# Patient Record
Sex: MALE | Race: WHITE | Employment: FULL TIME | URBAN - METROPOLITAN AREA
[De-identification: names, ages, dates, MRNs, and addresses within clinical notes are randomized per-mention and may not be internally consistent; named-entity substitution may affect disease eponyms.]

---

## 2017-01-17 ENCOUNTER — TELEPHONE (OUTPATIENT)
Dept: FAMILY MEDICINE | Facility: CLINIC | Age: 57
End: 2017-01-17

## 2017-01-17 DIAGNOSIS — R10.13 ABDOMINAL PAIN, EPIGASTRIC: Primary | ICD-10-CM

## 2017-01-17 NOTE — TELEPHONE ENCOUNTER
[facility] phone call message-patient reporting a symptom:     Symptom: Stomach suggestive to stress.    Same Day Visit Offered: Yes, accepted and schedule for 01/20/2017 at 9:00 AM.    Additional comments: Patient states experiencing symptom mentioned above.  Would like a call from nurse to discuss whether it would be best to come in to see Dr. Singleton about medication, or to see a specialist.  Please call patient.  Thank you.    OK to leave message on voice mail? Yes on [phone] or [phone].    Primary language: English      needed? No    Call taken on January 17, 2017 at 10:48 AM by Lisa Alberto

## 2017-01-17 NOTE — TELEPHONE ENCOUNTER
Attempted to reach patient to further discuss. Unable to reach. LVM with name and call back number.    Maggie Villafana RN

## 2017-01-17 NOTE — TELEPHONE ENCOUNTER
Patient returned call. States he was seen by PCP 5-6 weeks ago regarding stomach issues (see 12/8/16 encounter). Reports PCP advised patient start Zantac and other OTC medications. States he still is having the same stomach symptoms with only slight improvement for a short period with Maalox. Patient states plan was originally to try Zantac for 6 week trial  and return.    Patient currently scheduled for appointment with PCP on 1/20/17. Patient wants to know what the plan is moving forward. Patient is wondering if he should follow up with his PCP or if patient should be seen by a specialist.     Advised patient to keep appointment as scheduled. Informed patient I will reach out to PCP to discuss plan moving forward and return call to patient with PCP's recommendation. Patient in agreement.    Maggie Villafana RN

## 2017-01-18 NOTE — TELEPHONE ENCOUNTER
Spoke with PCP. Willing to place referral to [facility]. Called patient to inform. Patient would still like to keep appointment to discuss treatment in meantime with PCP.    Maggie Villafana RN

## 2017-01-20 ENCOUNTER — PRE VISIT (OUTPATIENT)
Dept: GASTROENTEROLOGY | Facility: CLINIC | Age: 57
End: 2017-01-20

## 2017-01-20 ENCOUNTER — OFFICE VISIT (OUTPATIENT)
Dept: FAMILY MEDICINE | Facility: CLINIC | Age: 57
End: 2017-01-20

## 2017-01-20 VITALS
WEIGHT: 229.4 LBS | DIASTOLIC BLOOD PRESSURE: 78 MMHG | TEMPERATURE: 98.5 F | RESPIRATION RATE: 16 BRPM | OXYGEN SATURATION: 96 % | HEIGHT: 72 IN | HEART RATE: 67 BPM | SYSTOLIC BLOOD PRESSURE: 117 MMHG | BODY MASS INDEX: 31.07 KG/M2

## 2017-01-20 DIAGNOSIS — E78.5 HYPERLIPIDEMIA LDL GOAL <70: ICD-10-CM

## 2017-01-20 DIAGNOSIS — K92.9 DIGESTIVE PROBLEMS: Primary | ICD-10-CM

## 2017-01-20 DIAGNOSIS — S89.91XA RIGHT KNEE INJURY, INITIAL ENCOUNTER: ICD-10-CM

## 2017-01-20 NOTE — MR AVS SNAPSHOT
After Visit Summary   1/20/2017    Hong Pool    MRN: [id number]           Patient Information     Date Of Birth          1960        Visit Information        Provider Department      1/20/2017 9:00 AM Jorge Singleton MD [facility]        Today's Diagnoses     Digestive problems    -  1       Care Instructions    Here is the plan from today's visit    1. Digestive problems  - Add metamucil to diet (start daily then change to every other day when stools are at right consistency)   - H Pylori antigen stool; Future - Drop off sample to lab when completed  - omeprazole (PRILOSEC) 20 MG CR capsule; Take 1 capsule (20 mg) by mouth daily  Dispense: 30 capsule; Refill: 1  - GI appointment in Feb 2017    Please call or return to clinic if your symptoms don't go away.    Follow up plan    Thank you for coming to [facility] today.  Lab Testing:  **If you had lab testing today and your results are reassuring or normal they will be mailed to you or sent through [facility] within 7 days.   **If the lab tests need quick action we will call you with the results.  The phone number we will call with results is # [phone] (home) . If this is not the best number please call our clinic and change the number.  Medication Refills:  If you need any refills please call your pharmacy and they will contact us.   If you need to  your refill at a new pharmacy, please contact the new pharmacy directly. The new pharmacy will help you get your medications transferred faster.   Scheduling:  If you have any concerns about today's visit or wish to schedule another appointment please call our office during normal business hours [phone] (8-5:00 M-F)  If a referral was made to a [facility] and you don't get a call from central scheduling please call [phone].  If a Mammogram was ordered for you at [facility] call [phone] to schedule or change your  appointment.  If you had an XRay/CT/Ultrasound/MRI ordered the number is [phone] to schedule or change your radiology appointment.   Medical Concerns:  If you have urgent medical concerns please call [phone] at any time of the day.  If you have a medical emergency please call 911.          Follow-ups after your visit        Your next 10 appointments already scheduled     Feb 08, 2017 11:40 AM   (Arrive by 11:25 AM)   New Patient Visit with Corine Ly MD   [facility] ([facility])    [address] [place] [address]  4th Floor  [facility] [id number] [phone]            Jun 28, 2017  1:00 PM   Lab with  LAB   [facility] ([facility])    [address] [place] [address]  1st [facility] [id number] [phone]            Jun 28, 2017  1:30 PM   (Arrive by 1:15 PM)   RETURN LIPID VISIT with Jaylan Evangelista MD   [facility] ([facility])    [address] [place] [address]  3rd [facility] [id number] [phone]              Future tests that were ordered for you today     Open Future Orders        Priority Expected Expires Ordered    H Pylori antigen stool Routine  2/19/2017 1/20/2017            Who to contact     Please call your clinic at [phone] to:    Ask questions about your health    Make or cancel appointments    Discuss your medicines    Learn about your test results    Speak to your doctor   If you have compliments or concerns about an experience at your clinic, or if you wish to file a complaint, please contact [facility] Patient Relations at [phone] or email us at [email or web address]         Additional Information About Your Visit        Care EveryWhere ID     This is your Care EveryWhere ID. This could be used by other organizations to access your [place] medical records  [id number]        Your Vitals Were     Pulse  Temperature Respirations Height BMI (Body Mass Index) Pulse Oximetry    67 98.5  F (36.9  C) (Oral) 16 6' (182.9 cm) 31.11 kg/m2 96%       Blood Pressure from Last 3 Encounters:   01/20/17 117/78   12/14/16 128/81   12/08/16 134/80    Weight from Last 3 Encounters:   01/20/17 229 lb 6.4 oz (104.055 kg)   12/14/16 235 lb 14.4 oz (107.004 kg)   12/08/16 232 lb 3.2 oz (105.325 kg)                 Today's Medication Changes          These changes are accurate as of: 1/20/17  9:35 AM.  If you have any questions, ask your nurse or doctor.               Start taking these medicines.        Dose/Directions    omeprazole 20 MG CR capsule   Commonly known as:  priLOSEC   Used for:  Digestive problems   Started by:  Jorge Singleton MD        Dose:  20 mg   Take 1 capsule (20 mg) by mouth daily   Quantity:  30 capsule   Refills:  1            Where to get your medicines      These medications were sent to [address] [address]     Phone:  [phone]    - omeprazole 20 MG CR capsule             Primary Care Provider Office Phone # Fax #    Jorge Singleton [phone]       [facility] [address] [address]        Thank you!     Thank you for choosing [facility]  for your care. Our goal is always to provide you with excellent care. Hearing back from our patients is one way we can continue to improve our services. Please take a few minutes to complete the written survey that you may receive in the mail after your visit with us. Thank you!             Your Updated Medication List - Protect others around you: Learn how to safely use, store and throw away your medicines at www.disposemymeds.org.          This list is accurate as of: 1/20/17  9:35 AM.  Always use your most recent med list.                   Brand Name Dispense Instructions for use    alpha-d-galactosidase tablet     540  tablet    Take 2 tablets by mouth 3 times daily (before meals)       aspirin 81 MG EC tablet     90 tablet    Take 1 tablet (81 mg) by mouth daily       Blood Pressure Monitor Kit     1 kit    1 each daily.       [employer] COENZYME Q10 100 MG Caps capsule   Generic drug:  co-enzyme Q-10      Take 1 capsule (100 mg) by mouth daily       ezetimibe 10 MG tablet    ZETIA    90 tablet    Take 1 tablet (10 mg) by mouth daily       ketotifen 0.025 % Soln ophthalmic solution    ZADITOR    1 Bottle    Place 1 drop into both eyes every 12 hours       Lactase 9000 UNITS Chew    LACTAID FAST ACT    270 tablet    Take 1 tablet by mouth daily Chew and swallow 1 tablet with first bite of dairy food prn       MENS MULTIVITAMIN PLUS Tabs     90 tablet    Take 1 tablet by mouth daily       omeprazole 20 MG CR capsule    priLOSEC    30 capsule    Take 1 capsule (20 mg) by mouth daily       potassium citrate 10 MEQ (1080 MG) CR tablet    UROCIT-K    270 tablet    Take 1 tablet (10 mEq) by mouth 3 times daily       pyridOXINE 100 MG Tabs    VITAMIN B6    90 tablet    Take 1 tablet (100 mg) by mouth daily       ranitidine 150 MG tablet    ZANTAC    60 tablet    Take 1 tablet (150 mg) by mouth 2 times daily       STATIN NOT PRESCRIBED (INTENTIONAL)     0 each    1 each continuous Statin not prescribed intentionally due to Intolerance (with supporting documentation of trying a statin at least once within the last 5 years)       vitamin D3 2000 UNITS Caps     90 capsule    Take 1 capsule by mouth daily

## 2017-01-20 NOTE — PATIENT INSTRUCTIONS
Here is the plan from today's visit    1. Digestive problems  - Add metamucil to diet (start daily then change to every other day when stools are at right consistency)   - H Pylori antigen stool; Future - Drop off sample to lab when completed  - omeprazole (PRILOSEC) 20 MG CR capsule; Take 1 capsule (20 mg) by mouth daily  Dispense: 30 capsule; Refill: 1  - GI appointment in Feb 2017    Please call or return to clinic if your symptoms don't go away.    Follow up plan    Thank you for coming to [facility] today.  Lab Testing:  **If you had lab testing today and your results are reassuring or normal they will be mailed to you or sent through [facility] within 7 days.   **If the lab tests need quick action we will call you with the results.  The phone number we will call with results is # [phone] (home) . If this is not the best number please call our clinic and change the number.  Medication Refills:  If you need any refills please call your pharmacy and they will contact us.   If you need to  your refill at a new pharmacy, please contact the new pharmacy directly. The new pharmacy will help you get your medications transferred faster.   Scheduling:  If you have any concerns about today's visit or wish to schedule another appointment please call our office during normal business hours [phone] (8-5:00 M-F)  If a referral was made to a [facility] and you don't get a call from central scheduling please call [phone].  If a Mammogram was ordered for you at [facility] call [phone] to schedule or change your appointment.  If you had an XRay/CT/Ultrasound/MRI ordered the number is [phone] to schedule or change your radiology appointment.   Medical Concerns:  If you have urgent medical concerns please call [phone] at any time of the day.  If you have a medical emergency please call [phone].

## 2017-01-20 NOTE — PROGRESS NOTES
HPI:       Hong Pool is a 56 year old who presents for the following  Patient presents with:  RECHECK: Digestion Issues  Pain: R knee pain X1week since falling on ice.      1. Fall - R knee     Onset: 1 week ago    Injury  Yes Details: slipped on ice and fell forward on knee     Description:   Location(s): Right Knee  Character: Sharp    Intensity: moderate    Progression of Symptoms: better    Accompanying Signs & Symptoms:  Other symptoms: none   History:   Previous similar pain: no      Worsened by    Overuse?: no  Morning Stiffness?:no    Alleviating factors:  Improved by: ice       Therapies Tried and outcome: Nothing         2. Digestion Issues    Description of the problem :Having stomach issues. Complains if discomfort down center of chest. Eating helps.      When did it start?: 6 Weeks     Intensity: moderate, severe    Progression of Symptoms:  same    Therapies Tried Zantac for 6 weeks    What has worked?  Nothing    - Taking Zantac but with no relief, but has some relief when taking with food/snack  - Digestion comes up at random times, not related to any specific activity   - GI consult/appointment scheduled for February 2017  - No longer taking calcium  - H pylori ordered     - Does not sound cardiac  No change in symptoms when going up stairs, or with activity  Actually feels better        3. Zetia  - Has been on Zetia for 4 weeks - prescribed by cardiologist  - Taking Zetia for 8 weeks total due to insurance formulary issue        Adherence and Exercise  Medication side effects: no  How often is a medication missed? Never  Are you able to follow the treatment plan? Yes  Exercise:walking  4-5 days/week for an average of 30-45 minutes     Problem, Medication and Allergy Lists were reviewed and are current.  Patient is an established patient of this clinic.         Review of Systems:   Review of Systems   Negative ROS except as noted above in HPI         Physical Exam:   Patient Vitals for  the past 24 hrs:   BP Temp Temp src Pulse Resp SpO2 Height Weight   01/20/17 0903 117/78 mmHg 98.5  F (36.9  C) Oral 67 16 96 % 6' (182.9 cm) 229 lb 6.4 oz (104.055 kg)     Body mass index is 31.11 kg/(m^2).  Vitals were reviewed and were normal     Physical Exam   Musculoskeletal:        Right knee: Normal.   Scab on lateral side of right knee         Results:     Results for orders placed or performed in visit on 12/09/16   Comprehensive metabolic panel   Result Value Ref Range    Sodium 143 133 - 144 mmol/L    Potassium 4.0 3.4 - 5.3 mmol/L    Chloride 109 94 - 109 mmol/L    Carbon Dioxide 28 20 - 32 mmol/L    Anion Gap 6 3 - 14 mmol/L    Glucose 126 (H) 70 - 99 mg/dL    Urea Nitrogen 21 7 - 30 mg/dL    Creatinine 1.08 0.66 - 1.25 mg/dL    GFR Estimate 71 >60 mL/min/1.7m2    GFR Estimate If Black 85 >60 mL/min/1.7m2    Calcium 8.9 8.5 - 10.1 mg/dL    Bilirubin Total 0.7 0.2 - 1.3 mg/dL    Albumin 3.7 3.4 - 5.0 g/dL    Protein Total 7.3 6.8 - 8.8 g/dL    Alkaline Phosphatase 64 40 - 150 U/L    ALT 63 0 - 70 U/L    AST 28 0 - 45 U/L   Lipid panel reflex to direct LDL   Result Value Ref Range    Cholesterol 250 (H) <200 mg/dL    Triglycerides 423 (H) <150 mg/dL    HDL Cholesterol 44 >39 mg/dL    LDL Cholesterol Calculated  <100 mg/dL     Cannot estimate LDL when triglyceride exceeds 400 mg/dL    Non HDL Cholesterol 206 (H) <130 mg/dL   LDL cholesterol direct   Result Value Ref Range    LDL Cholesterol Direct 146 (H) <100 mg/dL        Assessment and Plan     1. Digestive problems  Unimproved with eliminating potential irritants, using zantac  Check for H. Pylori  Trial of omeprazole  GI consult     - H Pylori antigen stool; Future  - omeprazole (PRILOSEC) 20 MG CR capsule; Take 1 capsule (20 mg) by mouth daily  Dispense: 30 capsule; Refill: 1    2. Right knee injury, initial encounter  Symptomatic care    3. Hyperlipidemia LDL goal <70  On zetia x 1 month  Will check lipids after 1 additional month.  If not  improving, PA for prulent may be stronger         There are no discontinued medications.  Options for treatment and follow-up care were reviewed with the patient. Hong Pool  engaged in the decision making process and verbalized understanding of the options discussed and agreed with the final plan.    Jorge Singleton MD

## 2017-01-20 NOTE — TELEPHONE ENCOUNTER
Received call back from patient. Stated all records are at [facility] with Dr. Singleton only. No outside records.     Closing encounter.

## 2017-01-24 DIAGNOSIS — K92.9 DIGESTIVE PROBLEMS: ICD-10-CM

## 2017-01-25 LAB
H PYLORI AG STL QL IA: NORMAL
MICRO REPORT STATUS: NORMAL
SPECIMEN SOURCE: NORMAL

## 2017-02-08 ENCOUNTER — TELEPHONE (OUTPATIENT)
Dept: GASTROENTEROLOGY | Facility: CLINIC | Age: 57
End: 2017-02-08

## 2017-02-08 ENCOUNTER — OFFICE VISIT (OUTPATIENT)
Dept: GASTROENTEROLOGY | Facility: CLINIC | Age: 57
End: 2017-02-08

## 2017-02-08 VITALS
DIASTOLIC BLOOD PRESSURE: 85 MMHG | HEART RATE: 62 BPM | OXYGEN SATURATION: 96 % | SYSTOLIC BLOOD PRESSURE: 137 MMHG | BODY MASS INDEX: 30.61 KG/M2 | HEIGHT: 72 IN | WEIGHT: 226 LBS

## 2017-02-08 DIAGNOSIS — R10.13 EPIGASTRIC PAIN: Primary | ICD-10-CM

## 2017-02-08 ASSESSMENT — PAIN SCALES - GENERAL: PAINLEVEL: MODERATE PAIN (5)

## 2017-02-08 NOTE — PATIENT INSTRUCTIONS
1. Continue omeprazole.  2. Ok to use antacid as needed for breakthrough discomfort.   3. Schedule an EGD.  4. Follow-up in GI clinic after EGD, 3-4 months.

## 2017-02-08 NOTE — MR AVS SNAPSHOT
After Visit Summary   2/8/2017    Hong Pool    MRN: [id number]           Patient Information     Date Of Birth          1960        Visit Information        Provider Department      2/8/2017 11:40 AM Corine Ly MD [facility]        Today's Diagnoses     Epigastric pain    -  1       Care Instructions    1. Continue omeprazole.  2. Ok to use antacid as needed for breakthrough discomfort.   3. Schedule an EGD.  4. Follow-up in GI clinic after EGD, 3-4 months.          Follow-ups after your visit        Your next 10 appointments already scheduled     Jun 28, 2017  1:00 PM   Lab with  LAB   [facility] ([facility])    [address] [place] [address]  1st [facility] [address] [phone]            Jun 28, 2017  1:30 PM   (Arrive by 1:15 PM)   RETURN LIPID VISIT with Jaylan Evangelista MD   [facility] ([facility])    [address] [place] [address]  3rd [facility] [address] [phone]              Future tests that were ordered for you today     Open Future Orders        Priority Expected Expires Ordered    EXAMEGD Routine 3/25/2017 3/25/2017 2/8/2017            Who to contact     Please call your clinic at [phone] to:    Ask questions about your health    Make or cancel appointments    Discuss your medicines    Learn about your test results    Speak to your doctor   If you have compliments or concerns about an experience at your clinic, or if you wish to file a complaint, please contact [facility] Patient Relations at [phone] or email us at [email or web address]         Additional Information About Your Visit        Care EveryWhere ID     This is your Care EveryWhere ID. This could be used by other organizations to access your [place] medical records  [id number]        Your Vitals Were     Pulse Height BMI (Body Mass Index) Pulse  Oximetry          62 1.829 m (6') 30.64 kg/m2 96%         Blood Pressure from Last 3 Encounters:   02/08/17 137/85   01/20/17 117/78   12/14/16 128/81    Weight from Last 3 Encounters:   02/08/17 102.513 kg (226 lb)   01/20/17 104.055 kg (229 lb 6.4 oz)   12/14/16 107.004 kg (235 lb 14.4 oz)               Primary Care Provider Office Phone # Fax #    Jorge Singleton [phone]       [facility] [address] [address]        Thank you!     Thank you for choosing [facility]  for your care. Our goal is always to provide you with excellent care. Hearing back from our patients is one way we can continue to improve our services. Please take a few minutes to complete the written survey that you may receive in the mail after your visit with us. Thank you!             Your Updated Medication List - Protect others around you: Learn how to safely use, store and throw away your medicines at www.disposemymeds.org.          This list is accurate as of: 2/8/17 12:42 PM.  Always use your most recent med list.                   Brand Name Dispense Instructions for use    alpha-d-galactosidase tablet     540 tablet    Take 2 tablets by mouth 3 times daily (before meals)       aspirin 81 MG EC tablet     90 tablet    Take 1 tablet (81 mg) by mouth daily       Blood Pressure Monitor Kit     1 kit    1 each daily.       [employer]S COENZYME Q10 100 MG Caps capsule   Generic drug:  co-enzyme Q-10      Take 1 capsule (100 mg) by mouth daily       ezetimibe 10 MG tablet    ZETIA    90 tablet    Take 1 tablet (10 mg) by mouth daily       Lactase 9000 UNITS Chew    LACTAID FAST ACT    270 tablet    Take 1 tablet by mouth daily Chew and swallow 1 tablet with first bite of dairy food prn       MENS MULTIVITAMIN PLUS Tabs     90 tablet    Take 1 tablet by mouth daily       omeprazole 20 MG CR capsule    priLOSEC    30 capsule    Take 1 capsule (20 mg) by mouth daily       pyridOXINE  100 MG Tabs    VITAMIN B6    90 tablet    Take 1 tablet (100 mg) by mouth daily       STATIN NOT PRESCRIBED (INTENTIONAL)     0 each    1 each continuous Statin not prescribed intentionally due to Intolerance (with supporting documentation of trying a statin at least once within the last 5 years)       vitamin D3 2000 UNITS Caps     90 capsule    Take 1 capsule by mouth daily

## 2017-02-08 NOTE — Clinical Note
"2/8/2017       RE: Hong Pool  [address]  [facility] [address]     Dear Colleague,    Thank you for referring your patient, Hong Pool, to the [facility] GASTROENTEROLOGY AND IBD at [facility]. Please see a copy of my visit note below.    GI CLINIC VISIT    CC/REFERRING MD:  Jorge Singleton  REASON FOR CONSULTATION:   Mr. Pool is a 56 year old male who I was asked to see in consultation at the request of Dr. Jorge Singleton for   Chief Complaint   Patient presents with     Consult     abd pain       ASSESSMENT/PLAN:  ***    Specialty Problems        Gastroenterology Diagnoses    Calculus of gallbladder without cholecystitis without obstruction                   [facility] {[facility]:[id number]::\"***\"}    Thank you for this consultation.  It was a pleasure to participate in the care of this patient; please contact us with any further questions.  A total of *** minutes was spent with this patient, >50% of which was counseling regarding the above delineated issues.    Corine Ly MD   of Medicine  Division of Gastroenterology, Hepatology and Nutrition  [facility]    ---------------------------------------------------------------------------------------------------  HPI:    Mr. Pool is a 56 year old male with *** and reported lactose intolerance       Started cholest-OFF plant stanol (as couldn't tolerated statins) - bad taste, loss of appetite, bloating overall felt \"odd\" and began to have a vague abdominal discomfort. So stopped using it as well as the potassium citrate he'd been on. Bad taste went away after stopping cholest-OFF, but other symptoms remained.      Seemed to progress - in Dec. Discomfort in upper abdomen to low sternal area, a feeling bloated/distended in upper abdomen similar to his lower abd sx with lactose ingestion. Feels like an \"empty stomach\" dull ache --> progresses to more focal burning pain.      Felt " much better with eating. Not aggravated by palpation. No associated symptoms     Saw [facility] in Dec - put on ranitidine and prn alum hydroxide, mg hydroxide, simethicone combo as needed for discomfort. Helped but not as much as with food.      Cut out EtOH, spicy foods.    Only other new med is Zetia (but this was started after abdominal symptoms).     Now on omeprazole - after 4 days or so feeling a little better.     Stool s  ROS:    GI ROS:  Dysphagia: none   Odynophagia: none  GERD symptoms: rare heartburn, no correlation with current discomfort  Nausea/vomiting: one episode of vomiting in Jan - felt it was a GI illness, did not correlate with discomfort, never recurred  Hematemesis/melena/hematochezia: none  Baseline stools: 1-2 stools daily, formed, soft   Stools were looser while on ranitidine  Eructation: no change from baseline  Flatus: no change from baseline  Abdominal pain: see HPI  Weight loss: none, has some weight gain  NSAIDs: 81 mg ASA daily, denies other NSAIDs  Oral steroids: none    Fevers/chills: none  Headache: none  Vision changes: none  Chest pain/pressure: none  Dyspnea: none  Skin changes/rashes: none  Lymphadenopathy: none  Myalgias/Arthralgias: none  Anxiety/depression: none      PROBLEM LIST  Patient Active Problem List    Diagnosis Date Noted     ASCVD (arteriosclerotic cardiovascular disease) 06/24/2012     Priority: High     CAD (coronary artery disease) 06/22/2012     Priority: High     Kidney stone 12/08/2016     Priority: Medium     Lithotripsy to remove 1 stone, cystoscopy to remove 2nd       Statin intolerance 06/23/2016     Priority: Medium     Calculus of gallbladder without cholecystitis without obstruction 06/21/2016     Priority: Medium     Borderline diabetes mellitus 11/26/2015     Priority: Medium     Coronary artery disease involving coronary bypass graft of native heart without angina pectoris 11/18/2015     Priority: Medium     Hyperlipidemia LDL goal <70 09/07/2012      Priority: Medium     Statin intolerance       Knee pain 01/10/2013     Fatigue 01/08/2013     Adjustment disorder with depressed mood 10/25/2012     Testicular hypofunction 10/25/2012     Problem list name updated by automated process. Provider to review       Simple goiter 10/25/2012     Problem list name updated by automated process. Provider to review       Pain in joint, shoulder region 12/07/2010       PERTINENT PAST MEDICAL HISTORY:  Past Medical History   Diagnosis Date     Depression      Hyperlipidemia      Started on statin 7/2007.  Discontinued 10/07 secondary to muscle aches and fatigue     Shoulder pain      Goiter      With normal TSH     Nephrolithiasis      Coronary artery disease 2012     CABG x4     Coronary artery disease involving coronary bypass graft of native heart without angina pectoris 11/18/2015     Statin intolerance 6/23/2016     Borderline diabetes mellitus 11/26/2015       PREVIOUS SURGERIES:  Past Surgical History   Procedure Laterality Date     Bypass graft artery coronary  6/24/2012     Procedure: BYPASS GRAFT ARTERY CORONARY;  Median Sternotomy, Coronary Artery Bypass Grafting x 4 using Left Internal Mammary and Left Saphenous Vein  with Endovein Harvesting. On Pump Oxygenation;  Surgeon: Genesis Larsen MD;  Location: [facility] OR       PREVIOUS ENDOSCOPY:  ***    ALLERGIES:     Allergies   Allergen Reactions     Lidocaine Rash     Break out     Band-Aid Anti-Itch      Cholestoff Complete [Plant Sterol Stanol-Pantethine] Other (See Comments)     Severe stomach pain     Lactose Diarrhea     Zocor [Simvastatin - High Dose] Other (See Comments)     Muscle aches/soreness, confusion, disorganized thinking       PERTINENT MEDICATIONS:  Current Outpatient Prescriptions   Medication     omeprazole (PRILOSEC) 20 MG CR capsule     ezetimibe (ZETIA) 10 MG tablet     alpha-d-galactosidase (BEANO) tablet     Lactase (LACTAID FAST ACT) 9000 UNITS CHEW     Cholecalciferol (VITAMIN D3) 2000  UNITS CAPS     pyridOXINE (VITAMIN B6) 100 MG TABS     co-enzyme Q-10 ([employer] COENZYME Q10) 100 MG CAPS     Multiple Vitamins-Minerals (MENS MULTIVITAMIN PLUS) TABS     aspirin 81 MG EC tablet     Blood Pressure Monitor KIT     STATIN NOT PRESCRIBED, INTENTIONAL,     No current facility-administered medications for this visit.       SOCIAL HISTORY:  Social History     Social History     Marital Status:      Spouse Name: N/A     Number of Children: N/A     Years of Education: N/A     Occupational History     Not on file.     Social History Main Topics     Smoking status: Never Smoker      Smokeless tobacco: Never Used     Alcohol Use: Yes      Comment: Occassional     Drug Use: No     Sexual Activity: Yes     Other Topics Concern     Not on file     Social History Narrative    .        FAMILY HISTORY:  Family History   Problem Relation Age of Onset     C.A.D. Other      Uncle     DIABETES Brother      CEREBROVASCULAR DISEASE Brother      Cardiovascular Sister      tachyarrhythmia       Past/family/social history reviewed and no changes    PHYSICAL EXAMINATION:  Vitals /85 mmHg  Pulse 62  Ht 1.829 m (6')  Wt 102.513 kg (226 lb)  BMI 30.64 kg/m2  SpO2 96%   Wt   Wt Readings from Last 2 Encounters:   02/08/17 102.513 kg (226 lb)   01/20/17 104.055 kg (229 lb 6.4 oz)      Gen: Pt sitting up on exam table in NAD, interactive and cooperative on exam***  Eyes: sclerae anicteric, no injection***  ENT:  OP clear, MMM***  Cardiac: RRR, nl S1, S2, radial pulses +2 b/l, no peripheral edema***  Resp: Clear on anterior exam***  GI: Normoactive BS, abd soft, flat, nontender***  Skin: Warm, dry, no jaundice, nails appear healthy***  Lymph: no submandibular, no cervical, and no supraclavicular LAD***  Neuro: alert, oriented, answers questions appropriately***      PERTINENT STUDIES:    Orders Only on 01/24/2017   Component Date Value Ref Range Status     Specimen Description 01/24/2017 Feces   Final     H Pylori  Antigen 01/24/2017    Final                    Value:Negative for Helicobacter pylori antigen by enzyme immunoassay. A negative   result indicates the absence of H. pylori antigen or that the level of antigen   is below the level of detection.       Micro Report Status 01/24/2017 FINAL 01/25/2017   Final               Again, thank you for allowing me to participate in the care of your patient.      Sincerely,    Corine Ly MD

## 2017-02-08 NOTE — LETTER
2/8/2017      RE: Hong Pool  [address]  [facility] [address]       GI CLINIC VISIT    CC/REFERRING MD:  Jorge Singleton  REASON FOR CONSULTATION:   Mr. Pool is a 56 year old male who I was asked to see in consultation at the request of Dr. Jorge Singleton for   Chief Complaint   Patient presents with     Consult     abd pain       ASSESSMENT/PLAN:  (R10.13) Epigastric pain  (primary encounter diagnosis)  Comment:    Epigastric/substernal pain resolves with eating and, more recently, improved (but not resolved) with PPI. Denies odynophagia, dysphagia, overt signs of GI bleeding. Denies any weight loss. Chronic ASA use does place patient at risk of PUD. H pylori a possibility as well. Would be an unusual presentation for celiac disease; most recent hgb 13.9.    Clinical history is not suggestive of GB disease, though noted history of cholelithiasis.     Will start with endoscopic evaluation with planned biopsies. Procedure discussed with patient and his wife. Questions addressed.   Plan:   1. Continue omeprazole.  2. Ok to use antacid as needed for breakthrough discomfort.   3. Schedule an EGD at earliest convenience.       RTC after EGD, in ~3 months    Thank you for this consultation.  It was a pleasure to participate in the care of this patient; please contact us with any further questions.  A total of 35 minutes was spent with this patient, >50% of which was counseling regarding the above delineated issues.    Corine Ly MD   of Medicine  Division of Gastroenterology, Hepatology and Nutrition  [facility]    ---------------------------------------------------------------------------------------------------  HPI:    Mr. Pool is a 56 year old male with CAD s/p 2v CABG, HL, MDD, cholelithiasis, nephrolithiasis, borderline diabetes and reported lactose intolerance who was referred to GI clinic for evaluation of substernal pain.      Mr. Pool believes the  "symptoms began in Nov 2016. He ties it to the time he began Cholest-OFF plant stanol. With these pills he reported a \"bad\" taste, loss of appetite, and a vague abdominal discomfort associated with bloating. He discontinued the Cholest-OFF as well as potassium chloride which he been on for some time prior. The \"bad\" taste resolved, though his other symptoms remained and seemed to progress throughout Dec 2016.      In December, Mr. Pool felt that his vague abdominal pain was more focused in the upper abdomen and substernal area (primarily at the level of his xiphoid process). This pain is described as \"like an empty stomach\" and a dull ache which progressed to a more focal burning pain. The discomfort is improved with eating. It does not worsen with direct palpation/pressure and Mr. Pool denies any associated symptoms.     He was seen at his primary care clinic, [facility], in December and placed on ranitidine and prn aluminum hydroxide/Mg hydroxide/simethicone combo. These both helped to some degree, but did not relieve the pain as eating does. He cut our alcohol and spicy foods with no marked change. He returned to his primary care clinic and omeprazole was started. He has only been on this for 3-4 days and feels that it is helping more than other interventions though his symptoms have not fully resolved.        Mr. Pool is on a daily 81 mg ASA, but denies use of any other NSAIDs. He has not been on any oral steroids as of late. His only other new medication is Zetia and he states this was started weeks after his abdominal symptoms began.     Please see GI ROS and systemic ROS below for futher details.     ROS:    GI ROS:  Dysphagia: none   Odynophagia: none  GERD symptoms: rare heartburn, no correlation with current discomfort  Nausea/vomiting: one episode of vomiting in Jan - felt it was a GI illness, did not correlate with discomfort, never recurred  Hematemesis/melena/hematochezia: none  Baseline stools: " 1-2 stools daily, formed, soft   Stools were looser while on ranitidine, now back to baseline  Eructation: no change from baseline  Flatus: no change from baseline  Abdominal pain: see HPI  Weight loss: none, has some weight gain  NSAIDs: 81 mg ASA daily, denies other NSAIDs  Oral steroids: none    Fevers/chills: none  Headache: none  Vision changes: none  Chest pain/pressure: none  Dyspnea: none  Skin changes/rashes: none  Lymphadenopathy: none  Myalgias/Arthralgias: none  Anxiety/depression: none      PROBLEM LIST  Patient Active Problem List    Diagnosis Date Noted     ASCVD (arteriosclerotic cardiovascular disease) 06/24/2012     Priority: High     CAD (coronary artery disease) 06/22/2012     Priority: High     Kidney stone 12/08/2016     Priority: Medium     Lithotripsy to remove 1 stone, cystoscopy to remove 2nd       Statin intolerance 06/23/2016     Priority: Medium     Calculus of gallbladder without cholecystitis without obstruction 06/21/2016     Priority: Medium     Borderline diabetes mellitus 11/26/2015     Priority: Medium     Coronary artery disease involving coronary bypass graft of native heart without angina pectoris 11/18/2015     Priority: Medium     Hyperlipidemia LDL goal <70 09/07/2012     Priority: Medium     Statin intolerance       Knee pain 01/10/2013     Fatigue 01/08/2013     Adjustment disorder with depressed mood 10/25/2012     Testicular hypofunction 10/25/2012     Problem list name updated by automated process. Provider to review       Simple goiter 10/25/2012     Problem list name updated by automated process. Provider to review       Pain in joint, shoulder region 12/07/2010       PERTINENT PAST MEDICAL HISTORY:  Past Medical History   Diagnosis Date     Depression      Hyperlipidemia      Started on statin 7/2007.  Discontinued 10/07 secondary to muscle aches and fatigue     Shoulder pain      Goiter      With normal TSH     Nephrolithiasis      Coronary artery disease 2012      CABG x4     Coronary artery disease involving coronary bypass graft of native heart without angina pectoris 11/18/2015     Statin intolerance 6/23/2016     Borderline diabetes mellitus 11/26/2015       PREVIOUS SURGERIES:  Past Surgical History   Procedure Laterality Date     Bypass graft artery coronary  6/24/2012     Procedure: BYPASS GRAFT ARTERY CORONARY;  Median Sternotomy, Coronary Artery Bypass Grafting x 4 using Left Internal Mammary and Left Saphenous Vein  with Endovein Harvesting. On Pump Oxygenation;  Surgeon: Genesis Larsen MD;  Location:  OR       ALLERGIES:     Allergies   Allergen Reactions     Lidocaine Rash     Break out     Band-Aid Anti-Itch      Cholestoff Complete [Plant Sterol Stanol-Pantethine] Other (See Comments)     Severe stomach pain     Lactose Diarrhea     Zocor [Simvastatin - High Dose] Other (See Comments)     Muscle aches/soreness, confusion, disorganized thinking       PERTINENT MEDICATIONS:  Current Outpatient Prescriptions   Medication     omeprazole (PRILOSEC) 20 MG CR capsule     ezetimibe (ZETIA) 10 MG tablet     alpha-d-galactosidase (BEANO) tablet     Lactase (LACTAID FAST ACT) 9000 UNITS CHEW     Cholecalciferol (VITAMIN D3) 2000 UNITS CAPS     pyridOXINE (VITAMIN B6) 100 MG TABS     co-enzyme Q-10 (CVS COENZYME Q10) 100 MG CAPS     Multiple Vitamins-Minerals (MENS MULTIVITAMIN PLUS) TABS     aspirin 81 MG EC tablet     Blood Pressure Monitor KIT     STATIN NOT PRESCRIBED, INTENTIONAL,     No current facility-administered medications for this visit.       SOCIAL HISTORY:  Social History     Social History     Marital Status:      Spouse Name: N/A     Number of Children: N/A     Years of Education: N/A     Occupational History     Not on file.     Social History Main Topics     Smoking status: Never Smoker      Smokeless tobacco: Never Used     Alcohol Use: Yes      Comment: Occassional     Drug Use: No     Sexual Activity: Yes     Other Topics Concern     Not  on file     Social History Narrative    .        FAMILY HISTORY:  Family History   Problem Relation Age of Onset     C.A.D. Other      Uncle     DIABETES Brother      CEREBROVASCULAR DISEASE Brother      Cardiovascular Sister      tachyarrhythmia       Past/family/social history reviewed and no changes    PHYSICAL EXAMINATION:  Vitals /85 mmHg  Pulse 62  Ht 1.829 m (6')  Wt 102.513 kg (226 lb)  BMI 30.64 kg/m2  SpO2 96%   Wt   Wt Readings from Last 2 Encounters:   02/08/17 102.513 kg (226 lb)   01/20/17 104.055 kg (229 lb 6.4 oz)   Gen: Pt sitting up on exam table in NAD, interactive and cooperative on exam  Eyes: sclerae anicteric, no injection  ENT:  OP clear, MMM  Cardiac: RRR, nl S1, S2, radial pulses +2 b/l, no peripheral edema  Resp: Clear on anterior exam  GI: Normoactive BS, abd soft, flat, nontender  Skin: Warm, dry, no jaundice, nails appear healthy  Lymph: no submandibular, no cervical, and no supraclavicular LAD  Neuro: alert, oriented, answers questions appropriately      PERTINENT STUDIES:    Orders Only on 01/24/2017   Component Date Value Ref Range Status     Specimen Description 01/24/2017 Feces   Final     H Pylori Antigen 01/24/2017    Final                    Value:Negative for Helicobacter pylori antigen by enzyme immunoassay. A negative   result indicates the absence of H. pylori antigen or that the level of antigen   is below the level of detection.       Micro Report Status 01/24/2017 FINAL 01/25/2017   Final         Corine Ly MD

## 2017-02-08 NOTE — PROGRESS NOTES
GI CLINIC VISIT    CC/REFERRING MD:  Jorge Singleton  REASON FOR CONSULTATION:   Mr. Pool is a 56 year old male who I was asked to see in consultation at the request of Dr. Jorge Singleton for   Chief Complaint   Patient presents with     Consult     abd pain       ASSESSMENT/PLAN:  (R10.13) Epigastric pain  (primary encounter diagnosis)  Comment:    Epigastric/substernal pain resolves with eating and, more recently, improved (but not resolved) with PPI. Denies odynophagia, dysphagia, overt signs of GI bleeding. Denies any weight loss. Chronic ASA use does place patient at risk of PUD. H pylori a possibility as well. Would be an unusual presentation for celiac disease; most recent hgb 13.9.    Clinical history is not suggestive of GB disease, though noted history of cholelithiasis.     Will start with endoscopic evaluation with planned biopsies. Procedure discussed with patient and his wife. Questions addressed.   Plan:   1. Continue omeprazole.  2. Ok to use antacid as needed for breakthrough discomfort.   3. Schedule an EGD at earliest convenience.       RTC after EGD, in ~3 months    Thank you for this consultation.  It was a pleasure to participate in the care of this patient; please contact us with any further questions.  A total of 35 minutes was spent with this patient, >50% of which was counseling regarding the above delineated issues.    Corine Ly MD   of Medicine  Division of Gastroenterology, Hepatology and Nutrition  [facility]    ---------------------------------------------------------------------------------------------------  HPI:    Mr. Pool is a 56 year old male with CAD s/p 2v CABG, HL, MDD, cholelithiasis, nephrolithiasis, borderline diabetes and reported lactose intolerance who was referred to GI clinic for evaluation of substernal pain.      Mr. Pool believes the symptoms began in Nov 2016. He ties it to the time he began Cholest-OFF plant stanol.  "With these pills he reported a \"bad\" taste, loss of appetite, and a vague abdominal discomfort associated with bloating. He discontinued the Cholest-OFF as well as potassium chloride which he been on for some time prior. The \"bad\" taste resolved, though his other symptoms remained and seemed to progress throughout Dec 2016.      In December, Mr. Pool felt that his vague abdominal pain was more focused in the upper abdomen and substernal area (primarily at the level of his xiphoid process). This pain is described as \"like an empty stomach\" and a dull ache which progressed to a more focal burning pain. The discomfort is improved with eating. It does not worsen with direct palpation/pressure and Mr. Pool denies any associated symptoms.     He was seen at his primary care clinic, [facility], in December and placed on ranitidine and prn aluminum hydroxide/Mg hydroxide/simethicone combo. These both helped to some degree, but did not relieve the pain as eating does. He cut our alcohol and spicy foods with no marked change. He returned to his primary care clinic and omeprazole was started. He has only been on this for 3-4 days and feels that it is helping more than other interventions though his symptoms have not fully resolved.        Mr. Pool is on a daily 81 mg ASA, but denies use of any other NSAIDs. He has not been on any oral steroids as of late. His only other new medication is Zetia and he states this was started weeks after his abdominal symptoms began.     Please see GI ROS and systemic ROS below for futher details.     ROS:    GI ROS:  Dysphagia: none   Odynophagia: none  GERD symptoms: rare heartburn, no correlation with current discomfort  Nausea/vomiting: one episode of vomiting in Jan - felt it was a GI illness, did not correlate with discomfort, never recurred  Hematemesis/melena/hematochezia: none  Baseline stools: 1-2 stools daily, formed, soft   Stools were looser while on ranitidine, now back to " baseline  Eructation: no change from baseline  Flatus: no change from baseline  Abdominal pain: see HPI  Weight loss: none, has some weight gain  NSAIDs: 81 mg ASA daily, denies other NSAIDs  Oral steroids: none    Fevers/chills: none  Headache: none  Vision changes: none  Chest pain/pressure: none  Dyspnea: none  Skin changes/rashes: none  Lymphadenopathy: none  Myalgias/Arthralgias: none  Anxiety/depression: none      PROBLEM LIST  Patient Active Problem List    Diagnosis Date Noted     ASCVD (arteriosclerotic cardiovascular disease) 06/24/2012     Priority: High     CAD (coronary artery disease) 06/22/2012     Priority: High     Kidney stone 12/08/2016     Priority: Medium     Lithotripsy to remove 1 stone, cystoscopy to remove 2nd       Statin intolerance 06/23/2016     Priority: Medium     Calculus of gallbladder without cholecystitis without obstruction 06/21/2016     Priority: Medium     Borderline diabetes mellitus 11/26/2015     Priority: Medium     Coronary artery disease involving coronary bypass graft of native heart without angina pectoris 11/18/2015     Priority: Medium     Hyperlipidemia LDL goal <70 09/07/2012     Priority: Medium     Statin intolerance       Knee pain 01/10/2013     Fatigue 01/08/2013     Adjustment disorder with depressed mood 10/25/2012     Testicular hypofunction 10/25/2012     Problem list name updated by automated process. Provider to review       Simple goiter 10/25/2012     Problem list name updated by automated process. Provider to review       Pain in joint, shoulder region 12/07/2010       PERTINENT PAST MEDICAL HISTORY:  Past Medical History   Diagnosis Date     Depression      Hyperlipidemia      Started on statin 7/2007.  Discontinued 10/07 secondary to muscle aches and fatigue     Shoulder pain      Goiter      With normal TSH     Nephrolithiasis      Coronary artery disease 2012     CABG x4     Coronary artery disease involving coronary bypass graft of native heart  without angina pectoris 11/18/2015     Statin intolerance 6/23/2016     Borderline diabetes mellitus 11/26/2015       PREVIOUS SURGERIES:  Past Surgical History   Procedure Laterality Date     Bypass graft artery coronary  6/24/2012     Procedure: BYPASS GRAFT ARTERY CORONARY;  Median Sternotomy, Coronary Artery Bypass Grafting x 4 using Left Internal Mammary and Left Saphenous Vein  with Endovein Harvesting. On Pump Oxygenation;  Surgeon: Genesis Larsen MD;  Location: [facility] OR       ALLERGIES:     Allergies   Allergen Reactions     Lidocaine Rash     Break out     Band-Aid Anti-Itch      Cholestoff Complete [Plant Sterol Stanol-Pantethine] Other (See Comments)     Severe stomach pain     Lactose Diarrhea     Zocor [Simvastatin - High Dose] Other (See Comments)     Muscle aches/soreness, confusion, disorganized thinking       PERTINENT MEDICATIONS:  Current Outpatient Prescriptions   Medication     omeprazole (PRILOSEC) 20 MG CR capsule     ezetimibe (ZETIA) 10 MG tablet     alpha-d-galactosidase (BEANO) tablet     Lactase (LACTAID FAST ACT) 9000 UNITS CHEW     Cholecalciferol (VITAMIN D3) 2000 UNITS CAPS     pyridOXINE (VITAMIN B6) 100 MG TABS     co-enzyme Q-10 (CVS COENZYME Q10) 100 MG CAPS     Multiple Vitamins-Minerals (MENS MULTIVITAMIN PLUS) TABS     aspirin 81 MG EC tablet     Blood Pressure Monitor KIT     STATIN NOT PRESCRIBED, INTENTIONAL,     No current facility-administered medications for this visit.       SOCIAL HISTORY:  Social History     Social History     Marital Status:      Spouse Name: N/A     Number of Children: N/A     Years of Education: N/A     Occupational History     Not on file.     Social History Main Topics     Smoking status: Never Smoker      Smokeless tobacco: Never Used     Alcohol Use: Yes      Comment: Occassional     Drug Use: No     Sexual Activity: Yes     Other Topics Concern     Not on file     Social History Narrative    .        FAMILY HISTORY:  Family  History   Problem Relation Age of Onset     C.A.D. Other      Uncle     DIABETES Brother      CEREBROVASCULAR DISEASE Brother      Cardiovascular Sister      tachyarrhythmia       Past/family/social history reviewed and no changes    PHYSICAL EXAMINATION:  Vitals /85 mmHg  Pulse 62  Ht 1.829 m (6')  Wt 102.513 kg (226 lb)  BMI 30.64 kg/m2  SpO2 96%   Wt   Wt Readings from Last 2 Encounters:   02/08/17 102.513 kg (226 lb)   01/20/17 104.055 kg (229 lb 6.4 oz)   Gen: Pt sitting up on exam table in NAD, interactive and cooperative on exam  Eyes: sclerae anicteric, no injection  ENT:  OP clear, MMM  Cardiac: RRR, nl S1, S2, radial pulses +2 b/l, no peripheral edema  Resp: Clear on anterior exam  GI: Normoactive BS, abd soft, flat, nontender  Skin: Warm, dry, no jaundice, nails appear healthy  Lymph: no submandibular, no cervical, and no supraclavicular LAD  Neuro: alert, oriented, answers questions appropriately      PERTINENT STUDIES:    Orders Only on 01/24/2017   Component Date Value Ref Range Status     Specimen Description 01/24/2017 Feces   Final     H Pylori Antigen 01/24/2017    Final                    Value:Negative for Helicobacter pylori antigen by enzyme immunoassay. A negative   result indicates the absence of H. pylori antigen or that the level of antigen   is below the level of detection.       Micro Report Status 01/24/2017 FINAL 01/25/2017   Final

## 2017-02-08 NOTE — NURSING NOTE
Chief Complaint   Patient presents with     Consult     abd pain       Filed Vitals:    02/08/17 1158   BP: 137/85   Pulse: 62   Height: 1.829 m (6')   Weight: 102.513 kg (226 lb)   SpO2: 96%       Body mass index is 30.64 kg/(m^2).

## 2017-02-14 ENCOUNTER — TELEPHONE (OUTPATIENT)
Dept: GASTROENTEROLOGY | Facility: CLINIC | Age: 57
End: 2017-02-14

## 2017-02-15 DIAGNOSIS — Z78.9 STATIN INTOLERANCE: ICD-10-CM

## 2017-02-15 DIAGNOSIS — R73.03 BORDERLINE DIABETES MELLITUS: ICD-10-CM

## 2017-02-15 DIAGNOSIS — E78.5 HYPERLIPIDEMIA LDL GOAL <70: ICD-10-CM

## 2017-02-15 LAB
ALBUMIN SERPL-MCNC: 4 G/DL (ref 3.4–5)
ALP SERPL-CCNC: 61 U/L (ref 40–150)
ALT SERPL W P-5'-P-CCNC: 59 U/L (ref 0–70)
ANION GAP SERPL CALCULATED.3IONS-SCNC: 8 MMOL/L (ref 3–14)
AST SERPL W P-5'-P-CCNC: 32 U/L (ref 0–45)
BILIRUB SERPL-MCNC: 1.6 MG/DL (ref 0.2–1.3)
BUN SERPL-MCNC: 19 MG/DL (ref 7–30)
CALCIUM SERPL-MCNC: 8.8 MG/DL (ref 8.5–10.1)
CHLORIDE SERPL-SCNC: 105 MMOL/L (ref 94–109)
CHOLEST SERPL-MCNC: 199 MG/DL
CO2 SERPL-SCNC: 27 MMOL/L (ref 20–32)
CREAT SERPL-MCNC: 1.05 MG/DL (ref 0.66–1.25)
GFR SERPL CREATININE-BSD FRML MDRD: 73 ML/MIN/1.7M2
GLUCOSE SERPL-MCNC: 113 MG/DL (ref 70–99)
HBA1C MFR BLD: 5.9 % (ref 4.3–6)
HDLC SERPL-MCNC: 52 MG/DL
LDLC SERPL CALC-MCNC: 109 MG/DL
NONHDLC SERPL-MCNC: 147 MG/DL
POTASSIUM SERPL-SCNC: 4.2 MMOL/L (ref 3.4–5.3)
PROT SERPL-MCNC: 7.4 G/DL (ref 6.8–8.8)
SODIUM SERPL-SCNC: 140 MMOL/L (ref 133–144)
TRIGL SERPL-MCNC: 189 MG/DL

## 2017-02-27 ENCOUNTER — TELEPHONE (OUTPATIENT)
Dept: FAMILY MEDICINE | Facility: CLINIC | Age: 57
End: 2017-02-27

## 2017-02-27 NOTE — TELEPHONE ENCOUNTER
[facility] phone call message- medication clarification/question:    Full Medication Name: omeprazole (PRILOSEC) 20 MG CR capsule    Question: Patient believes he is experiencing some side effects of the above medication. He reports a constant feeling of being bloated, even when he has not eaten anything for several hours. He states that this does not effect his appetite however, he is still able to eat a full meal. He states that prior to taking the medication he felt stomach pains, which have lessened, and he is wondering if maybe the bloating is more noticeable now that the pain is gone. He also reports that he feels congested and has a runny nose that have not gone away. He realizes this could be unrelated but notes that it is listed as a side effect on the medication's web site. Please call patient to discuss in more detail. Thank you!     Pharmacy confirmed as   [facility] - [address] [address]  Phone: [phone] Fax: [phone]  : Yes    OK to leave a message on voice mail? Yes    Call taken on February 27, 2017 at 2:39 PM by Paul Vargas

## 2017-03-03 ENCOUNTER — HOSPITAL ENCOUNTER (OUTPATIENT)
Facility: CLINIC | Age: 57
End: 2017-03-03
Attending: INTERNAL MEDICINE | Admitting: INTERNAL MEDICINE

## 2017-03-03 ENCOUNTER — TELEPHONE (OUTPATIENT)
Dept: FAMILY MEDICINE | Facility: CLINIC | Age: 57
End: 2017-03-03

## 2017-03-03 NOTE — TELEPHONE ENCOUNTER
Spoke with pt.  Symptoms have recurred.  He affirms that the association between omeprazole and bloating is not definitively established.  He also affirms that ranitidine and antacid meds are not effective.      Plan:  He will retry the omeprazole.  If bloating recurs, call clinic again in 3-4 days and we will try an alternate PPI.

## 2017-03-03 NOTE — TELEPHONE ENCOUNTER
[facility] phone call message- medication clarification/question:    Full Medication Name:    omeprazole (PRILOSEC) 20 MG CR capsule    Question: patient states he was taking above medication more a month and started noticing a bad side effect, so he was told to discontinue using the med. Patient states he was also suppose to take the medication until his surgery in April but is not sure if it would be safe to be off this medication that long.     Pharmacy confirmed as [facility] - [address]: Yes    OK to leave a message on voice mail? Yes     Primary language: English      needed? No    Call taken on March 3, 2017 at 3:41 PM by Jennyfer Aguayo

## 2017-03-03 NOTE — TELEPHONE ENCOUNTER
Attempted to reach patient at number in encounter. Patient is not home. Spoke with wife who provided cell number to reach patient at [phone].    Patient was supposed to have upcoming appointment with Gastro this next week. Patient's appointment got pushed back another 4 weeks. Patient states some of the sharp pains Omeprazole helped with are returning. Per chart review, patient called on 2/27/17 reporting negative side effects of bloating with taking medication. Was informed they can discontinue medication and follow up with Gastro as scheduled.    Patient requesting alternative of Omeprazole be sent to pharmacy to take in meantime to help with burning symptoms since appointment with Gastro pushed back. Patient has tried Zantac in the past and reports not effective.     Message routed to PCP. Please review and send if appropriate.    Maggie Villafana RN

## 2017-03-07 ENCOUNTER — TELEPHONE (OUTPATIENT)
Dept: CARDIOLOGY | Facility: CLINIC | Age: 57
End: 2017-03-07

## 2017-03-08 NOTE — TELEPHONE ENCOUNTER
Prior Authorization [employer] Medication Request  Medication/Dose: Praluent 75 mg   Diagnosis and ICD code: Hyperlipidemia goal LDL <70 [E78.5], Statin intolerance [Z78.9], Coronary artery disease involving coronary bypass graft of native heart without angina pectoris [I25.810], Borderline diabetes mellitus [R73.03]  New/Renewal/Insurance Change PA: New, appeal   Previously Tried and Failed Therapies: Atorvastatin, Pravastatin (patient unable to tolerate 10 mg three times weekly), plant sterols; Presently taking Zetia as recommended in previous prior authorization in 11/2016 request for Praluent approval.  Goal LDL <70 not achieved.     Component      Latest Ref Rng & Units 11/30/2015 6/24/2016   Cholesterol      <200 mg/dL 244 (H) 214 (H)   Triglycerides      <150 mg/dL 342 (H) 234 (H)   HDL Cholesterol      >39 mg/dL 45 42   LDL Cholesterol Calculated      <100 mg/dL 131 (H) 125 (H)   Non HDL Cholesterol      <130 mg/dL 199 (H) 172 (H)   LDL Cholesterol Direct      <100 mg/dL       Component      Latest Ref Rng & Units 12/9/2016   Cholesterol      <200 mg/dL 250 (H)   Triglycerides      <150 mg/dL 423 (H)   HDL Cholesterol      >39 mg/dL 44   LDL Cholesterol Calculated      <100 mg/dL Cannot estimate LDL when triglyceride exceeds 400 mg/dL   Non HDL Cholesterol      <130 mg/dL 206 (H)   LDL Cholesterol Direct      <100 mg/dL 146 (H)     Component      Latest Ref Rng & Units 2/15/2017   Cholesterol      <200 mg/dL 199   Triglycerides      <150 mg/dL 189 (H)   HDL Cholesterol      >39 mg/dL 52   LDL Cholesterol Calculated      <100 mg/dL 109 (H)   Non HDL Cholesterol      <130 mg/dL 147 (H)   LDL Cholesterol Direct      <100 mg/dL

## 2017-03-09 ENCOUNTER — TELEPHONE (OUTPATIENT)
Dept: CARDIOLOGY | Facility: CLINIC | Age: 57
End: 2017-03-09

## 2017-03-09 NOTE — TELEPHONE ENCOUNTER
Pt called to receive results of lipid panel from 2/15. Reviewed results with patient. Pt would like to receive a call from [facility]' care team to discuss his Zetia and his overall plan of care based on these results. Pt said it is ok to leave a voicemail at his home number if necessary.

## 2017-03-13 NOTE — TELEPHONE ENCOUNTER
[employer] Prior Authorization Team   Phone: [phone]  Fax: [phone]    PRIOR AUTHORIZATION DENIED    Medication: Praluent - Denied    Denial Date: 12/8/2016    Denial Rational: According to Rep at [employer] prior authorization department, denial from 12/8/16 (see telephone encounter dated 11/14/16) is still in place and upheld for 180 days from 6/6/17. They will not review another initial request until 6/7/17. If provider feels patient should be on this medication, provider can appeal the denial from 12/8/16.    Appeal Information: Appeal Allowed/Information: Appeals can be faxed to [employer] Appeal Program at fax number [phone]. Please let us know if you would like to appeal this denial. If appealing, please provide a letter of medical necessity.

## 2017-03-23 DIAGNOSIS — K92.9 DIGESTIVE PROBLEMS: ICD-10-CM

## 2017-03-23 NOTE — TELEPHONE ENCOUNTER
Date of last visit at clinic: 1/20/17    Please complete refill and CLOSE ENCOUNTER.  Closing the encounter signifies the refill is complete.

## 2017-04-11 ENCOUNTER — HOSPITAL ENCOUNTER (OUTPATIENT)
Facility: CLINIC | Age: 57
Discharge: HOME | End: 2017-04-11
Attending: INTERNAL MEDICINE | Admitting: INTERNAL MEDICINE
Payer: COMMERCIAL

## 2017-04-11 ENCOUNTER — SURGERY (OUTPATIENT)
Age: 57
End: 2017-04-11

## 2017-04-11 VITALS
BODY MASS INDEX: 31.15 KG/M2 | RESPIRATION RATE: 13 BRPM | SYSTOLIC BLOOD PRESSURE: 131 MMHG | DIASTOLIC BLOOD PRESSURE: 81 MMHG | HEIGHT: 72 IN | WEIGHT: 230 LBS | OXYGEN SATURATION: 93 %

## 2017-04-11 LAB — UPPER GI ENDOSCOPY: NORMAL

## 2017-04-11 PROCEDURE — 43239 EGD BIOPSY SINGLE/MULTIPLE: CPT | Performed by: INTERNAL MEDICINE

## 2017-04-11 PROCEDURE — 25000132 ZZH RX MED GY IP 250 OP 250 PS 637: Performed by: INTERNAL MEDICINE

## 2017-04-11 PROCEDURE — 25000125 ZZHC RX 250: Performed by: INTERNAL MEDICINE

## 2017-04-11 PROCEDURE — 88305 TISSUE EXAM BY PATHOLOGIST: CPT | Performed by: INTERNAL MEDICINE

## 2017-04-11 PROCEDURE — 25000128 H RX IP 250 OP 636: Performed by: INTERNAL MEDICINE

## 2017-04-11 PROCEDURE — G0500 MOD SEDAT ENDO SERVICE >5YRS: HCPCS | Performed by: INTERNAL MEDICINE

## 2017-04-11 PROCEDURE — 88342 IMHCHEM/IMCYTCHM 1ST ANTB: CPT | Performed by: INTERNAL MEDICINE

## 2017-04-11 RX ORDER — FLUMAZENIL 0.1 MG/ML
0.2 INJECTION, SOLUTION INTRAVENOUS
Status: DISCONTINUED | OUTPATIENT
Start: 2017-04-11 | End: 2017-04-11 | Stop reason: HOSPADM

## 2017-04-11 RX ORDER — ONDANSETRON 4 MG/1
4 TABLET, ORALLY DISINTEGRATING ORAL EVERY 6 HOURS PRN
Status: DISCONTINUED | OUTPATIENT
Start: 2017-04-11 | End: 2017-04-11 | Stop reason: HOSPADM

## 2017-04-11 RX ORDER — NALOXONE HYDROCHLORIDE 0.4 MG/ML
.1-.4 INJECTION, SOLUTION INTRAMUSCULAR; INTRAVENOUS; SUBCUTANEOUS
Status: DISCONTINUED | OUTPATIENT
Start: 2017-04-11 | End: 2017-04-11 | Stop reason: HOSPADM

## 2017-04-11 RX ORDER — ONDANSETRON 2 MG/ML
4 INJECTION INTRAMUSCULAR; INTRAVENOUS EVERY 6 HOURS PRN
Status: DISCONTINUED | OUTPATIENT
Start: 2017-04-11 | End: 2017-04-11 | Stop reason: HOSPADM

## 2017-04-11 RX ORDER — FENTANYL CITRATE 50 UG/ML
INJECTION, SOLUTION INTRAMUSCULAR; INTRAVENOUS PRN
Status: DISCONTINUED | OUTPATIENT
Start: 2017-04-11 | End: 2017-04-11 | Stop reason: HOSPADM

## 2017-04-11 RX ADMIN — FENTANYL CITRATE 50 MCG: 50 INJECTION, SOLUTION INTRAMUSCULAR; INTRAVENOUS at 11:08

## 2017-04-11 RX ADMIN — MIDAZOLAM HYDROCHLORIDE 2 MG: 1 INJECTION, SOLUTION INTRAMUSCULAR; INTRAVENOUS at 11:08

## 2017-04-11 RX ADMIN — FENTANYL CITRATE 50 MCG: 50 INJECTION, SOLUTION INTRAMUSCULAR; INTRAVENOUS at 11:12

## 2017-04-11 RX ADMIN — BENZOCAINE 1 SPRAY: 220 SPRAY, METERED PERIODONTAL at 11:08

## 2017-04-11 NOTE — IP AVS SNAPSHOT
MRN:[id number]                      After Visit Summary   4/11/2017    Hong Pool    MRN: [id number]           Thank you!     Thank you for choosing [facility] for your care. Our goal is always to provide you with excellent care. Hearing back from our patients is one way we can continue to improve our services. Please take a few minutes to complete the written survey that you may receive in the mail after you visit with us. Thank you!        Patient Information     Date Of Birth          1960        About your hospital stay     You were admitted on:  April 11, 2017 You last received care in the:  [facility], Endoscopy    You were discharged on:  April 11, 2017       Who to Call     For medical emergencies, please call 911.  For non-urgent questions about your medical care, please call your primary care provider or clinic, [phone]  For questions related to your surgery, please call your surgery clinic        Attending Provider     Provider Corine Desir MD Internal Medicine       Primary Care Provider Office Phone # Fax #    Jorge Singleton [phone]       [facility] [address] [address]        Your next 10 appointments already scheduled     May 03, 2017 10:20 AM CDT   (Arrive by 10:05 AM)   Return Visit with Corine Ly MD   [facility] ([facility])    [address] [place] [address]  [address] [facility] [id number] [phone]            Jun 23, 2017  8:30 AM CDT   Lab with UC LAB   [facility] ([facility])    [address] [place] [address]  [address] [facility] [id number] [phone]            Jun 28, 2017  1:30 PM CDT   (Arrive by 1:15 PM)   RETURN LIPID VISIT with Jaylan Evangelista MD   [facility] ([facility])    [address] [place] [address]  [address] [facility] [address]  "  [phone]              Pending Results     No orders found from 2017 to 2017.            Admission Information     Date & Time Provider Department Dept. Phone    2017 Corine Ly MD [facility] [place], Endoscopy [phone]      Your Vitals Were     Blood Pressure Respirations Height Weight Pulse Oximetry BMI (Body Mass Index)    128/79 11 1.829 m (6') 104.3 kg (230 lb) 98% 31.19 kg/m2      MyChart Information     [employer] lets you send messages to your doctor, view your test results, renew your prescriptions, schedule appointments and more. To sign up, go to [email or web address] . Click on \"Log in\" on the left side of the screen, which will take you to the Welcome page. Then click on \"Sign up Now\" on the right side of the page.     You will be asked to enter the access code listed below, as well as some personal information. Please follow the directions to create your username and password.     Your access code is: [email or web address]  Expires: 7/10/2017 11:45 AM     Your access code will  in 90 days. If you need help or a new code, please call your [place] clinic or [phone].        Care EveryWhere ID     This is your Care EveryWhere ID. This could be used by other organizations to access your [place] medical records  [id number]           Review of your medicines      UNREVIEWED medicines. Ask your doctor about these medicines        Dose / Directions    alpha-d-galactosidase tablet   Used for:  Bloating        Dose:  2 tablet   Take 2 tablets by mouth 3 times daily (before meals)   Quantity:  540 tablet   Refills:  4       aspirin 81 MG EC tablet        Dose:  81 mg   Take 1 tablet (81 mg) by mouth daily   Quantity:  90 tablet   Refills:  4       CVS COENZYME Q10 100 MG Caps capsule   Generic drug:  co-enzyme Q-10        Dose:  100 mg   Take 1 capsule (100 mg) by mouth daily   Refills:  0       ezetimibe 10 MG tablet   Commonly known as:  ZETIA   Used for:  " Hyperlipidemia LDL goal <70        Dose:  10 mg   Take 1 tablet (10 mg) by mouth daily   Quantity:  90 tablet   Refills:  3       Lactase 9000 UNITS Chew   Commonly known as:  LACTAID FAST ACT   Used for:  Lactose intolerance        Dose:  1 tablet   Take 1 tablet by mouth daily Chew and swallow 1 tablet with first bite of dairy food prn   Quantity:  270 tablet   Refills:  4       MENS MULTIVITAMIN PLUS Tabs   Used for:  Preventive measure        Dose:  1 tablet   Take 1 tablet by mouth daily   Quantity:  90 tablet   Refills:  4       omeprazole 20 MG CR capsule   Commonly known as:  priLOSEC   Used for:  Digestive problems        Dose:  20 mg   Take 1 capsule (20 mg) by mouth daily   Quantity:  30 capsule   Refills:  1       pyridOXINE 100 MG Tabs   Commonly known as:  VITAMIN B6   Used for:  Coronary artery disease involving autologous artery coronary bypass graft without angina pectoris        Dose:  100 mg   Take 1 tablet (100 mg) by mouth daily   Quantity:  90 tablet   Refills:  4       STATIN NOT PRESCRIBED (INTENTIONAL)   Used for:  Hyperlipidemia LDL goal <70, Statin intolerance        Dose:  1 each   1 each continuous Statin not prescribed intentionally due to Intolerance (with supporting documentation of trying a statin at least once within the last 5 years)   Quantity:  0 each   Refills:  0       vitamin D3 2000 UNITS Caps   Used for:  Vitamin D deficiency disease        Dose:  1 capsule   Take 1 capsule by mouth daily   Quantity:  90 capsule   Refills:  4         CONTINUE these medicines which have NOT CHANGED        Dose / Directions    Blood Pressure Monitor Kit   Used for:  S/P CABG (coronary artery bypass graft), CAD (coronary artery disease)        Dose:  1 each   1 each daily.   Quantity:  1 kit   Refills:  0                Protect others around you: Learn how to safely use, store and throw away your medicines at www.disposemymeds.org.             Medication List: This is a list of all your  medications and when to take them. Check marks below indicate your daily home schedule. Keep this list as a reference.      Medications           Morning Afternoon Evening Bedtime As Needed    alpha-d-galactosidase tablet   Take 2 tablets by mouth 3 times daily (before meals)                                aspirin 81 MG EC tablet   Take 1 tablet (81 mg) by mouth daily                                Blood Pressure Monitor Kit   1 each daily.                                CVS COENZYME Q10 100 MG Caps capsule   Take 1 capsule (100 mg) by mouth daily   Generic drug:  co-enzyme Q-10                                ezetimibe 10 MG tablet   Commonly known as:  ZETIA   Take 1 tablet (10 mg) by mouth daily                                Lactase 9000 UNITS Chew   Commonly known as:  LACTAID FAST ACT   Take 1 tablet by mouth daily Chew and swallow 1 tablet with first bite of dairy food prn                                MENS MULTIVITAMIN PLUS Tabs   Take 1 tablet by mouth daily                                omeprazole 20 MG CR capsule   Commonly known as:  priLOSEC   Take 1 capsule (20 mg) by mouth daily                                pyridOXINE 100 MG Tabs   Commonly known as:  VITAMIN B6   Take 1 tablet (100 mg) by mouth daily                                STATIN NOT PRESCRIBED (INTENTIONAL)   1 each continuous Statin not prescribed intentionally due to Intolerance (with supporting documentation of trying a statin at least once within the last 5 years)                                vitamin D3 2000 UNITS Caps   Take 1 capsule by mouth daily

## 2017-04-11 NOTE — OR NURSING
EGD with biopsies done under conscious sedation (100mcg fentanyl and 2mg versed), pt tolerate well. VSS on 2L NC throughout procedure, weaned to room air.

## 2017-04-11 NOTE — LETTER
April 13, 2017       TO: Hnog Pool  [address] [facility] [address]       Dear Mr. Pool,    I am writing to inform you of your biopsy results from your recent upper endoscopy.     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 This is a benign condition that does not require any further follow-up.     Your stomach showed some chronic inflammation. The inflammation is often caused by medications (typically non-steroidal anti-inflammatory drugs such as aspirin and ibuprofen), bile, or a bacteria called Helicobacter pylori. There was no evidence of this bacteria in your biopsies. There were no cells concerning for cancer in your biopsies.     As we discussed on the day of your procedure, the polyps seen in your stomach are fundic gland polyps. This is confirmed on the biopsies. They can form in your stomach from longterm proton-pump inhibitor use (such as omeprazole/Prilosec). These are benign and don't require any further intervention.     If you have any questions, please don't hesitate to contact our GI RN Clinic Coordinators at [phone] (select option #3 for nurse line).       Sincerely,     Corine Ly MD    [facility]  Division of Gastroenterology, Hepatology and Nutrition

## 2017-04-11 NOTE — IP AVS SNAPSHOT
[facility], [place], Endoscopy    [address] [facility] [address]    Phone:  [phone]                                       After Visit Summary   4/11/2017    Hong Pool    MRN: [id number]           After Visit Summary Signature Page     I have received my discharge instructions, and my questions have been answered. I have discussed any challenges I see with this plan with the nurse or doctor.    ..........................................................................................................................................  Patient/Patient Representative Signature      ..........................................................................................................................................  Patient Representative Print Name and Relationship to Patient    ..................................................               ................................................  Date                                            Time    ..........................................................................................................................................  Reviewed by Signature/Title    ...................................................              ..............................................  Date                                                            Time

## 2017-04-12 LAB — COPATH REPORT: NORMAL

## 2017-04-27 ENCOUNTER — TELEPHONE (OUTPATIENT)
Dept: GASTROENTEROLOGY | Facility: CLINIC | Age: 57
End: 2017-04-27

## 2017-05-03 ENCOUNTER — OFFICE VISIT (OUTPATIENT)
Dept: GASTROENTEROLOGY | Facility: CLINIC | Age: 57
End: 2017-05-03

## 2017-05-03 VITALS
SYSTOLIC BLOOD PRESSURE: 146 MMHG | HEART RATE: 65 BPM | OXYGEN SATURATION: 97 % | WEIGHT: 233 LBS | HEIGHT: 72 IN | DIASTOLIC BLOOD PRESSURE: 102 MMHG | BODY MASS INDEX: 31.56 KG/M2

## 2017-05-03 DIAGNOSIS — K92.9 DIGESTIVE PROBLEMS: ICD-10-CM

## 2017-05-03 DIAGNOSIS — R10.13 ABDOMINAL PAIN, EPIGASTRIC: Primary | ICD-10-CM

## 2017-05-03 DIAGNOSIS — K30 FUNCTIONAL DYSPEPSIA: ICD-10-CM

## 2017-05-03 DIAGNOSIS — Z86.0100 HISTORY OF COLONIC POLYPS: ICD-10-CM

## 2017-05-03 DIAGNOSIS — K21.9 GASTROESOPHAGEAL REFLUX DISEASE WITHOUT ESOPHAGITIS: ICD-10-CM

## 2017-05-03 ASSESSMENT — PAIN SCALES - GENERAL: PAINLEVEL: SEVERE PAIN (6)

## 2017-05-03 NOTE — PROGRESS NOTES
GI CLINIC VISIT    CC: follow-up       ASSESSMENT/PLAN:  (R10.13) Abdominal pain, epigastric  (primary encounter diagnosis)  (K30) Functional dyspepsia  (K21.9) Gastroesophageal reflux disease without esophagitis  Comment:    Pt with GERD which has resolved with daily PPI. Epigastric pain markedly improved with PPI, though not fully resolved. Though mild chronic gastritis on stomach biopsies, endoscopic exam was without signficant inflammation. Suspect that dyspepsia is a contributor to this discomfort and has responded to PPI. We discussed a trial of BID omeprazole dosing vs adding in an H2 blocker. Mr. Pool would prefer trial of PPI given prior loose stools with use of ranitidine in the past.   Reviewed with patient recent studies regarding longterm PPI medications (with focus on studies regarding CKD, CVA, and dementia). Notably omeprazole with lower risk for CVA as compared with other PPIs and most significantly, the risk on lowest doses of PPI is felt to be none to minimal for CVA. From patient's QOL perspective, Mr. Pool is open to continued daily 20 mg omeprazole and trial of  20 mg BID dosing with re-evaluation. Pt to contact clinic via phone for an update. Pending response could consider intermittent BID dosing for periods of increased symptoms (if pt desires to avoid chronic PPI use).    Future considerations could be for cholestyramine.    Much time was spent reviewing the pathophysiology of GERD and dyspepsia. Pt with many questions regarding role of stress, lifestyle in contributing to symptoms. Diet and lifestyle changes were reviewed as outlined below. Pt is interested in pursuing acupuncture and massage which would be acceptable from GI perspective.   Plan:   1. Continue symptom and diet tracking  2. Increase your omeprazole (Prilosec) to 20 mg twice daily.  3. Contact GI clinic with an update in 4-8 weeks.  4. GERD diet and lifestyle changes   5. Avoid prolonged periods of fasting/empty  "stomach.   6. Avoid overfilling stomach/over-eating.       (Z86.010) History of colonic polyps  Pt is due for surveillance colonoscopy. He is interested in follow-up with the GI provider who previously did his procedure.  We discussed that we're happy to arrange this within the [facility] system if needed.         RTC as needed    It was a pleasure to participate in the care of this patient; please contact us with any further questions.  A total of 20 minutes was spent with this patient, >50% of which was counseling regarding the above delineated issues.    Corine Ly MD   of Medicine  Division of Gastroenterology, Hepatology and Nutrition  [facility]    ---------------------------------------------------------------------------------------------------  HPI:    Mr. Pool is a 56 year old male with CAD s/p 2v CABG, HL, MDD, cholelithiasis, nephrolithiasis, borderline diabetes and reported lactose intolerance who was initially seen in GI clinic for evaluation of substernal and epigastric pain in Feb 2017. He returns today for follow-up.     Summarized from my prior documentation:  Mr. Pool's symptoms began in Nov 2016 with a \"bad\" taste, loss of appetite, and a vague abdominal discomfort associated with bloating. The \"bad-taste\" resolved after discontinuing Cholest-OFF and potassium chloride though the other symptoms remained and progressed. In Dec 2016, Mr. Pool's abdominal pain became focused in the upper abdomen and substernal area (primarily at the level of his xiphoid process). This pain is described as \"like an empty stomach\" and a dull ache which progressed to a more focal burning pain. The discomfort is improved with eating. It does not worsen with direct palpation/pressure and Mr. Pool denies any associated symptoms. It did not respond to ranitidine and prn aluminum hydroxide/Mg hydroxide/simethicone combo. He cut our alcohol and spicy foods with no marked " change. He had just started PPI at the time of our last visit (a couple days of treatment) and thought it might be helpful, though symptoms persisted.        An EGD was undertaken which revealed a normal esophagus, normal stomach with small fundic gland polyps, and normal duodenum with a small focus of gastric heretopia. Gastric biopsies demonstrated mild chronic gastritis with no H pylori and no IM. Since the EGD, we recommended restarting PPI which he has continued every day.      Today, Mr. Pool reports he has been doing well. With restart and continuation of omeprazole his symptoms improved rapidly. He went from episodes of pain multiple times a day to having symptom-free days. He still has occasional discomfort (every few days, rarely has symptoms which occur more than once daily, more short-lived) which are less intense. He does use liquid antacids for these episodes, though they minimally improve things.      His substernal burning/GERD has resolved completely with PPI.      In regards to his GI ROS. He states he had no further episodes of emesis since his viral GI illness in Jan. His intermittent issues with gas/bloating/flatulence and loose stools attributed to lactose intolerance have improved with switching lactase enzyme brands. He states that otherwise he is feeling quite well.     ROS:    Remainder of comprehensive ROS is otherwise negative.    PROBLEM LIST  Patient Active Problem List    Diagnosis Date Noted     ASCVD (arteriosclerotic cardiovascular disease) 06/24/2012     Priority: High     CAD (coronary artery disease) 06/22/2012     Priority: High     Kidney stone 12/08/2016     Priority: Medium     Lithotripsy to remove 1 stone, cystoscopy to remove 2nd       Statin intolerance 06/23/2016     Priority: Medium     Calculus of gallbladder without cholecystitis without obstruction 06/21/2016     Priority: Medium     Borderline diabetes mellitus 11/26/2015     Priority: Medium     Coronary artery  disease involving coronary bypass graft of native heart without angina pectoris 11/18/2015     Priority: Medium     Hyperlipidemia LDL goal <70 09/07/2012     Priority: Medium     Statin intolerance       Knee pain 01/10/2013     Fatigue 01/08/2013     Adjustment disorder with depressed mood 10/25/2012     Testicular hypofunction 10/25/2012     Problem list name updated by automated process. Provider to review       Simple goiter 10/25/2012     Problem list name updated by automated process. Provider to review       Pain in joint, shoulder region 12/07/2010       PERTINENT MEDICATIONS:  Current Outpatient Prescriptions   Medication     omeprazole (PRILOSEC) 20 MG CR capsule     alpha-d-galactosidase (BEANO) tablet     Lactase (LACTAID FAST ACT) 9000 UNITS CHEW     Cholecalciferol (VITAMIN D3) 2000 UNITS CAPS     co-enzyme Q-10 ([employer] COENZYME Q10) 100 MG CAPS     Multiple Vitamins-Minerals (MENS MULTIVITAMIN PLUS) TABS     aspirin 81 MG EC tablet     Blood Pressure Monitor KIT     ezetimibe (ZETIA) 10 MG tablet     pyridOXINE (VITAMIN B6) 100 MG TABS     STATIN NOT PRESCRIBED, INTENTIONAL,     No current facility-administered medications for this visit.          PHYSICAL EXAMINATION:  Vitals BP (!) 146/102  Pulse 65  Ht 1.829 m (6')  Wt 105.7 kg (233 lb)  SpO2 97%  BMI 31.6 kg/m2   Wt   Wt Readings from Last 2 Encounters:   05/03/17 105.7 kg (233 lb)   04/11/17 104.3 kg (230 lb)   Gen: Pt sitting up on exam table in NAD, interactive and cooperative on exam  Eyes: sclerae anicteric, no injection  ENT:  MMM  Cardiac: RRR, nl S1, S2  Resp: Clear on anterior exam  GI: Normoactive BS, abd soft, flat, nontender  Skin: Warm, dry, no jaundice  Neuro: alert, oriented, answers questions appropriately      PERTINENT STUDIES:    Orders Only on 02/15/2017   Component Date Value Ref Range Status     Sodium 02/15/2017 140  133 - 144 mmol/L Final     Potassium 02/15/2017 4.2  3.4 - 5.3 mmol/L Final     Chloride 02/15/2017 105   94 - 109 mmol/L Final     Carbon Dioxide 02/15/2017 27  20 - 32 mmol/L Final     Anion Gap 02/15/2017 8  3 - 14 mmol/L Final     Glucose 02/15/2017 113* 70 - 99 mg/dL Final     Urea Nitrogen 02/15/2017 19  7 - 30 mg/dL Final     Creatinine 02/15/2017 1.05  0.66 - 1.25 mg/dL Final     GFR Estimate 02/15/2017 73  >60 mL/min/1.7m2 Final     GFR Estimate If Black 02/15/2017 88  >60 mL/min/1.7m2 Final     Calcium 02/15/2017 8.8  8.5 - 10.1 mg/dL Final     Bilirubin Total 02/15/2017 1.6* 0.2 - 1.3 mg/dL Final     Albumin 02/15/2017 4.0  3.4 - 5.0 g/dL Final     Protein Total 02/15/2017 7.4  6.8 - 8.8 g/dL Final     Alkaline Phosphatase 02/15/2017 61  40 - 150 U/L Final     ALT 02/15/2017 59  0 - 70 U/L Final     AST 02/15/2017 32  0 - 45 U/L Final     Hemoglobin A1C 02/15/2017 5.9  4.3 - 6.0 % Final     Cholesterol 02/15/2017 199  <200 mg/dL Final     Triglycerides 02/15/2017 189* <150 mg/dL Final     HDL Cholesterol 02/15/2017 52  >39 mg/dL Final     LDL Cholesterol Calculated 02/15/2017 109* <100 mg/dL Final     Non HDL Cholesterol 02/15/2017 147* <130 mg/dL Final

## 2017-05-03 NOTE — LETTER
5/3/2017       RE: Hong Pool  [address] [facility] [address]     Dear Colleague,    Thank you for referring your patient, Hong Pool, to the [facility] GASTROENTEROLOGY AND IBD at [facility]. Please see a copy of my visit note below.    GI CLINIC VISIT    CC: follow-up       ASSESSMENT/PLAN:  (R10.13) Abdominal pain, epigastric  (primary encounter diagnosis)  (K30) Functional dyspepsia  (K21.9) Gastroesophageal reflux disease without esophagitis  Comment:    Pt with GERD which has resolved with daily PPI. Epigastric pain markedly improved with PPI, though not fully resolved. Though mild chronic gastritis on stomach biopsies, endoscopic exam was without signficant inflammation. Suspect that dyspepsia is a contributor to this discomfort and has responded to PPI. We discussed a trial of BID omeprazole dosing vs adding in an H2 blocker. Mr. Pool would prefer trial of PPI given prior loose stools with use of ranitidine in the past.   Reviewed with patient recent studies regarding longterm PPI medications (with focus on studies regarding CKD, CVA, and dementia). Notably omeprazole with lower risk for CVA as compared with other PPIs and most significantly, the risk on lowest doses of PPI is felt to be none to minimal for CVA. From patient's QOL perspective, Mr. Pool is open to continued daily 20 mg omeprazole and trial of  20 mg BID dosing with re-evaluation. Pt to contact clinic via phone for an update. Pending response could consider intermittent BID dosing for periods of increased symptoms (if pt desires to avoid chronic PPI use).    Future considerations could be for cholestyramine.    Much time was spent reviewing the pathophysiology of GERD and dyspepsia. Pt with many questions regarding role of stress, lifestyle in contributing to symptoms. Diet and lifestyle changes were reviewed as outlined below. Pt is interested in pursuing acupuncture and  "massage which would be acceptable from GI perspective.   Plan:   1. Continue symptom and diet tracking  2. Increase your omeprazole (Prilosec) to 20 mg twice daily.  3. Contact GI clinic with an update in 4-8 weeks.  4. GERD diet and lifestyle changes   5. Avoid prolonged periods of fasting/empty stomach.   6. Avoid overfilling stomach/over-eating.       (Z86.010) History of colonic polyps  Pt is due for surveillance colonoscopy. He is interested in follow-up with the GI provider who previously did his procedure.  We discussed that we're happy to arrange this within the [facility] system if needed.         RTC as needed    It was a pleasure to participate in the care of this patient; please contact us with any further questions.  A total of 20 minutes was spent with this patient, >50% of which was counseling regarding the above delineated issues.    Corine Ly MD   of Medicine  Division of Gastroenterology, Hepatology and Nutrition  [facility]    ---------------------------------------------------------------------------------------------------  HPI:    Mr. Pool is a 56 year old male with CAD s/p 2v CABG, HL, MDD, cholelithiasis, nephrolithiasis, borderline diabetes and reported lactose intolerance who was initially seen in GI clinic for evaluation of substernal and epigastric pain in Feb 2017. He returns today for follow-up.     Summarized from my prior documentation:  Mr. Pool's symptoms began in Nov 2016 with a \"bad\" taste, loss of appetite, and a vague abdominal discomfort associated with bloating. The \"bad-taste\" resolved after discontinuing Cholest-OFF and potassium chloride though the other symptoms remained and progressed. In Dec 2016, Mr. Pool's abdominal pain became focused in the upper abdomen and substernal area (primarily at the level of his xiphoid process). This pain is described as \"like an empty stomach\" and a dull ache which progressed to a more focal " burning pain. The discomfort is improved with eating. It does not worsen with direct palpation/pressure and Mr. Pool denies any associated symptoms. It did not respond to ranitidine and prn aluminum hydroxide/Mg hydroxide/simethicone combo. He cut our alcohol and spicy foods with no marked change. He had just started PPI at the time of our last visit (a couple days of treatment) and thought it might be helpful, though symptoms persisted.        An EGD was undertaken which revealed a normal esophagus, normal stomach with small fundic gland polyps, and normal duodenum with a small focus of gastric heretopia. Gastric biopsies demonstrated mild chronic gastritis with no H pylori and no IM. Since the EGD, we recommended restarting PPI which he has continued every day.      Today, Mr. Pool reports he has been doing well. With restart and continuation of omeprazole his symptoms improved rapidly. He went from episodes of pain multiple times a day to having symptom-free days. He still has occasional discomfort (every few days, rarely has symptoms which occur more than once daily, more short-lived) which are less intense. He does use liquid antacids for these episodes, though they minimally improve things.      His substernal burning/GERD has resolved completely with PPI.      In regards to his GI ROS. He states he had no further episodes of emesis since his viral GI illness in Jan. His intermittent issues with gas/bloating/flatulence and loose stools attributed to lactose intolerance have improved with switching lactase enzyme brands. He states that otherwise he is feeling quite well.     ROS:    Remainder of comprehensive ROS is otherwise negative.    PROBLEM LIST  Patient Active Problem List    Diagnosis Date Noted     ASCVD (arteriosclerotic cardiovascular disease) 06/24/2012     Priority: High     CAD (coronary artery disease) 06/22/2012     Priority: High     Kidney stone 12/08/2016     Priority: Medium      Lithotripsy to remove 1 stone, cystoscopy to remove 2nd       Statin intolerance 06/23/2016     Priority: Medium     Calculus of gallbladder without cholecystitis without obstruction 06/21/2016     Priority: Medium     Borderline diabetes mellitus 11/26/2015     Priority: Medium     Coronary artery disease involving coronary bypass graft of native heart without angina pectoris 11/18/2015     Priority: Medium     Hyperlipidemia LDL goal <70 09/07/2012     Priority: Medium     Statin intolerance       Knee pain 01/10/2013     Fatigue 01/08/2013     Adjustment disorder with depressed mood 10/25/2012     Testicular hypofunction 10/25/2012     Problem list name updated by automated process. Provider to review       Simple goiter 10/25/2012     Problem list name updated by automated process. Provider to review       Pain in joint, shoulder region 12/07/2010       PERTINENT MEDICATIONS:  Current Outpatient Prescriptions   Medication     omeprazole (PRILOSEC) 20 MG CR capsule     alpha-d-galactosidase (BEANO) tablet     Lactase (LACTAID FAST ACT) 9000 UNITS CHEW     Cholecalciferol (VITAMIN D3) 2000 UNITS CAPS     co-enzyme Q-10 ([employer] COENZYME Q10) 100 MG CAPS     Multiple Vitamins-Minerals (MENS MULTIVITAMIN PLUS) TABS     aspirin 81 MG EC tablet     Blood Pressure Monitor KIT     ezetimibe (ZETIA) 10 MG tablet     pyridOXINE (VITAMIN B6) 100 MG TABS     STATIN NOT PRESCRIBED, INTENTIONAL,     No current facility-administered medications for this visit.          PHYSICAL EXAMINATION:  Vitals BP (!) 146/102  Pulse 65  Ht 1.829 m (6')  Wt 105.7 kg (233 lb)  SpO2 97%  BMI 31.6 kg/m2   Wt   Wt Readings from Last 2 Encounters:   05/03/17 105.7 kg (233 lb)   04/11/17 104.3 kg (230 lb)   Gen: Pt sitting up on exam table in NAD, interactive and cooperative on exam  Eyes: sclerae anicteric, no injection  ENT:  MMM  Cardiac: RRR, nl S1, S2  Resp: Clear on anterior exam  GI: Normoactive BS, abd soft, flat, nontender  Skin: Warm,  dry, no jaundice  Neuro: alert, oriented, answers questions appropriately      PERTINENT STUDIES:    Orders Only on 02/15/2017   Component Date Value Ref Range Status     Sodium 02/15/2017 140  133 - 144 mmol/L Final     Potassium 02/15/2017 4.2  3.4 - 5.3 mmol/L Final     Chloride 02/15/2017 105  94 - 109 mmol/L Final     Carbon Dioxide 02/15/2017 27  20 - 32 mmol/L Final     Anion Gap 02/15/2017 8  3 - 14 mmol/L Final     Glucose 02/15/2017 113* 70 - 99 mg/dL Final     Urea Nitrogen 02/15/2017 19  7 - 30 mg/dL Final     Creatinine 02/15/2017 1.05  0.66 - 1.25 mg/dL Final     GFR Estimate 02/15/2017 73  >60 mL/min/1.7m2 Final     GFR Estimate If Black 02/15/2017 88  >60 mL/min/1.7m2 Final     Calcium 02/15/2017 8.8  8.5 - 10.1 mg/dL Final     Bilirubin Total 02/15/2017 1.6* 0.2 - 1.3 mg/dL Final     Albumin 02/15/2017 4.0  3.4 - 5.0 g/dL Final     Protein Total 02/15/2017 7.4  6.8 - 8.8 g/dL Final     Alkaline Phosphatase 02/15/2017 61  40 - 150 U/L Final     ALT 02/15/2017 59  0 - 70 U/L Final     AST 02/15/2017 32  0 - 45 U/L Final     Hemoglobin A1C 02/15/2017 5.9  4.3 - 6.0 % Final     Cholesterol 02/15/2017 199  <200 mg/dL Final     Triglycerides 02/15/2017 189* <150 mg/dL Final     HDL Cholesterol 02/15/2017 52  >39 mg/dL Final     LDL Cholesterol Calculated 02/15/2017 109* <100 mg/dL Final     Non HDL Cholesterol 02/15/2017 147* <130 mg/dL Final       Again, thank you for allowing me to participate in the care of your patient.      Sincerely,    Corine Ly MD

## 2017-05-03 NOTE — MR AVS SNAPSHOT
After Visit Summary   5/3/2017    Hong Pool    MRN: [id number]           Patient Information     Date Of Birth          1960        Visit Information        Provider Department      5/3/2017 10:20 AM Corine Ly MD [facility] Gastroenterology and IBD        Today's Diagnoses     Abdominal pain, epigastric    -  1    Functional dyspepsia        Gastroesophageal reflux disease without esophagitis        History of colonic polyps        Digestive problems          Care Instructions    1. Continue symptom and diet tracking  2. Increase your omeprazole (Prilosec) to 20 mg twice daily.  3. Contact GI clinic with an update in 4-8 weeks.  4. GERD diet and lifestyle changes as outlined below.  5. Avoid prolonged periods of fasting/empty stomach.   6. Avoid overfilling stomach/over-eating.   7. Follow-up with GI clinic as needed.   8. Please schedule a colonoscopy. If you have difficulty scheduling with your prior GI provider or are interested in having it done at [facility], please let us known.   If you have any questions, please don't hesitate to contact our GI RN Clinic Coordinators at [phone] (select option #3 for nurse line).         Lifestyle Changes for Controlling GERD  When you have GERD, stomach acid feels as if it s backing up toward your mouth. Whether or not you take medication to control your GERD, your symptoms can often be improved with lifestyle changes. Talk to your doctor about the following suggestions, which may help you get relief from your symptoms.  Raise Your Head    Reflux is more likely to strike when you re lying down flat, because stomach fluid can flow backward more easily. Raising the head of your bed 4 to 6 inches can help. To do this:    Slide blocks or books under the legs at the head of your bed. Or, place a wedge under the mattress. Many [employer] can make a suitable wedge for you. The wedge should run from your waist to the top of your  head.    Don t just prop your head on several pillows. This increases pressure on your stomach. It can make GERD worse.  Watch Your Eating Habits  Certain foods may increase the acid in your stomach or relax the lower esophageal sphincter, making GERD more likely. It s best to avoid the following:    Coffee, tea, and carbonated drinks (with and without caffeine)    Fatty, fried, or spicy food    Mint, chocolate, onions, and tomatoes    Any other foods that seem to irritate your stomach or cause you pain  Relieve the Pressure    Eat smaller meals, even if you have to eat more often.    Don t lie down right after you eat. Wait a few hours for your stomach to empty.    Avoid tight belts and tight-fitting clothes.    Lose excess weight.  Tobacco and Alcohol  Avoid smoking tobacco and drinking alcohol. They can make GERD symptoms worse.    4292-5915 [employer]. [address] [place] [address]. All rights reserved. This information is not intended as a substitute for professional medical care. Always follow your healthcare professional's instructions.              Follow-ups after your visit        Your next 10 appointments already scheduled     Jun 23, 2017  8:30 AM CDT   Lab with  LAB    [facility] ([facility])    [address] [place] [id number] [phone]            Jun 28, 2017  1:30 PM CDT   (Arrive by 1:15 PM)   RETURN LIPID VISIT with Jaylan Evangelista MD   [facility] ([facility])    [address] [place] [address] Floor  [facility] [id number] [phone]              Who to contact     Please call your clinic at [phone] to:    Ask questions about your health    Make or cancel appointments    Discuss your medicines    Learn about your test results    Speak to your doctor   If you have compliments or concerns about an experience at your clinic, or if you wish to file a complaint, please contact  [facility] Patient Relations at [phone] or email us at [email or web address]         Additional Information About Your Visit        DataPophart Information     Pollfish is an electronic gateway that provides easy, online access to your medical records. With Pollfish, you can request a clinic appointment, read your test results, renew a prescription or communicate with your care team.     To sign up for Pollfish visit the website at [email or web address]   You will be asked to enter the access code listed below, as well as some personal information. Please follow the directions to create your username and password.     Your access code is: [email or web address]  Expires: 7/10/2017 11:45 AM     Your access code will  in 90 days. If you need help or a new code, please contact your [facility] or call [phone] for assistance.        Care EveryWhere ID     This is your Care EveryWhere ID. This could be used by other organizations to access your [place] medical records  [id number]        Your Vitals Were     Pulse Height Pulse Oximetry BMI (Body Mass Index)          65 1.829 m (6') 97% 31.6 kg/m2         Blood Pressure from Last 3 Encounters:   17 (!) 146/102   17 131/81   17 137/85    Weight from Last 3 Encounters:   17 105.7 kg (233 lb)   17 104.3 kg (230 lb)   17 102.5 kg (226 lb)              Today, you had the following     No orders found for display         Today's Medication Changes          These changes are accurate as of: 5/3/17 11:30 AM.  If you have any questions, ask your nurse or doctor.               These medicines have changed or have updated prescriptions.        Dose/Directions    omeprazole 20 MG CR capsule   Commonly known as:  priLOSEC   This may have changed:  when to take this   Used for:  Digestive problems   Changed by:  Corine Ly MD        Dose:  20 mg   Take 1  capsule (20 mg) by mouth 2 times daily   Quantity:  60 capsule   Refills:  3            Where to get your medicines      These medications were sent to [facility] - [place], [place] - [address] [facility]  [address] [facility] [address]     Phone:  [phone]     omeprazole 20 MG CR capsule                Primary Care Provider Office Phone # Fax #    Jorge Singleton [phone]       [facility] [address]  [facility] [address]        Thank you!     Thank you for choosing [facility] GASTROENTEROLOGY AND IBD  for your care. Our goal is always to provide you with excellent care. Hearing back from our patients is one way we can continue to improve our services. Please take a few minutes to complete the written survey that you may receive in the mail after your visit with us. Thank you!             Your Updated Medication List - Protect others around you: Learn how to safely use, store and throw away your medicines at www.disposemymeds.org.          This list is accurate as of: 5/3/17 11:30 AM.  Always use your most recent med list.                   Brand Name Dispense Instructions for use    alpha-d-galactosidase tablet     540 tablet    Take 2 tablets by mouth 3 times daily (before meals)       aspirin 81 MG EC tablet     90 tablet    Take 1 tablet (81 mg) by mouth daily       Blood Pressure Monitor Kit     1 kit    1 each daily.       CVS COENZYME Q10 100 MG Caps capsule   Generic drug:  co-enzyme Q-10      Take 1 capsule (100 mg) by mouth daily       ezetimibe 10 MG tablet    ZETIA    90 tablet    Take 1 tablet (10 mg) by mouth daily       Lactase 9000 UNITS Chew    LACTAID FAST ACT    270 tablet    Take 1 tablet by mouth daily Chew and swallow 1 tablet with first bite of dairy food prn       MENS MULTIVITAMIN PLUS Tabs     90 tablet    Take 1 tablet by mouth daily       omeprazole 20 MG CR capsule    priLOSEC    60 capsule    Take 1 capsule (20 mg) by mouth 2 times  daily       pyridOXINE 100 MG Tabs    VITAMIN B6    90 tablet    Take 1 tablet (100 mg) by mouth daily       STATIN NOT PRESCRIBED (INTENTIONAL)     0 each    1 each continuous Statin not prescribed intentionally due to Intolerance (with supporting documentation of trying a statin at least once within the last 5 years)       vitamin D3 2000 UNITS Caps     90 capsule    Take 1 capsule by mouth daily

## 2017-05-03 NOTE — NURSING NOTE
Chief Complaint   Patient presents with     RECHECK     Abd pain        Vitals:    05/03/17 1020   BP: (!) 146/102   Pulse: 65   SpO2: 97%   Weight: 105.7 kg (233 lb)   Height: 1.829 m (6')       Body mass index is 31.6 kg/(m^2).

## 2017-05-03 NOTE — PATIENT INSTRUCTIONS
1. Continue symptom and diet tracking  2. Increase your omeprazole (Prilosec) to 20 mg twice daily.  3. Contact GI clinic with an update in 4-8 weeks.  4. GERD diet and lifestyle changes as outlined below.  5. Avoid prolonged periods of fasting/empty stomach.   6. Avoid overfilling stomach/over-eating.   7. Follow-up with GI clinic as needed.   8. Please schedule a colonoscopy. If you have difficulty scheduling with your prior GI provider or are interested in having it done at [facility], please let us known.   If you have any questions, please don't hesitate to contact our GI RN Clinic Coordinators at [phone] (select option #3 for nurse line).         Lifestyle Changes for Controlling GERD  When you have GERD, stomach acid feels as if it s backing up toward your mouth. Whether or not you take medication to control your GERD, your symptoms can often be improved with lifestyle changes. Talk to your doctor about the following suggestions, which may help you get relief from your symptoms.  Raise Your Head    Reflux is more likely to strike when you re lying down flat, because stomach fluid can flow backward more easily. Raising the head of your bed 4 to 6 inches can help. To do this:    Slide blocks or books under the legs at the head of your bed. Or, place a wedge under the mattress. Many [employer] can make a suitable wedge for you. The wedge should run from your waist to the top of your head.    Don t just prop your head on several pillows. This increases pressure on your stomach. It can make GERD worse.  Watch Your Eating Habits  Certain foods may increase the acid in your stomach or relax the lower esophageal sphincter, making GERD more likely. It s best to avoid the following:    Coffee, tea, and carbonated drinks (with and without caffeine)    Fatty, fried, or spicy food    Mint, chocolate, onions, and tomatoes    Any other foods that seem to irritate your stomach or cause you pain  Relieve the  Pressure    Eat smaller meals, even if you have to eat more often.    Don t lie down right after you eat. Wait a few hours for your stomach to empty.    Avoid tight belts and tight-fitting clothes.    Lose excess weight.  Tobacco and Alcohol  Avoid smoking tobacco and drinking alcohol. They can make GERD symptoms worse.    1349-5026 [employer]. [address] [place] [address], [place] [address]. All rights reserved. This information is not intended as a substitute for professional medical care. Always follow your healthcare professional's instructions.

## 2017-05-16 ENCOUNTER — OFFICE VISIT (OUTPATIENT)
Dept: FAMILY MEDICINE | Facility: CLINIC | Age: 57
End: 2017-05-16

## 2017-05-16 VITALS
HEART RATE: 70 BPM | BODY MASS INDEX: 31.65 KG/M2 | DIASTOLIC BLOOD PRESSURE: 80 MMHG | TEMPERATURE: 98.7 F | SYSTOLIC BLOOD PRESSURE: 138 MMHG | WEIGHT: 233.4 LBS | OXYGEN SATURATION: 95 %

## 2017-05-16 DIAGNOSIS — K30 FUNCTIONAL DYSPEPSIA: ICD-10-CM

## 2017-05-16 DIAGNOSIS — K21.9 GASTROESOPHAGEAL REFLUX DISEASE WITHOUT ESOPHAGITIS: ICD-10-CM

## 2017-05-16 NOTE — MR AVS SNAPSHOT
After Visit Summary   5/16/2017    Hong Pool    MRN: [id number]           Patient Information     Date Of Birth          1960        Visit Information        Provider Department      5/16/2017 12:50 PM Jorge Singleton MD [facility]        Today's Diagnoses     Functional dyspepsia        Gastroesophageal reflux disease without esophagitis           Follow-ups after your visit        Your next 10 appointments already scheduled     Jun 23, 2017  8:30 AM CDT   Lab with  LAB   [facility] ([facility])    [address] [place] [address] [phone]            Jun 28, 2017  1:30 PM CDT   (Arrive by 1:15 PM)   RETURN LIPID VISIT with Jaylan Evangelista MD   [facility] ([facility])    [address] [place] [address] [phone]              Who to contact     Please call your clinic at [phone] to:    Ask questions about your health    Make or cancel appointments    Discuss your medicines    Learn about your test results    Speak to your doctor   If you have compliments or concerns about an experience at your clinic, or if you wish to file a complaint, please contact [facility] Patient Relations at [phone] or email us at [email or web address]         Additional Information About Your Visit        MyChart Information     Pavlokt is an electronic gateway that provides easy, online access to your medical records. With [employer], you can request a clinic appointment, read your test results, renew a prescription or communicate with your care team.     To sign up for Pavlokt visit the website at [email or web address]   You will be asked to enter the access code listed below, as well as some personal information. Please follow the directions to create your username and password.     Your access code is:  [email or web address]  Expires: 7/10/2017 11:45 AM     Your access code will  in 90 days. If you need help or a new code, please contact your [facility] or call [phone] for assistance.        Care EveryWhere ID     This is your Care EveryWhere ID. This could be used by other organizations to access your [place] medical records  [id number]        Your Vitals Were     Pulse Temperature Pulse Oximetry BMI (Body Mass Index)          70 98.7  F (37.1  C) (Oral) 95% 31.65 kg/m2         Blood Pressure from Last 3 Encounters:   17 138/80   17 (!) 146/102   17 131/81    Weight from Last 3 Encounters:   17 233 lb 6.4 oz (105.9 kg)   17 233 lb (105.7 kg)   17 230 lb (104.3 kg)              Today, you had the following     No orders found for display       Primary Care Provider Office Phone # Fax #    Jorge Singleton [phone]       [facility] [address] [address]        Thank you!     Thank you for choosing [facility]  for your care. Our goal is always to provide you with excellent care. Hearing back from our patients is one way we can continue to improve our services. Please take a few minutes to complete the written survey that you may receive in the mail after your visit with us. Thank you!             Your Updated Medication List - Protect others around you: Learn how to safely use, store and throw away your medicines at www.disposemymeds.org.          This list is accurate as of: 17 11:59 PM.  Always use your most recent med list.                   Brand Name Dispense Instructions for use    alpha-d-galactosidase tablet     540 tablet    Take 2 tablets by mouth 3 times daily (before meals)       aspirin 81 MG EC tablet     90 tablet    Take 1 tablet (81 mg) by mouth daily       Blood Pressure Monitor Kit     1 kit    1 each daily.       CVS COENZYME Q10 100 MG Caps capsule    Generic drug:  co-enzyme Q-10      Take 1 capsule (100 mg) by mouth daily       ezetimibe 10 MG tablet    ZETIA    90 tablet    Take 1 tablet (10 mg) by mouth daily       Lactase 9000 UNITS Chew    LACTAID FAST ACT    270 tablet    Take 1 tablet by mouth daily Chew and swallow 1 tablet with first bite of dairy food prn       MENS MULTIVITAMIN PLUS Tabs     90 tablet    Take 1 tablet by mouth daily       omeprazole 20 MG CR capsule    priLOSEC    60 capsule    Take 1 capsule (20 mg) by mouth 2 times daily       pyridOXINE 100 MG Tabs    VITAMIN B6    90 tablet    Take 1 tablet (100 mg) by mouth daily       STATIN NOT PRESCRIBED (INTENTIONAL)     0 each    1 each continuous Statin not prescribed intentionally due to Intolerance (with supporting documentation of trying a statin at least once within the last 5 years)       vitamin D3 2000 UNITS Caps     90 capsule    Take 1 capsule by mouth daily

## 2017-05-16 NOTE — PROGRESS NOTES
"HPI  56 year old male with wife to discuss \"digestive issues\"     Pt has completed a significant evaluation with GI for abd discomfort / digestive issues.  Essentially a negative work-up  Does not feel he has a clear diagnosis. Wonders about complicated interactions that may be causing symptoms    Symptoms - stomach distress at irregular times.  Not cardiac related  After discussion, stress a likely component.  Is using PPI QD  Off statin (intolerance - muscle, other)  Stopping as many other meds as possible    Long discussion to explore perceptions and ideas including the followin. Use PPI BID x 6  Weeks  2. Stop ASA for 1 month  3. Health psychology - dealing with unclear dx, stress management  4. MBSR  5. Complementary health providers - acupuncture, kinesology, massage, others  6. Colonoscopy for polyps f/u.  7. Consider irritable bowel syndrome - pt to read  8. Eliminate as many non-essential vitamins/supplements as possible               ROS  6 point ROS neg other than the symptoms noted above in the HPI.    MEDS  Current Outpatient Prescriptions   Medication     omeprazole (PRILOSEC) 20 MG CR capsule     alpha-d-galactosidase (BEANO) tablet     Lactase (LACTAID FAST ACT) 9000 UNITS CHEW     Cholecalciferol (VITAMIN D3) 2000 UNITS CAPS     pyridOXINE (VITAMIN B6) 100 MG TABS     co-enzyme Q-10 ([employer] COENZYME Q10) 100 MG CAPS     Multiple Vitamins-Minerals (MENS MULTIVITAMIN PLUS) TABS     aspirin 81 MG EC tablet     ezetimibe (ZETIA) 10 MG tablet     STATIN NOT PRESCRIBED, INTENTIONAL,     Blood Pressure Monitor KIT     No current facility-administered medications for this visit.          SOC      FHx  Family History   Problem Relation Age of Onset     C.A.D. Other      Uncle     DIABETES Brother      CEREBROVASCULAR DISEASE Brother      Cardiovascular Sister      tachyarrhythmia       PHYSICAL EXAMINATION:  Vital signs: /80  Pulse 70  Temp 98.7  F (37.1  C) (Oral)  Wt 233 lb 6.4 oz (105.9 kg)  " SpO2 95%  BMI 31.65 kg/m2    GENERAL:: healthy, alert and no distress  PSYCH: Alert and oriented times 3; speech- coherent , normal rate and volume; able to articulate logical thoughts, able to abstract reason, no tangential thoughts, no hallucinations or delusions, affect- normal      LABS    Results for orders placed or performed during the hospital encounter of 04/11/17   UPPER GI ENDOSCOPY   Result Value Ref Range    Upper GI Endoscopy       [facility], [place]  [address] [place] [address] [phone]     Endoscopy Department  _______________________________________________________________________________  Patient Name: Hong Pool         Procedure Date: 4/11/2017 10:45 AM  MRN: [id number]                       Account Number: [id number]  YOB: 1960              Admit Type: Outpatient  Age: 56                               Room[facility]  Gender: Male                          Note Status: Finalized  Attending MD: Corine Ly MD  Total Sedation Time:   _______________________________________________________________________________     Procedure:           Upper GI endoscopy  Indications:         Epigastric abdominal pain  Providers:           Corine Ly MD, Zhanna Godinez RN  Patient Profile:     Mr. Pool is a 56 year old male with CAD s/p 2v CABG,                        HL, MDD, cholelithiasis, nephrolithiasis, borderline                        diabetes, and re ported lactose intolerance who was                        referred to GI clinic for evaluation of epigastric pain.                        The pain has improved somewhat with PPI use.  Referring MD:        Corine Ly MD  Medicines:           Midazolam 2 mg IV, Fentanyl 100 micrograms IV,                        Benzocaine spray                       Face-to-face sedation time: 22 minutes  Complications:       No immediate  complications.  _______________________________________________________________________________  Procedure:           Pre-Anesthesia Assessment:                       - Prior to the procedure, a History and Physical was                        performed, and patient medications and allergies were                        reviewed. The patient is competent. The risks and                        benefits of the procedure and the sedation options and                        risks were discussed with the patient. All questions                        were answered and  informed consent was obtained. Patient                        identification and proposed procedure were verified by                        the physician and the nurse in the procedure room.                        Mental Status Examination: alert and oriented. Airway                        Examination: normal oropharyngeal airway and neck                        mobility and Mallampati Class I (tonsillar pillars                        visualized). Respiratory Examination: clear to                        auscultation. CV Examination: normal. Prophylactic                        Antibiotics: The patient does not require prophylactic                        antibiotics. Prior Anticoagulants: The patient has taken                        no previous anticoagulant or antiplatelet agents. ASA                        Grade Assessment: II - A patient with mild systemic                        disease. After reviewing the risks and benefits, the                        patient was deemed in satisfacto ry condition to undergo                        the procedure. The anesthesia plan was to use moderate                        sedation / analgesia (conscious sedation). Immediately                        prior to administration of medications, the patient was                        re-assessed for adequacy to receive sedatives. The heart                        rate, respiratory  rate, oxygen saturations, blood                        pressure, adequacy of pulmonary ventilation, and                        response to care were monitored throughout the                        procedure. The physical status of the patient was                        re-assessed after the procedure.                       After obtaining informed consent, the endoscope was                        passed under direct vision. Throughout the procedure,                        the patient's blood pressure, pulse, and oxygen                        saturations were monitored continuously. The Endoscope                         was introduced through the mouth, and advanced to the                        second part of duodenum. The upper GI endoscopy was                        accomplished without difficulty. The patient tolerated                        the procedure well.                                                                                   Findings:       The Z-line was regular and was found 41 cm from the incisors.       The examined esophagus was normal.       A few 2 to 6 mm sessile polyps with no bleeding and no stigmata of        recent bleeding were found in the gastric fundus and in the gastric        body; appearance was suggestive of fundic gland polyps. Two of these        polyps were removed with a cold biopsy forceps. Resection and retrieval        were complete (Gastric polyps = Jar C).       The exam of the stomach was otherwise normal.       Biopsies were taken with a cold forceps for Helicobacter pylori testing.        (Jar B)       A single 4 mm sessile polyp with no bleed ing was found in the duodenal        bulb. The polyp was removed with a piecemeal technique using a cold        biopsy forceps. Resection and retrieval were complete. (Jar A)       The examined duodenum was normal.                                                                                   Impression:          - Z-line  regular, 41 cm from the incisors.                       - Normal esophagus.                       - A few gastric polyps. Resected and retrieved.                       - A single duodenal polyp. Resected and retrieved.                       - Normal examined duodenum.                       - Biopsies were taken with a cold forceps for                        Helicobacter pylori testing.  Recommendation:      - Await pathology results.                       - Discharge patient to home.                       - Return to previous diet.                       - Restart omeprazole 20 mg daily.                                                                                      Ean Ly MD  _______________________  Ean Ly MD  4/11/2017 11:42 AM  I was physically present for the entire viewing portion of the exam.  __________________________  Signature of teaching physician  Natasha/Aquiles Ly MD  Number of Addenda: 0    Note Initiated On: 4/11/2017 10:45 AM  Scope In: 12:00:00 AM  Scope Out: 12:00:00 AM     Surgical pathology exam   Result Value Ref Range    Copath Report       Patient Name: SVEN ESCALERA  MR#: [id number]  Specimen #: [id number]  Collected: 4/11/2017  Received: 4/11/2017  Reported: 4/12/2017 18:00  Ordering Phy(s): EAN LY    For improved result formatting, select 'View Enhanced Report Format'  under Linked Documents section.    SPECIMEN(S):  A: Duodenal biopsy, polyp  B: Gastric biopsy, body  C: Gastric polyp, antrum    FINAL DIAGNOSIS:  A. SMALL INTESTINE, DUODENAL POLYP, POLYPECTOMY:  - Duodenal mucosa with polypoid gastric heterotopia  - Negative for dysplasia and malignancy    B. STOMACH, BODY, BIOPSY:  - Mild chronic gastritis  - No H. pylori like organisms identified on immunohistochemical staining  - Negative for intestinal metaplasia and dysplasia    C. STOMACH, ANTRUM POLYP, POLYPECTOMY:  - Fundic gland polyp  - Negative for dysplasia,  "intestinal metaplasia and malignancy    I have personally reviewed all specimens and or slides, including the  listed special stains, and used them with my medical judgement to   determine the final diagnosis.    Electronically signed out by:    Joseph Barney M.D    CLINICAL HISTORY:  56 year old man with multiple comorbidities including coronary artery  disease, now presenting with epigastric pain, somewhat improved with PPI  use.    EGD findings: A few gastric polyps-resected and retrieved.  A single  duodenal polyp-resected and retrieved.  Biopsies were taken with a cold  forceps for Helicobacter pylori testing.    GROSS:  A: The specimen is received in formalin with proper patient  identification, labeled \"duodenal polyp\".  The specimen consists of a  0.2 cm in greatest dimension, polypoid tan-pink soft tissue which is  entirely submitted in cassette A1.    B: The specimen is received in formalin with proper patient  identification, labeled \"gastric biopsies\".  The specimen consists of  five irregular tan soft tissues averaging 0.2 cm in greatest dimension  which are entirely submitted in cassette B1.    C: The specimen is received in formalin with pro per patient  identification, labeled \"gastric polyps\".  The specimen consists of  three irregular tan soft tissues averaging 0.2 cm in greatest dimension  which are entirely submitted in cassette C1. (Dictated by: Suman Gee  4/11/2017 02:35 PM)    MICROSCOPIC:  Microscopic examination was performed.  An immunohistochemical stain for  H. pylori was performed with the appropriate control and is negative.    CPT Codes:  A: 48394-KX5  B: 86882-IC9, 60443-ZAP  C: 85359-TG0    TESTING LAB LOCATION:  [facility], [address]   [id number] [phone]    COLLECTION SITE:  Client: [facility]  Location: [facility] (B)           ASSESSMENT/PLAN    1. " Functional dyspepsia  2. Gastroesophageal reflux disease without esophagitis    Patient with unclear diagnosis with significant life impairment (not wanting to travel, etc)  Discussed that the bad things are not there. This is not comforting to patient. He would prefer a clear dx  Discussed likely layering effect of multiple issues    Long discussion about next steps  1. Use PPI BID x 6  Weeks  2. Stop ASA for 1 month  3. Health psychology - dealing with unclear dx, stress management  4. MBSR  5. Complementary health providers - acupuncture, kinesology, massage, others  6. Colonoscopy for polyps f/u.  7. Consider irritable bowel syndrome - pt to read  8. Eliminate as many non-essential vitamins/supplements as possible    Patient and wife will consider  I would urge health psychologist (at [facility]) and complementary providers (wife believes in these, patient skeptical). Also urge them to read about IBS      CORY BOSS    40min spent with the patient, >50% in arranging care for unclear diagnosis and next steps for digestive issues  Cory

## 2017-05-17 PROBLEM — K21.9 GASTROESOPHAGEAL REFLUX DISEASE WITHOUT ESOPHAGITIS: Status: ACTIVE | Noted: 2017-05-17

## 2017-05-17 PROBLEM — K30 FUNCTIONAL DYSPEPSIA: Status: ACTIVE | Noted: 2017-05-17

## 2017-05-23 NOTE — TELEPHONE ENCOUNTER
Medication Appeal Initiation    We have initiated an appeal for the requested medication:  Medication: Praluent - Denied  Appeal Start Date:  5/23/2017  Insurance Company: [employer] - Phone [phone] Fax [phone]  Comments:       Drafted LMN and faxed to [employer] at  [phone]

## 2017-05-26 NOTE — TELEPHONE ENCOUNTER
MEDICATION APPEAL DENIED    Medication: Praluent - Appeal Denied    Denial Date: 5/25/2017    Denial Rational: Patient must try and fail high dose Crestor (over 20mg)    Second Level Appeal Information: Can appeal, please provide guidance on why medication has not been prescribed.     ** Appeal denial letter is scanned into Epic under Media tab **

## 2017-06-01 ENCOUNTER — TELEPHONE (OUTPATIENT)
Dept: CARDIOLOGY | Facility: CLINIC | Age: 57
End: 2017-06-01

## 2017-06-01 ENCOUNTER — CARE COORDINATION (OUTPATIENT)
Dept: CARDIOLOGY | Facility: CLINIC | Age: 57
End: 2017-06-01

## 2017-06-01 DIAGNOSIS — I25.10 ASCVD (ARTERIOSCLEROTIC CARDIOVASCULAR DISEASE): Primary | ICD-10-CM

## 2017-06-01 DIAGNOSIS — E78.5 HYPERLIPIDEMIA LDL GOAL <70: ICD-10-CM

## 2017-06-01 NOTE — PROGRESS NOTES
Left patient voice message informing him that insurance requires that he try and fail high dose Crestor (over 20mg).  Encouraged him to return call to determine if he has ever been tried on Crestor (Rosuvastatin).

## 2017-06-01 NOTE — TELEPHONE ENCOUNTER
Patient returned call stating he has not tried Crestvor, and yes, he will.  He is wondering if he needs a blood draw before he starts this medication.  Please call him @ [phone]

## 2017-06-23 DIAGNOSIS — E78.5 HYPERLIPIDEMIA LDL GOAL <70: ICD-10-CM

## 2017-06-23 DIAGNOSIS — I25.10 ASCVD (ARTERIOSCLEROTIC CARDIOVASCULAR DISEASE): ICD-10-CM

## 2017-06-23 LAB
ALBUMIN SERPL-MCNC: 3.8 G/DL (ref 3.4–5)
ALP SERPL-CCNC: 53 U/L (ref 40–150)
ALT SERPL W P-5'-P-CCNC: 36 U/L (ref 0–70)
ANION GAP SERPL CALCULATED.3IONS-SCNC: 7 MMOL/L (ref 3–14)
AST SERPL W P-5'-P-CCNC: 23 U/L (ref 0–45)
BILIRUB SERPL-MCNC: 1.1 MG/DL (ref 0.2–1.3)
BUN SERPL-MCNC: 16 MG/DL (ref 7–30)
CALCIUM SERPL-MCNC: 8.8 MG/DL (ref 8.5–10.1)
CHLORIDE SERPL-SCNC: 108 MMOL/L (ref 94–109)
CHOLEST SERPL-MCNC: 227 MG/DL
CO2 SERPL-SCNC: 26 MMOL/L (ref 20–32)
CREAT SERPL-MCNC: 1.09 MG/DL (ref 0.66–1.25)
GFR SERPL CREATININE-BSD FRML MDRD: 70 ML/MIN/1.7M2
GLUCOSE SERPL-MCNC: 109 MG/DL (ref 70–99)
HDLC SERPL-MCNC: 50 MG/DL
LDLC SERPL CALC-MCNC: 137 MG/DL
NONHDLC SERPL-MCNC: 177 MG/DL
POTASSIUM SERPL-SCNC: 4 MMOL/L (ref 3.4–5.3)
PROT SERPL-MCNC: 6.9 G/DL (ref 6.8–8.8)
SODIUM SERPL-SCNC: 141 MMOL/L (ref 133–144)
TRIGL SERPL-MCNC: 197 MG/DL

## 2017-06-27 ENCOUNTER — CARE COORDINATION (OUTPATIENT)
Dept: CARDIOLOGY | Facility: CLINIC | Age: 57
End: 2017-06-27

## 2017-06-27 DIAGNOSIS — Z78.9 STATIN INTOLERANCE: ICD-10-CM

## 2017-06-27 DIAGNOSIS — E78.5 HYPERLIPIDEMIA LDL GOAL <70: Primary | ICD-10-CM

## 2017-06-27 RX ORDER — ROSUVASTATIN CALCIUM 10 MG/1
10 TABLET, COATED ORAL DAILY
Qty: 30 TABLET | Refills: 11 | Status: SHIPPED | OUTPATIENT
Start: 2017-06-27 | End: 2017-08-02 | Stop reason: SINTOL

## 2017-06-27 NOTE — PROGRESS NOTES
Patient confirmed receipt of message.  Patient's appointment rescheduled to end of August.  He will have labs prior to the appointment at his convenience to assess response to Rosuvastatin.  He denies any questions or concerns regarding plan of care.

## 2017-06-27 NOTE — PROGRESS NOTES
Date: 6/27/2017    Time of Call: 9:06 AM     Diagnosis:  Hyperlipidemia     [ TORB ] Ordering provider: Dr. Evangelista  Order: Rosuvastatin 10 mg tablet, take one-half tablet (5 mg) by mouth once daily for 3 weeks, then 10 mg by mouth once daily for 3 weeks, then 20 mg once daily for 3 weeks. Stop immediately if you develop muscle pain or weakness. Repeat labs (CMP, lipids, CK) in 12 weeks.      Order received by: Mansi Christiansen RN, BSN     Follow-up/additional notes: Patient's insurance is unwilling to approve PCSK9 therapy due to Left patient detailed voice message encouraging that he return call to confirm receipt of message.

## 2017-07-13 ENCOUNTER — CARE COORDINATION (OUTPATIENT)
Dept: CARDIOLOGY | Facility: CLINIC | Age: 57
End: 2017-07-13

## 2017-07-13 NOTE — PROGRESS NOTES
Patient called to report that restarting rosuvastatin has reproduced the pain in his legs.  Patient will d/c rosuvastatin.  I will forward to Rita MARTIN RN to review with Dr. Evangelista.  Any changes to care plan will be communicated by them to the patient.  Patient states understanding and agrees to call with any questions or concerns.

## 2017-07-14 ENCOUNTER — TELEPHONE (OUTPATIENT)
Dept: CARDIOLOGY | Facility: CLINIC | Age: 57
End: 2017-07-14

## 2017-07-14 NOTE — TELEPHONE ENCOUNTER
PA Initiation    Medication: Praluent 75 mg/mL - PA Pending  Insurance Company: [employer] - Phone [phone] Fax [phone]  [facility] [address] [place] [address]  Filling Pharmacy Phone: [phone]  Filling Pharmacy Fax: [phone]  Start Date: 7/14/2017    Submitted a new PA for Praluent 75 mg/mL to [employer] (fax: [phone]). Case ID# [id number]. The request was first initiated 12/8/16, but was denied. An appeal was submitted 5/25/17, but was also denied due to the patient needing to try/fail: Atorvastatin, Rosuvastatin, and Zetia. After the appeal was denied the patient was prescribed Rosuvastatin 20 MG in combination with Zetia 10 MG. Rosuvastatin was stopped due to developing myalgia (7/13/17) and discontinued. I confirmed with a rep at [employer] that a new PA could be submitted 6 months (6/6/17) after the initial PA denial. The rep sent me a new case ID# and was able to resubmit his PA.

## 2017-07-19 NOTE — TELEPHONE ENCOUNTER
PRIOR AUTHORIZATION DENIED    Medication: Praluent 75 mg/mL - PA Denied    Denial Date: 7/17/2017    Denial Rational: See denial letter below    Appeal Information: External Appeal - Patient signature required    [employer] stated all PAs submitted after 5/25/2017 are denied due to the appeal denial (the rep. I spoke with 7/17/17 gave me incorrect information). Since a 1st level appeal was done and marked urgent the determination made was a final determination and a 2nd level appeal is not allowed. The appeal was denied 5/25/17. The only other option is to do an External Appeal through [employer]. A signature is required by the patient to submit the external appeal.

## 2017-07-31 ENCOUNTER — PRE VISIT (OUTPATIENT)
Dept: CARDIOLOGY | Facility: CLINIC | Age: 57
End: 2017-07-31

## 2017-08-02 ENCOUNTER — OFFICE VISIT (OUTPATIENT)
Dept: CARDIOLOGY | Facility: CLINIC | Age: 57
End: 2017-08-02
Attending: INTERNAL MEDICINE
Payer: COMMERCIAL

## 2017-08-02 VITALS
DIASTOLIC BLOOD PRESSURE: 83 MMHG | OXYGEN SATURATION: 100 % | WEIGHT: 231.6 LBS | SYSTOLIC BLOOD PRESSURE: 131 MMHG | HEART RATE: 61 BPM | BODY MASS INDEX: 31.37 KG/M2 | HEIGHT: 72 IN

## 2017-08-02 DIAGNOSIS — I25.10 ASCVD (ARTERIOSCLEROTIC CARDIOVASCULAR DISEASE): ICD-10-CM

## 2017-08-02 DIAGNOSIS — Z95.1 S/P CABG (CORONARY ARTERY BYPASS GRAFT): ICD-10-CM

## 2017-08-02 DIAGNOSIS — Z78.9 STATIN INTOLERANCE: ICD-10-CM

## 2017-08-02 DIAGNOSIS — E78.00 HYPERCHOLESTEREMIA: Primary | ICD-10-CM

## 2017-08-02 PROCEDURE — 99213 OFFICE O/P EST LOW 20 MIN: CPT | Mod: GC | Performed by: INTERNAL MEDICINE

## 2017-08-02 ASSESSMENT — PAIN SCALES - GENERAL: PAINLEVEL: NO PAIN (0)

## 2017-08-02 NOTE — NURSING NOTE
Chief Complaint   Patient presents with     Follow Up For     6 month follow up for CAD s/p CABG, HTN, hyperlipidemia        Vitals were taken and medications were reconciled.    Dottie Baez, ARELY     2:53 PM

## 2017-08-02 NOTE — LETTER
8/2/2017      RE: Hong Pool  [address] [facility] [id number]       Dear Colleague,    Thank you for the opportunity to participate in the care of your patient, Hong Pool, at the [facility]. Please see a copy of my visit note below.    HPI:     Anant Pool is a 56 year old male with a history of CAD with CABG in 2012. Last seen by Cardiology Dec 2016. He has had statin intolerance (atorvastatin 80 mg , simvastatin 80 mg caused both muscle pain and consequently was unable to walk ) and lower dosage pravastatin was not tolerated.  Tried to get praluent approved because of failure to zetia.    Patient was started on zetia 10mg a day, however did not have profound affect on cholesterol.  Applied to use pralulent (alirocumab) but application denied, and appeal denied in May.  Noted that patient must try and fail crestor high dose.  Started on one-half tablet (5 mg) by mouth once daily with plans to titrate up. Upon restarting he developed pain in his legs.  Repeat appeal for praluent made in July and also denied.     Patient denies chest pain, shortness of breath, palpitations, intermittent claudication.     Indigestion is getting better with diet change, eating more vegetables.    PAST MEDICAL HISTORY:  Past Medical History:   Diagnosis Date     Borderline diabetes mellitus 11/26/2015     Coronary artery disease 2012    CABG x4     Coronary artery disease involving coronary bypass graft of native heart without angina pectoris 11/18/2015     Depression      Goiter     With normal TSH     Hyperlipidemia     Started on statin 7/2007.  Discontinued 10/07 secondary to muscle aches and fatigue     Nephrolithiasis      Shoulder pain      Statin intolerance 6/23/2016       CURRENT MEDICATIONS:  Current Outpatient Prescriptions   Medication Sig Dispense Refill     alpha-d-galactosidase (BEANO) tablet Take 2 tablets by mouth 3 times daily  (before meals) 540 tablet 4     Lactase (LACTAID FAST ACT) 9000 UNITS CHEW Take 1 tablet by mouth daily Chew and swallow 1 tablet with first bite of dairy food prn 270 tablet 4     Cholecalciferol (VITAMIN D3) 2000 UNITS CAPS Take 1 capsule by mouth daily 90 capsule 4     pyridOXINE (VITAMIN B6) 100 MG TABS Take 1 tablet (100 mg) by mouth daily 90 tablet 4     Multiple Vitamins-Minerals (MENS MULTIVITAMIN PLUS) TABS Take 1 tablet by mouth daily 90 tablet 4     Blood Pressure Monitor KIT 1 each daily. 1 kit 0     rosuvastatin (CRESTOR) 10 MG tablet Take 1 tablet (10 mg) by mouth daily (Patient not taking: Reported on 8/2/2017) 30 tablet 11     omeprazole (PRILOSEC) 20 MG CR capsule Take 1 capsule (20 mg) by mouth 2 times daily (Patient taking differently: Take 20 mg by mouth daily ) 60 capsule 3     ezetimibe (ZETIA) 10 MG tablet Take 1 tablet (10 mg) by mouth daily (Patient not taking: Reported on 5/3/2017) 90 tablet 3     STATIN NOT PRESCRIBED, INTENTIONAL, 1 each continuous Statin not prescribed intentionally due to Intolerance (with supporting documentation of trying a statin at least once within the last 5 years) (Patient not taking: Reported on 5/3/2017) 0 each 0     co-enzyme Q-10 ([employer] COENZYME Q10) 100 MG CAPS Take 1 capsule (100 mg) by mouth daily  0     aspirin 81 MG EC tablet Take 1 tablet (81 mg) by mouth daily (Patient not taking: Reported on 8/2/2017) 90 tablet 4       PAST SURGICAL HISTORY:  Past Surgical History:   Procedure Laterality Date     BYPASS GRAFT ARTERY CORONARY  6/24/2012    Procedure: BYPASS GRAFT ARTERY CORONARY;  Median Sternotomy, Coronary Artery Bypass Grafting x 4 using Left Internal Mammary and Left Saphenous Vein  with Endovein Harvesting. On Pump Oxygenation;  Surgeon: Genesis Larsen MD;  Location: [facility]     ESOPHAGOSCOPY, GASTROSCOPY, DUODENOSCOPY (EGD), COMBINED N/A 4/11/2017    Procedure: COMBINED ESOPHAGOSCOPY, GASTROSCOPY, DUODENOSCOPY (EGD), BIOPSY SINGLE OR MULTIPLE;   Surgeon: Corine Ly MD;  Location:  GI       ALLERGIES     Allergies   Allergen Reactions     Lidocaine Rash     Break out     Band-Aid Anti-Itch      Cholestoff Complete [Plant Sterol Stanol-Pantethine] Other (See Comments)     Severe stomach pain     Lactose Diarrhea     Rosuvastatin Muscle Pain (Myalgia)     Zocor [Simvastatin - High Dose] Other (See Comments)     Muscle aches/soreness, confusion, disorganized thinking       FAMILY HISTORY:  Family History   Problem Relation Age of Onset     C.A.D. Other      Uncle     DIABETES Brother      CEREBROVASCULAR DISEASE Brother      Cardiovascular Sister      tachyarrhythmia       SOCIAL HISTORY:  Social History     Social History     Marital status:      Spouse name: N/A     Number of children: N/A     Years of education: N/A     Social History Main Topics     Smoking status: Never Smoker     Smokeless tobacco: Never Used     Alcohol use No     Drug use: No     Sexual activity: Yes     Other Topics Concern     None     Social History Narrative    .      ROS:   Constitutional: No fever, chills, or sweats. No weight gain/loss   ENT: No visual disturbance, ear ache, epistaxis, sore throat  Allergies/Immunologic: Negative.   Respiratory: No cough, hemoptysia  Cardiovascular: As per HPI  GI: No nausea, vomiting, hematemesis, melena, or hematochezia  : No urinary frequency, dysuria, or hematuria  Integument: Negative  Psychiatric: Negative  Neuro: Negative  Endocrinology: Negative   Musculoskeletal: Negative    EXAM:  /83 (BP Location: Left arm, Patient Position: Chair, Cuff Size: Adult Regular)  Pulse 61  Ht 1.829 m (6')  Wt 105.1 kg (231 lb 9.6 oz)  SpO2 100%  BMI 31.41 kg/m2  In general, the patient is a pleasant male in no apparent distress.    HEENT: NC/AT.  PERRLA.  EOMI.  Sclerae white, not injected.  Nares clear.    Neck: No jugular venous distension.   Heart: RRR. Normal S1, S2. No murmur, rub, click, or gallop.    Lungs: CTA.  No ronchi, wheezes, rales.  No dullness to percussion.   Abdomen: Soft, nontender, nondistended. No organomegaly.  No bruits.   Extremities: No clubbing, cyanosis, or edema.    Neurologic: Alert and oriented to person/place/time, normal speech, gait and affect  Skin: No petechiae, purpura or rash.    Labs:  LIPID RESULTS:  Lab Results   Component Value Date    CHOL 227 (H) 06/23/2017    HDL 50 06/23/2017     (H) 06/23/2017    TRIG 197 (H) 06/23/2017    CHOLHDLRATIO 5.1 (H) 11/24/2015    NHDL 177 (H) 06/23/2017       LIVER ENZYME RESULTS:  Lab Results   Component Value Date    AST 23 06/23/2017    ALT 36 06/23/2017       CBC RESULTS:  Lab Results   Component Value Date    WBC 4.9 06/24/2016    RBC 4.33 (L) 06/24/2016    HGB 13.9 06/24/2016    HCT 41.3 06/24/2016    MCV 95 06/24/2016    MCH 32.1 06/24/2016    MCHC 33.7 06/24/2016    RDW 11.9 06/24/2016     (L) 06/24/2016       BMP RESULTS:  Lab Results   Component Value Date     06/23/2017    POTASSIUM 4.0 06/23/2017    CHLORIDE 108 06/23/2017    CO2 26 06/23/2017    ANIONGAP 7 06/23/2017     (H) 06/23/2017    BUN 16 06/23/2017    CR 1.09 06/23/2017    GFRESTIMATED 70 06/23/2017    GFRESTBLACK 84 06/23/2017    TYSON 8.8 06/23/2017        A1C RESULTS:  Lab Results   Component Value Date    A1C 5.9 02/15/2017       INR RESULTS:  Lab Results   Component Value Date    INR 1.53 (H) 06/24/2012    INR 1.73 (H) 06/24/2012       Procedures:  Echo Interpretation Summary  Right ventricular function, chamber size, wall motion, and thickness are  normal.  Borderline dilated LV with normal geometry and wall motion, LVEF 58%. Normal  diastolic function    Assessment and Plan:   Anant Pool is a 56 year old male with a history of CAD with CABG in 2012. Last seen by Cardiology Dec 2016. He has had statin intolerance (atorvastatin 80 mg, simvastatin 80 mg caused both muscle pain and consequently was unable to walk, pravastatin 10mg , and  rosuvastatin 5mg).  Tried to get praluent approved because of failure to zetia.      Will try one more external appeal for praluent.  If not approved, will try for repatha.  Patient will ask his employer and HR department if the PCSK9 inhibitors are on their formulary.  If PCSK9 approved, patient will need labs after second dose.    Follow-up in 1 year.  Seen and discussed with attending dr. katerina Negron MD PGY4  Cardiology Fellow  [phone]    I interviewed and examined the patient with the CV fellow.  I agree with the assessment and plan as documented.    Jaylan Evangelista MD, PhD  Professor of Medicine  Division of Cardiology    CC  Patient Care Team:  Jorge Singleton MD as PCP - General (General Surgery)  Mansi Christiansen, RN as Nurse Coordinator (Cardiology)  Rebeca Dunn MD as MD (Ophthalmology)  Corine Ly MD as MD (Internal Medicine)

## 2017-08-02 NOTE — PROGRESS NOTES
HPI:     Anant Pool is a 56 year old male with a history of CAD with CABG in 2012. Last seen by Cardiology Dec 2016. He has had statin intolerance (atorvastatin 80 mg , simvastatin 80 mg caused both muscle pain and consequently was unable to walk ) and lower dosage pravastatin was not tolerated.  Tried to get praluent approved because of failure to zetia.    Patient was started on zetia 10mg a day, however did not have profound affect on cholesterol.  Applied to use pralulent (alirocumab) but application denied, and appeal denied in May.  Noted that patient must try and fail crestor high dose.  Started on one-half tablet (5 mg) by mouth once daily with plans to titrate up. Upon restarting he developed pain in his legs.  Repeat appeal for praluent made in July and also denied.     Patient denies chest pain, shortness of breath, palpitations, intermittent claudication.     Indigestion is getting better with diet change, eating more vegetables.    PAST MEDICAL HISTORY:  Past Medical History:   Diagnosis Date     Borderline diabetes mellitus 11/26/2015     Coronary artery disease 2012    CABG x4     Coronary artery disease involving coronary bypass graft of native heart without angina pectoris 11/18/2015     Depression      Goiter     With normal TSH     Hyperlipidemia     Started on statin 7/2007.  Discontinued 10/07 secondary to muscle aches and fatigue     Nephrolithiasis      Shoulder pain      Statin intolerance 6/23/2016       CURRENT MEDICATIONS:  Current Outpatient Prescriptions   Medication Sig Dispense Refill     alpha-d-galactosidase (BEANO) tablet Take 2 tablets by mouth 3 times daily (before meals) 540 tablet 4     Lactase (LACTAID FAST ACT) 9000 UNITS CHEW Take 1 tablet by mouth daily Chew and swallow 1 tablet with first bite of dairy food prn 270 tablet 4     Cholecalciferol (VITAMIN D3) 2000 UNITS CAPS Take 1 capsule by mouth daily 90 capsule 4     pyridOXINE (VITAMIN B6) 100 MG TABS Take 1 tablet  (100 mg) by mouth daily 90 tablet 4     Multiple Vitamins-Minerals (MENS MULTIVITAMIN PLUS) TABS Take 1 tablet by mouth daily 90 tablet 4     Blood Pressure Monitor KIT 1 each daily. 1 kit 0     rosuvastatin (CRESTOR) 10 MG tablet Take 1 tablet (10 mg) by mouth daily (Patient not taking: Reported on 8/2/2017) 30 tablet 11     omeprazole (PRILOSEC) 20 MG CR capsule Take 1 capsule (20 mg) by mouth 2 times daily (Patient taking differently: Take 20 mg by mouth daily ) 60 capsule 3     ezetimibe (ZETIA) 10 MG tablet Take 1 tablet (10 mg) by mouth daily (Patient not taking: Reported on 5/3/2017) 90 tablet 3     STATIN NOT PRESCRIBED, INTENTIONAL, 1 each continuous Statin not prescribed intentionally due to Intolerance (with supporting documentation of trying a statin at least once within the last 5 years) (Patient not taking: Reported on 5/3/2017) 0 each 0     co-enzyme Q-10 ([employer] COENZYME Q10) 100 MG CAPS Take 1 capsule (100 mg) by mouth daily  0     aspirin 81 MG EC tablet Take 1 tablet (81 mg) by mouth daily (Patient not taking: Reported on 8/2/2017) 90 tablet 4       PAST SURGICAL HISTORY:  Past Surgical History:   Procedure Laterality Date     BYPASS GRAFT ARTERY CORONARY  6/24/2012    Procedure: BYPASS GRAFT ARTERY CORONARY;  Median Sternotomy, Coronary Artery Bypass Grafting x 4 using Left Internal Mammary and Left Saphenous Vein  with Endovein Harvesting. On Pump Oxygenation;  Surgeon: Genesis Larsen MD;  Location:  OR     ESOPHAGOSCOPY, GASTROSCOPY, DUODENOSCOPY (EGD), COMBINED N/A 4/11/2017    Procedure: COMBINED ESOPHAGOSCOPY, GASTROSCOPY, DUODENOSCOPY (EGD), BIOPSY SINGLE OR MULTIPLE;  Surgeon: Corine Ly MD;  Location:  GI       ALLERGIES     Allergies   Allergen Reactions     Lidocaine Rash     Break out     Band-Aid Anti-Itch      Cholestoff Complete [Plant Sterol Stanol-Pantethine] Other (See Comments)     Severe stomach pain     Lactose Diarrhea     Rosuvastatin Muscle Pain  (Myalgia)     Zocor [Simvastatin - High Dose] Other (See Comments)     Muscle aches/soreness, confusion, disorganized thinking       FAMILY HISTORY:  Family History   Problem Relation Age of Onset     C.A.D. Other      Uncle     DIABETES Brother      CEREBROVASCULAR DISEASE Brother      Cardiovascular Sister      tachyarrhythmia       SOCIAL HISTORY:  Social History     Social History     Marital status:      Spouse name: N/A     Number of children: N/A     Years of education: N/A     Social History Main Topics     Smoking status: Never Smoker     Smokeless tobacco: Never Used     Alcohol use No     Drug use: No     Sexual activity: Yes     Other Topics Concern     None     Social History Narrative    .      ROS:   Constitutional: No fever, chills, or sweats. No weight gain/loss   ENT: No visual disturbance, ear ache, epistaxis, sore throat  Allergies/Immunologic: Negative.   Respiratory: No cough, hemoptysia  Cardiovascular: As per HPI  GI: No nausea, vomiting, hematemesis, melena, or hematochezia  : No urinary frequency, dysuria, or hematuria  Integument: Negative  Psychiatric: Negative  Neuro: Negative  Endocrinology: Negative   Musculoskeletal: Negative    EXAM:  /83 (BP Location: Left arm, Patient Position: Chair, Cuff Size: Adult Regular)  Pulse 61  Ht 1.829 m (6')  Wt 105.1 kg (231 lb 9.6 oz)  SpO2 100%  BMI 31.41 kg/m2  In general, the patient is a pleasant male in no apparent distress.    HEENT: NC/AT.  PERRLA.  EOMI.  Sclerae white, not injected.  Nares clear.    Neck: No jugular venous distension.   Heart: RRR. Normal S1, S2. No murmur, rub, click, or gallop.   Lungs: CTA.  No ronchi, wheezes, rales.  No dullness to percussion.   Abdomen: Soft, nontender, nondistended. No organomegaly.  No bruits.   Extremities: No clubbing, cyanosis, or edema.    Neurologic: Alert and oriented to person/place/time, normal speech, gait and affect  Skin: No petechiae, purpura or  rash.    Labs:  LIPID RESULTS:  Lab Results   Component Value Date    CHOL 227 (H) 06/23/2017    HDL 50 06/23/2017     (H) 06/23/2017    TRIG 197 (H) 06/23/2017    CHOLHDLRATIO 5.1 (H) 11/24/2015    NHDL 177 (H) 06/23/2017       LIVER ENZYME RESULTS:  Lab Results   Component Value Date    AST 23 06/23/2017    ALT 36 06/23/2017       CBC RESULTS:  Lab Results   Component Value Date    WBC 4.9 06/24/2016    RBC 4.33 (L) 06/24/2016    HGB 13.9 06/24/2016    HCT 41.3 06/24/2016    MCV 95 06/24/2016    MCH 32.1 06/24/2016    MCHC 33.7 06/24/2016    RDW 11.9 06/24/2016     (L) 06/24/2016       BMP RESULTS:  Lab Results   Component Value Date     06/23/2017    POTASSIUM 4.0 06/23/2017    CHLORIDE 108 06/23/2017    CO2 26 06/23/2017    ANIONGAP 7 06/23/2017     (H) 06/23/2017    BUN 16 06/23/2017    CR 1.09 06/23/2017    GFRESTIMATED 70 06/23/2017    GFRESTBLACK 84 06/23/2017    TYSON 8.8 06/23/2017        A1C RESULTS:  Lab Results   Component Value Date    A1C 5.9 02/15/2017       INR RESULTS:  Lab Results   Component Value Date    INR 1.53 (H) 06/24/2012    INR 1.73 (H) 06/24/2012       Procedures:  Echo Interpretation Summary  Right ventricular function, chamber size, wall motion, and thickness are  normal.  Borderline dilated LV with normal geometry and wall motion, LVEF 58%. Normal  diastolic function    Assessment and Plan:   Anant Pool is a 56 year old male with a history of CAD with CABG in 2012. Last seen by Cardiology Dec 2016. He has had statin intolerance (atorvastatin 80 mg, simvastatin 80 mg caused both muscle pain and consequently was unable to walk, pravastatin 10mg , and rosuvastatin 5mg).  Tried to get praluent approved because of failure to zetia.      Will try one more external appeal for praluent.  If not approved, will try for repatha.  Patient will ask his employer and HR department if the PCSK9 inhibitors are on their formulary.  If PCSK9 approved, patient will need labs  after second dose.    Follow-up in 1 year.  Seen and discussed with attending dr. katerina eNgron MD PGY4  Cardiology Fellow  [phone]    I interviewed and examined the patient with the CV fellow.  I agree with the assessment and plan as documented.    Jaylan Evangelista MD, PhD  Professor of Medicine  Division of Cardiology    CC  Patient Care Team:  Jorge Singleton MD as PCP - General (General Surgery)  Mansi Christiansen RN as Nurse Coordinator (Cardiology)  Jaylan Evangelista MD as MD (Cardiology)  Rebeca Dunn MD as MD (Ophthalmology)  Corine Ly MD as MD (Internal Medicine)

## 2017-08-02 NOTE — PATIENT INSTRUCTIONS
Return to clinic in 12 months.  Fasting labs will be needed prior to this appointment.     You will need fasting labs 7-10 days after your second injection of Praluent (or Repatha), and then once every 6 months thereafter.      Please do not hesitate to call if you have any cardiology related questions or concerns, or need to schedule an appointment, at [phone].         Cardiology Medication Refill Request Information:  * Please contact your pharmacy regarding ANY request for medication refills.  ** [facility] Prescription Fax = [phone]  * Please allow 3 business days for routine medication refills.    Cardiology Test Result notification information:  *You will be notified with in 7-10 days of your appointment day regarding the results of your test. If you are on MyChart you will be notified as soon as the provider has reviewed the results and signed off on them. Please call RN Care Coordinator with questions regarding results.      
SPouse

## 2017-08-02 NOTE — MR AVS SNAPSHOT
After Visit Summary   8/2/2017    Hong Pool    MRN: [id number]           Patient Information     Date Of Birth          1960        Visit Information        Provider Department      8/2/2017 3:00 PM Jaylan Evangelista MD [facility]        Care Instructions    Return to clinic in 12 months.  Fasting labs will be needed prior to this appointment.     You will need fasting labs 7-10 days after your second injection of Praluent (or Repatha), and then once every 6 months thereafter.      Please do not hesitate to call if you have any cardiology related questions or concerns, or need to schedule an appointment, at [phone].         Cardiology Medication Refill Request Information:  * Please contact your pharmacy regarding ANY request for medication refills.  ** [facility] Prescription Fax = [phone]  * Please allow 3 business days for routine medication refills.    Cardiology Test Result notification information:  *You will be notified with in 7-10 days of your appointment day regarding the results of your test. If you are on MyChart you will be notified as soon as the provider has reviewed the results and signed off on them. Please call RN Care Coordinator with questions regarding results.              Follow-ups after your visit        Follow-up notes from your care team     Discussed this visit Return in about 1 year (around 8/2/2018) for Jean Carlos, Alex, Routine Visit, FASTING lab work.      Who to contact     If you have questions or need follow up information about today's clinic visit or your schedule please contact [facility] directly at [phone].  Normal or non-critical lab and imaging results will be communicated to you by MyChart, letter or phone within 4 business days after the clinic has received the results. If you do not hear from us within 7 days, please contact the clinic through MyChart or phone. If you have a critical or abnormal lab result, we  "will notify you by phone as soon as possible.  Submit refill requests through [employer] or call your pharmacy and they will forward the refill request to us. Please allow 3 business days for your refill to be completed.          Additional Information About Your Visit        [employer] Information     S[employer] lets you send messages to your doctor, view your test results, renew your prescriptions, schedule appointments and more. To sign up, go to [email or web address] . Click on \"Log in\" on the left side of the screen, which will take you to the Welcome page. Then click on \"Sign up Now\" on the right side of the page.     You will be asked to enter the access code listed below, as well as some personal information. Please follow the directions to create your username and password.     Your access code is: [email or web address]  Expires: 10/26/2017  6:30 AM     Your access code will  in 90 days. If you need help or a new code, please call your [place] clinic or [phone].        Care EveryWhere ID     This is your Care EveryWhere ID. This could be used by other organizations to access your [place] medical records  [id number]        Your Vitals Were     Pulse Height Pulse Oximetry BMI (Body Mass Index)          61 1.829 m (6') 100% 31.41 kg/m2         Blood Pressure from Last 3 Encounters:   17 131/83   17 138/80   17 (!) 146/102    Weight from Last 3 Encounters:   17 105.1 kg (231 lb 9.6 oz)   17 105.9 kg (233 lb 6.4 oz)   17 105.7 kg (233 lb)              Today, you had the following     No orders found for display         Today's Medication Changes          These changes are accurate as of: 17  3:59 PM.  If you have any questions, ask your nurse or doctor.               These medicines have changed or have updated prescriptions.        Dose/Directions    omeprazole 20 MG CR capsule   Commonly known as:  priLOSEC   This may have changed:  when to take this   Used for:  " Digestive problems        Dose:  20 mg   Take 1 capsule (20 mg) by mouth 2 times daily   Quantity:  60 capsule   Refills:  3         Stop taking these medicines if you haven't already. Please contact your care team if you have questions.     ezetimibe 10 MG tablet   Commonly known as:  ZETIA   Stopped by:  Jaylan Evangelista MD                    Primary Care Provider Office Phone # Fax #    Jorge Singleton [phone]       [facility] [address] [address]        Equal Access to Services     [facility]: Hadii aad ku hadasho Soomaali, waaxda luqadaha, qaybta kaalmada adeegyada, waxizabel hoover haytoyin randhawa . So [facility] [phone].    ATENCIÓN: Si habla español, tiene a keane disposición servicios gratuitos de asistencia lingüística. [facility] [phone].    We comply with applicable federal civil rights laws and [place] laws. We do not discriminate on the basis of race, color, national origin, age, disability sex, sexual orientation or gender identity.            Thank you!     Thank you for choosing [facility]  for your care. Our goal is always to provide you with excellent care. Hearing back from our patients is one way we can continue to improve our services. Please take a few minutes to complete the written survey that you may receive in the mail after your visit with us. Thank you!             Your Updated Medication List - Protect others around you: Learn how to safely use, store and throw away your medicines at www.disposemymeds.org.          This list is accurate as of: 8/2/17  3:59 PM.  Always use your most recent med list.                   Brand Name Dispense Instructions for use Diagnosis    alpha-d-galactosidase tablet     540 tablet    Take 2 tablets by mouth 3 times daily (before meals)    Bloating       aspirin 81 MG EC tablet     90 tablet    Take 1 tablet (81 mg) by mouth daily        Blood Pressure Monitor Kit     1 kit    1  each daily.    S/P CABG (coronary artery bypass graft), CAD (coronary artery disease)       [employer] COENZYME Q10 100 MG Caps capsule   Generic drug:  co-enzyme Q-10      Take 1 capsule (100 mg) by mouth daily        Lactase 9000 UNITS Chew    LACTAID FAST ACT    270 tablet    Take 1 tablet by mouth daily Chew and swallow 1 tablet with first bite of dairy food prn    Lactose intolerance       MENS MULTIVITAMIN PLUS Tabs     90 tablet    Take 1 tablet by mouth daily    Preventive measure       omeprazole 20 MG CR capsule    priLOSEC    60 capsule    Take 1 capsule (20 mg) by mouth 2 times daily    Digestive problems       pyridOXINE 100 MG Tabs    VITAMIN B6    90 tablet    Take 1 tablet (100 mg) by mouth daily    Coronary artery disease involving autologous artery coronary bypass graft without angina pectoris       rosuvastatin 10 MG tablet    CRESTOR    30 tablet    Take 1 tablet (10 mg) by mouth daily    Hyperlipidemia LDL goal <70, Statin intolerance       STATIN NOT PRESCRIBED (INTENTIONAL)     0 each    1 each continuous Statin not prescribed intentionally due to Intolerance (with supporting documentation of trying a statin at least once within the last 5 years)    Hyperlipidemia LDL goal <70, Statin intolerance       vitamin D3 2000 UNITS Caps     90 capsule    Take 1 capsule by mouth daily    Vitamin D deficiency disease

## 2017-08-03 NOTE — TELEPHONE ENCOUNTER
Medication Appeal Initiation    We have initiated an appeal for the requested medication:  Medication: Praluent 75 mg/mL - Appeal Pending  Appeal Start Date:  8/2/17   Insurance Company:  [employer]  Comments:  Faxed appeal letter, Praluent FDA approval letter, most recent office visit and LDL labs, to [employer] Appeal Program at fax # [phone]. Scanned appeal letter into Media tab.

## 2017-08-09 NOTE — TELEPHONE ENCOUNTER
Appeal for Praluent is currently under review with [employer] (team 5). [facility] Case # [id number]    Phone: [phone]  Fax: [phone]

## 2017-08-21 NOTE — TELEPHONE ENCOUNTER
Peer to Peer Review requested for Praluent by [employer] department. Request to call back Dr. Craig at [phone]. Informed nurse of request.

## 2017-08-30 ENCOUNTER — TELEPHONE (OUTPATIENT)
Dept: CARDIOLOGY | Facility: CLINIC | Age: 57
End: 2017-08-30

## 2017-09-11 ENCOUNTER — CARE COORDINATION (OUTPATIENT)
Dept: CARDIOLOGY | Facility: CLINIC | Age: 57
End: 2017-09-11

## 2017-09-11 DIAGNOSIS — E78.5 HYPERLIPIDEMIA LDL GOAL <70: ICD-10-CM

## 2017-09-11 DIAGNOSIS — I25.10 CAD (CORONARY ARTERY DISEASE): Primary | ICD-10-CM

## 2017-09-11 NOTE — PROGRESS NOTES
Patient states that he received approval letter from his insurance company for Praluent.  Prescription sent to  Specialty pharmacy as requested by patient. He understands that fasting labs are needed 5-7 days after his 2nd injection. He will review the 's web site for administration instructions.  Denies questions or concerns at this time.

## 2017-09-12 NOTE — TELEPHONE ENCOUNTER
MEDICATION APPEAL APPROVED    Medication: Praluent 75 mg/mL - Appeal Approved  Authorization Effective Date:    Authorization Expiration Date:    Approved Dose/Quantity: 2    Reference #: Case ID# [id number]   Insurance Company: [employer] - Phone [phone] Fax [phone]  Expected CoPay:       CoPay Card Available:   Yes   Foundation Assistance Needed:    Which Pharmacy is filling the prescription (Not needed for infusion/clinic administered): [facility] - [address] approved. Pt has insurance through [employer] and is restricted to fill with [facility] (Ph: [phone] Fax: [phone])

## 2017-10-09 DIAGNOSIS — Z78.9 STATIN INTOLERANCE: ICD-10-CM

## 2017-10-09 DIAGNOSIS — I25.10 CAD (CORONARY ARTERY DISEASE): ICD-10-CM

## 2017-10-09 DIAGNOSIS — E78.5 HYPERLIPIDEMIA LDL GOAL <70: ICD-10-CM

## 2017-10-09 LAB
ALBUMIN SERPL-MCNC: 4 G/DL (ref 3.4–5)
ALP SERPL-CCNC: 55 U/L (ref 40–150)
ALT SERPL W P-5'-P-CCNC: 32 U/L (ref 0–70)
ANION GAP SERPL CALCULATED.3IONS-SCNC: 4 MMOL/L (ref 3–14)
AST SERPL W P-5'-P-CCNC: 19 U/L (ref 0–45)
BILIRUB SERPL-MCNC: 1.7 MG/DL (ref 0.2–1.3)
BUN SERPL-MCNC: 18 MG/DL (ref 7–30)
CALCIUM SERPL-MCNC: 9.1 MG/DL (ref 8.5–10.1)
CHLORIDE SERPL-SCNC: 105 MMOL/L (ref 94–109)
CHOLEST SERPL-MCNC: 144 MG/DL
CK SERPL-CCNC: 125 U/L (ref 30–300)
CO2 SERPL-SCNC: 29 MMOL/L (ref 20–32)
CREAT SERPL-MCNC: 1.07 MG/DL (ref 0.66–1.25)
GFR SERPL CREATININE-BSD FRML MDRD: 71 ML/MIN/1.7M2
GLUCOSE SERPL-MCNC: 118 MG/DL (ref 70–99)
HDLC SERPL-MCNC: 54 MG/DL
LDLC SERPL CALC-MCNC: 63 MG/DL
NONHDLC SERPL-MCNC: 90 MG/DL
POTASSIUM SERPL-SCNC: 4.4 MMOL/L (ref 3.4–5.3)
PROT SERPL-MCNC: 7.7 G/DL (ref 6.8–8.8)
SODIUM SERPL-SCNC: 138 MMOL/L (ref 133–144)
TRIGL SERPL-MCNC: 135 MG/DL

## 2017-10-09 NOTE — LETTER
October 9, 2017       TO: Hong Pool  [address] [facility] [address]       Dear Mr. Pool,    I have attached a copy of your recent laboratroy results for your personal record.  Your LDL decreased as expected with Praluent.  Please have repeat fasting lipid profile again in 3 months, then once every 6 months thereafter.     Please do not hesitate to call if you have any cardiology related questions or concerns, or need to schedule an appointment at [phone].      Sincerely,     Rita Christiansen RN, BSN  Cardiology Care Coordinator                                Resulted Orders   CK total   Result Value Ref Range    CK Total 125 30 - 300 U/L   Comprehensive metabolic panel   Result Value Ref Range    Sodium 138 133 - 144 mmol/L    Potassium 4.4 3.4 - 5.3 mmol/L    Chloride 105 94 - 109 mmol/L    Carbon Dioxide 29 20 - 32 mmol/L    Anion Gap 4 3 - 14 mmol/L    Glucose 118 (H) 70 - 99 mg/dL    Urea Nitrogen 18 7 - 30 mg/dL    Creatinine 1.07 0.66 - 1.25 mg/dL    GFR Estimate 71 >60 mL/min/1.7m2      Comment:      Non  GFR Calc    GFR Estimate If Black 86 >60 mL/min/1.7m2      Comment:       GFR Calc    Calcium 9.1 8.5 - 10.1 mg/dL    Bilirubin Total 1.7 (H) 0.2 - 1.3 mg/dL    Albumin 4.0 3.4 - 5.0 g/dL    Protein Total 7.7 6.8 - 8.8 g/dL    Alkaline Phosphatase 55 40 - 150 U/L    ALT 32 0 - 70 U/L    AST 19 0 - 45 U/L   Lipid panel reflex to direct LDL   Result Value Ref Range    Cholesterol 144 <200 mg/dL    Triglycerides 135 <150 mg/dL    HDL Cholesterol 54 >39 mg/dL    LDL Cholesterol Calculated 63 <100 mg/dL      Comment:      Desirable:       <100 mg/dl    Non HDL Cholesterol 90 <130 mg/dL

## 2017-10-10 ENCOUNTER — TELEPHONE (OUTPATIENT)
Dept: CARDIOLOGY | Facility: CLINIC | Age: 57
End: 2017-10-10

## 2017-10-10 NOTE — TELEPHONE ENCOUNTER
Patient calling for lab results and also what the next steps will be for him.  Please call to discuss.  [phone].

## 2017-10-12 ENCOUNTER — TELEPHONE (OUTPATIENT)
Dept: CARDIOLOGY | Facility: CLINIC | Age: 57
End: 2017-10-12

## 2017-10-12 NOTE — TELEPHONE ENCOUNTER
Free Drug     Medication Praluent  Sponsor MyPraluent  Phone # [phone]  Fax # [phone]  Effective Dates   Additional Information     Enrolling patient in MyPRALUENT free drug program due to patient not having insurance coverage.

## 2017-10-17 NOTE — TELEPHONE ENCOUNTER
Cancelling request for Free Drug Program through [employer]. The patient's COBRA plan has been activated through [employer]. Test claim confirms coverage is active. Also, provided [facility] with a co-pay card.

## 2017-11-15 ENCOUNTER — TRANSFERRED RECORDS (OUTPATIENT)
Dept: HEALTH INFORMATION MANAGEMENT | Facility: CLINIC | Age: 57
End: 2017-11-15

## 2017-12-30 ENCOUNTER — HEALTH MAINTENANCE LETTER (OUTPATIENT)
Age: 57
End: 2017-12-30

## 2018-01-02 ENCOUNTER — TELEPHONE (OUTPATIENT)
Dept: CARDIOLOGY | Facility: CLINIC | Age: 58
End: 2018-01-02

## 2018-01-02 DIAGNOSIS — I25.10 CAD (CORONARY ARTERY DISEASE): ICD-10-CM

## 2018-01-02 DIAGNOSIS — E78.5 HYPERLIPIDEMIA LDL GOAL <70: ICD-10-CM

## 2018-01-02 NOTE — TELEPHONE ENCOUNTER
Prior Authorization Approval    Authorization Effective Date: 1/1/2018  Authorization Expiration Date: 1/2/2019  Medication: Praluent 75MG/ML - Approved  Approved Dose/Quantity: 2mL  Reference #: [id number]   Insurance Company: [email or web address] - Phone [phone] Fax [phone]  Expected CoPay: $0 with Cop[employer] card     CoPay Card Available: Yes   Foundation Assistance Needed: MyPraluent  Which Pharmacy is filling the prescription (Not needed for infusion/clinic administered): [facility] - [place], [place] - [facility] [address]  Pharmacy Notified: Yes  Patient Notified: Yes

## 2018-01-02 NOTE — TELEPHONE ENCOUNTER
PA Initiation    Medication: Praluent 75MG/ML - Approved  Insurance Company: [facility] - Phone [phone] Fax [phone]  Pharmacy Filling the Rx: [facility] - [place], [place] - [facility] [address]  Filling Pharmacy Phone: [phone]  Filling Pharmacy Fax: [phone]  Start Date: 1/2/2018

## 2018-01-03 DIAGNOSIS — E78.5 HYPERLIPIDEMIA LDL GOAL <70: ICD-10-CM

## 2018-01-03 DIAGNOSIS — I25.10 CAD (CORONARY ARTERY DISEASE): ICD-10-CM

## 2018-01-03 LAB
CHOLEST SERPL-MCNC: 142 MG/DL
HDLC SERPL-MCNC: 52 MG/DL
LDLC SERPL CALC-MCNC: 45 MG/DL
NONHDLC SERPL-MCNC: 90 MG/DL
TRIGL SERPL-MCNC: 227 MG/DL

## 2018-01-03 NOTE — LETTER
January 4, 2018       TO: Hong Pool  [address] [facility] [address]       Dear  Corine,    I have attached your recent lab results for your personal record. Please continue your Praluent and have repeat fasting labs once every 6 months while on this medication.      Please do not hesitate to call if you have any cardiology related questions or concerns, or need to schedule an appointment at [phone].      Sincerely,     Rita Christiansen RN, BSN  Cardiology Care Coordinator                                  Resulted Orders   Lipid panel reflex to direct LDL   Result Value Ref Range    Cholesterol 142 <200 mg/dL    Triglycerides 227 (H) <150 mg/dL      Comment:      Borderline high:  150-199 mg/dl  High:             200-499 mg/dl  Very high:       >499 mg/dl      HDL Cholesterol 52 >39 mg/dL    LDL Cholesterol Calculated 45 <100 mg/dL      Comment:      Desirable:       <100 mg/dl    Non HDL Cholesterol 90 <130 mg/dL

## 2018-01-17 ENCOUNTER — ALLIED HEALTH/NURSE VISIT (OUTPATIENT)
Dept: FAMILY MEDICINE | Facility: CLINIC | Age: 58
End: 2018-01-17
Payer: COMMERCIAL

## 2018-01-17 DIAGNOSIS — Z23 IMMUNIZATION DUE: Primary | ICD-10-CM

## 2018-01-17 NOTE — MR AVS SNAPSHOT
After Visit Summary   2018    Hong Pool    MRN: [id number]           Patient Information     Date Of Birth          1960        Visit Information        Provider Department      2018 2:20 PM Nurse, Marcio [facility]        Today's Diagnoses     Immunization due    -  1       Follow-ups after your visit        Who to contact     Please call your clinic at [phone] to:    Ask questions about your health    Make or cancel appointments    Discuss your medicines    Learn about your test results    Speak to your doctor   If you have compliments or concerns about an experience at your clinic, or if you wish to file a complaint, please contact [facility] Patient Relations at [phone] or email us at [email or web address]         Additional Information About Your Visit        MyChart Information     [employer] is an electronic gateway that provides easy, online access to your medical records. With [employer], you can request a clinic appointment, read your test results, renew a prescription or communicate with your care team.     To sign up for [employer]st visit the website at [email or web address]   You will be asked to enter the access code listed below, as well as some personal information. Please follow the directions to create your username and password.     Your access code is: [email or web address]  Expires: 2018  6:31 AM     Your access code will  in 90 days. If you need help or a new code, please contact your [facility] or call [phone] for assistance.        Care EveryWhere ID     This is your Care EveryWhere ID. This could be used by other organizations to access your [place] medical records  [id number]         Blood Pressure from Last 3 Encounters:   17 131/83   17 138/80   17 (!) 146/102    Weight from Last 3 Encounters:   17 231 lb 9.6 oz (105.1  kg)   05/16/17 233 lb 6.4 oz (105.9 kg)   05/03/17 233 lb (105.7 kg)              We Performed the Following     ADMIN VACCINE, INITIAL     FLU VAC PRESRV FREE QUAD SPLIT VIR IM, 0.5 mL dosage          Today's Medication Changes          These changes are accurate as of: 1/17/18 11:59 PM.  If you have any questions, ask your nurse or doctor.               These medicines have changed or have updated prescriptions.        Dose/Directions    omeprazole 20 MG CR capsule   Commonly known as:  priLOSEC   This may have changed:  when to take this   Used for:  Digestive problems        Dose:  20 mg   Take 1 capsule (20 mg) by mouth 2 times daily   Quantity:  60 capsule   Refills:  3                Primary Care Provider Office Phone # Fax #    Jorge Singleton [phone]       [address] [address]        Equal Access to Services     [facility] : Devin Rios, wamai kelly, honorio del angel, cuba randhawa . So [facility] [phone].    ATENCIÓN: Si habla español, tiene a keane disposición servicios gratuitos de asistencia lingüística. Llame al [phone].    We comply with applicable federal civil rights laws and [place] laws. We do not discriminate on the basis of race, color, national origin, age, disability, sex, sexual orientation, or gender identity.            Thank you!     Thank you for choosing [facility]  for your care. Our goal is always to provide you with excellent care. Hearing back from our patients is one way we can continue to improve our services. Please take a few minutes to complete the written survey that you may receive in the mail after your visit with us. Thank you!             Your Updated Medication List - Protect others around you: Learn how to safely use, store and throw away your medicines at www.disposemymeds.org.          This list is accurate as of: 1/17/18 11:59 PM.   Always use your most recent med list.                   Brand Name Dispense Instructions for use Diagnosis    alirocumab 75 MG/ML injectable pen    PRALUENT    2 mL    Inject 1 mL (75 mg) Subcutaneous every 14 days    CAD (coronary artery disease), Hyperlipidemia LDL goal <70       alpha-d-galactosidase tablet     540 tablet    Take 2 tablets by mouth 3 times daily (before meals)    Bloating       aspirin 81 MG EC tablet     90 tablet    Take 1 tablet (81 mg) by mouth daily        Blood Pressure Monitor Kit     1 kit    1 each daily.    S/P CABG (coronary artery bypass graft), CAD (coronary artery disease)       CVS COENZYME Q10 100 MG Caps capsule   Generic drug:  co-enzyme Q-10      Take 1 capsule (100 mg) by mouth daily        Lactase 9000 UNITS Chew    LACTAID FAST ACT    270 tablet    Take 1 tablet by mouth daily Chew and swallow 1 tablet with first bite of dairy food prn    Lactose intolerance       MENS MULTIVITAMIN PLUS Tabs     90 tablet    Take 1 tablet by mouth daily    Preventive measure       omeprazole 20 MG CR capsule    priLOSEC    60 capsule    Take 1 capsule (20 mg) by mouth 2 times daily    Digestive problems       pyridOXINE 100 MG Tabs    VITAMIN B6    90 tablet    Take 1 tablet (100 mg) by mouth daily    Coronary artery disease involving autologous artery coronary bypass graft without angina pectoris       STATIN NOT PRESCRIBED (INTENTIONAL)     0 each    1 each continuous Statin not prescribed intentionally due to Intolerance (with supporting documentation of trying a statin at least once within the last 5 years)    Hyperlipidemia LDL goal <70, Statin intolerance       vitamin D3 2000 UNITS Caps     90 capsule    Take 1 capsule by mouth daily    Vitamin D deficiency disease

## 2018-01-17 NOTE — NURSING NOTE
"Injectable Influenza Immunization Documentation    1.  Has the patient received the information for the injectable influenza vaccine? YES     2. Is the patient 6 months of age or older? YES     3. Does the patient have any of the following contraindications?         Severe allergy to eggs? No     Severe allergic reaction to previous influenza vaccines? No   Severe allergy to latex? No       History of Guillain-[place] syndrome? No     Currently have a temperature greater than 100.4F? No        4.  Severely egg allergic patients should have flu vaccine eligibility assessed by an MD, RN, or pharmacist, and those who received flu vaccine should be observed for 15 min by an MD, RN, Pharmacist, Medical Technician, or member of clinic staff.\": YES    5. Latex-allergic patients should be given latex-free influenza vaccine Yes. Please reference the Vaccine latex table to determine if your clinic s product is latex-containing.       Vaccination given by Sultana Duarte CMA        "

## 2018-03-12 ENCOUNTER — TELEPHONE (OUTPATIENT)
Dept: CARDIOLOGY | Facility: CLINIC | Age: 58
End: 2018-03-12

## 2018-03-12 DIAGNOSIS — I25.10 CORONARY ARTERY DISEASE INVOLVING NATIVE CORONARY ARTERY OF NATIVE HEART WITHOUT ANGINA PECTORIS: Primary | ICD-10-CM

## 2018-03-12 NOTE — TELEPHONE ENCOUNTER
----- Message from Katie Gibbs sent at 3/12/2018  2:41 PM CDT -----  Regarding: FW: Dr Evangelista/[facility] APPT for Lipid Management SORAYA  Contact: [phone]  Annemarie,    This came from the call center.  Per patient's chart he should be following up in April not August.  Not sure though.      ----- Message -----     From: Laisha Jarvis     Sent: 3/12/2018  11:39 AM       To: Acute Care Discharge - Cardiology Clinic  Subject: Dr Evangelista/[facility] APPT for Lipid Management A#    Per call from PT he is to FU with Dr Evangelista in August but PT scheduled next AVAIL APPT on 10/17/18.  Per PT he is okay to come in Oct if no sooner APPT but if there's sooner APPT he can come earlier. PT on wait list.    Thank You,  Laisha    Please DO NOT send this message and/or reply back to sender. Call Center Representatives DO NOT respond to messages

## 2018-05-25 ENCOUNTER — APPOINTMENT (OUTPATIENT)
Dept: CT IMAGING | Facility: CLINIC | Age: 58
End: 2018-05-25
Attending: EMERGENCY MEDICINE
Payer: COMMERCIAL

## 2018-05-25 ENCOUNTER — OFFICE VISIT (OUTPATIENT)
Dept: FAMILY MEDICINE | Facility: CLINIC | Age: 58
End: 2018-05-25
Payer: COMMERCIAL

## 2018-05-25 ENCOUNTER — HOSPITAL ENCOUNTER (EMERGENCY)
Facility: CLINIC | Age: 58
Discharge: HOME | End: 2018-05-25
Attending: EMERGENCY MEDICINE | Admitting: EMERGENCY MEDICINE
Payer: COMMERCIAL

## 2018-05-25 VITALS
WEIGHT: 218.4 LBS | SYSTOLIC BLOOD PRESSURE: 149 MMHG | HEART RATE: 64 BPM | TEMPERATURE: 98.3 F | OXYGEN SATURATION: 94 % | DIASTOLIC BLOOD PRESSURE: 89 MMHG | BODY MASS INDEX: 29.62 KG/M2

## 2018-05-25 VITALS
BODY MASS INDEX: 29.78 KG/M2 | RESPIRATION RATE: 18 BRPM | DIASTOLIC BLOOD PRESSURE: 74 MMHG | WEIGHT: 219.58 LBS | SYSTOLIC BLOOD PRESSURE: 139 MMHG | OXYGEN SATURATION: 97 % | TEMPERATURE: 98.8 F

## 2018-05-25 DIAGNOSIS — R10.9 RIGHT FLANK PAIN: ICD-10-CM

## 2018-05-25 DIAGNOSIS — K80.20 CALCULUS OF GALLBLADDER WITHOUT CHOLECYSTITIS WITHOUT OBSTRUCTION: ICD-10-CM

## 2018-05-25 DIAGNOSIS — N20.0 NEPHROLITHIASIS: ICD-10-CM

## 2018-05-25 DIAGNOSIS — R10.31 ABDOMINAL PAIN, RIGHT LOWER QUADRANT: Primary | ICD-10-CM

## 2018-05-25 LAB
% GRANULOCYTES: 82.1 %G (ref 40–75)
ALBUMIN SERPL-MCNC: 4.6 MG/DL (ref 3.5–4.7)
ALP SERPL-CCNC: 52.9 U/L (ref 31.7–110.7)
ALT SERPL-CCNC: 33.5 U/L (ref 0–45)
AST SERPL-CCNC: 29.4 U/L (ref 0–55)
BILIRUB SERPL-MCNC: 2 MG/DL (ref 0.2–1.3)
BILIRUBIN UR: ABNORMAL
BLOOD UR: ABNORMAL
BUN SERPL-MCNC: 19.1 MG/DL (ref 7–21)
CALCIUM SERPL-MCNC: 9.4 MG/DL (ref 8.5–10.1)
CHLORIDE SERPLBLD-SCNC: 101.2 MMOL/L (ref 98–110)
CO2 SERPL-SCNC: 30.8 MMOL/L (ref 20–32)
CREAT SERPL-MCNC: 1.2 MG/DL (ref 0.7–1.3)
GFR SERPL CREATININE-BSD FRML MDRD: 66.3 ML/MIN/1.7 M2
GLUCOSE SERPL-MCNC: 155.3 MG'DL (ref 70–99)
GLUCOSE URINE: NEGATIVE
GRANULOCYTES #: 4 K/UL (ref 1.6–8.3)
HCT VFR BLD AUTO: 51.2 % (ref 40–53)
HEMOGLOBIN: 15.7 G/DL (ref 13.3–17.7)
KETONES UR QL: ABNORMAL
LEUKOCYTE ESTERASE UR: NEGATIVE
LIPASE SERPL-CCNC: 114 U/L (ref 73–393)
LYMPHOCYTES # BLD AUTO: 0.6 K/UL (ref 0.8–5.3)
LYMPHOCYTES NFR BLD AUTO: 13.1 %L (ref 20–48)
MCH RBC QN AUTO: 31.5 PG (ref 26.5–35)
MCHC RBC AUTO-ENTMCNC: 30.7 G/DL (ref 32–36)
MCV RBC AUTO: 102.7 FL (ref 78–100)
MID #: 0.2 K/UL (ref 0–2.2)
MID %: 4.8 %M (ref 0–20)
NITRITE UR QL STRIP: NEGATIVE
PH UR STRIP: 5.5 [PH] (ref 5–7)
PLATELET # BLD AUTO: 122 K/UL (ref 150–450)
POTASSIUM SERPL-SCNC: 3.7 MMOL/DL (ref 3.3–4.5)
PROT SERPL-MCNC: 7.3 G/DL (ref 6.8–8.8)
PROTEIN UR: ABNORMAL
RADIOLOGIST FLAGS: NORMAL
RBC # BLD AUTO: 4.99 M/UL (ref 4.4–5.9)
SODIUM SERPL-SCNC: 135.8 MMOL/L (ref 132.6–141.4)
SP GR UR STRIP: 1.02
UROBILINOGEN UR STRIP-ACNC: ABNORMAL
WBC # BLD AUTO: 4.9 K/UL (ref 4–11)

## 2018-05-25 PROCEDURE — 74176 CT ABD & PELVIS W/O CONTRAST: CPT

## 2018-05-25 PROCEDURE — 96374 THER/PROPH/DIAG INJ IV PUSH: CPT | Performed by: EMERGENCY MEDICINE

## 2018-05-25 PROCEDURE — 99284 EMERGENCY DEPT VISIT MOD MDM: CPT | Mod: 25 | Performed by: EMERGENCY MEDICINE

## 2018-05-25 PROCEDURE — 96361 HYDRATE IV INFUSION ADD-ON: CPT | Performed by: EMERGENCY MEDICINE

## 2018-05-25 PROCEDURE — 96375 TX/PRO/DX INJ NEW DRUG ADDON: CPT | Performed by: EMERGENCY MEDICINE

## 2018-05-25 PROCEDURE — 25000132 ZZH RX MED GY IP 250 OP 250 PS 637: Performed by: EMERGENCY MEDICINE

## 2018-05-25 PROCEDURE — 25000128 H RX IP 250 OP 636: Performed by: EMERGENCY MEDICINE

## 2018-05-25 PROCEDURE — 99284 EMERGENCY DEPT VISIT MOD MDM: CPT | Mod: Z6 | Performed by: EMERGENCY MEDICINE

## 2018-05-25 RX ORDER — TAMSULOSIN HYDROCHLORIDE 0.4 MG/1
0.4 CAPSULE ORAL ONCE
Status: COMPLETED | OUTPATIENT
Start: 2018-05-25 | End: 2018-05-25

## 2018-05-25 RX ORDER — ONDANSETRON 2 MG/ML
4 INJECTION INTRAMUSCULAR; INTRAVENOUS ONCE
Status: COMPLETED | OUTPATIENT
Start: 2018-05-25 | End: 2018-05-25

## 2018-05-25 RX ORDER — KETOROLAC TROMETHAMINE 10 MG/1
10 TABLET, FILM COATED ORAL EVERY 6 HOURS PRN
Qty: 20 TABLET | Refills: 0 | Status: SHIPPED | OUTPATIENT
Start: 2018-05-25 | End: 2018-08-14

## 2018-05-25 RX ORDER — KETOROLAC TROMETHAMINE 15 MG/ML
15 INJECTION, SOLUTION INTRAMUSCULAR; INTRAVENOUS ONCE
Status: COMPLETED | OUTPATIENT
Start: 2018-05-25 | End: 2018-05-25

## 2018-05-25 RX ORDER — ONDANSETRON 4 MG/1
4 TABLET, ORALLY DISINTEGRATING ORAL EVERY 6 HOURS PRN
Qty: 10 TABLET | Refills: 0 | Status: SHIPPED | OUTPATIENT
Start: 2018-05-25 | End: 2018-05-28

## 2018-05-25 RX ORDER — OXYCODONE HYDROCHLORIDE 5 MG/1
5 TABLET ORAL EVERY 6 HOURS PRN
Qty: 12 TABLET | Refills: 0 | Status: SHIPPED | OUTPATIENT
Start: 2018-05-25 | End: 2018-06-05

## 2018-05-25 RX ORDER — TAMSULOSIN HYDROCHLORIDE 0.4 MG/1
0.4 CAPSULE ORAL DAILY
Qty: 10 CAPSULE | Refills: 0 | Status: SHIPPED | OUTPATIENT
Start: 2018-05-25 | End: 2018-06-12

## 2018-05-25 RX ADMIN — ONDANSETRON 4 MG: 2 INJECTION INTRAMUSCULAR; INTRAVENOUS at 15:07

## 2018-05-25 RX ADMIN — SODIUM CHLORIDE 1000 ML: 9 INJECTION, SOLUTION INTRAVENOUS at 15:09

## 2018-05-25 RX ADMIN — KETOROLAC TROMETHAMINE 15 MG: 15 INJECTION, SOLUTION INTRAMUSCULAR; INTRAVENOUS at 15:07

## 2018-05-25 RX ADMIN — TAMSULOSIN HYDROCHLORIDE 0.4 MG: 0.4 CAPSULE ORAL at 16:12

## 2018-05-25 ASSESSMENT — ENCOUNTER SYMPTOMS
NAUSEA: 0
DYSURIA: 0
FEVER: 1
DIARRHEA: 0
HEMATURIA: 0
APPETITE CHANGE: 1
VOMITING: 0
ABDOMINAL PAIN: 1
CHILLS: 1

## 2018-05-25 NOTE — DISCHARGE INSTRUCTIONS
Strain urine and save any stone if found before your follow-up appointment.    Please make an appointment to follow up with Urology Clinic (phone: [phone]) in 5-7 days for recheck.    Return to the ER for any worsening, fevers, pain uncontrolled by medication.

## 2018-05-25 NOTE — ED AVS SNAPSHOT
[facility] [place], Emergency Department    [address] [place] [address]    Phone:  [phone]                                       Hong Pool   MRN: [id number]    Department:  [facility], Emergency Department   Date of Visit:  5/25/2018           After Visit Summary Signature Page     I have received my discharge instructions, and my questions have been answered. I have discussed any challenges I see with this plan with the nurse or doctor.    ..........................................................................................................................................  Patient/Patient Representative Signature      ..........................................................................................................................................  Patient Representative Print Name and Relationship to Patient    ..................................................               ................................................  Date                                            Time    ..........................................................................................................................................  Reviewed by Signature/Title    ...................................................              ..............................................  Date                                                            Time

## 2018-05-25 NOTE — ED AVS SNAPSHOT
[facility], Emergency Department    [address] [facility] [address]    Phone:  [phone]                                       Hong Pool   MRN: [id number]    Department:  [facility], Emergency Department   Date of Visit:  5/25/2018           Patient Information     Date Of Birth          1960        Your diagnoses for this visit were:     Nephrolithiasis right proximal ureter    Calculus of gallbladder without cholecystitis without obstruction incidental, known       You were seen by Gerardo Tolentino MD.        Discharge Instructions       Strain urine and save any stone if found before your follow-up appointment.    Please make an appointment to follow up with Urology Clinic (phone: [phone]) in 5-7 days for recheck.    Return to the ER for any worsening, fevers, pain uncontrolled by medication.    Discharge References/Attachments     KIDNEY STONES, UNDERSTANDING (ENGLISH)      Your next 10 appointments already scheduled     Oct 17, 2018  3:30 PM CDT   (Arrive by 3:15 PM)   RETURN LIPID VISIT with Jaylan Evangelista MD   [facility] ([facility])    [address] [place] [address]  [address] [place] [id number] [phone]              24 Hour Appointment Hotline       To make an appointment at any [facility], call [phone]). If you don't have a family doctor or clinic, we will help you find one. [facility] clinics are conveniently located to serve the needs of you and your family.             Review of your medicines      START taking        Dose / Directions Last dose taken    ketorolac 10 MG tablet   Commonly known as:  TORADOL   Dose:  10 mg   Quantity:  20 tablet        Take 1 tablet (10 mg) by mouth every 6 hours as needed for moderate pain   Refills:  0        ondansetron 4 MG ODT tab   Commonly known as:  ZOFRAN ODT   Dose:  4 mg   Quantity:  10 tablet        Take 1 tablet (4 mg) by mouth every 6 hours as  needed   Refills:  0        tamsulosin 0.4 MG capsule   Commonly known as:  FLOMAX   Dose:  0.4 mg   Quantity:  10 capsule        Take 1 capsule (0.4 mg) by mouth daily for 10 doses   Refills:  0          Our records show that you are taking the medicines listed below. If these are incorrect, please call your family doctor or clinic.        Dose / Directions Last dose taken    alirocumab 75 MG/ML injectable pen   Commonly known as:  PRALUENT   Dose:  75 mg   Quantity:  2 mL        Inject 1 mL (75 mg) Subcutaneous every 14 days   Refills:  11        alpha-d-galactosidase tablet   Dose:  2 tablet   Quantity:  540 tablet        Take 2 tablets by mouth 3 times daily (before meals)   Refills:  4        aspirin 81 MG EC tablet   Dose:  81 mg   Quantity:  90 tablet        Take 1 tablet (81 mg) by mouth daily   Refills:  4        Blood Pressure Monitor Kit   Dose:  1 each   Quantity:  1 kit        1 each daily.   Refills:  0        [employer] COENZYME Q10 100 MG Caps capsule   Dose:  100 mg   Generic drug:  co-enzyme Q-10        Take 1 capsule (100 mg) by mouth daily   Refills:  0        Lactase 9000 units Chew   Commonly known as:  LACTAID FAST ACT   Dose:  1 tablet   Quantity:  270 tablet        Take 1 tablet by mouth daily Chew and swallow 1 tablet with first bite of dairy food prn   Refills:  4        MENS MULTIVITAMIN PLUS Tabs   Dose:  1 tablet   Quantity:  90 tablet        Take 1 tablet by mouth daily   Refills:  4        omeprazole 20 MG CR capsule   Commonly known as:  priLOSEC   Dose:  20 mg   Quantity:  60 capsule        Take 1 capsule (20 mg) by mouth 2 times daily   Refills:  3        oxyCODONE IR 5 MG tablet   Commonly known as:  ROXICODONE   Dose:  5 mg   Quantity:  12 tablet        Take 1 tablet (5 mg) by mouth every 6 hours as needed for pain   Refills:  0        pyridOXINE 100 MG Tabs   Commonly known as:  VITAMIN B6   Dose:  100 mg   Quantity:  90 tablet        Take 1 tablet (100 mg) by mouth daily   Refills:  4         STATIN NOT PRESCRIBED (INTENTIONAL)   Dose:  1 each   Quantity:  0 each        1 each continuous Statin not prescribed intentionally due to Intolerance (with supporting documentation of trying a statin at least once within the last 5 years)   Refills:  0        vitamin D3 2000 units Caps   Dose:  1 capsule   Quantity:  90 capsule        Take 1 capsule by mouth daily   Refills:  4                Prescriptions were sent or printed at these locations (3 Prescriptions)                   Other Prescriptions                Printed at Department/Unit printer (3 of 3)         ketorolac (TORADOL) 10 MG tablet               ondansetron (ZOFRAN ODT) 4 MG ODT tab               tamsulosin (FLOMAX) 0.4 MG capsule                Procedures and tests performed during your visit     CT Abdomen Pelvis w/o Contrast    Peripheral IV: Standard      Orders Needing Specimen Collection     None      Pending Results     No orders found from 5/23/2018 to 5/26/2018.            Pending Culture Results     No orders found from 5/23/2018 to 5/26/2018.            Pending Results Instructions     If you had any lab results that were not finalized at the time of your Discharge, you can call the ED Lab Result RN at [phone]. You will be contacted by this team for any positive Lab results or changes in treatment. The nurses are available 7 days a week from 10A to 6:30P.  You can leave a message 24 hours per day and they will return your call.        Thank you for choosing [facility]       Thank you for choosing [facility] for your care. Our goal is always to provide you with excellent care. Hearing back from our patients is one way we can continue to improve our services. Please take a few minutes to complete the written survey that you may receive in the mail after you visit with us. Thank you!        Care EveryWhere ID     This is your Care EveryWhere ID. This could be used by other organizations to access your [facility] medical  records  [id number]        Equal Access to Services     MARCK BE : Devin Rios, lexy kelly, honorio del angel, cub[place] randhawa . So [facility] [phone].    ATENCIÓN: Si habla español, tiene a keane disposición servicios gratuitos de asistencia lingüística. Llame al [phone].    We comply with applicable federal civil rights laws and [place] laws. We do not discriminate on the basis of race, color, national origin, age, disability, sex, sexual orientation, or gender identity.            After Visit Summary       This is your record. Keep this with you and show to your community pharmacist(s) and doctor(s) at your next visit.

## 2018-05-25 NOTE — PROGRESS NOTES
I was present with the medical student who participated in the service and in the documentation of this note. I have verified the history and personally performed the physical exam and medical decision making, and have verified the content of the note, which accurately reflects my assessment of the patient and the plan of care.   Chencho Beltran MD

## 2018-05-25 NOTE — PROGRESS NOTES
HPI:       Hong Pool is a 57 year old who presents for the following  Patient presents with:  Abdominal Pain: Started with leg pain, vomiting, then R side abdominal pain x1 wk  Flank Pain: R flank pain    Mathew presents for abdominal pain following flu-like symptoms. He had leg pain on Tuesday night that was followed by diarrhea and vomiting on Wednesday. He thought this was either due to the flu or food poisoning. With the vomiting and diarrhea he also noted intense abdominal cramps. He stopped experiencing diarrhea and vomiting by Wednesday night but then noticed right lower back pain and flank pain. He has had limited PO intake prior to yesterday. At this point, the flu symptoms have disappeared and he has resumed eating, but he is having pain in his RLQ, right flank and right lower back. The pain is sharp, rates 8/10 in severity, and has been unchanged in severity over the past few days. The pain lasts for 30-60 minutes, disappears for 5-6 hours but will return. He has had subjective fevers and chills, often with the pains. Sometimes he notes issues with deep breaths but not always. Movement, eating, and stooling do not affect the pain. He has not had any pain with urination, hematuria, or pain into his scrotum. He has not found anything that makes the pain better. Of note, he has a history of kidney stones. He has been on potassium citrate for the last 6 months. He stopped the potassium citrate for 1 year due to digestive issues and during the workup for this issue he was found to have a non-obstructive gallstone. He notes that the pain sometimes feels similar to the pain he experienced with kidney stones. He is wondering if the problem could be with his intestines given that the pain is shifting.    Problem, Medication and Allergy Lists were reviewed and are current.  Patient is an established patient of this clinic.         Review of Systems:   Review of Systems   Constitutional: Positive for  appetite change, chills and fever.   Gastrointestinal: Positive for abdominal pain. Negative for diarrhea, nausea and vomiting.   Genitourinary: Negative for dysuria and hematuria.   All other systems reviewed and are negative.            Physical Exam:   Patient Vitals for the past 24 hrs:   BP Temp Temp src Pulse SpO2 Weight   05/25/18 1020 149/89 - - - - -   05/25/18 1016 (!) 164/94 98.3  F (36.8  C) Oral 64 94 % 218 lb 6.4 oz (99.1 kg)     Body mass index is 29.62 kg/(m^2).  Vitals were reviewed and were normal except for elevated blood pressure (164/94), likely 2/2 pain.     Physical Exam   Constitutional: He appears well-developed and well-nourished. No distress.   Cardiovascular: Normal rate, regular rhythm and normal heart sounds.    Pulmonary/Chest: Effort normal and breath sounds normal.   Abdominal: Soft. Bowel sounds are normal. He exhibits no distension and no mass. There is no tenderness. There is no rebound and no guarding.   Skin: He is not diaphoretic.         Results:    Results from the last 24 hours  Results for orders placed or performed in visit on 05/25/18 (from the past 24 hour(s))   CBC with Diff Plt ([facility])   Result Value Ref Range    WBC 4.9 4.0 - 11.0 K/uL    Lymphocytes # 0.6 (L) 0.8 - 5.3 K/uL    % Lymphocytes 13.1 (L) 20.0 - 48.0 %L    Mid # 0.2 0.0 - 2.2 K/uL    Mid % 4.8 0.0 - 20.0 %M    GRANULOCYTES # 4.0 1.6 - 8.3 K/uL    % Granulocytes 82.1 (H) 40.0 - 75.0 %G    RBC 4.99 4.40 - 5.90 M/uL    Hemoglobin 15.7 13.3 - 17.7 g/dL    Hematocrit 51.2 40.0 - 53.0 %    .7 (H) 78.0 - 100.0 fL    MCH 31.5 26.5 - 35.0 pg    MCHC 30.7 (L) 32.0 - 36.0 g/dL    Platelets 122.0 (L) 150.0 - 450.0 K/uL   Urinalysis (UA) ([facility])   Result Value Ref Range    Specific Gravity Urine 1.020 1.005 - 1.030    pH Urine 5.5 4.5 - 8.0    Leukocyte Esterase UR Negative NEGATIVE    Nitrite Urine Negative NEGATIVE    Protein UR 1+ (A) NEGATIVE    Glucose Urine Negative NEGATIVE    Ketones Urine Trace  (A) NEGATIVE    Urobilinogen mg/dL 1.0 E.U./dL 0.2 E.U./dL    Bilirubin UR 1+ (A) NEGATIVE    Blood UR 2+ (A) NEGATIVE     Assessment and Plan     1. Abdominal pain, right lower quadrant  2. Right flank pain  Ongoing intermittent intense RLQ and right flank pain in the setting of recent gastritis-like symptoms and a hx of nephrolithiasis. No tenderness or abdominal discomfort on exam but pt reported no symptoms at time of exam. UA with blood, CBC unremarkable, CMP pending. Etiology unknown, differential includes but is not limited to nephrolithiasis, appendicitis, pancreatitis, gastritis. Likely nephrolithiasis in the setting of limited PO intake with recent gastritis, urinalysis demonstrates evidence of blood in urine. Advised pt to go to [facility] ED, called ED to discuss pt.  - CBC with Diff Plt ([facility])  - Comprehensive Metabolic Panel ([facility])  - Lipase  - Urinalysis (UA) ([facility])  - oxyCODONE IR (ROXICODONE) 5 MG tablet; Take 1 tablet (5 mg) by mouth every 6 hours as needed for pain  Dispense: 12 tablet; Refill: 0    There are no discontinued medications.  Options for treatment and follow-up care were reviewed with the patient. Hong Pool  engaged in the decision making process and verbalized understanding of the options discussed and agreed with the final plan.  Patient became significantly more distressed through the visit.  Pain control may be difficult.  Has some signs of kidney stone but story doesn't entirely fit so imaging is a good idea.  Talked about doing this outpatient but will be hard to do this and make sure that any major findings are communicated in a timely fashion.  Recommended he go to the ER for pain control, further workup and coordination of his care for over the holiday weekend.  Patient and wife agreed.    >40 minutes spent with pt with >50% in counseling and coordination of care.  Farrah Nunn, MS4/Chencho Beltran MD

## 2018-05-25 NOTE — ED PROVIDER NOTES
History     Chief Complaint   Patient presents with     Flank Pain     HPI  Hong Pool is a 57 year old male who was sent from the [facility] for evaluation of some right flank pain.  Patient states that he has had the pain for the last 2 days and finally went to the clinic today for evaluation.  Patient was found to have material and was diagnosed as a probable kidney stone. He was sent here to the ER for further evaluation imaging.  Patient states that he did throw up initially when pain started on Wednesday, but denies fevers or dysuria. He states he has had a previous kidney stone appoximally 15 years ago.  Patient denies any hematemesis, melena, diarrhea, or bright blood per rectum.    This part of the medical record was transcribed by Zane Ward Medical Scribe, from a dictation done by Gerardo Tolentino MD.     PAST MEDICAL HISTORY  Past Medical History:   Diagnosis Date     Borderline diabetes mellitus 11/26/2015     Coronary artery disease 2012    CABG x4     Coronary artery disease involving coronary bypass graft of native heart without angina pectoris 11/18/2015     Depression      Goiter     With normal TSH     Hyperlipidemia     Started on statin 7/2007.  Discontinued 10/07 secondary to muscle aches and fatigue     Nephrolithiasis      Shoulder pain      Statin intolerance 6/23/2016     PAST SURGICAL HISTORY  Past Surgical History:   Procedure Laterality Date     BYPASS GRAFT ARTERY CORONARY  6/24/2012    Procedure: BYPASS GRAFT ARTERY CORONARY;  Median Sternotomy, Coronary Artery Bypass Grafting x 4 using Left Internal Mammary and Left Saphenous Vein  with Endovein Harvesting. On Pump Oxygenation;  Surgeon: Genesis Larsen MD;  Location:  OR     ESOPHAGOSCOPY, GASTROSCOPY, DUODENOSCOPY (EGD), COMBINED N/A 4/11/2017    Procedure: COMBINED ESOPHAGOSCOPY, GASTROSCOPY, DUODENOSCOPY (EGD), BIOPSY SINGLE OR MULTIPLE;  Surgeon: Corine Ly MD;  Location:  GI     FAMILY  HISTORY  Family History   Problem Relation Age of Onset     C.A.D. Other      Uncle     DIABETES Brother      CEREBROVASCULAR DISEASE Brother      Cardiovascular Sister      tachyarrhythmia     SOCIAL HISTORY  Social History   Substance Use Topics     Smoking status: Never Smoker     Smokeless tobacco: Never Used     Alcohol use No     MEDICATIONS  Previous Medications    ALIROCUMAB (PRALUENT) 75 MG/ML INJECTABLE PEN    Inject 1 mL (75 mg) Subcutaneous every 14 days    ALPHA-D-GALACTOSIDASE (BEANO) TABLET    Take 2 tablets by mouth 3 times daily (before meals)    ASPIRIN 81 MG EC TABLET    Take 1 tablet (81 mg) by mouth daily    BLOOD PRESSURE MONITOR KIT    1 each daily.    CHOLECALCIFEROL (VITAMIN D3) 2000 UNITS CAPS    Take 1 capsule by mouth daily    CO-ENZYME Q-10 ([employer] COENZYME Q10) 100 MG CAPS    Take 1 capsule (100 mg) by mouth daily    LACTASE (LACTAID FAST ACT) 9000 UNITS CHEW    Take 1 tablet by mouth daily Chew and swallow 1 tablet with first bite of dairy food prn    MULTIPLE VITAMINS-MINERALS (MENS MULTIVITAMIN PLUS) TABS    Take 1 tablet by mouth daily    OMEPRAZOLE (PRILOSEC) 20 MG CR CAPSULE    Take 1 capsule (20 mg) by mouth 2 times daily    OXYCODONE IR (ROXICODONE) 5 MG TABLET    Take 1 tablet (5 mg) by mouth every 6 hours as needed for pain    PYRIDOXINE (VITAMIN B6) 100 MG TABS    Take 1 tablet (100 mg) by mouth daily    STATIN NOT PRESCRIBED, INTENTIONAL,    1 each continuous Statin not prescribed intentionally due to Intolerance (with supporting documentation of trying a statin at least once within the last 5 years)     ALLERGIES  Allergies   Allergen Reactions     Lidocaine Rash     Break out     Band-Aid Anti-Itch      Cholestoff Complete [Plant Sterol Stanol-Pantethine] Other (See Comments)     Severe stomach pain     Lactose Diarrhea     Rosuvastatin Muscle Pain (Myalgia)     Zocor [Simvastatin - High Dose] Other (See Comments)     Muscle aches/soreness, confusion, disorganized thinking            I have reviewed the Medications, Allergies, Past Medical and Surgical History, and Social History in the Epic system.    Review of Systems   All other systems reviewed and are negative.      Physical Exam   BP: 163/81  Heart Rate: 73  Temp: 98.8  F (37.1  C)  Resp: 16  Weight: 99.6 kg (219 lb 9.3 oz)  SpO2: 98 %      Physical Exam   Constitutional: He is oriented to person, place, and time.   Alert conversant pleasant   HENT:   Head: Atraumatic.   Eyes: EOM are normal. Pupils are equal, round, and reactive to light.   Neck: Neck supple.   Cardiovascular: Normal heart sounds.    Pulmonary/Chest: Breath sounds normal.   Abdominal: Soft. There is no tenderness.   Musculoskeletal: He exhibits no edema or tenderness.   Neurological: He is alert and oriented to person, place, and time.   Grossly intact and symmetric   Skin: No rash noted.   Psychiatric: He has a normal mood and affect.       ED Course     ED Course     Procedures        Results for SVEN ESCALERA (MRN [id number]) as of 5/25/2018 14:29   Ref. Range 5/25/2018 11:07   Glucose Latest Ref Range: 70.0 - 99.0 mg'dL 155.3 (H)   WBC Latest Ref Range: 4.0 - 11.0 K/uL 4.9   Hemoglobin Latest Ref Range: 13.3 - 17.7 g/dL 15.7   Hematocrit Latest Ref Range: 40.0 - 53.0 % 51.2   Platelets Latest Ref Range: 150.0 - 450.0 K/uL 122.0 (L)   RBC Latest Ref Range: 4.40 - 5.90 M/uL 4.99   MCV Latest Ref Range: 78.0 - 100.0 fL 102.7 (H)   MCH Latest Ref Range: 26.5 - 35.0 pg 31.5   MCHC Latest Ref Range: 32.0 - 36.0 g/dL 30.7 (L)   % Lymphocytes Latest Ref Range: 20.0 - 48.0 %L 13.1 (L)   % Granulocytes Latest Ref Range: 40.0 - 75.0 %G 82.1 (H)   Mid % Latest Ref Range: 0.0 - 20.0 %M 4.8   GRANULOCYTES # Latest Ref Range: 1.6 - 8.3 K/uL 4.0   Lymphocytes # Latest Ref Range: 0.8 - 5.3 K/uL 0.6 (L)   Mid # Latest Ref Range: 0.0 - 2.2 K/uL 0.2   Glucose Urine Latest Ref Range: NEGATIVE  Negative   Bilirubin UR Latest Ref Range: NEGATIVE  1+ (A)   Ketones Urine Latest  Ref Range: NEGATIVE  Trace (A)   Specific Gravity Urine Latest Ref Range: 1.005 - 1.030  1.020   pH Urine Latest Ref Range: 4.5 - 8.0  5.5   Protein UR Latest Ref Range: NEGATIVE  1+ (A)   Urobilinogen mg/dL Latest Ref Range: 0.2 E.U./dL  1.0 E.U./dL   Nitrite Urine Latest Ref Range: NEGATIVE  Negative   Blood UR Latest Ref Range: NEGATIVE  2+ (A)   Leukocyte Esterase UR Latest Ref Range: NEGATIVE  Negative     Results for orders placed or performed during the hospital encounter of 05/25/18   CT Abdomen Pelvis w/o Contrast   Result Value Ref Range    Radiologist flags Ureteral stone, and cholelithiasis.     Narrative    EXAMINATION: CT ABDOMEN PELVIS W/O CONTRAST, 5/25/2018 3:28 PM    TECHNIQUE:  Helical CT images from the lung bases through the  symphysis pubis were obtained without IV contrast per the stone  protocol. Contrast dose: None    COMPARISON: CT abdomen pelvis 6/4/2007    HISTORY: right flank pain;     FINDINGS:    There is a 4 mm stone in the proximal right ureter (series 2 image  91). There is mild right hydronephrosis. There are multiple  nonobstructing renal stones in the left kidney. No left hydronephrosis  or hydroureter. No stones within the urinary bladder.    Remainder of the abdomen and pelvis: Liver, spleen, adrenals, and  pancreas are within normal limits on this noncontrast study. There is  a 2.9 cm calcified gallstone in the neck of the gallbladder. This  gallstone was present on 6/4/2007 and measured 2.5 cm. The gallbladder  is mildly distended. There appears to be mild gallbladder wall  thickening. No abnormally dilated small or large bowel. No pelvic  masses. No free fluid in the abdomen or pelvis. No abdominal aortic  aneurysm.    Lung bases: 2 mm hyperattenuating pulmonary nodule right lower lobe  likely represents a benign granuloma. 2 mm pulmonary nodule in the  right middle lobe (series 2 image 5). Minimal bibasilar  fibroatelectasis. No pleural effusion.    Bones and soft tissues:  No aggressive osseous lesions. Soft tissues  are within normal limits.      Impression    IMPRESSION:   1. 4 mm stone within the proximal right ureter with mild right  hydronephrosis. Nonobstructing nephrolithiasis in the left kidney.  2. 2.9 cm calcified gallstone in the gallbladder neck. Mild  gallbladder wall thickening, otherwise no evidence for cholecystitis.  Right upper quadrant ultrasound could be considered if clinically  indicated.  3. Several 2 mm pulmonary nodules. In a low-risk patient, no follow-up  is recommended. In a high-risk patient, a CT chest in 12 months is  optional.    [Consider Follow Up: Ureteral stone, and cholelithiasis.]    This report will be copied to the [facility] to ensure a  provider acknowledges the finding.     I have personally reviewed the examination and initial interpretation  and I agree with the findings.    JABARI HA       Assessments & Plan (with Medical Decision Making)     I have reviewed the nursing notes.    Medications   ondansetron (ZOFRAN) injection 4 mg (4 mg Intravenous Given 5/25/18 1507)   ketorolac (TORADOL) injection 15 mg (15 mg Intravenous Given 5/25/18 1507)   0.9% sodium chloride BOLUS (0 mLs Intravenous Stopped 5/25/18 1545)   tamsulosin (FLOMAX) capsule 0.4 mg (0.4 mg Oral Given 5/25/18 1612)     Patient an IV started and was given Toradol and Zofran along with normal saline bolus.  Patient reports significant relief in his pain with the IV Toradol.  Patient CT confirmed a proximal ureteral stone.  Patient was given Flomax at this time will be sent home to follow-up with urology clinic next week for further treatment and evaluation including possible lithotripsy.  Patient does have oxycodone at home but has requested Toradol orally as an alternative treatment.    I have reviewed the findings, diagnosis, plan and need for follow up with the patient.    New Prescriptions    KETOROLAC (TORADOL) 10 MG TABLET    Take 1 tablet (10 mg) by  mouth every 6 hours as needed for moderate pain    ONDANSETRON (ZOFRAN ODT) 4 MG ODT TAB    Take 1 tablet (4 mg) by mouth every 6 hours as needed    TAMSULOSIN (FLOMAX) 0.4 MG CAPSULE    Take 1 capsule (0.4 mg) by mouth daily for 10 doses       Final diagnoses:   Nephrolithiasis - right proximal ureter   Calculus of gallbladder without cholecystitis without obstruction - incidental, known     Strain urine and save any stone if found before your follow-up appointment.     Please make an appointment to follow up with Urology Clinic (phone: [phone]) in 5-7 days for recheck.     Return to the ER for any worsening, fevers, pain uncontrolled by medication.    Routine discharge instructions were given for this diagnosis    Gerardo Tolentino MD    5/25/2018   [facility], EMERGENCY DEPARTMENT     Gerardo Tolentino MD  05/25/18 6434

## 2018-05-25 NOTE — MR AVS SNAPSHOT
After Visit Summary   5/25/2018    Hnog Pool    MRN: [id number]           Patient Information     Date Of Birth          1960        Visit Information        Provider Department      5/25/2018 10:00 AM Chencho Beltran MD [facility]        Today's Diagnoses     Abdominal pain, right lower quadrant    -  1    Right flank pain           Follow-ups after your visit        Your next 10 appointments already scheduled     Oct 17, 2018  3:30 PM CDT   (Arrive by 3:15 PM)   RETURN LIPID VISIT with Jaylan Evangelista MD   [facility] ([facility])    [address] [place] [address]  [address] [place] [id number] [phone]              Who to contact     Please call your clinic at [phone] to:    Ask questions about your health    Make or cancel appointments    Discuss your medicines    Learn about your test results    Speak to your doctor            Additional Information About Your Visit        Care EveryWhere ID     This is your Care EveryWhere ID. This could be used by other organizations to access your [facility] medical records  [id number]        Your Vitals Were     Pulse Temperature Pulse Oximetry BMI (Body Mass Index)          64 98.3  F (36.8  C) (Oral) 94% 29.62 kg/m2         Blood Pressure from Last 3 Encounters:   05/25/18 163/81   05/25/18 149/89   08/02/17 131/83    Weight from Last 3 Encounters:   05/25/18 219 lb 9.3 oz (99.6 kg)   05/25/18 218 lb 6.4 oz (99.1 kg)   08/02/17 231 lb 9.6 oz (105.1 kg)              We Performed the Following     CBC with Diff Plt ([facility])     Comprehensive Metabolic Panel ([facility])     Lipase     Urinalysis (UA) ([facility])          Today's Medication Changes          These changes are accurate as of 5/25/18  4:07 PM.  If you have any questions, ask your nurse or doctor.               Start taking these medicines.        Dose/Directions    oxyCODONE IR 5 MG tablet   Commonly known as:  ROXICODONE    Used for:  Abdominal pain, right lower quadrant, Right flank pain   Started by:  Chencho Beltran MD        Dose:  5 mg   Take 1 tablet (5 mg) by mouth every 6 hours as needed for pain   Quantity:  12 tablet   Refills:  0         These medicines have changed or have updated prescriptions.        Dose/Directions    omeprazole 20 MG CR capsule   Commonly known as:  priLOSEC   This may have changed:  when to take this   Used for:  Digestive problems        Dose:  20 mg   Take 1 capsule (20 mg) by mouth 2 times daily   Quantity:  60 capsule   Refills:  3            Where to get your medicines      Some of these will need a paper prescription and others can be bought over the counter.  Ask your nurse if you have questions.     Bring a paper prescription for each of these medications     oxyCODONE IR 5 MG tablet               Information about OPIOIDS     PRESCRIPTION OPIOIDS: WHAT YOU NEED TO KNOW   You have a prescription for an opioid (narcotic) pain medicine. Opioids can cause addiction. If you have a history of chemical dependency of any type, you are at a higher risk of becoming addicted to opioids. Only take this medicine after all other options have been tried. Take it for as short a time and as few doses as possible.     Do not:    Drive. If you drive while taking these medicines, you could be arrested for driving under the influence (DUI).    Operate heavy machinery    Do any other dangerous activities while taking these medicines.     Drink any alcohol while taking these medicines.      Take with any other medicines that contain acetaminophen. Read all labels carefully. Look for the word  acetaminophen  or  Tylenol.  Ask your pharmacist if you have questions or are unsure.    Store your pills in a secure place, locked if possible. We will not replace any lost or stolen medicine. If you don t finish your medicine, please throw away (dispose) as directed by your pharmacist. The [place] [employer]ution [employer]  has more information about safe disposal: https://www.pca.[facility].us/living-green/managing-unwanted-medications    All opioids tend to cause constipation. Drink plenty of water and eat foods that have a lot of fiber, such as fruits, vegetables, prune juice, apple juice and high-fiber cereal. Take a laxative (Miralax, milk of magnesia, Colace, Senna) if you don t move your bowels at least every other day.          Primary Care Provider Office Phone # Fax #    Jorge Singleton [phone]       [address] [address]        Equal Access to Services     MARCK BE : Hadii patrice Rios, waaxda robin, qaybta kaalmada bean, cuba randhawa . So [facility] [phone].    ATENCIÓN: Si habla español, tiene a keane disposición servicios gratuitos de asistencia lingüística. [facility] [phone].    We comply with applicable federal civil rights laws and [place] laws. We do not discriminate on the basis of race, color, national origin, age, disability, sex, sexual orientation, or gender identity.            Thank you!     Thank you for choosing \[facility]\"" [facility]  for your care. Our goal is always to provide you with excellent care. Hearing back from our patients is one way we can continue to improve our services. Please take a few minutes to complete the written survey that you may receive in the mail after your visit with us. Thank you!             Your Updated Medication List - Protect others around you: Learn how to safely use, store and throw away your medicines at www.disposemymeds.org.          This list is accurate as of 5/25/18  4:07 PM.  Always use your most recent med list.                   Brand Name Dispense Instructions for use Diagnosis    alirocumab 75 MG/ML injectable pen    PRALUENT    2 mL    Inject 1 mL (75 mg) Subcutaneous every 14 days    CAD (coronary artery disease), Hyperlipidemia LDL goal <70        alpha-d-galactosidase tablet     540 tablet    Take 2 tablets by mouth 3 times daily (before meals)    Bloating       aspirin 81 MG EC tablet     90 tablet    Take 1 tablet (81 mg) by mouth daily        Blood Pressure Monitor Kit     1 kit    1 each daily.    S/P CABG (coronary artery bypass graft), CAD (coronary artery disease)       [employer] COENZYME Q10 100 MG Caps capsule   Generic drug:  co-enzyme Q-10      Take 1 capsule (100 mg) by mouth daily        ketorolac 10 MG tablet    TORADOL    20 tablet    Take 1 tablet (10 mg) by mouth every 6 hours as needed for moderate pain        Lactase 9000 units Chew    LACTAID FAST ACT    270 tablet    Take 1 tablet by mouth daily Chew and swallow 1 tablet with first bite of dairy food prn    Lactose intolerance       MENS MULTIVITAMIN PLUS Tabs     90 tablet    Take 1 tablet by mouth daily    Preventive measure       omeprazole 20 MG CR capsule    priLOSEC    60 capsule    Take 1 capsule (20 mg) by mouth 2 times daily    Digestive problems       ondansetron 4 MG ODT tab    ZOFRAN ODT    10 tablet    Take 1 tablet (4 mg) by mouth every 6 hours as needed        oxyCODONE IR 5 MG tablet    ROXICODONE    12 tablet    Take 1 tablet (5 mg) by mouth every 6 hours as needed for pain    Abdominal pain, right lower quadrant, Right flank pain       pyridOXINE 100 MG Tabs    VITAMIN B6    90 tablet    Take 1 tablet (100 mg) by mouth daily    Coronary artery disease involving autologous artery coronary bypass graft without angina pectoris       STATIN NOT PRESCRIBED (INTENTIONAL)     0 each    1 each continuous Statin not prescribed intentionally due to Intolerance (with supporting documentation of trying a statin at least once within the last 5 years)    Hyperlipidemia LDL goal <70, Statin intolerance       tamsulosin 0.4 MG capsule    FLOMAX    10 capsule    Take 1 capsule (0.4 mg) by mouth daily for 10 doses        vitamin D3 2000 units Caps     90 capsule    Take 1 capsule by mouth  daily    Vitamin D deficiency disease

## 2018-05-25 NOTE — ED TRIAGE NOTES
Pt arrived to the ED from clinic with c/o right flank pain. Pt reports the pain increases in waves. He also reports nausea on Wednesday. On arrival vitals stable, afebrile. Alert and oriented x4. Pt reports a history of Kidney stones with potential for reoccurrence.

## 2018-05-29 ENCOUNTER — PRE VISIT (OUTPATIENT)
Dept: UROLOGY | Facility: CLINIC | Age: 58
End: 2018-05-29

## 2018-05-30 ENCOUNTER — TELEPHONE (OUTPATIENT)
Dept: UROLOGY | Facility: CLINIC | Age: 58
End: 2018-05-30

## 2018-05-30 NOTE — TELEPHONE ENCOUNTER
Patient called he is having nausea and vomiting for a one week due to stone  He will be seeing dr pereira on Tuesday as new patient.  He has no stone pain at all yesterday or today. I told him to call on Monday to update on his symptoms.  He feels that flomax is making him ill suggested he stop it to see if some of these symptoms improve. Susy Seay LPN Staff Nurse

## 2018-05-30 NOTE — TELEPHONE ENCOUNTER
Health Call Center    Phone Message    May a detailed message be left on voicemail: no    Reason for Call: Other: Pt called in to speak with someone after being seen in the ER for a kidney stone. Pt was prescribed flomax and is concerned his symptoms are related to taking it. Pt reports bloating, feeling dehydrated, dizziness, vomiting, and being unable to take in water. Pt wanst a call back to discuss. Pt is new to urology; I did advise him to speak with his PCP if unrelated to urology or flomax use. Pt's records are in epic     Action Taken: Message routed to:  [facility] ADULT [facility]

## 2018-06-04 ENCOUNTER — RADIANT APPOINTMENT (OUTPATIENT)
Dept: CT IMAGING | Facility: CLINIC | Age: 58
End: 2018-06-04
Attending: UROLOGY
Payer: COMMERCIAL

## 2018-06-04 DIAGNOSIS — N20.0 CALCULUS OF KIDNEY: ICD-10-CM

## 2018-06-04 DIAGNOSIS — N20.0 CALCULUS OF KIDNEY: Primary | ICD-10-CM

## 2018-06-05 ENCOUNTER — OFFICE VISIT (OUTPATIENT)
Dept: UROLOGY | Facility: CLINIC | Age: 58
End: 2018-06-05
Payer: COMMERCIAL

## 2018-06-05 VITALS
HEIGHT: 72 IN | DIASTOLIC BLOOD PRESSURE: 74 MMHG | WEIGHT: 215.3 LBS | HEART RATE: 65 BPM | SYSTOLIC BLOOD PRESSURE: 110 MMHG | BODY MASS INDEX: 29.16 KG/M2

## 2018-06-05 DIAGNOSIS — N20.0 CALCULUS OF KIDNEY: Primary | ICD-10-CM

## 2018-06-05 DIAGNOSIS — I25.10 CAD (CORONARY ARTERY DISEASE): ICD-10-CM

## 2018-06-05 DIAGNOSIS — Z29.9 PREVENTIVE MEASURE: ICD-10-CM

## 2018-06-05 DIAGNOSIS — E78.5 HYPERLIPIDEMIA LDL GOAL <70: ICD-10-CM

## 2018-06-05 DIAGNOSIS — R10.31 ABDOMINAL PAIN, RIGHT LOWER QUADRANT: ICD-10-CM

## 2018-06-05 DIAGNOSIS — N20.0 CALCULUS OF KIDNEY: ICD-10-CM

## 2018-06-05 DIAGNOSIS — R10.9 RIGHT FLANK PAIN: ICD-10-CM

## 2018-06-05 LAB
ALBUMIN UR-MCNC: NEGATIVE MG/DL
APPEARANCE UR: ABNORMAL
BILIRUB UR QL STRIP: NEGATIVE
CHOLEST SERPL-MCNC: 100 MG/DL
COLOR UR AUTO: YELLOW
GLUCOSE UR STRIP-MCNC: NEGATIVE MG/DL
HDLC SERPL-MCNC: 42 MG/DL
HGB UR QL STRIP: NEGATIVE
KETONES UR STRIP-MCNC: NEGATIVE MG/DL
LDLC SERPL CALC-MCNC: 30 MG/DL
LEUKOCYTE ESTERASE UR QL STRIP: NEGATIVE
MUCOUS THREADS #/AREA URNS LPF: PRESENT /LPF
NITRATE UR QL: NEGATIVE
NONHDLC SERPL-MCNC: 58 MG/DL
PH UR STRIP: 5 PH (ref 5–7)
RBC #/AREA URNS AUTO: 1 /HPF (ref 0–2)
SOURCE: ABNORMAL
SP GR UR STRIP: 1.02 (ref 1–1.03)
TRIGL SERPL-MCNC: 140 MG/DL
UROBILINOGEN UR STRIP-MCNC: 2 MG/DL (ref 0–2)
WBC #/AREA URNS AUTO: 2 /HPF (ref 0–5)

## 2018-06-05 RX ORDER — ROSUVASTATIN CALCIUM 10 MG/1
TABLET, COATED ORAL
Refills: 11 | COMMUNITY
Start: 2017-06-27 | End: 2018-08-14 | Stop reason: SINTOL

## 2018-06-05 RX ORDER — OXYCODONE HYDROCHLORIDE 5 MG/1
5 TABLET ORAL EVERY 6 HOURS PRN
Qty: 12 TABLET | Refills: 0 | Status: SHIPPED | OUTPATIENT
Start: 2018-06-05 | End: 2018-06-26

## 2018-06-05 RX ORDER — LACTASE 9000 UNIT
3000-9000 TABLET ORAL PRN
Refills: 4 | COMMUNITY
Start: 2017-11-28

## 2018-06-05 ASSESSMENT — ENCOUNTER SYMPTOMS
WEIGHT LOSS: 0
RECTAL PAIN: 0
BOWEL INCONTINENCE: 0
FLANK PAIN: 1
EYE REDNESS: 0
DIARRHEA: 1
SMELL DISTURBANCE: 1
TROUBLE SWALLOWING: 0
EYE PAIN: 0
DOUBLE VISION: 0
DIFFICULTY URINATING: 0
ABDOMINAL PAIN: 1
FEVER: 1
INCREASED ENERGY: 1
LIGHT-HEADEDNESS: 1
EYE WATERING: 0
HALLUCINATIONS: 0
EYE IRRITATION: 0
LEG PAIN: 0
WEIGHT GAIN: 0
SORE THROAT: 0
HEMATURIA: 1
DYSURIA: 0
DECREASED APPETITE: 1
SINUS CONGESTION: 0
JAUNDICE: 0
ORTHOPNEA: 0
POLYPHAGIA: 1
NIGHT SWEATS: 1
SLEEP DISTURBANCES DUE TO BREATHING: 0
HYPOTENSION: 0
POLYDIPSIA: 1
VOMITING: 1
EXERCISE INTOLERANCE: 0
CONSTIPATION: 1
SYNCOPE: 0
HEARTBURN: 1
NECK MASS: 0
BLOATING: 1
NAUSEA: 1
PALPITATIONS: 0
HOARSE VOICE: 0
BLOOD IN STOOL: 0
FATIGUE: 1
HYPERTENSION: 0
ALTERED TEMPERATURE REGULATION: 1
SINUS PAIN: 0
TASTE DISTURBANCE: 1
CHILLS: 1

## 2018-06-05 ASSESSMENT — PAIN SCALES - GENERAL: PAINLEVEL: NO PAIN (1)

## 2018-06-05 NOTE — LETTER
6/5/2018       RE: Hong Pool  [address]  [facility] [address]     Dear Colleague,    Thank you for referring your patient, Hong Pool, to the [facility] UROLOGY AND [facility] PR[facility] UROLOGIC [facility]CERS at [facility]. Please see a copy of my visit note below.          Chief Complaint:   Nephrolithiasis         History of Present Illness:    Hong Pool is a very pleasant 57 year old male who presents with a history of CAD (s/p CABG in 2012), GERD, borderline DM, and hyperlipidemia.     First stone: 20 years ago (had 3 stone episodes in the span of 5 years)  Number of stone surgeries: 2 (ESWL x1, ureteroscopy w/ basket extraction x1 by Dr. Schwartz)  Number of stones passed spontaneously: 1  History of UTI: No  Prior Stone Analysis: Calcium oxalate (2005)  Family History Nephrolithasis: No  Stone Risk Factors: Hypocitraturia remotely  Prior Metabolic Workup: Yes, was put on urocit K many years ago     Pt presented to ED on 5/25/18 with right flank pain accompanied by nausea/vomiting. He was running a low-grade fever at this time. UA/UC was negative. CT obtained at that time demonstrated a 4mm right proximal ureteral stone and 3 non-obstructing stones in the left kidney. He was also noted to have mild right-sided hydronephrosis.     He has had some hot/cold symptoms and sweats, but denies any fevers following ED visit.  He managed his pain alternating Toradol and Oxycodone for 3 days and states his pain resolved after this. He has been off these medications for over 1 week. He was also taking Flomax but discontinued due to side effects.     He had been on potassium citrate but discontinued 1.5 years ago due to GI issues. He is now back on it, taking 1 tablet 2x per day.     CT performed yesterday is personally reviewed and shows resolution of hydro with progression of right ureteral stone to distal ureter.           Past Medical History:     Past Medical  History:   Diagnosis Date     Borderline diabetes mellitus 11/26/2015     Coronary artery disease 2012    CABG x4     Coronary artery disease involving coronary bypass graft of native heart without angina pectoris 11/18/2015     Depression      Goiter     With normal TSH     Hyperlipidemia     Started on statin 7/2007.  Discontinued 10/07 secondary to muscle aches and fatigue     Nephrolithiasis      Shoulder pain      Statin intolerance 6/23/2016            Past Surgical History:     Past Surgical History:   Procedure Laterality Date     BYPASS GRAFT ARTERY CORONARY  6/24/2012    Procedure: BYPASS GRAFT ARTERY CORONARY;  Median Sternotomy, Coronary Artery Bypass Grafting x 4 using Left Internal Mammary and Left Saphenous Vein  with Endovein Harvesting. On Pump Oxygenation;  Surgeon: Genesis Larsen MD;  Location: [facility] OR     ESOPHAGOSCOPY, GASTROSCOPY, DUODENOSCOPY (EGD), COMBINED N/A 4/11/2017    Procedure: COMBINED ESOPHAGOSCOPY, GASTROSCOPY, DUODENOSCOPY (EGD), BIOPSY SINGLE OR MULTIPLE;  Surgeon: Corine Ly MD;  Location: [facility] GI            Medications     Current Outpatient Prescriptions   Medication     alirocumab (PRALUENT) 75 MG/ML injectable pen     alpha-d-galactosidase (BEANO) tablet     aspirin 81 MG EC tablet     Blood Pressure Monitor KIT     Cholecalciferol (VITAMIN D3) 2000 UNITS CAPS     co-enzyme Q-10 ([employer] COENZYME Q10) 100 MG CAPS     ketorolac (TORADOL) 10 MG tablet     LACTAID FAST ACT 9000 units TABS tablet     Lactase (LACTAID FAST ACT) 9000 UNITS CHEW     Multiple Vitamins-Minerals (MENS MULTIVITAMIN PLUS) TABS     omeprazole (PRILOSEC) 20 MG CR capsule     oxyCODONE IR (ROXICODONE) 5 MG tablet     pyridOXINE (VITAMIN B6) 100 MG TABS     rosuvastatin (CRESTOR) 10 MG tablet     STATIN NOT PRESCRIBED, INTENTIONAL,     No current facility-administered medications for this visit.             Family History:     Family History   Problem Relation Age of Onset     C.A.D. Other       Uncle     DIABETES Brother      CEREBROVASCULAR DISEASE Brother      Cardiovascular Sister      tachyarrhythmia            Social History:     Social History     Social History     Marital status:      Spouse name: N/A     Number of children: N/A     Years of education: N/A     Occupational History     Not on file.     Social History Main Topics     Smoking status: Never Smoker     Smokeless tobacco: Never Used     Alcohol use No     Drug use: No     Sexual activity: Yes     Other Topics Concern     Not on file     Social History Narrative    .             Allergies:   Lidocaine; Band-aid anti-itch; Cholestoff complete [plant sterol stanol-pantethine]; Lactose; Rosuvastatin; and Zocor [simvastatin - high dose]         Review of Systems:  From intake questionnaire   Negative 14 system review except as noted on HPI, nurse's note.         Physical Exam:   Patient is a 57 year old  male   Vitals: Blood pressure 110/74, pulse 65, height 1.829 m (6'), weight 97.7 kg (215 lb 4.8 oz).  General Appearance Adult: Alert, no acute distress, oriented  HENT: throat/mouth:normal, good dentition  Neck: No adenopathy,masses or thyromegaly  Lungs: no respiratory distress, or pursed lip breathing  Heart: No obvious jugular venous distension present  Abdomen: soft, nontender, no organomegaly or masses, Body mass index is 29.2 kg/(m^2).  Lymphatics: No cervical or supraclavicular adenopathy  Musculoskeltal: extremities normal, no peripheral edema  Skin: no suspicious lesions or rashes  Neuro: Alert, oriented, speech and mentation normal  Psych: affect and mood normal  Gait: Normal  : Deferred       Labs and Pathology:    I personally reviewed all applicable laboratory data and went over findings with patient  Significant for:    CBC RESULTS:  Recent Labs   Lab Test  05/25/18   1107  06/24/16   0816  01/10/13   1147  01/08/13   1641   06/29/12   0705   WBC   --   4.9  4.7  5.3   --   5.0   HGB  15.7  13.9  14.9  15.0    < >  10.2*   PLT   --   139*  125*  132*   --   135*    < > = values in this interval not displayed.        BMP RESULTS:  Recent Labs   Lab Test  05/25/18   1107  10/09/17   1035  06/23/17   0956  02/15/17   0817  12/09/16   0804   NA  135.8  138  141  140  143   POTASSIUM  3.7  4.4  4.0  4.2  4.0   CHLORIDE  101.2  105  108  105  109   CO2  30.8  29  26  27  28   ANIONGAP   --   4  7  8  6   GLC  155.3*  118*  109*  113*  126*   BUN  19.1  18  16  19  21   CR  1.2  1.07  1.09  1.05  1.08   GFRESTIMATED  66.3  71  70  73  71   GFRESTBLACK  80.3  86  84  88  85   TYSON  9.4  9.1  8.8  8.8  8.9       UA RESULTS:   Recent Labs   Lab Test  05/25/18   1107  06/23/12   1439   SG  1.020  1.009   URINEPH  5.5  5.5   NITRITE  Negative  Negative   RBCU   --   1   WBCU   --   8*       CALCIUM RESULTS  Lab Results   Component Value Date    TYSON 9.4 05/25/2018    TYSON 9.1 10/09/2017    TYSON 8.8 06/23/2017       INR  Recent Labs   Lab Test  06/24/12   1355  06/24/12   1254  06/23/12   0550   INR  1.53*  1.73*  1.22*         Imaging:    I personally reviewed all applicable imaging and went over the below findings with patient.    Results for orders placed or performed in visit on 06/04/18   CT Abdomen pelvis w/o contrast    Narrative    CT ABDOMEN AND PELVIS WITHOUT CONTRAST 6/4/2018    CLINICAL HISTORY: History of right renal stone. Microscopic hematuria.    COMPARISONS: 5/25/2018    TECHNIQUE: Unenhanced CT performed through the abdomen and pelvis.  Coronal and sagittal reconstructions were produced.    DOSE (DLP): 129 mGy*cm    FINDINGS: 4 mm right ureteral stone has migrated to the inferior right  ureter (series 3, image 418). Right hydronephrosis and hydroureter has  largely resolved.    Nonobstructive left upper pole stones are unchanged. No perinephric  fluid collection or stranding.    Lack of intravenous contrast limits evaluation of the abdominal and  pelvic viscera.    Unchanged gallstone in the gallbladder without CT  evidence for  cholecystitis.    Pelvic phleboliths.    Previous right middle and lower lobe nodules were not included in this  study.      Impression    IMPRESSION: 4 mm right ureteral stone has migrated to the inferior  right ureter. Right hydronephrosis and hydroureter has largely  resolved.    ADITYA SELF MD          Assessment and Plan:     Assessment: 57 year old male w/ hx of nephrolithiasis, currently with right ureteral stone    Plan:    - Patient elected for trial of passage given stone's current position and high likelihood of passing spontaenously  - Recommend pain management with ibuprofen, but will renew oxycodone as well  - Consider resuming potassium citrate in the future for treatment of left-sided kidney stones  - Repeat 24hr urine analysis x2 once stone is passed  - UA/UC today to rule out infection  - Follow up in 3 weeks, provided new strainer, advised importance to return to ED or clinic for fevers or uncontrolled pain      Scribe Disclosure:   Mary KELLER, am serving as a scribe; to document services personally performed by Claudio Chavez MD based on data collection and the provider's statements to me.     IClaudio saw and evaluated this patient and agree with the plan as stated above.  I personally performed all listed procedures.       Again, thank you for allowing me to participate in the care of your patient.      Sincerely,    Claudio Chavez MD

## 2018-06-05 NOTE — PATIENT INSTRUCTIONS
Follow up in 3 weeks with Dr. Chavez.    Stop at the lab today for urine sample.    It was a pleasure meeting with you today.  Thank you for allowing me and my team the privilege of caring for you today.  YOU are the reason we are here, and I truly hope we provided you with the excellent service you deserve.  Please let us know if there is anything else we can do for you so that we can be sure you are leaving completely satisfied with your care experience.       Mac Ying LPN

## 2018-06-05 NOTE — MR AVS SNAPSHOT
After Visit Summary   6/5/2018    Hong Pool    MRN: [id number]           Patient Information     Date Of Birth          1960        Visit Information        Provider Department      6/5/2018 9:00 AM Claudio Chavez MD [facility]        Today's Diagnoses     Calculus of kidney    -  1    Preventive measure        Abdominal pain, right lower quadrant        Right flank pain          Care Instructions    Follow up in 3 weeks with Dr. Chavez.    Stop at the lab today for urine sample.    It was a pleasure meeting with you today.  Thank you for allowing me and my team the privilege of caring for you today.  YOU are the reason we are here, and I truly hope we provided you with the excellent service you deserve.  Please let us know if there is anything else we can do for you so that we can be sure you are leaving completely satisfied with your care experience.       Mac Ying LPN          Follow-ups after your visit        Your next 10 appointments already scheduled     Jun 05, 2018 10:15 AM CDT   LAB with  LAB   [facility] ([facility])    [address] [place] [id number] [phone]           Please do not eat 10-12 hours before your appointment if you are coming in fasting for labs on lipids, cholesterol, or glucose (sugar). This does not apply to pregnant women. Water, hot tea and black coffee (with nothing added) are okay. Do not drink other fluids, diet soda or chew gum.            Jun 26, 2018  8:20 AM CDT   (Arrive by 8:05 AM)   Return Visit with Claudio Chavez MD   [facility] ([facility])    [address] [place] [address]  [facility] [id number] [phone]            Oct 17, 2018  3:30 PM CDT   (Arrive by 3:15 PM)   RETURN LIPID VISIT with Jaylan Evangelista MD   [facility]  [facility])    [address] [facility]  [address] [place] [address] [id number] [phone]              Future tests that were ordered for you today     Open Future Orders        Priority Expected Expires Ordered    Routine UA with micro reflex to culture Routine  6/5/2019 6/5/2018    Routine UA with micro reflex to culture Routine  6/5/2019 6/5/2018    Stone analysis Routine  6/5/2019 6/5/2018            Who to contact     Please call your clinic at [phone] to:    Ask questions about your health    Make or cancel appointments    Discuss your medicines    Learn about your test results    Speak to your doctor            Additional Information About Your Visit        Care EveryWhere ID     This is your Care EveryWhere ID. This could be used by other organizations to access your [facility] medical records  [id number]        Your Vitals Were     Pulse Height BMI (Body Mass Index)             65 1.829 m (6') 29.2 kg/m2          Blood Pressure from Last 3 Encounters:   06/05/18 110/74   05/25/18 139/74   05/25/18 149/89    Weight from Last 3 Encounters:   06/05/18 97.7 kg (215 lb 4.8 oz)   05/25/18 99.6 kg (219 lb 9.3 oz)   05/25/18 99.1 kg (218 lb 6.4 oz)                 Today's Medication Changes          These changes are accurate as of 6/5/18 10:06 AM.  If you have any questions, ask your nurse or doctor.               These medicines have changed or have updated prescriptions.        Dose/Directions    omeprazole 20 MG CR capsule   Commonly known as:  priLOSEC   This may have changed:  when to take this   Used for:  Digestive problems        Dose:  20 mg   Take 1 capsule (20 mg) by mouth 2 times daily   Quantity:  60 capsule   Refills:  3            Where to get your medicines      Some of these will need a paper prescription and others can be bought over the counter.  Ask your nurse if you have questions.     Bring a paper prescription for each of these medications     oxyCODONE IR 5 MG  tablet               Information about OPIOIDS     PRESCRIPTION OPIOIDS: WHAT YOU NEED TO KNOW   You have a prescription for an opioid (narcotic) pain medicine. Opioids can cause addiction. If you have a history of chemical dependency of any type, you are at a higher risk of becoming addicted to opioids. Only take this medicine after all other options have been tried. Take it for as short a time and as few doses as possible.     Do not:    Drive. If you drive while taking these medicines, you could be arrested for driving under the influence (DUI).    Operate heavy machinery    Do any other dangerous activities while taking these medicines.     Drink any alcohol while taking these medicines.      Take with any other medicines that contain acetaminophen. Read all labels carefully. Look for the word  acetaminophen  or  Tylenol.  Ask your pharmacist if you have questions or are unsure.    Store your pills in a secure place, locked if possible. We will not replace any lost or stolen medicine. If you don t finish your medicine, please throw away (dispose) as directed by your pharmacist. The [place] Pollution Control Agency has more information about safe disposal: https://www.pca.[facility].mn.us/living-green/managing-unwanted-medications    All opioids tend to cause constipation. Drink plenty of water and eat foods that have a lot of fiber, such as fruits, vegetables, prune juice, apple juice and high-fiber cereal. Take a laxative (Miralax, milk of magnesia, Colace, Senna) if you don t move your bowels at least every other day.          Primary Care Provider Office Phone # Fax #    Jorge Singleton [phone]       [address] [address]        Equal Access to Services     MILADY BE : Devin Rios, lexy kelly, qacuba tamayo. So [facility] [phone].    ATENCIÓN: Si habla español, tiene a keane disposición  servicios gratuitos de asistencia lingüística. Osiel azar [phone].    We comply with applicable federal civil rights laws and [place] laws. We do not discriminate on the basis of race, color, national origin, age, disability, sex, sexual orientation, or gender identity.            Thank you!     Thank you for choosing [facility] AND [facility]  for your care. Our goal is always to provide you with excellent care. Hearing back from our patients is one way we can continue to improve our services. Please take a few minutes to complete the written survey that you may receive in the mail after your visit with us. Thank you!             Your Updated Medication List - Protect others around you: Learn how to safely use, store and throw away your medicines at www.disposemymeds.org.          This list is accurate as of 6/5/18 10:06 AM.  Always use your most recent med list.                   Brand Name Dispense Instructions for use Diagnosis    alirocumab 75 MG/ML injectable pen    PRALUENT    2 mL    Inject 1 mL (75 mg) Subcutaneous every 14 days    CAD (coronary artery disease), Hyperlipidemia LDL goal <70       alpha-d-galactosidase tablet     540 tablet    Take 2 tablets by mouth 3 times daily (before meals)    Bloating       aspirin 81 MG EC tablet     90 tablet    Take 1 tablet (81 mg) by mouth daily        Blood Pressure Monitor Kit     1 kit    1 each daily.    S/P CABG (coronary artery bypass graft), CAD (coronary artery disease)       [employer] COENZYME Q10 100 MG Caps capsule   Generic drug:  co-enzyme Q-10      Take 1 capsule (100 mg) by mouth daily        ketorolac 10 MG tablet    TORADOL    20 tablet    Take 1 tablet (10 mg) by mouth every 6 hours as needed for moderate pain        * Lactase 9000 units Chew    LACTAID FAST ACT    270 tablet    Take 1 tablet by mouth daily Chew and swallow 1 tablet with first bite of dairy food prn    Lactose intolerance       * LACTAID FAST ACT  9000 units Tabs tablet   Generic drug:  lactase           MENS MULTIVITAMIN PLUS Tabs     90 tablet    Take 1 tablet by mouth daily    Preventive measure       omeprazole 20 MG CR capsule    priLOSEC    60 capsule    Take 1 capsule (20 mg) by mouth 2 times daily    Digestive problems       oxyCODONE IR 5 MG tablet    ROXICODONE    12 tablet    Take 1 tablet (5 mg) by mouth every 6 hours as needed for pain    Abdominal pain, right lower quadrant, Right flank pain       pyridOXINE 100 MG Tabs    VITAMIN B6    90 tablet    Take 1 tablet (100 mg) by mouth daily    Coronary artery disease involving autologous artery coronary bypass graft without angina pectoris       rosuvastatin 10 MG tablet    CRESTOR          STATIN NOT PRESCRIBED (INTENTIONAL)     0 each    1 each continuous Statin not prescribed intentionally due to Intolerance (with supporting documentation of trying a statin at least once within the last 5 years)    Hyperlipidemia LDL goal <70, Statin intolerance       vitamin D3 2000 units Caps     90 capsule    Take 1 capsule by mouth daily    Vitamin D deficiency disease       * Notice:  This list has 2 medication(s) that are the same as other medications prescribed for you. Read the directions carefully, and ask your doctor or other care provider to review them with you.

## 2018-06-05 NOTE — PROGRESS NOTES
Chief Complaint:   Nephrolithiasis         History of Present Illness:    Hong Pool is a very pleasant 57 year old male who presents with a history of CAD (s/p CABG in 2012), GERD, borderline DM, and hyperlipidemia.     First stone: 20 years ago (had 3 stone episodes in the span of 5 years)  Number of stone surgeries: 2 (ESWL x1, ureteroscopy w/ basket extraction x1 by Dr. Schwartz)  Number of stones passed spontaneously: 1  History of UTI: No  Prior Stone Analysis: Calcium oxalate (2005)  Family History Nephrolithasis: No  Stone Risk Factors: Hypocitraturia remotely  Prior Metabolic Workup: Yes, was put on urocit K many years ago     Pt presented to ED on 5/25/18 with right flank pain accompanied by nausea/vomiting. He was running a low-grade fever at this time. UA/UC was negative. CT obtained at that time demonstrated a 4mm right proximal ureteral stone and 3 non-obstructing stones in the left kidney. He was also noted to have mild right-sided hydronephrosis.     He has had some hot/cold symptoms and sweats, but denies any fevers following ED visit.  He managed his pain alternating Toradol and Oxycodone for 3 days and states his pain resolved after this. He has been off these medications for over 1 week. He was also taking Flomax but discontinued due to side effects.     He had been on potassium citrate but discontinued 1.5 years ago due to GI issues. He is now back on it, taking 1 tablet 2x per day.     CT performed yesterday is personally reviewed and shows resolution of hydro with progression of right ureteral stone to distal ureter.           Past Medical History:     Past Medical History:   Diagnosis Date     Borderline diabetes mellitus 11/26/2015     Coronary artery disease 2012    CABG x4     Coronary artery disease involving coronary bypass graft of native heart without angina pectoris 11/18/2015     Depression      Goiter     With normal TSH     Hyperlipidemia     Started on statin 7/2007.   Discontinued 10/07 secondary to muscle aches and fatigue     Nephrolithiasis      Shoulder pain      Statin intolerance 6/23/2016            Past Surgical History:     Past Surgical History:   Procedure Laterality Date     BYPASS GRAFT ARTERY CORONARY  6/24/2012    Procedure: BYPASS GRAFT ARTERY CORONARY;  Median Sternotomy, Coronary Artery Bypass Grafting x 4 using Left Internal Mammary and Left Saphenous Vein  with Endovein Harvesting. On Pump Oxygenation;  Surgeon: Genesis Larsen MD;  Location: [facility] OR     ESOPHAGOSCOPY, GASTROSCOPY, DUODENOSCOPY (EGD), COMBINED N/A 4/11/2017    Procedure: COMBINED ESOPHAGOSCOPY, GASTROSCOPY, DUODENOSCOPY (EGD), BIOPSY SINGLE OR MULTIPLE;  Surgeon: Corine Ly MD;  Location: [facility]            Medications     Current Outpatient Prescriptions   Medication     alirocumab (PRALUENT) 75 MG/ML injectable pen     alpha-d-galactosidase (BEANO) tablet     aspirin 81 MG EC tablet     Blood Pressure Monitor KIT     Cholecalciferol (VITAMIN D3) 2000 UNITS CAPS     co-enzyme Q-10 (CVS COENZYME Q10) 100 MG CAPS     ketorolac (TORADOL) 10 MG tablet     LACTAID FAST ACT 9000 units TABS tablet     Lactase (LACTAID FAST ACT) 9000 UNITS CHEW     Multiple Vitamins-Minerals (MENS MULTIVITAMIN PLUS) TABS     omeprazole (PRILOSEC) 20 MG CR capsule     oxyCODONE IR (ROXICODONE) 5 MG tablet     pyridOXINE (VITAMIN B6) 100 MG TABS     rosuvastatin (CRESTOR) 10 MG tablet     STATIN NOT PRESCRIBED, INTENTIONAL,     No current facility-administered medications for this visit.             Family History:     Family History   Problem Relation Age of Onset     C.A.D. Other      Uncle     DIABETES Brother      CEREBROVASCULAR DISEASE Brother      Cardiovascular Sister      tachyarrhythmia            Social History:     Social History     Social History     Marital status:      Spouse name: N/A     Number of children: N/A     Years of education: N/A     Occupational History     Not on  file.     Social History Main Topics     Smoking status: Never Smoker     Smokeless tobacco: Never Used     Alcohol use No     Drug use: No     Sexual activity: Yes     Other Topics Concern     Not on file     Social History Narrative    .             Allergies:   Lidocaine; Band-aid anti-itch; Cholestoff complete [plant sterol stanol-pantethine]; Lactose; Rosuvastatin; and Zocor [simvastatin - high dose]         Review of Systems:  From intake questionnaire   Negative 14 system review except as noted on HPI, nurse's note.         Physical Exam:   Patient is a 57 year old  male   Vitals: Blood pressure 110/74, pulse 65, height 1.829 m (6'), weight 97.7 kg (215 lb 4.8 oz).  General Appearance Adult: Alert, no acute distress, oriented  HENT: throat/mouth:normal, good dentition  Neck: No adenopathy,masses or thyromegaly  Lungs: no respiratory distress, or pursed lip breathing  Heart: No obvious jugular venous distension present  Abdomen: soft, nontender, no organomegaly or masses, Body mass index is 29.2 kg/(m^2).  Lymphatics: No cervical or supraclavicular adenopathy  Musculoskeltal: extremities normal, no peripheral edema  Skin: no suspicious lesions or rashes  Neuro: Alert, oriented, speech and mentation normal  Psych: affect and mood normal  Gait: Normal  : Deferred       Labs and Pathology:    I personally reviewed all applicable laboratory data and went over findings with patient  Significant for:    CBC RESULTS:  Recent Labs   Lab Test  05/25/18   1107  06/24/16   0816  01/10/13   1147  01/08/13   1641   06/29/12   0705   WBC   --   4.9  4.7  5.3   --   5.0   HGB  15.7  13.9  14.9  15.0   < >  10.2*   PLT   --   139*  125*  132*   --   135*    < > = values in this interval not displayed.        BMP RESULTS:  Recent Labs   Lab Test  05/25/18   1107  10/09/17   1035  06/23/17   0956  02/15/17   0817  12/09/16   0804   NA  135.8  138  141  140  143   POTASSIUM  3.7  4.4  4.0  4.2  4.0   CHLORIDE  101.2   105  108  105  109   CO2  30.8  29  26  27  28   ANIONGAP   --   4  7  8  6   GLC  155.3*  118*  109*  113*  126*   BUN  19.1  18  16  19  21   CR  1.2  1.07  1.09  1.05  1.08   GFRESTIMATED  66.3  71  70  73  71   GFRESTBLACK  80.3  86  84  88  85   TYSON  9.4  9.1  8.8  8.8  8.9       UA RESULTS:   Recent Labs   Lab Test  05/25/18   1107  06/23/12   1439   SG  1.020  1.009   URINEPH  5.5  5.5   NITRITE  Negative  Negative   RBCU   --   1   WBCU   --   8*       CALCIUM RESULTS  Lab Results   Component Value Date    TYSON 9.4 05/25/2018    TYSON 9.1 10/09/2017    TYSON 8.8 06/23/2017       INR  Recent Labs   Lab Test  06/24/12   1355  06/24/12   1254  06/23/12   0550   INR  1.53*  1.73*  1.22*         Imaging:    I personally reviewed all applicable imaging and went over the below findings with patient.    Results for orders placed or performed in visit on 06/04/18   CT Abdomen pelvis w/o contrast    Narrative    CT ABDOMEN AND PELVIS WITHOUT CONTRAST 6/4/2018    CLINICAL HISTORY: History of right renal stone. Microscopic hematuria.    COMPARISONS: 5/25/2018    TECHNIQUE: Unenhanced CT performed through the abdomen and pelvis.  Coronal and sagittal reconstructions were produced.    DOSE (DLP): 129 mGy*cm    FINDINGS: 4 mm right ureteral stone has migrated to the inferior right  ureter (series 3, image 418). Right hydronephrosis and hydroureter has  largely resolved.    Nonobstructive left upper pole stones are unchanged. No perinephric  fluid collection or stranding.    Lack of intravenous contrast limits evaluation of the abdominal and  pelvic viscera.    Unchanged gallstone in the gallbladder without CT evidence for  cholecystitis.    Pelvic phleboliths.    Previous right middle and lower lobe nodules were not included in this  study.      Impression    IMPRESSION: 4 mm right ureteral stone has migrated to the inferior  right ureter. Right hydronephrosis and hydroureter has largely  resolved.    ADITYA SELF MD           Assessment and Plan:     Assessment: 57 year old male w/ hx of nephrolithiasis, currently with right ureteral stone    Plan:    - Patient elected for trial of passage given stone's current position and high likelihood of passing spontaenously  - Recommend pain management with ibuprofen, but will renew oxycodone as well  - Consider resuming potassium citrate in the future for treatment of left-sided kidney stones  - Repeat 24hr urine analysis x2 once stone is passed  - UA/UC today to rule out infection  - Follow up in 3 weeks, provided new strainer, advised importance to return to ED or clinic for fevers or uncontrolled pain      Scribe Disclosure:   Mary KELLER, am serving as a scribe; to document services personally performed by Claudio Chavez MD based on data collection and the provider's statements to me.     IClaudio saw and evaluated this patient and agree with the plan as stated above.  I personally performed all listed procedures.       Answers for HPI/ROS submitted by the patient on 6/5/2018   General Symptoms: Yes  Skin Symptoms: No  HENT Symptoms: Yes  EYE SYMPTOMS: Yes  HEART SYMPTOMS: Yes  LUNG SYMPTOMS: No  INTESTINAL SYMPTOMS: Yes  URINARY SYMPTOMS: Yes  REPRODUCTIVE SYMPTOMS: No  SKELETAL SYMPTOMS: No  BLOOD SYMPTOMS: No  NERVOUS SYSTEM SYMPTOMS: No  MENTAL HEALTH SYMPTOMS: No  Fever: Yes  Loss of appetite: Yes  Weight loss: No  Weight gain: No  Fatigue: Yes  Night sweats: Yes  Chills: Yes  Increased stress: Yes  Excessive hunger: Yes  Excessive thirst: Yes  Feeling hot or cold when others believe the temperature is normal: Yes  Loss of height: No  Post-operative complications: No  Surgical site pain: No  Hallucinations: No  Change in or Loss of Energy: Yes  Hyperactivity: No  Confusion: No  Ear pain: No  Ear discharge: No  Hearing loss: No  Tinnitus: No  Nosebleeds: No  Congestion: No  Sinus pain: No  Trouble swallowing: No   Voice hoarseness: No  Mouth sores: No  Sore  throat: No  Tooth pain: No  Gum tenderness: No  Bleeding gums: No  Change in taste: Yes  Change in sense of smell: Yes  Dry mouth: No  Hearing aid used: No  Neck lump: No  Eye pain: No  Vision loss: No  Dry eyes: No  Watery eyes: No  Eye bulging: No  Double vision: No  Flashing of lights: No  Spots: No  Floaters: No  Redness: No  Crossed eyes: No  Tunnel Vision: No  Yellowing of eyes: No  Eye irritation: No  Chest pain or pressure: No  Fast or irregular heartbeat: No  Pain in legs with walking: No  Trouble breathing while lying down: No  Fingers or toes appear blue: No  High blood pressure: No  Low blood pressure: No  Fainting: No  Murmurs: No  Pacemaker: No  Varicose veins: No  Edema or swelling: No  Wake up at night with shortness of breath: No  Light-headedness: Yes  Exercise intolerance: No  Heart burn or indigestion: Yes  Nausea: Yes  Vomiting: Yes  Abdominal pain: Yes  Bloating: Yes  Constipation: Yes  Diarrhea: Yes  Blood in stool: No  Black stools: No  Rectal or Anal pain: No  Fecal incontinence: No  Yellowing of skin or eyes: No  Vomit with blood: No  Change in stools: Yes  Trouble holding urine or incontinence: No  Pain or burning: No  Trouble starting or stopping: No  Increased frequency of urination: No  Blood in urine: Yes  Decreased frequency of urination: No  Frequent nighttime urination: No  Flank pain: Yes  Difficulty emptying bladder: No

## 2018-06-06 ENCOUNTER — PRE VISIT (OUTPATIENT)
Dept: UROLOGY | Facility: CLINIC | Age: 58
End: 2018-06-06

## 2018-06-12 DIAGNOSIS — N20.0 NEPHROLITHIASIS: Primary | ICD-10-CM

## 2018-06-12 RX ORDER — TAMSULOSIN HYDROCHLORIDE 0.4 MG/1
0.4 CAPSULE ORAL DAILY
Qty: 30 CAPSULE | Refills: 1 | Status: SHIPPED | OUTPATIENT
Start: 2018-06-12 | End: 2018-06-26

## 2018-06-12 NOTE — TELEPHONE ENCOUNTER

## 2018-06-21 DIAGNOSIS — N20.0 CALCULUS OF KIDNEY: ICD-10-CM

## 2018-06-25 LAB
APPEARANCE STONE: NORMAL
COMPN STONE: NORMAL
NUMBER STONE: 1
SIZE STONE: NORMAL MM
WT STONE: 27 MG

## 2018-06-26 ENCOUNTER — OFFICE VISIT (OUTPATIENT)
Dept: UROLOGY | Facility: CLINIC | Age: 58
End: 2018-06-26
Payer: COMMERCIAL

## 2018-06-26 VITALS
SYSTOLIC BLOOD PRESSURE: 112 MMHG | BODY MASS INDEX: 28.91 KG/M2 | WEIGHT: 213.4 LBS | DIASTOLIC BLOOD PRESSURE: 71 MMHG | HEART RATE: 57 BPM | HEIGHT: 72 IN

## 2018-06-26 DIAGNOSIS — N20.0 CALCULUS OF KIDNEY: Primary | ICD-10-CM

## 2018-06-26 LAB
APPEARANCE UR: CLEAR
BILIRUB UR QL: NORMAL
COLOR UR: YELLOW
GLUCOSE URINE: NORMAL MG/DL
HGB UR QL: NORMAL
KETONES UR QL: NORMAL MG/DL
LEUKOCYTE ESTERASE URINE: NORMAL
NITRITE UR QL STRIP: NORMAL
PH UR STRIP: 5 PH (ref 5–7)
PROTEIN ALBUMIN URINE: NORMAL MG/DL
SOURCE: NORMAL
SP GR UR STRIP: 1.02 (ref 1–1.03)
UROBILINOGEN UR QL STRIP: 0.2 EU/DL (ref 0.2–1)

## 2018-06-26 RX ORDER — POTASSIUM CITRATE 10 MEQ/1
10 TABLET, EXTENDED RELEASE ORAL
Qty: 180 TABLET | Refills: 3 | Status: SHIPPED | OUTPATIENT
Start: 2018-06-26 | End: 2019-05-21

## 2018-06-26 ASSESSMENT — PAIN SCALES - GENERAL: PAINLEVEL: NO PAIN (0)

## 2018-06-26 NOTE — PROGRESS NOTES
UROLOGY FOLLOW-UP NOTE    ASSESSMENT AND PLAN - 58 yo Recurrent CaOx kidney stone former  -24 hour urine to be sent to home  -Low salt diet  -Low oxalate diet (no nuts or nut based products, no spinach, they are well aware of high oxalate foods)  -High fluid intake 2.5 to 3L per day  -Modest/minimal animal protein  -800-1200 mg calcium per day  -Renewed potassium citrate  -Follow-up via telephone consult after results of 24 hour urine    _________________________________________________________________________    CHIEF COMPLAINT  It was my pleasure to see Hong Pool who is a 57 year old male who is here for follow-up for kidney stones.    HPI  Mr. Pool is a 56yo who returns to clinic today for follow-up of trial of passage for 4mm R ureteral stone. Patient was last seen in clinic 6/5/18 in which CT abdomen demonstrated migration of a 4mm R ureteral stone to the inferior right ureter with resoltuion of right hydronephrosis and hydroureter.  He has since passed his stone (it was calcium oxalate).  He feels entirely improved.    He is back on potassium citrate and is taking 10 meq twice per day with plans to go back to 10 meq TID.  He is not having any gastic issues.    He is interested in stone prevention.    PHYSICAL EXAM  /71  Pulse 57  Ht 1.829 m (6')  Wt 96.8 kg (213 lb 6.4 oz)  BMI 28.94 kg/m2  Constitutional: Alert, no acute distress  Psychiatric: Normal mood and affect  Gastrointestinal: Abdomen soft, non-tender. No masses, organomegaly.       Lab Results   Component Value Date    TYSON 9.4 05/25/2018    TYSON 9.1 10/09/2017    TYSON 8.8 06/23/2017       Recent Labs   Lab Test  05/25/18   1107  10/09/17   1035  06/23/17   0956  02/15/17   0817  12/09/16   0804   NA  135.8  138  141  140  143   POTASSIUM  3.7  4.4  4.0  4.2  4.0   CHLORIDE  101.2  105  108  105  109   CO2  30.8  29  26  27  28   ANIONGAP   --   4  7  8  6   GLC  155.3*  118*  109*  113*  126*   BUN  19.1  18  16  19  21   CR  1.2   1.07  1.09  1.05  1.08   GFRESTIMATED  66.3  71  70  73  71   GFRESTBLACK  80.3  86  84  88  85   TYSON  9.4  9.1  8.8  8.8  8.9        UA RESULTS:   Recent Labs   Lab Test  06/05/18   1031  05/25/18   1107  06/23/12   1439   SG  1.024  1.020  1.009   URINEPH  5.0  5.5  5.5   NITRITE  Negative  Negative  Negative   RBCU  1   --   1   WBCU  2   --   8*     Ua today neg nitr, neg LE, no blood    I spent over 15 minutes with the patient.  Over half this time was spent on counseling on kidney stones.

## 2018-06-26 NOTE — MR AVS SNAPSHOT
After Visit Summary   6/26/2018    Hong Pool    MRN: [id number]           Patient Information     Date Of Birth          1960        Visit Information        Provider Department      6/26/2018 8:20 AM Claudio Chavez MD [facility] Urology and [facility]        Today's Diagnoses     Calculus of kidney    -  1      Care Instructions    [facility] x 1.    [facility] a telephone appointment [facility]    It was a pleasure meeting with you today.  Thank you for allowing me and my team the privilege of caring for you today.  YOU are the reason we are here, and I truly hope we provided you with the excellent service you deserve.  Please let us know if there is anything else we can do for you so that we can be sure you are leaving completely satisfied with your care experience.                  Follow-ups after your visit        Your next 10 appointments already scheduled     Oct 17, 2018  3:30 PM CDT   (Arrive by 3:15 PM)   RETURN LIPID VISIT with Jaylan Evangelista MD   [facility] [place] [facility] ([facility])    [address] [place] [address]  [address] [place] [address] [phone]              Who to contact     Please call your clinic at [phone] to:    Ask questions about your health    Make or cancel appointments    Discuss your medicines    Learn about your test results    Speak to your doctor            Additional Information About Your Visit        Care EveryWhere ID     This is your Care EveryWhere ID. This could be used by other organizations to access your [facility] medical records  [id number]        Your Vitals Were     Pulse Height BMI (Body Mass Index)             57 1.829 m (6') 28.94 kg/m2          Blood Pressure from Last 3 Encounters:   06/26/18 112/71   06/05/18 110/74   05/25/18 139/74    Weight from Last 3 Encounters:   06/26/18 96.8 kg (213 lb 6.4 oz)   06/05/18 97.7 kg (215 lb 4.8  oz)   05/25/18 99.6 kg (219 lb 9.3 oz)              We Performed the Following     L[facility]olink: Clinic Lab Order          Today's Medication Changes          These changes are accurate as of 6/26/18  8:35 AM.  If you have any questions, ask your nurse or doctor.               Start taking these medicines.        Dose/Directions    potassium citrate 10 MEQ (1080 MG) CR tablet   Commonly known as:  UROCIT-K   Used for:  Calculus of kidney   Started by:  Claudio Chavez MD        Dose:  10 mEq   Take 1 tablet (10 mEq) by mouth 3 times daily (with meals)   Quantity:  180 tablet   Refills:  3         These medicines have changed or have updated prescriptions.        Dose/Directions    omeprazole 20 MG CR capsule   Commonly known as:  priLOSEC   This may have changed:  when to take this   Used for:  Digestive problems        Dose:  20 mg   Take 1 capsule (20 mg) by mouth 2 times daily   Quantity:  60 capsule   Refills:  3            Where to get your medicines      These medications were sent to [address] [address]     Phone:  [phone]     potassium citrate 10 MEQ (1080 MG) CR tablet                Primary Care Provider Office Phone # Fax #    Jorge Singleton [phone]       [address] [address]        Equal Access to Services     MARCK BE AH: Devin romeroo Sokwesi, waaxda luqadaha, qaybta kaalmada adeegyada, cuba tan. So [facility] [phone].    ATENCIÓN: Si habla español, tiene a keane disposición servicios gratuitos de asistencia lingüística. Llame al [phone].    We comply with applicable federal civil rights laws and [place] laws. We do not discriminate on the basis of race, color, national origin, age, disability, sex, sexual orientation, or gender identity.            Thank you!     Thank you for choosing [facility] UROLOGY AND [facility] FOR  PROSTATE AND UROLOGIC CANCERS  for your care. Our goal is always to provide you with excellent care. Hearing back from our patients is one way we can continue to improve our services. Please take a few minutes to complete the written survey that you may receive in the mail after your visit with us. Thank you!             Your Updated Medication List - Protect others around you: Learn how to safely use, store and throw away your medicines at www.disposemymeds.org.          This list is accurate as of 6/26/18  8:35 AM.  Always use your most recent med list.                   Brand Name Dispense Instructions for use Diagnosis    alirocumab 75 MG/ML injectable pen    PRALUENT    2 mL    Inject 1 mL (75 mg) Subcutaneous every 14 days    CAD (coronary artery disease), Hyperlipidemia LDL goal <70       alpha-d-galactosidase tablet     540 tablet    Take 2 tablets by mouth 3 times daily (before meals)    Bloating       aspirin 81 MG EC tablet     90 tablet    Take 1 tablet (81 mg) by mouth daily        Blood Pressure Monitor Kit     1 kit    1 each daily.    S/P CABG (coronary artery bypass graft), CAD (coronary artery disease)       [employer] COENZYME Q10 100 MG Caps capsule   Generic drug:  co-enzyme Q-10      Take 1 capsule (100 mg) by mouth daily        ketorolac 10 MG tablet    TORADOL    20 tablet    Take 1 tablet (10 mg) by mouth every 6 hours as needed for moderate pain        * Lactase 9000 units Chew    LACTAID FAST ACT    270 tablet    Take 1 tablet by mouth daily Chew and swallow 1 tablet with first bite of dairy food prn    Lactose intolerance       * LACTAID FAST ACT 9000 units Tabs tablet   Generic drug:  lactase           MENS MULTIVITAMIN PLUS Tabs     90 tablet    Take 1 tablet by mouth daily    Preventive measure       omeprazole 20 MG CR capsule    priLOSEC    60 capsule    Take 1 capsule (20 mg) by mouth 2 times daily    Digestive problems       potassium citrate 10 MEQ (1080 MG) CR tablet    UROCIT-K    180  tablet    Take 1 tablet (10 mEq) by mouth 3 times daily (with meals)    Calculus of kidney       pyridOXINE 100 MG Tabs    VITAMIN B6    90 tablet    Take 1 tablet (100 mg) by mouth daily    Coronary artery disease involving autologous artery coronary bypass graft without angina pectoris       rosuvastatin 10 MG tablet    CRESTOR          STATIN NOT PRESCRIBED (INTENTIONAL)     0 each    1 each continuous Statin not prescribed intentionally due to Intolerance (with supporting documentation of trying a statin at least once within the last 5 years)    Hyperlipidemia LDL goal <70, Statin intolerance       vitamin D3 2000 units Caps     90 capsule    Take 1 capsule by mouth daily    Vitamin D deficiency disease       * Notice:  This list has 2 medication(s) that are the same as other medications prescribed for you. Read the directions carefully, and ask your doctor or other care provider to review them with you.

## 2018-06-26 NOTE — LETTER
6/26/2018       RE: Hong Pool  [address]  [facility] [address]     Dear Colleague,    Thank you for referring your patient, Hong Pool, to the [facility] UROLOGY AND INST FOR PROSTATE AND UROLOGIC CANCERS at [facility]. Please see a copy of my visit note below.    UROLOGY FOLLOW-UP NOTE    ASSESSMENT AND PLAN - 58 yo Recurrent CaOx kidney stone former  -24 hour urine to be sent to home  -Low salt diet  -Low oxalate diet (no nuts or nut based products, no spinach, they are well aware of high oxalate foods)  -High fluid intake 2.5 to 3L per day  -Modest/minimal animal protein  -800-1200 mg calcium per day  -Renewed potassium citrate  -Follow-up via telephone consult after results of 24 hour urine    _________________________________________________________________________    CHIEF COMPLAINT  It was my pleasure to see Hong Pool who is a 57 year old male who is here for follow-up for kidney stones.    HPI  Mr. Pool is a 56yo who returns to clinic today for follow-up of trial of passage for 4mm R ureteral stone. Patient was last seen in clinic 6/5/18 in which CT abdomen demonstrated migration of a 4mm R ureteral stone to the inferior right ureter with resoltuion of right hydronephrosis and hydroureter.  He has since passed his stone (it was calcium oxalate).  He feels entirely improved.    He is back on potassium citrate and is taking 10 meq twice per day with plans to go back to 10 meq TID.  He is not having any gastic issues.    He is interested in stone prevention.    PHYSICAL EXAM  /71  Pulse 57  Ht 1.829 m (6')  Wt 96.8 kg (213 lb 6.4 oz)  BMI 28.94 kg/m2  Constitutional: Alert, no acute distress  Psychiatric: Normal mood and affect  Gastrointestinal: Abdomen soft, non-tender. No masses, organomegaly.       Lab Results   Component Value Date    TYSON 9.4 05/25/2018    TYSON 9.1 10/09/2017    TYSON 8.8 06/23/2017       Recent Labs   Lab Test   05/25/18   1107  10/09/17   1035  06/23/17   0956  02/15/17   0817  12/09/16   0804   NA  135.8  138  141  140  143   POTASSIUM  3.7  4.4  4.0  4.2  4.0   CHLORIDE  101.2  105  108  105  109   CO2  30.8  29  26  27  28   ANIONGAP   --   4  7  8  6   GLC  155.3*  118*  109*  113*  126*   BUN  19.1  18  16  19  21   CR  1.2  1.07  1.09  1.05  1.08   GFRESTIMATED  66.3  71  70  73  71   GFRESTBLACK  80.3  86  84  88  85   TYSON  9.4  9.1  8.8  8.8  8.9        UA RESULTS:   Recent Labs   Lab Test  06/05/18   1031  05/25/18   1107  06/23/12   1439   SG  1.024  1.020  1.009   URINEPH  5.0  5.5  5.5   NITRITE  Negative  Negative  Negative   RBCU  1   --   1   WBCU  2   --   8*     Ua today neg nitr, neg LE, no blood    I spent over 15 minutes with the patient.  Over half this time was spent on counseling on kidney stones.      Again, thank you for allowing me to participate in the care of your patient.      Sincerely,    Claudio Chavez MD

## 2018-06-26 NOTE — PATIENT INSTRUCTIONS
[facility] x 1.    Schedule a telephone appointment after you mail the 24 hour urine back to [facility].    It was a pleasure meeting with you today.  Thank you for allowing me and my team the privilege of caring for you today.  YOU are the reason we are here, and I truly hope we provided you with the excellent service you deserve.  Please let us know if there is anything else we can do for you so that we can be sure you are leaving completely satisfied with your care experience.

## 2018-08-06 ENCOUNTER — TELEPHONE (OUTPATIENT)
Dept: UROLOGY | Facility: CLINIC | Age: 58
End: 2018-08-06

## 2018-08-06 NOTE — TELEPHONE ENCOUNTER
Attempted to call patient twice. Line is busy. I will keep trying.     Called back patient and he will be walking forms over today.

## 2018-08-06 NOTE — TELEPHONE ENCOUNTER
Health Call Center    Phone Message    May a detailed message be left on voicemail: yes    Reason for Call: Other: Pt needs some forms to be filled out stating he is able to look for work. Dr. Chavez looks like he is out for awhile but pt wants to make sure someone else would be able to fill this out for him. Please give him a call back.      Action Taken: Message routed to:  [facility] Urology

## 2018-08-07 ENCOUNTER — TELEPHONE (OUTPATIENT)
Dept: UROLOGY | Facility: CLINIC | Age: 58
End: 2018-08-07

## 2018-08-07 NOTE — TELEPHONE ENCOUNTER
[facility] Call [facility]    Phone Message    May a detailed message be left on voicemail: yes    Reason for Call: Other: Pt wanting to touch base with Carolyn to see if everything was good with the forms he dropped off yesterday     Action Taken: Message routed to:  [facility] URO AND PROSTATE

## 2018-08-08 NOTE — TELEPHONE ENCOUNTER
Called and spoke with patient. Forms completed and faxed to [facility] unemployment and copy mailed to patient per his request. Carolyn Christiansen RN

## 2018-08-13 DIAGNOSIS — I25.10 CORONARY ARTERY DISEASE INVOLVING NATIVE CORONARY ARTERY OF NATIVE HEART WITHOUT ANGINA PECTORIS: ICD-10-CM

## 2018-08-13 DIAGNOSIS — N20.0 CALCULUS OF KIDNEY: ICD-10-CM

## 2018-08-13 LAB
ALBUMIN SERPL-MCNC: 4 G/DL (ref 3.4–5)
ALBUMIN UR-MCNC: NEGATIVE MG/DL
ALP SERPL-CCNC: 59 U/L (ref 40–150)
ALT SERPL W P-5'-P-CCNC: 30 U/L (ref 0–70)
ANION GAP SERPL CALCULATED.3IONS-SCNC: 7 MMOL/L (ref 3–14)
APPEARANCE UR: ABNORMAL
AST SERPL W P-5'-P-CCNC: 22 U/L (ref 0–45)
BILIRUB SERPL-MCNC: 1.6 MG/DL (ref 0.2–1.3)
BILIRUB UR QL STRIP: NEGATIVE
BUN SERPL-MCNC: 17 MG/DL (ref 7–30)
CALCIUM SERPL-MCNC: 8.9 MG/DL (ref 8.5–10.1)
CHLORIDE SERPL-SCNC: 106 MMOL/L (ref 94–109)
CHOLEST SERPL-MCNC: 136 MG/DL
CO2 SERPL-SCNC: 28 MMOL/L (ref 20–32)
COLOR UR AUTO: YELLOW
CREAT SERPL-MCNC: 1.07 MG/DL (ref 0.66–1.25)
GFR SERPL CREATININE-BSD FRML MDRD: 71 ML/MIN/1.7M2
GLUCOSE SERPL-MCNC: 111 MG/DL (ref 70–99)
GLUCOSE UR STRIP-MCNC: NEGATIVE MG/DL
HDLC SERPL-MCNC: 49 MG/DL
HGB UR QL STRIP: NEGATIVE
KETONES UR STRIP-MCNC: NEGATIVE MG/DL
LDLC SERPL CALC-MCNC: 55 MG/DL
LEUKOCYTE ESTERASE UR QL STRIP: NEGATIVE
MUCOUS THREADS #/AREA URNS LPF: PRESENT /LPF
NITRATE UR QL: NEGATIVE
NONHDLC SERPL-MCNC: 87 MG/DL
PH UR STRIP: 5 PH (ref 5–7)
POTASSIUM SERPL-SCNC: 4 MMOL/L (ref 3.4–5.3)
PROT SERPL-MCNC: 7.5 G/DL (ref 6.8–8.8)
RBC #/AREA URNS AUTO: 1 /HPF (ref 0–2)
SODIUM SERPL-SCNC: 141 MMOL/L (ref 133–144)
SOURCE: ABNORMAL
SP GR UR STRIP: 1.02 (ref 1–1.03)
TRIGL SERPL-MCNC: 161 MG/DL
UROBILINOGEN UR STRIP-MCNC: 0 MG/DL (ref 0–2)
WBC #/AREA URNS AUTO: 1 /HPF (ref 0–5)

## 2018-08-14 ENCOUNTER — OFFICE VISIT (OUTPATIENT)
Dept: FAMILY MEDICINE | Facility: CLINIC | Age: 58
End: 2018-08-14
Payer: COMMERCIAL

## 2018-08-14 VITALS
HEART RATE: 57 BPM | TEMPERATURE: 98.7 F | WEIGHT: 214.6 LBS | BODY MASS INDEX: 29.07 KG/M2 | DIASTOLIC BLOOD PRESSURE: 77 MMHG | OXYGEN SATURATION: 97 % | RESPIRATION RATE: 16 BRPM | HEIGHT: 72 IN | SYSTOLIC BLOOD PRESSURE: 123 MMHG

## 2018-08-14 DIAGNOSIS — S76.312A LEFT HAMSTRING MUSCLE STRAIN, INITIAL ENCOUNTER: ICD-10-CM

## 2018-08-14 DIAGNOSIS — R10.84 ABDOMINAL PAIN, GENERALIZED: ICD-10-CM

## 2018-08-14 DIAGNOSIS — I25.10 CORONARY ARTERY DISEASE INVOLVING NATIVE CORONARY ARTERY OF NATIVE HEART WITHOUT ANGINA PECTORIS: ICD-10-CM

## 2018-08-14 DIAGNOSIS — Z00.00 ENCOUNTER FOR ROUTINE ADULT HEALTH EXAMINATION WITHOUT ABNORMAL FINDINGS: Primary | ICD-10-CM

## 2018-08-14 DIAGNOSIS — E78.5 HYPERLIPIDEMIA LDL GOAL <70: ICD-10-CM

## 2018-08-14 DIAGNOSIS — R53.83 FATIGUE, UNSPECIFIED TYPE: ICD-10-CM

## 2018-08-14 NOTE — MR AVS SNAPSHOT
After Visit Summary   8/14/2018    oHng Pool    MRN: [id number]           Patient Information     Date Of Birth          1960        Visit Information        Provider Department      8/14/2018 1:00 PM Jorge Singleton MD [facility]        Today's Diagnoses     Left hamstring muscle strain, initial encounter    -  1    Abdominal pain, generalized          Care Instructions    1. Left hamstring muscle strain, initial encounter  See physical therapy    - [facility], PT, HAND AND CHIROPRACTIC REFERRAL - [facility]    2. Abdominal pain, generalized  See our health psychologist to deal with stress and its potenial interaction with the stomach issues and sleep/fatigue    - BEHAVIORAL HEALTH REFERRAL ([facility] interal and external)    PHarper            Preventive Health Recommendations  Male Ages 50 - 64    Yearly exam:             See your health care provider every year in order to  o   Review health changes.   o   Discuss preventive care.    o   Review your medicines if your doctor has prescribed any.     Have a cholesterol test every 5 years, or more frequently if you are at risk for high cholesterol/heart disease.     Have a diabetes test (fasting glucose) every three years. If you are at risk for diabetes, you should have this test more often.     Have a colonoscopy at age 50, or have a yearly FIT test (stool test). These exams will check for colon cancer.      Talk with your health care provider about whether or not a prostate cancer screening test (PSA) is right for you.    You should be tested each year for STDs (sexually transmitted diseases), if you re at risk.     Shots: Get a flu shot each year. Get a tetanus shot every 10 years.     Nutrition:    Eat at least 5 servings of fruits and vegetables daily.     Eat whole-grain bread, whole-wheat pasta and brown rice instead of white grains and rice.     Get adequate Calcium and Vitamin D.     Lifestyle    Exercise for at least 150  minutes a week (30 minutes a day, 5 days a week). This will help you control your weight and prevent disease.     Limit alcohol to one drink per day.     No smoking.     Wear sunscreen to prevent skin cancer.     See your dentist every six months for an exam and cleaning.     See your eye doctor every 1 to 2 years.            Follow-ups after your visit        Additional Services     BEHAVIORAL HEALTH REFERRAL ([facility] and external)       Referring MD: BLOSSOM DYE    May leave message on voicemail: Yes  PHQ-9 Score: 6  GAD7 Score:            [facility], PT, HAND AND CHIROPRACTIC REFERRAL - [facility]       **This order will print in the [facility] Scheduling Office**    Physical Therapy, Hand Therapy and Chiropractic Care are available through:    *[facility]  *[facility]  *[facility]    Call one number to schedule at any of the above locations: [phone].    Your provider has referred you to: Physical Therapy at [facility] or [facility]    Indication/Reason for Referral: L hamstring pain  Onset of Illness: longstanding, worse in recent months  Therapy Orders: Evaluate and Treat  Special Programs: None  Special Request: None    Bee Gamboa      Additional Comments for the Therapist or Chiropractor:     Please be aware that coverage of these services is subject to the terms and limitations of your health insurance plan.  Call member services at your health plan with any benefit or coverage questions.      Please bring the following to your appointment:    *Your personal calendar for scheduling future appointments  *Comfortable clothing                  Your next 10 appointments already scheduled     Oct 17, 2018  3:30 PM CDT   (Arrive by 3:15 PM)   RETURN LIPID VISIT with Jaylan Evangelista MD   [facility] ([facility])    [address] [place] [address]  [address] [place] [address] [phone]              Who to contact     Please call your  clinic at [phone] to:    Ask questions about your health    Make or cancel appointments    Discuss your medicines    Learn about your test results    Speak to your doctor            Additional Information About Your Visit        Care EveryWhere ID     This is your Care EveryWhere ID. This could be used by other organizations to access your [facility] medical records  [id number]        Your Vitals Were     Pulse Temperature Respirations Height Pulse Oximetry BMI (Body Mass Index)    57 98.7  F (37.1  C) (Oral) 16 6' (182.9 cm) 97% 29.1 kg/m2       Blood Pressure from Last 3 Encounters:   08/14/18 123/77   06/26/18 112/71   06/05/18 110/74    Weight from Last 3 Encounters:   08/14/18 214 lb 9.6 oz (97.3 kg)   06/26/18 213 lb 6.4 oz (96.8 kg)   06/05/18 215 lb 4.8 oz (97.7 kg)              We Performed the Following     BEHAVIORAL HEALTH REFERRAL ([facility] interal and external)     [facility], PT, HAND AND CHIROPRACTIC REFERRAL - [facility]        Primary Care Provider Office Phone # Fax #    Jorge Singleton [phone]       [address] [address]        Equal Access to Services     MARCK BE : Hadii patrice funes hadjessikao Socatrachitoali, waaxda luqadaha, qaybta kaalmada adeegyada, cuba tan. So [facility] [phone].    ATENCIÓN: Si habla español, tiene a keane disposición servicios gratuitos de asistencia lingüística. Llame al [phone].    We comply with applicable federal civil rights laws and [place] laws. We do not discriminate on the basis of race, color, national origin, age, disability, sex, sexual orientation, or gender identity.            Thank you!     Thank you for choosing [facility]  for your care. Our goal is always to provide you with excellent care. Hearing back from our patients is one way we can continue to improve our services. Please take a few minutes to complete the written survey that you may receive in the mail after  your visit with us. Thank you!             Your Updated Medication List - Protect others around you: Learn how to safely use, store and throw away your medicines at www.disposemymeds.org.          This list is accurate as of 8/14/18  1:56 PM.  Always use your most recent med list.                   Brand Name Dispense Instructions for use Diagnosis    alirocumab 75 MG/ML injectable pen    PRALUENT    2 mL    Inject 1 mL (75 mg) Subcutaneous every 14 days    CAD (coronary artery disease), Hyperlipidemia LDL goal <70       alpha-d-galactosidase tablet     540 tablet    Take 2 tablets by mouth 3 times daily (before meals)    Bloating       aspirin 81 MG EC tablet     90 tablet    Take 1 tablet (81 mg) by mouth daily        Blood Pressure Monitor Kit     1 kit    1 each daily.    S/P CABG (coronary artery bypass graft), CAD (coronary artery disease)       [employer]S COENZYME Q10 100 MG Caps capsule   Generic drug:  co-enzyme Q-10      Take 1 capsule (100 mg) by mouth daily        ketorolac 10 MG tablet    TORADOL    20 tablet    Take 1 tablet (10 mg) by mouth every 6 hours as needed for moderate pain        * Lactase 9000 units Chew    LACTAID FAST ACT    270 tablet    Take 1 tablet by mouth daily Chew and swallow 1 tablet with first bite of dairy food prn    Lactose intolerance       * LACTAID FAST ACT 9000 units Tabs tablet   Generic drug:  lactase           MENS MULTIVITAMIN PLUS Tabs     90 tablet    Take 1 tablet by mouth daily    Preventive measure       omeprazole 20 MG CR capsule    priLOSEC    60 capsule    Take 1 capsule (20 mg) by mouth 2 times daily    Digestive problems       potassium citrate 10 MEQ (1080 MG) CR tablet    UROCIT-K    180 tablet    Take 1 tablet (10 mEq) by mouth 3 times daily (with meals)    Calculus of kidney       pyridOXINE 100 MG Tabs    VITAMIN B6    90 tablet    Take 1 tablet (100 mg) by mouth daily    Coronary artery disease involving autologous artery coronary bypass graft without angina  pectoris       rosuvastatin 10 MG tablet    CRESTOR          STATIN NOT PRESCRIBED (INTENTIONAL)     0 each    1 each continuous Statin not prescribed intentionally due to Intolerance (with supporting documentation of trying a statin at least once within the last 5 years)    Hyperlipidemia LDL goal <70, Statin intolerance       vitamin D3 2000 units Caps     90 capsule    Take 1 capsule by mouth daily    Vitamin D deficiency disease       * Notice:  This list has 2 medication(s) that are the same as other medications prescribed for you. Read the directions carefully, and ask your doctor or other care provider to review them with you.

## 2018-08-14 NOTE — PROGRESS NOTES
Male Physical Note          HPI         Concerns today:     1. L leg pain  L leg hamstring tightens after 20-30min in car or confined place    2. Abd pain / digestive issues  Unsatisfactory  Some good days then bad ones  Slowly introduced supplements without worsening  Changed diet - no   What's working   - eating more - 5 measls /day  - 1/4 peptobismol every few days helps  - eats carrot first thing in the AM    Off omepraozle  Had colonoscopy / EGD  Previous US showed GB stone without cholecystitis      3. Lab results  reviewed    4. Kidney stone  Has passed stones  Has conflicting diet recommendations. Told to limit protein, but patient already limiting many other foods    5. Poor sleep / fatigue  Varying problems - uncertain if abd pain is waking him or he notices pain when awakening  Laid off from work. Has not found work again. Stressful.   Other stressors.      6. CAD  Sister with new A-fib  Brother is HTN, another brother with uncontrolled hyperlipidemia  Pt s/p CABG - doing OK  Sees cardiologist again soon      Patient Active Problem List   Diagnosis     Pain in joint, shoulder region     CAD (coronary artery disease)     ASCVD (arteriosclerotic cardiovascular disease)     Hyperlipidemia LDL goal <70     Adjustment disorder with depressed mood     Testicular hypofunction     Simple goiter     Fatigue     Knee pain     Coronary artery disease involving coronary bypass graft of native heart without angina pectoris     Borderline diabetes mellitus     Calculus of gallbladder without cholecystitis without obstruction     Statin intolerance     Kidney stone     Functional dyspepsia     Gastroesophageal reflux disease without esophagitis       Past Medical History:   Diagnosis Date     Borderline diabetes mellitus 11/26/2015     Coronary artery disease 2012    CABG x4     Coronary artery disease involving coronary bypass graft of native heart without angina pectoris 11/18/2015     Depression      Goiter      With normal TSH     Hyperlipidemia     Started on statin 7/2007.  Discontinued 10/07 secondary to muscle aches and fatigue     Nephrolithiasis      Shoulder pain      Statin intolerance 6/23/2016            Family History   Problem Relation Age of Onset     C.A.D. Other      Uncle     Diabetes Brother      Cerebrovascular Disease Brother      Cardiovascular Sister      tachyarrhythmia              Review of Systems:     Review of Systems:  CONSTITUTIONAL: NEGATIVE for fever, chills, change in weight  INTEGUMENTARY/SKIN: NEGATIVE for worrisome rashes, moles or lesions  EYES: NEGATIVE for vision changes or irritation  ENT/MOUTH: NEGATIVE for ear, mouth and throat problems  RESP: NEGATIVE for significant cough or SOB  BREAST: NEGATIVE for masses, tenderness or discharge  CV: NEGATIVE for chest pain, palpitations or peripheral edema  GI: multiple stomach issues  : NEGATIVE for frequency, dysuria, or hematuria  MUSCULOSKELETAL: l hamstring pain  NEURO: NEGATIVE for weakness, dizziness or paresthesias  ENDOCRINE: NEGATIVE for temperature intolerance, skin/hair changes  HEME/ALLERGY: NEGATIVE for bleeding problems  PSYCHIATRIC: fatigue, sleep issues, stress  Sleep:   Do you snore most or the night (as reported by a family member)? No  Do you feel sleepy or extremely tired during most of the day? No             Social History     Social History     Social History     Marital status:      Spouse name: N/A     Number of children: N/A     Years of education: N/A     Occupational History     Not on file.     Social History Main Topics     Smoking status: Never Smoker     Smokeless tobacco: Never Used     Alcohol use No     Drug use: No     Sexual activity: Yes     Other Topics Concern     Not on file     Social History Narrative    .        Marital Status:  Who lives in your household? wife    Has anyone hurt you physically, for example by pushing, hitting, slapping or kicking you or forcing you to have  sex? Denies  Do you feel threatened or controlled by a partner, ex-partner or anyone in your life? Denies    Sexual Health     Sexual concerns: No   STI History: Neg      Recommended Screening     Cholesterol Level (>46 yo or at risk):  Done   and ASA use (>3% risk in 5 y):  Done    Colon CA Screening (>50-75 ):  done             Physical Exam:     Vitals: /77  Pulse 57  Temp 98.7  F (37.1  C) (Oral)  Resp 16  Ht 6' (182.9 cm)  Wt 214 lb 9.6 oz (97.3 kg)  SpO2 97%  BMI 29.1 kg/m2  BMI= Body mass index is 29.1 kg/(m^2).     Wt Readings from Last 5 Encounters:   08/14/18 214 lb 9.6 oz (97.3 kg)   06/26/18 213 lb 6.4 oz (96.8 kg)   06/05/18 215 lb 4.8 oz (97.7 kg)   05/25/18 219 lb 9.3 oz (99.6 kg)   05/25/18 218 lb 6.4 oz (99.1 kg)         GENERAL: healthy, alert and no distress  EYES: Eyes grossly normal to inspection, extraocular movements - intact, and PERRL  HENT: ear canals- normal; TMs- normal; Nose- normal; Mouth- no ulcers, no lesions  NECK: no tenderness, no adenopathy, no asymmetry, no masses, no stiffness; thyroid- normal to palpation  RESP: lungs clear to auscultation - no rales, no rhonchi, no wheezes  CV: regular rates and rhythm, normal S1 S2, no S3 or S4 and no murmur, no click or rub -  ABDOMEN: soft, no tenderness, no  hepatosplenomegaly, no masses, normal bowel sounds  MS: extremities- no gross deformities noted, no edema  SKIN: no suspicious lesions, no rashes  NEURO: strength and tone- normal, sensory exam- grossly normal, mentation- intact, speech- normal, reflexes- symmetric  BACK: no CVA tenderness, no paralumbar tenderness  - male: testicles- normal, no atrophy, no masses;  no inguinal hernias  PSYCH: Alert and oriented times 3; speech- coherent , normal rate and volume; able to articulate logical thoughts, able to abstract reason, no tangential thoughts, no hallucinations or delusions, affect- normal  LYMPHATICS: ant. cervical- normal, post. cervical- normal, axillary- normal,  supraclavicular- normal, inguinal- normal      LABS  Results for orders placed or performed in visit on 08/13/18   Comprehensive metabolic panel   Result Value Ref Range    Sodium 141 133 - 144 mmol/L    Potassium 4.0 3.4 - 5.3 mmol/L    Chloride 106 94 - 109 mmol/L    Carbon Dioxide 28 20 - 32 mmol/L    Anion Gap 7 3 - 14 mmol/L    Glucose 111 (H) 70 - 99 mg/dL    Urea Nitrogen 17 7 - 30 mg/dL    Creatinine 1.07 0.66 - 1.25 mg/dL    GFR Estimate 71 >60 mL/min/1.7m2    GFR Estimate If Black 86 >60 mL/min/1.7m2    Calcium 8.9 8.5 - 10.1 mg/dL    Bilirubin Total 1.6 (H) 0.2 - 1.3 mg/dL    Albumin 4.0 3.4 - 5.0 g/dL    Protein Total 7.5 6.8 - 8.8 g/dL    Alkaline Phosphatase 59 40 - 150 U/L    ALT 30 0 - 70 U/L    AST 22 0 - 45 U/L   Lipid panel reflex to direct LDL Fasting   Result Value Ref Range    Cholesterol 136 <200 mg/dL    Triglycerides 161 (H) <150 mg/dL    HDL Cholesterol 49 >39 mg/dL    LDL Cholesterol Calculated 55 <100 mg/dL    Non HDL Cholesterol 87 <130 mg/dL   Routine UA with micro reflex to culture   Result Value Ref Range    Color Urine Yellow     Appearance Urine Slightly Cloudy     Glucose Urine Negative NEG^Negative mg/dL    Bilirubin Urine Negative NEG^Negative    Ketones Urine Negative NEG^Negative mg/dL    Specific Gravity Urine 1.023 1.003 - 1.035    Blood Urine Negative NEG^Negative    pH Urine 5.0 5.0 - 7.0 pH    Protein Albumin Urine Negative NEG^Negative mg/dL    Urobilinogen mg/dL 0.0 0.0 - 2.0 mg/dL    Nitrite Urine Negative NEG^Negative    Leukocyte Esterase Urine Negative NEG^Negative    Source Midstream Urine     WBC Urine 1 0 - 5 /HPF    RBC Urine 1 0 - 2 /HPF    Mucous Urine Present (A) NEG^Negative /LPF       Colonoscopy 11/15/17  Tubular adenoma    EGD 11/15/17  Impression:          - Z-line regular, 41 cm from the incisors.                        - Normal esophagus.                        - A few gastric polyps. Resected and retrieved                        - A single duodenal  polyp. Resected/retrieved.                        - Normal examined duodenum.                        - Biopsies were taken with a      US 6/2016  IMPRESSION:   Cholelithiasis, with large nonmobile gallstone in the neck.  No  convincing evidence for cholecystitis.     Abd CT   5/2018  IMPRESSION:   1. 4 mm stone within the proximal right ureter with mild right  hydronephrosis. Nonobstructing nephrolithiasis in the left kidney.  2. 2.9 cm calcified gallstone in the gallbladder neck. Mild  gallbladder wall thickening, otherwise no evidence for cholecystitis.  Right upper quadrant ultrasound could be considered if clinically  indicated.  3. Several 2 mm pulmonary nodules. In a low-risk patient, no follow-up  is recommended. In a high-risk patient, a CT chest in 12 months is  optional.      Assessment and Plan        1. Encounter for routine adult health examination without abnormal findings  Screening UTD  Vaccines UTD      2. Left hamstring muscle strain, initial encounter  L hamstring pain with sitting in car or other confined places    - [facility], PT, HAND AND CHIROPRACTIC REFERRAL - [facility]    3. Abdominal pain, generalized / functional dyspepsia  Significant work-up. Diet changes, meds without help  US with gallstone  1/4 tab of peptobismol every third day and eating a carrot in the AM seems to help  Possible component of stress (PHQ9=6). Open to health psychologist.    Consider GB stone again in future    - BEHAVIORAL HEALTH REFERRAL ([facility] inter[facility] and external)    4. Fatigue, unspecified type  Likely stress related  Unemployed. Health issues.   Referral to     5. Coronary artery disease involving native coronary artery of native heart without angina pectoris  6. Hyperlipidemia LDL goal <70  Lipids good on  Praluent.  No chest pain, exercise intolerance    7. Kidney stones  Resolved. Stable at this time      Options for treatment and follow-up care were reviewed with the patient. Hong Pool and/or guardian  engaged in the decision making process and verbalized understanding of the options discussed and agreed with the final plan.    Jorge Singleton MD

## 2018-08-14 NOTE — PATIENT INSTRUCTIONS
1. Left hamstring muscle strain, initial encounter  See physical therapy    - [facility], PT, HAND AND CHIROPRACTIC REFERRAL - [facility]    2. Abdominal pain, generalized  See our health psychologist to deal with stress and its potenial interaction with the stomach issues and sleep/fatigue    - BEHAVIORAL HEALTH REFERRAL ([facility] inter[facility] and external)    PHarper            Preventive Health Recommendations  Male Ages 50 - 64    Yearly exam:             See your health care provider every year in order to  o   Review health changes.   o   Discuss preventive care.    o   Review your medicines if your doctor has prescribed any.     Have a cholesterol test every 5 years, or more frequently if you are at risk for high cholesterol/heart disease.     Have a diabetes test (fasting glucose) every three years. If you are at risk for diabetes, you should have this test more often.     Have a colonoscopy at age 50, or have a yearly FIT test (stool test). These exams will check for colon cancer.      Talk with your health care provider about whether or not a prostate cancer screening test (PSA) is right for you.    You should be tested each year for STDs (sexually transmitted diseases), if you re at risk.     Shots: Get a flu shot each year. Get a tetanus shot every 10 years.     Nutrition:    Eat at least 5 servings of fruits and vegetables daily.     Eat whole-grain bread, whole-wheat pasta and brown rice instead of white grains and rice.     Get adequate Calcium and Vitamin D.     Lifestyle    Exercise for at least 150 minutes a week (30 minutes a day, 5 days a week). This will help you control your weight and prevent disease.     Limit alcohol to one drink per day.     No smoking.     Wear sunscreen to prevent skin cancer.     See your dentist every six months for an exam and cleaning.     See your eye doctor every 1 to 2 years.

## 2018-08-15 ASSESSMENT — PATIENT HEALTH QUESTIONNAIRE - PHQ9: SUM OF ALL RESPONSES TO PHQ QUESTIONS 1-9: 6

## 2018-08-16 ENCOUNTER — THERAPY VISIT (OUTPATIENT)
Dept: PHYSICAL THERAPY | Facility: CLINIC | Age: 58
End: 2018-08-16
Payer: COMMERCIAL

## 2018-08-16 DIAGNOSIS — S76.319A HAMSTRING MUSCLE STRAIN: Primary | ICD-10-CM

## 2018-08-16 PROCEDURE — G8979 MOBILITY GOAL STATUS: HCPCS | Mod: GP | Performed by: PHYSICAL THERAPIST

## 2018-08-16 PROCEDURE — 97161 PT EVAL LOW COMPLEX 20 MIN: CPT | Mod: GP | Performed by: PHYSICAL THERAPIST

## 2018-08-16 PROCEDURE — G8978 MOBILITY CURRENT STATUS: HCPCS | Mod: GP | Performed by: PHYSICAL THERAPIST

## 2018-08-16 PROCEDURE — 97110 THERAPEUTIC EXERCISES: CPT | Mod: GP | Performed by: PHYSICAL THERAPIST

## 2018-08-16 PROCEDURE — 97530 THERAPEUTIC ACTIVITIES: CPT | Mod: GP | Performed by: PHYSICAL THERAPIST

## 2018-08-16 NOTE — MR AVS SNAPSHOT
After Visit Summary   8/16/2018    Hong Pool    MRN: [id number]           Patient Information     Date Of Birth          1960        Visit Information        Provider Department      8/16/2018 5:20 PM Kaleb Mahajan PT [facility]        Today's Diagnoses     Hamstring muscle strain    -  1       Follow-ups after your visit        Your next 10 appointments already scheduled     Aug 23, 2018  4:40 PM CDT   [facility] Extremity with Kaleb Mahajan PT   [facility] ([facility])    [address] [place] [id number]   [phone]            Aug 30, 2018  5:20 PM CDT   [facility] Extremity with Kaleb Mahajan PT   [facility] ([facility])    [address] [place] [id number] [phone]            Oct 17, 2018  3:30 PM CDT   (Arrive by 3:15 PM)   RETURN LIPID VISIT with Jaylan Evangelista MD   [facility] ([facility])    [address] [place] [address]  [address] [place] [id number] [phone]              Who to contact     If you have questions or need follow up information about today's clinic visit or your schedule please contact [facility] directly at [phone].  Normal or non-critical lab and imaging results will be communicated to you by MyChart, letter or phone within 4 business days after the clinic has received the results. If you do not hear from us within 7 days, please contact the clinic through MyChart or phone. If you have a critical or abnormal lab result, we will notify you by phone as soon as possible.  Submit refill requests through [employer] or call your pharmacy and they will forward the refill request to us. Please allow 3 business days for your refill to be completed.          Additional Information About Your Visit        Care EveryWhere ID     This  is your Care EveryWhere ID. This could be used by other organizations to access your [facility] medical records  [id number]         Blood Pressure from Last 3 Encounters:   08/14/18 123/77   06/26/18 112/71   06/05/18 110/74    Weight from Last 3 Encounters:   08/14/18 97.3 kg (214 lb 9.6 oz)   06/26/18 96.8 kg (213 lb 6.4 oz)   06/05/18 97.7 kg (215 lb 4.8 oz)              We Performed the Following     PT Eval, Low Complexity (75038)     Therapeutic Activities     Therapeutic Exercises        Primary Care Provider Office Phone # Fax #    Jorge Singleton [phone]       [address] [address]        Equal Access to Services     MARCK BE : Hadii patrice funes hadasho Soomaali, waaxda luqadaha, qaybta kaalmada adeegyada, cuba randhawa . So [facility] [phone].    ATENCIÓN: Si habla español, tiene a keane disposición servicios gratuitos de asistencia lingüística. [facility] [phone].    We comply with applicable federal civil rights laws and [place] laws. We do not discriminate on the basis of race, color, national origin, age, disability, sex, sexual orientation, or gender identity.            Thank you!     Thank you for choosing [facility] [place]  for your care. Our goal is always to provide you with excellent care. Hearing back from our patients is one way we can continue to improve our services. Please take a few minutes to complete the written survey that you may receive in the mail after your visit with us. Thank you!             Your Updated Medication List - Protect others around you: Learn how to safely use, store and throw away your medicines at www.disposemymeds.org.          This list is accurate as of 8/16/18 11:59 PM.  Always use your most recent med list.                   Brand Name Dispense Instructions for use Diagnosis    alirocumab 75 MG/ML injectable pen    PRALUENT    2 mL    Inject 1 mL (75 mg)  Subcutaneous every 14 days    CAD (coronary artery disease), Hyperlipidemia LDL goal <70       alpha-d-galactosidase tablet     540 tablet    Take 2 tablets by mouth 3 times daily (before meals)    Bloating       aspirin 81 MG EC tablet     90 tablet    Take 1 tablet (81 mg) by mouth daily        Blood Pressure Monitor Kit     1 kit    1 each daily.    S/P CABG (coronary artery bypass graft), CAD (coronary artery disease)       [employer] COENZYME Q10 100 MG Caps capsule   Generic drug:  co-enzyme Q-10      Take 1 capsule (100 mg) by mouth daily        LACTAID FAST ACT 9000 units Tabs tablet   Generic drug:  lactase           MENS MULTIVITAMIN PLUS Tabs     90 tablet    Take 1 tablet by mouth daily    Preventive measure       potassium citrate 10 MEQ (1080 MG) CR tablet    UROCIT-K    180 tablet    Take 1 tablet (10 mEq) by mouth 3 times daily (with meals)    Calculus of kidney       pyridOXINE 100 MG Tabs    VITAMIN B6    90 tablet    Take 1 tablet (100 mg) by mouth daily    Coronary artery disease involving autologous artery coronary bypass graft without angina pectoris       STATIN NOT PRESCRIBED (INTENTIONAL)     0 each    1 each continuous Statin not prescribed intentionally due to Intolerance (with supporting documentation of trying a statin at least once within the last 5 years)    Hyperlipidemia LDL goal <70, Statin intolerance       vitamin D3 2000 units Caps     90 capsule    Take 1 capsule by mouth daily    Vitamin D deficiency disease

## 2018-08-16 NOTE — PROGRESS NOTES
[facility] Initial Evaluation  Subjective:  Patient is a 57 year old male presenting with rehab left knee hpi.   Hong Pool is a 57 year old male with a left knee condition.  Condition occurred with:  Insidious onset.  Condition occurred: for unknown reasons.  This is a chronic condition  Pt presents to PT with c/o L hamstring pain with onset decades ago.  He is not sure what the cause is.      He reports he notices the pain the most while being seated for a long period of time.   It reports the pain used to come on after 4-5 hours, but now comes on in just 15 minutes of time.       Pt is currently not working.      PMH: Anemia, heart problems, kidney disease .    Site of Pain: proximal hamstring down to back of knee.  Radiates to: n/a.  Quality: tightening, becomes sharp after a while. and is intermittent and reported as 8/10 and 2/10.  Associated symptoms:  Loss of motion/stiffness. Pain is the same all the time.  Exacerbated by: sitting. Relieved by: activity.  Since onset symptoms are gradually worsening.  Special testing: n/a.  Previous treatment includes physical therapy.  Improvement with previous treatment: n/a.  General health as reported by patient is good.                                              Objective:  System         Lumbar/SI Evaluation            Neural Tension/Mobility:  Neural tension wnl lumbar: + L PSLR.                                                  Hip Evaluation  Hip PROM:  Hip PROM:  Left Hip:    Normal  Right Hip:  Normal                          Hip Strength:      Extension:  Left: 3+/5  Pain:Right: 4+/5    Pain:    Abduction:  Left: 3+/5     Pain:Right: 4-/5    Pain:                           Knee Evaluation:              Functional Testing:  : Squat WNL. SLS WFL.  SL Squat unsteadiness B.                      General Evaluation:                              Gait:  Gait wnl general: WFL.                                         ROS    Assessment/Plan:     Patient is a 57 year old male with left side knee complaints.    Patient has the following significant findings with corresponding treatment plan.                Diagnosis 1:  L hamstring strain  Pain -  hot/cold therapy  Decreased ROM/flexibility - manual therapy and therapeutic exercise  Decreased joint mobility - manual therapy and therapeutic exercise  Decreased strength - therapeutic exercise and therapeutic activities  Impaired balance - neuro re-education and therapeutic activities  Decreased proprioception - neuro re-education and therapeutic activities  Inflammation - cold therapy  Impaired gait - gait training  Impaired muscle performance - neuro re-education  Decreased function - therapeutic activities  Impaired posture - neuro re-education    Therapy Evaluation Codes:   1) History comprised of:   Personal factors that impact the plan of care:      Time since onset of symptoms.    Comorbidity factors that impact the plan of care are:      None.     Medications impacting care: None.  2) Examination of Body Systems comprised of:   Body structures and functions that impact the plan of care:      Knee.   Activity limitations that impact the plan of care are:      Sitting.  3) Clinical presentation characteristics are:   Stable/Uncomplicated.  4) Decision-Making    Low complexity using standardized patient assessment instrument and/or measureable assessment of functional outcome.  Cumulative Therapy Evaluation is: Low complexity.    Previous and current functional limitations:  (See Goal Flow Sheet for this information)    Short term and Long term goals: (See Goal Flow Sheet for this information)     Communication ability:  Patient appears to be able to clearly communicate and understand verbal and written communication and follow directions correctly.  Treatment Explanation - The following has been discussed with the patient:   RX ordered/plan of care  Anticipated outcomes  Possible risks and side  effects  This patient would benefit from PT intervention to resume normal activities.   Rehab is excellent.    Frequency:  1 X week, once daily  Duration:  for 6 weeks  Discharge Plan:  Achieve all LTG.  Independent in home treatment program.  Return to work with or without restrictions.  Return to previous functional level by discharge.  Reach maximal therapeutic benefit.    Please refer to the daily flowsheet for treatment today, total treatment time and time spent performing 1:1 timed codes.

## 2018-08-23 ENCOUNTER — THERAPY VISIT (OUTPATIENT)
Dept: PHYSICAL THERAPY | Facility: CLINIC | Age: 58
End: 2018-08-23
Payer: COMMERCIAL

## 2018-08-23 ENCOUNTER — TRANSFERRED RECORDS (OUTPATIENT)
Dept: HEALTH INFORMATION MANAGEMENT | Facility: CLINIC | Age: 58
End: 2018-08-23

## 2018-08-23 DIAGNOSIS — S76.319A HAMSTRING MUSCLE STRAIN: Primary | ICD-10-CM

## 2018-08-23 PROCEDURE — 97110 THERAPEUTIC EXERCISES: CPT | Mod: GP | Performed by: PHYSICAL THERAPIST

## 2018-08-23 PROCEDURE — 97530 THERAPEUTIC ACTIVITIES: CPT | Mod: GP | Performed by: PHYSICAL THERAPIST

## 2018-09-04 ENCOUNTER — THERAPY VISIT (OUTPATIENT)
Dept: PHYSICAL THERAPY | Facility: CLINIC | Age: 58
End: 2018-09-04
Payer: COMMERCIAL

## 2018-09-04 DIAGNOSIS — S76.319A HAMSTRING MUSCLE STRAIN: ICD-10-CM

## 2018-09-04 PROCEDURE — 97110 THERAPEUTIC EXERCISES: CPT | Mod: GP | Performed by: PHYSICAL THERAPIST

## 2018-09-04 PROCEDURE — 97530 THERAPEUTIC ACTIVITIES: CPT | Mod: GP | Performed by: PHYSICAL THERAPIST

## 2018-09-07 ENCOUNTER — PRE VISIT (OUTPATIENT)
Dept: UROLOGY | Facility: CLINIC | Age: 58
End: 2018-09-07

## 2018-09-18 ENCOUNTER — THERAPY VISIT (OUTPATIENT)
Dept: PHYSICAL THERAPY | Facility: CLINIC | Age: 58
End: 2018-09-18
Payer: COMMERCIAL

## 2018-09-18 ENCOUNTER — VIRTUAL VISIT (OUTPATIENT)
Dept: UROLOGY | Facility: CLINIC | Age: 58
End: 2018-09-18
Payer: COMMERCIAL

## 2018-09-18 DIAGNOSIS — S76.319A HAMSTRING MUSCLE STRAIN: ICD-10-CM

## 2018-09-18 DIAGNOSIS — N20.0 CALCULUS OF KIDNEY: Primary | ICD-10-CM

## 2018-09-18 PROCEDURE — 97140 MANUAL THERAPY 1/> REGIONS: CPT | Mod: GP | Performed by: PHYSICAL THERAPIST

## 2018-09-18 PROCEDURE — 97530 THERAPEUTIC ACTIVITIES: CPT | Mod: GP | Performed by: PHYSICAL THERAPIST

## 2018-09-18 NOTE — PROGRESS NOTES
Telephone Follow-Up     I had the pleasure of speaking to Hong JAN Pool over the phone to follow-up on recent 24 hour urine testing for stone prevention.    To review he is a recurrent stone former who passed a 4 mm right ureteral stone this past summer.  He has had several prior stone events.  Is currently on potassium citrate 10 TID for prevention which he is tolerating well.    Since last visit they has had no new flank pain, hematuria or stones.  Has several small known nonobstructing left renal stone on last CT.     New 24 hour urine studies are reviewed and notable for the following:  Volume 2.18  Ca 15  Ox 48  Cit 782  PH 6.6  Na 119  K 119    Interpretation - adequate volume, borderline high oxalate (very low calcium, does not have any dietary calcium intake by choice due to lactose intolerance) borderline high oxalate.    We reviewed general recommendations to prevent kidney stones including the followin) Low oxalate diet. We discussed foods that are particularly high in oxalate including spinach, rhubarb, beets, nuts, nut butters, and black teas.     2) Low salt diet. Discussed common foods with high amounts of salt as well as dietary strategies to minimize sodium.     3) High fluid intake. Recommend 3 liters of fluid daily to produce goal of 2.5L of urine.     4) Modest animal protein. Recommend limiting animal protein to one serving or less daily.     5) Normal dietary calcium.    Will check status of left renal stones with KUB in one year, sooner if symptoms      Claudio Chavez      15 minutes were spent on the telephone discussing stone prevention

## 2018-09-18 NOTE — PATIENT INSTRUCTIONS
Follow up as needed per your phone discussion with Dr. Chavez.    It was a pleasure meeting with you today.  Thank you for allowing me and my team the privilege of caring for you today.  YOU are the reason we are here, and I truly hope we provided you with the excellent service you deserve.  Please let us know if there is anything else we can do for you so that we can be sure you are leaving completely satisfied with your care experience.      Mac Ying LPN

## 2018-09-18 NOTE — MR AVS SNAPSHOT
After Visit Summary   9/18/2018    Hong Pool    MRN: [id number]           Patient Information     Date Of Birth          1960        Visit Information        Provider Department      9/18/2018 7:20 AM Claudio Chavez MD [facility] Urology and [facility] [[email or web address]] for Prostate and Urologic Cancers        Today's Diagnoses     Calculus of kidney    -  1      Care Instructions    Follow up as needed per your phone discussion with Dr. Chavez.    It was a pleasure meeting with you today.  Thank you for allowing me and my team the privilege of caring for you today.  YOU are the reason we are here, and I truly hope we provided you with the excellent service you deserve.  Please let us know if there is anything else we can do for you so that we can be sure you are leaving completely satisfied with your care experience.      Mac Ying LPN          Follow-ups after your visit        Your next 10 appointments already scheduled     Sep 18, 2018  2:30 PM CDT   ROSA ISELA Extremity with Kaleb Mahajan PT   [facility] ([facility])    [address] [place] [address] [phone]            Oct 17, 2018  3:30 PM CDT   (Arrive by 3:15 PM)   RETURN LIPID VISIT with Jaylan Evangelista MD   [facility] ([facility])    [address] [place] [address]  [address] [place] [address] [phone]              Future tests that were ordered for you today     Open Future Orders        Priority Expected Expires Ordered    X-Ray KUB Routine 9/18/2018 9/18/2019 9/18/2018            Who to contact     Please call your clinic at [phone] to:    Ask questions about your health    Make or cancel appointments    Discuss your medicines    Learn about your test results    Speak to your doctor            Additional Information About Your Visit        MyChart Information     [employer] is an electronic gateway that provides easy, online  access to your medical records. With [employer], you can request a clinic appointment, read your test results, renew a prescription or communicate with your care team.     To sign up for [employer] visit the website at [email or web address]   You will be asked to enter the access code listed below, as well as some personal information. Please follow the directions to create your username and password.     Your access code is: [email or web address]  Expires: 2018  5:25 PM     Your access code will  in 90 days. If you need help or a new code, please contact your [facility] or call [phone] for assistance.        Care EveryWhere ID     This is your Care EveryWhere ID. This could be used by other organizations to access your [facility] medical records  [id number]         Blood Pressure from Last 3 Encounters:   18 123/77   18 112/71   18 110/74    Weight from Last 3 Encounters:   18 97.3 kg (214 lb 9.6 oz)   18 96.8 kg (213 lb 6.4 oz)   18 97.7 kg (215 lb 4.8 oz)               Primary Care Provider Office Phone # Fax #    Jorge Singleton [phone]       [address] [address]        Equal Access to Services     MARCK BE AH: Hadii patrice funes hadasho Sokwesi, waaxda luqadaha, qaybta kaalmada bean, cuba randhawa . So [facility] [phone].    ATENCIÓN: Si habla español, tiene a keane disposición servicios gratuitos de asistencia lingüística. Llame al [phone].    We comply with applicable federal civil rights laws and [place] laws. We do not discriminate on the basis of race, color, national origin, age, disability, sex, sexual orientation, or gender identity.            Thank you!     Thank you for choosing [facility]OLOGY AND [facility] FOR PROSTATE AND UROLOGIC CANCERS  for your care. Our goal is always to provide you with excellent care. Hearing back from our patients is one  way we can continue to improve our services. Please take a few minutes to complete the written survey that you may receive in the mail after your visit with us. Thank you!             Your Updated Medication List - Protect others around you: Learn how to safely use, store and throw away your medicines at www.disposemymeds.org.          This list is accurate as of 9/18/18 10:15 AM.  Always use your most recent med list.                   Brand Name Dispense Instructions for use Diagnosis    alirocumab 75 MG/ML injectable pen    PRALUENT    2 mL    Inject 1 mL (75 mg) Subcutaneous every 14 days    CAD (coronary artery disease), Hyperlipidemia LDL goal <70       alpha-d-galactosidase tablet     540 tablet    Take 2 tablets by mouth 3 times daily (before meals)    Bloating       aspirin 81 MG EC tablet     90 tablet    Take 1 tablet (81 mg) by mouth daily        Blood Pressure Monitor Kit     1 kit    1 each daily.    S/P CABG (coronary artery bypass graft), CAD (coronary artery disease)       [employer] COENZYME Q10 100 MG Caps capsule   Generic drug:  co-enzyme Q-10      Take 1 capsule (100 mg) by mouth daily        LACTAID FAST ACT 9000 units Tabs tablet   Generic drug:  lactase           MENS MULTIVITAMIN PLUS Tabs     90 tablet    Take 1 tablet by mouth daily    Preventive measure       potassium citrate 10 MEQ (1080 MG) CR tablet    UROCIT-K    180 tablet    Take 1 tablet (10 mEq) by mouth 3 times daily (with meals)    Calculus of kidney       pyridOXINE 100 MG Tabs    VITAMIN B6    90 tablet    Take 1 tablet (100 mg) by mouth daily    Coronary artery disease involving autologous artery coronary bypass graft without angina pectoris       STATIN NOT PRESCRIBED (INTENTIONAL)     0 each    1 each continuous Statin not prescribed intentionally due to Intolerance (with supporting documentation of trying a statin at least once within the last 5 years)    Hyperlipidemia LDL goal <70, Statin intolerance       vitamin D3 2000  units Caps     90 capsule    Take 1 capsule by mouth daily    Vitamin D deficiency disease

## 2018-09-27 ENCOUNTER — THERAPY VISIT (OUTPATIENT)
Dept: PHYSICAL THERAPY | Facility: CLINIC | Age: 58
End: 2018-09-27
Payer: COMMERCIAL

## 2018-09-27 DIAGNOSIS — S76.319A HAMSTRING MUSCLE STRAIN: ICD-10-CM

## 2018-09-27 PROCEDURE — 97140 MANUAL THERAPY 1/> REGIONS: CPT | Mod: GP | Performed by: PHYSICAL THERAPIST

## 2018-09-27 PROCEDURE — 97530 THERAPEUTIC ACTIVITIES: CPT | Mod: GP | Performed by: PHYSICAL THERAPIST

## 2018-10-09 ENCOUNTER — TELEPHONE (OUTPATIENT)
Dept: CARDIOLOGY | Facility: CLINIC | Age: 58
End: 2018-10-09

## 2018-10-09 NOTE — TELEPHONE ENCOUNTER
[facility] Call Center    Phone Message    May a detailed message be left on voicemail: yes    Reason for Call: Other: Pt reported that he is changing to a different health plan eventually and that health plan wants pt to take Rx, Repatha instead of what he is currently using, Rx Praluent 75 ml injector.  Pt has these questions: Is Repatha a good choice for pt?  Or, should Dr. Evangelista push back at the ins company and allow pt to continue to use the Praluent?  Please call pt on his home ph# to give him an update.  Thank you!     Action Taken: Message routed to:  [facility] ([facility]  Cardiology clinic

## 2018-10-10 NOTE — TELEPHONE ENCOUNTER
Called and left VM for Pt per request.  Informed him that in most cases Dr Evangelista feels Repatha and Praluent are very comparable and unless he would like the Pt to remain at the lower dosage, which isn't provided with Repatha, that he likely would switch him to Repatha when he changes coverage.  Informed Pt that Dr Evangelista might prefer to keep him at the lower dosage, in which case a PA for Praluent would be needed.  Informed Pt this decision would be up to Dr Evangelista and would be best discussed in further detail at his 1 year f/u next week.  Instructed Pt if he had any additional questions or concerns that he could contact me in clinic.  Clinic telephone provided.    Elba Poole LPN

## 2018-10-15 ENCOUNTER — OFFICE VISIT (OUTPATIENT)
Dept: CARDIOLOGY | Facility: CLINIC | Age: 58
End: 2018-10-15
Attending: INTERNAL MEDICINE
Payer: COMMERCIAL

## 2018-10-15 VITALS
BODY MASS INDEX: 29.07 KG/M2 | SYSTOLIC BLOOD PRESSURE: 121 MMHG | DIASTOLIC BLOOD PRESSURE: 77 MMHG | HEIGHT: 72 IN | OXYGEN SATURATION: 96 % | HEART RATE: 64 BPM | WEIGHT: 214.6 LBS

## 2018-10-15 DIAGNOSIS — R73.03 BORDERLINE DIABETES MELLITUS: ICD-10-CM

## 2018-10-15 DIAGNOSIS — I25.10 ASCVD (ARTERIOSCLEROTIC CARDIOVASCULAR DISEASE): ICD-10-CM

## 2018-10-15 DIAGNOSIS — Z78.9 STATIN INTOLERANCE: ICD-10-CM

## 2018-10-15 DIAGNOSIS — I25.810 CORONARY ARTERY DISEASE INVOLVING CORONARY BYPASS GRAFT OF NATIVE HEART WITHOUT ANGINA PECTORIS: ICD-10-CM

## 2018-10-15 DIAGNOSIS — E78.5 HYPERLIPIDEMIA LDL GOAL <70: Primary | ICD-10-CM

## 2018-10-15 DIAGNOSIS — I25.10 CORONARY ARTERY DISEASE INVOLVING NATIVE CORONARY ARTERY OF NATIVE HEART WITHOUT ANGINA PECTORIS: ICD-10-CM

## 2018-10-15 PROCEDURE — 99213 OFFICE O/P EST LOW 20 MIN: CPT | Mod: GC | Performed by: INTERNAL MEDICINE

## 2018-10-15 PROCEDURE — G0463 HOSPITAL OUTPT CLINIC VISIT: HCPCS | Mod: ZF

## 2018-10-15 ASSESSMENT — PAIN SCALES - GENERAL: PAINLEVEL: NO PAIN (0)

## 2018-10-15 NOTE — NURSING NOTE
Labs: Patient was given results of the laboratory testing obtained today. Patient was instructed to return for the next laboratory testing in one year. Patient demonstrated understanding of this information and agreed to call with further questions or concerns.   Med Reconcile: Reviewed and verified all current medications with the patient. The updated medication list was printed and given to the patient.  Return Appointment: Patient given instructions regarding scheduling next clinic visit. Patient demonstrated understanding of this information and agreed to call with further questions or concerns.  Patient stated he understood all health information given and agreed to call with further questions or concerns.    Elba Poole LPN

## 2018-10-15 NOTE — PROGRESS NOTES
General Cardiology Clinic Note     HPI:   History: 56 year old with a PMHx of CAD s/p CABG in 2012 who presents for follow up. Patient has a history of high cholesterol and was statin intolerant. Patient was started on Pralulent without any major complications.     Patient denies chest pain, shortness of breath, syncope.     Patient's LDL was stable.     Patient's insurance is switching. They are requesting for Repatha.     PAST MEDICAL HISTORY:  Past Medical History:   Diagnosis Date     Borderline diabetes mellitus 11/26/2015     Coronary artery disease 2012    CABG x4     Coronary artery disease involving coronary bypass graft of native heart without angina pectoris 11/18/2015     Depression      Goiter     With normal TSH     Hyperlipidemia     Started on statin 7/2007.  Discontinued 10/07 secondary to muscle aches and fatigue     Nephrolithiasis      Shoulder pain      Statin intolerance 6/23/2016       CURRENT MEDICATIONS:  Current Outpatient Prescriptions   Medication Sig Dispense Refill     alirocumab (PRALUENT) 75 MG/ML injectable pen Inject 1 mL (75 mg) Subcutaneous every 14 days 2 mL 11     alpha-d-galactosidase (BEANO) tablet Take 2 tablets by mouth 3 times daily (before meals) 540 tablet 4     aspirin 81 MG EC tablet Take 1 tablet (81 mg) by mouth daily 90 tablet 4     Blood Pressure Monitor KIT 1 each daily. 1 kit 0     Cholecalciferol (VITAMIN D3) 2000 UNITS CAPS Take 1 capsule by mouth daily 90 capsule 4     LACTAID FAST ACT 9000 units TABS tablet   4     Multiple Vitamins-Minerals (MENS MULTIVITAMIN PLUS) TABS Take 1 tablet by mouth daily 90 tablet 4     potassium citrate (UROCIT-K) 10 MEQ (1080 MG) CR tablet Take 1 tablet (10 mEq) by mouth 3 times daily (with meals) 180 tablet 3     pyridOXINE (VITAMIN B6) 100 MG TABS Take 1 tablet (100 mg) by mouth daily 90 tablet 4     co-enzyme Q-10 (CVS COENZYME Q10) 100 MG CAPS Take 1 capsule (100 mg) by mouth daily  0     STATIN NOT PRESCRIBED, INTENTIONAL, 1  each continuous Statin not prescribed intentionally due to Intolerance (with supporting documentation of trying a statin at least once within the last 5 years) (Patient not taking: Reported on 10/15/2018) 0 each 0       PAST SURGICAL HISTORY:  Past Surgical History:   Procedure Laterality Date     BYPASS GRAFT ARTERY CORONARY  6/24/2012    Procedure: BYPASS GRAFT ARTERY CORONARY;  Median Sternotomy, Coronary Artery Bypass Grafting x 4 using Left Internal Mammary and Left Saphenous Vein  with Endovein Harvesting. On Pump Oxygenation;  Surgeon: Genesis Larsen MD;  Location:  OR     ESOPHAGOSCOPY, GASTROSCOPY, DUODENOSCOPY (EGD), COMBINED N/A 4/11/2017    Procedure: COMBINED ESOPHAGOSCOPY, GASTROSCOPY, DUODENOSCOPY (EGD), BIOPSY SINGLE OR MULTIPLE;  Surgeon: Corine Ly MD;  Location:  GI       ALLERGIES     Allergies   Allergen Reactions     Lidocaine Rash     Break out     Band-Aid Anti-Itch      Cholestoff Complete [Plant Sterol Stanol-Pantethine] Other (See Comments)     Severe stomach pain     Lactose Diarrhea     Rosuvastatin Muscle Pain (Myalgia)     Zocor [Simvastatin - High Dose] Other (See Comments)     Muscle aches/soreness, confusion, disorganized thinking       FAMILY HISTORY:  Family History   Problem Relation Age of Onset     C.A.D. Other      Uncle     Diabetes Brother      Cerebrovascular Disease Brother      Cardiovascular Sister      tachyarrhythmia       SOCIAL HISTORY:  Social History     Social History     Marital status:      Spouse name: N/A     Number of children: N/A     Years of education: N/A     Social History Main Topics     Smoking status: Never Smoker     Smokeless tobacco: Never Used     Alcohol use No     Drug use: No     Sexual activity: Yes     Other Topics Concern     None     Social History Narrative    .      ROS:   Constitutional: No fever, chills, or sweats. No weight gain/loss   ENT: No visual disturbance, ear ache, epistaxis, sore  throat  Allergies/Immunologic: Negative.   Respiratory: No cough, hemoptysia  Cardiovascular: As per HPI  GI: No nausea, vomiting, hematemesis, melena, or hematochezia  : No urinary frequency, dysuria, or hematuria  Integument: Negative  Psychiatric: Negative  Neuro: Negative  Endocrinology: Negative   Musculoskeletal: Negative    EXAM:  /77 (BP Location: Left arm, Patient Position: Chair, Cuff Size: Adult Regular)  Pulse 64  Ht 1.829 m (6')  Wt 97.3 kg (214 lb 9.6 oz)  SpO2 96%  BMI 29.1 kg/m2  Gen: NAD   HEENT: NC/AT   CV: RRR   Pulm: CTAB  GI: s/nt/nd  Ext: No edema   Neuro: No focal defects   Labs:  LIPID RESULTS:  Lab Results   Component Value Date    CHOL 136 08/13/2018    HDL 49 08/13/2018    LDL 55 08/13/2018    TRIG 161 (H) 08/13/2018    CHOLHDLRATIO 5.1 (H) 11/24/2015    NHDL 87 08/13/2018       LIVER ENZYME RESULTS:  Lab Results   Component Value Date    AST 22 08/13/2018    ALT 30 08/13/2018       CBC RESULTS:  Lab Results   Component Value Date    WBC 4.9 06/24/2016    RBC 4.33 (L) 06/24/2016    HGB 15.7 05/25/2018    HCT 51.2 05/25/2018    .7 (H) 05/25/2018    MCH 31.5 05/25/2018    MCHC 30.7 (L) 05/25/2018    RDW 11.9 06/24/2016     (L) 06/24/2016       BMP RESULTS:  Lab Results   Component Value Date     08/13/2018    POTASSIUM 4.0 08/13/2018    CHLORIDE 106 08/13/2018    CO2 28 08/13/2018    ANIONGAP 7 08/13/2018     (H) 08/13/2018    BUN 17 08/13/2018    CR 1.07 08/13/2018    GFRESTIMATED 71 08/13/2018    GFRESTBLACK 86 08/13/2018    TYSON 8.9 08/13/2018        A1C RESULTS:  Lab Results   Component Value Date    A1C 5.9 02/15/2017       INR RESULTS:  Lab Results   Component Value Date    INR 1.53 (H) 06/24/2012    INR 1.73 (H) 06/24/2012       Procedures:  Echo Interpretation Summary  Right ventricular function, chamber size, wall motion, and thickness are  normal.  Borderline dilated LV with normal geometry and wall motion, LVEF 58%. Normal  diastolic  function    Assessment and Plan:   History: 56 year old with a PMHx of CAD s/p CABG in 2012 who presents for follow up. Patient has a history of high cholesterol and was statin intolerant. Patient was started on Pralulent without any major complications.   We discussed the results with patient.  We discussed the importance of a heart healthy lifestyle and diet.    #Hyperlipidemia   -Patient had great response with Pralulent. However, given insurance change, will get approval for Repatha.   -Trend labs after Repatha injection     #CAD  -Continue ASA 81mg   -PCSK9 as above     Stacy Castro   Cardiology Fellow   Pager: [phone]    I interviewed and examined the patient with the CV fellow.  I agree with the assessment and plan as documented.    Jaylan Evangelista MD, PhD  Professor of Medicine  Division of Cardiology      CC  Patient Care Team:  Jorge Singleton MD as PCP - General (General Surgery)  Mansi Christiansen, RN as Nurse Coordinator (Cardiology)  Jaylan Evangelista MD as MD (Cardiology)  Rebeca Dunn MD as MD (Ophthalmology)  Corine Ly MD as MD (Internal Medicine)  Claudio Chavez MD as MD (Urology)  Carolyn Christiansen, RN as Registered Nurse (Urology)

## 2018-10-15 NOTE — NURSING NOTE
Vitals completed successfully and medication reconciled. An Castano MA  Chief Complaint   Patient presents with     Follow Up For      1 Yr F/U

## 2018-10-15 NOTE — PATIENT INSTRUCTIONS
Patient Instructions:  It was a pleasure to see you in the cardiology clinic today.      If you have any questions, you can reach my nurse, Elba Poole LPN, at [phone].  Press Option #1 for the [facility], and then press Option #3 for nursing.    We are encouraging the use of Qwilthart to communicate with your HealthCare Provider    Medication Changes: None.  Contact us once your insurance switches and we will place an order for Repatha 140 mg every two weeks.    Studies Ordered: None.    The results from today include: Labs.    Recommendations:  We will have you repeat fasting labs one week after your send Repatha injection.    Please follow up: With Dr. Evangelista in one year with fasting labs prior.    Sincerely,    Jaylan Evangelista MD     If you have an urgent need after hours (8:00 am to 4:30 pm) please call [phone] and ask for the cardiology fellow on call.

## 2018-10-15 NOTE — LETTER
10/15/2018      RE: Hong Pool  [address] [facility] [address]       Dear Colleague,    Thank you for the opportunity to participate in the care of your patient, Hong Pool, at the [facility] HEART [facility]. Please see a copy of my visit note below.    General Cardiology Clinic Note     HPI:   History: 56 year old with a PMHx of CAD s/p CABG in 2012 who presents for follow up. Patient has a history of high cholesterol and was statin intolerant. Patient was started on Pralulent without any major complications.     Patient denies chest pain, shortness of breath, syncope.     Patient's LDL was stable.     Patient's insurance is switching. They are requesting for Repatha.     PAST MEDICAL HISTORY:  Past Medical History:   Diagnosis Date     Borderline diabetes mellitus 11/26/2015     Coronary artery disease 2012    CABG x4     Coronary artery disease involving coronary bypass graft of native heart without angina pectoris 11/18/2015     Depression      Goiter     With normal TSH     Hyperlipidemia     Started on statin 7/2007.  Discontinued 10/07 secondary to muscle aches and fatigue     Nephrolithiasis      Shoulder pain      Statin intolerance 6/23/2016       CURRENT MEDICATIONS:  Current Outpatient Prescriptions   Medication Sig Dispense Refill     alirocumab (PRALUENT) 75 MG/ML injectable pen Inject 1 mL (75 mg) Subcutaneous every 14 days 2 mL 11     alpha-d-galactosidase (BEANO) tablet Take 2 tablets by mouth 3 times daily (before meals) 540 tablet 4     aspirin 81 MG EC tablet Take 1 tablet (81 mg) by mouth daily 90 tablet 4     Blood Pressure Monitor KIT 1 each daily. 1 kit 0     Cholecalciferol (VITAMIN D3) 2000 UNITS CAPS Take 1 capsule by mouth daily 90 capsule 4     LACTAID FAST ACT 9000 units TABS tablet   4     Multiple Vitamins-Minerals (MENS MULTIVITAMIN PLUS) TABS Take 1 tablet by mouth daily 90 tablet 4     potassium citrate  (UROCIT-K) 10 MEQ (1080 MG) CR tablet Take 1 tablet (10 mEq) by mouth 3 times daily (with meals) 180 tablet 3     pyridOXINE (VITAMIN B6) 100 MG TABS Take 1 tablet (100 mg) by mouth daily 90 tablet 4     co-enzyme Q-10 (CVS COENZYME Q10) 100 MG CAPS Take 1 capsule (100 mg) by mouth daily  0     STATIN NOT PRESCRIBED, INTENTIONAL, 1 each continuous Statin not prescribed intentionally due to Intolerance (with supporting documentation of trying a statin at least once within the last 5 years) (Patient not taking: Reported on 10/15/2018) 0 each 0       PAST SURGICAL HISTORY:  Past Surgical History:   Procedure Laterality Date     BYPASS GRAFT ARTERY CORONARY  6/24/2012    Procedure: BYPASS GRAFT ARTERY CORONARY;  Median Sternotomy, Coronary Artery Bypass Grafting x 4 using Left Internal Mammary and Left Saphenous Vein  with Endovein Harvesting. On Pump Oxygenation;  Surgeon: Genesis Larsen MD;  Location: [facility] OR     ESOPHAGOSCOPY, GASTROSCOPY, DUODENOSCOPY (EGD), COMBINED N/A 4/11/2017    Procedure: COMBINED ESOPHAGOSCOPY, GASTROSCOPY, DUODENOSCOPY (EGD), BIOPSY SINGLE OR MULTIPLE;  Surgeon: Corine Ly MD;  Location: [facility] GI       ALLERGIES     Allergies   Allergen Reactions     Lidocaine Rash     Break out     Band-Aid Anti-Itch      Cholestoff Complete [Plant Sterol Stanol-Pantethine] Other (See Comments)     Severe stomach pain     Lactose Diarrhea     Rosuvastatin Muscle Pain (Myalgia)     Zocor [Simvastatin - High Dose] Other (See Comments)     Muscle aches/soreness, confusion, disorganized thinking       FAMILY HISTORY:  Family History   Problem Relation Age of Onset     C.A.D. Other      Uncle     Diabetes Brother      Cerebrovascular Disease Brother      Cardiovascular Sister      tachyarrhythmia       SOCIAL HISTORY:  Social History     Social History     Marital status:      Spouse name: N/A     Number of children: N/A     Years of education: N/A     Social History Main Topics      Smoking status: Never Smoker     Smokeless tobacco: Never Used     Alcohol use No     Drug use: No     Sexual activity: Yes     Other Topics Concern     None     Social History Narrative    .      ROS:   Constitutional: No fever, chills, or sweats. No weight gain/loss   ENT: No visual disturbance, ear ache, epistaxis, sore throat  Allergies/Immunologic: Negative.   Respiratory: No cough, hemoptysia  Cardiovascular: As per HPI  GI: No nausea, vomiting, hematemesis, melena, or hematochezia  : No urinary frequency, dysuria, or hematuria  Integument: Negative  Psychiatric: Negative  Neuro: Negative  Endocrinology: Negative   Musculoskeletal: Negative    EXAM:  /77 (BP Location: Left arm, Patient Position: Chair, Cuff Size: Adult Regular)  Pulse 64  Ht 1.829 m (6')  Wt 97.3 kg (214 lb 9.6 oz)  SpO2 96%  BMI 29.1 kg/m2  Gen: NAD   HEENT: NC/AT   CV: RRR   Pulm: CTAB  GI: s/nt/nd  Ext: No edema   Neuro: No focal defects   Labs:  LIPID RESULTS:  Lab Results   Component Value Date    CHOL 136 08/13/2018    HDL 49 08/13/2018    LDL 55 08/13/2018    TRIG 161 (H) 08/13/2018    CHOLHDLRATIO 5.1 (H) 11/24/2015    NHDL 87 08/13/2018       LIVER ENZYME RESULTS:  Lab Results   Component Value Date    AST 22 08/13/2018    ALT 30 08/13/2018       CBC RESULTS:  Lab Results   Component Value Date    WBC 4.9 06/24/2016    RBC 4.33 (L) 06/24/2016    HGB 15.7 05/25/2018    HCT 51.2 05/25/2018    .7 (H) 05/25/2018    MCH 31.5 05/25/2018    MCHC 30.7 (L) 05/25/2018    RDW 11.9 06/24/2016     (L) 06/24/2016       BMP RESULTS:  Lab Results   Component Value Date     08/13/2018    POTASSIUM 4.0 08/13/2018    CHLORIDE 106 08/13/2018    CO2 28 08/13/2018    ANIONGAP 7 08/13/2018     (H) 08/13/2018    BUN 17 08/13/2018    CR 1.07 08/13/2018    GFRESTIMATED 71 08/13/2018    GFRESTBLACK 86 08/13/2018    TYSON 8.9 08/13/2018        A1C RESULTS:  Lab Results   Component Value Date    A1C 5.9 02/15/2017        INR RESULTS:  Lab Results   Component Value Date    INR 1.53 (H) 06/24/2012    INR 1.73 (H) 06/24/2012       Procedures:  Echo Interpretation Summary  Right ventricular function, chamber size, wall motion, and thickness are  normal.  Borderline dilated LV with normal geometry and wall motion, LVEF 58%. Normal  diastolic function    Assessment and Plan:   History: 56 year old with a PMHx of CAD s/p CABG in 2012 who presents for follow up. Patient has a history of high cholesterol and was statin intolerant. Patient was started on Pralulent without any major complications.   We discussed the results with patient.  We discussed the importance of a heart healthy lifestyle and diet.    #Hyperlipidemia   -Patient had great response with Pralulent. However, given insurance change, will get approval for Repatha.   -Trend labs after Repatha injection     #CAD  -Continue ASA 81mg   -PCSK9 as above     Stacy Castro   Cardiology Fellow   Pager: [phone]    I interviewed and examined the patient with the CV fellow.  I agree with the assessment and plan as documented.    Jaylan Evangelista MD, PhD  Professor of Medicine  Division of Cardiology      CC  Patient Care Team:  Jorge Singleton MD as PCP - General (General Surgery)  Mansi Christiansen RN as Nurse Coordinator (Cardiology)  Jaylan Evangelista MD as MD (Cardiology)  Rebeca Dunn MD as MD (Ophthalmology)  Corine Ly MD as MD (Internal Medicine)  Claudio Chavez MD as MD (Urology)  Carolyn Christiansen RN as Registered Nurse (Urology)

## 2018-10-15 NOTE — MR AVS SNAPSHOT
After Visit Summary   10/15/2018    Hong Pool    MRN: [id number]           Patient Information     Date Of Birth          1960        Visit Information        Provider Department      10/15/2018 1:30 PM Jaylan Evangelista MD [facility]        Today's Diagnoses     Hyperlipidemia LDL goal <70    -  1    Coronary artery disease involving native coronary artery of native heart without angina pectoris        ASCVD (arteriosclerotic cardiovascular disease)        Coronary artery disease involving coronary bypass graft of native heart without angina pectoris        Statin intolerance        Borderline diabetes mellitus          Care Instructions    Patient Instructions:  It was a pleasure to see you in the cardiology clinic today.      If you have any questions, you can reach my nurse, Elba Poole LPN, at [phone].  Press Option #1 for the [facility], and then press Option #3 for nursing.    We are encouraging the use of fishfishme to communicate with your HealthCare Provider    Medication Changes: None.  Contact us once your insurance switches and we will place an order for Repatha 140 mg every two weeks.    Studies Ordered: None.    The results from today include: Labs.    Recommendations:  We will have you repeat fasting labs one week after your send Repatha injection.    Please follow up: With Dr. Evangelista in one year with fasting labs prior.    Sincerely,    Jaylan Evangelista MD     If you have an urgent need after hours (8:00 am to 4:30 pm) please call [phone] and ask for the cardiology fellow on call.                    Follow-ups after your visit        Additional Services     Follow-Up with Cardiologist                 Future tests that were ordered for you today     Open Future Orders        Priority Expected Expires Ordered    Follow-Up with Cardiologist Routine 10/15/2019 10/16/2019 10/15/2018    Comprehensive metabolic panel Routine  10/15/2019 10/16/2019 10/15/2018    Lipid panel reflex to direct LDL Fasting Routine 10/15/2019 10/16/2019 10/15/2018            Who to contact     If you have questions or need follow up information about today's clinic visit or your schedule please contact [facility] directly at [phone].  Normal or non-critical lab and imaging results will be communicated to you by MyChart, letter or phone within 4 business days after the clinic has received the results. If you do not hear from us within 7 days, please contact the clinic through MyChart or phone. If you have a critical or abnormal lab result, we will notify you by phone as soon as possible.  Submit refill requests through [employer] or call your pharmacy and they will forward the refill request to us. Please allow 3 business days for your refill to be completed.          Additional Information About Your Visit        Care EveryWhere ID     This is your Care EveryWhere ID. This could be used by other organizations to access your [facility] medical records  [id number]        Your Vitals Were     Pulse Height Pulse Oximetry BMI (Body Mass Index)          64 1.829 m (6') 96% 29.1 kg/m2         Blood Pressure from Last 3 Encounters:   10/15/18 121/77   08/14/18 123/77   06/26/18 112/71    Weight from Last 3 Encounters:   10/15/18 97.3 kg (214 lb 9.6 oz)   08/14/18 97.3 kg (214 lb 9.6 oz)   06/26/18 96.8 kg (213 lb 6.4 oz)               Primary Care Provider Office Phone # Fax #    Jorge Singleton [phone]       [address] [address]        Equal Access to Services     [facility]: Hadii patrice Rios, lexy kelly, cuba moseley . So [facility] [phone].    ATENCIÓN: Si habla español, tiene a keane disposición servicios gratuitos de asistencia lingüística. Llame al [phone].    We comply with applicable federal civil rights laws and [place]  laws. We do not discriminate on the basis of race, color, national origin, age, disability, sex, sexual orientation, or gender identity.            Thank you!     Thank you for choosing [facility]  for your care. Our goal is always to provide you with excellent care. Hearing back from our patients is one way we can continue to improve our services. Please take a few minutes to complete the written survey that you may receive in the mail after your visit with us. Thank you!             Your Updated Medication List - Protect others around you: Learn how to safely use, store and throw away your medicines at www.disposemymeds.org.          This list is accurate as of 10/15/18  3:26 PM.  Always use your most recent med list.                   Brand Name Dispense Instructions for use Diagnosis    alirocumab 75 MG/ML injectable pen    PRALUENT    2 mL    Inject 1 mL (75 mg) Subcutaneous every 14 days    CAD (coronary artery disease), Hyperlipidemia LDL goal <70       alpha-d-galactosidase tablet     540 tablet    Take 2 tablets by mouth 3 times daily (before meals)    Bloating       aspirin 81 MG EC tablet     90 tablet    Take 1 tablet (81 mg) by mouth daily        Blood Pressure Monitor Kit     1 kit    1 each daily.    S/P CABG (coronary artery bypass graft), CAD (coronary artery disease)       [employer]S COENZYME Q10 100 MG Caps capsule   Generic drug:  co-enzyme Q-10      Take 1 capsule (100 mg) by mouth daily        LACTAID FAST ACT 9000 units Tabs tablet   Generic drug:  lactase           MENS MULTIVITAMIN PLUS Tabs     90 tablet    Take 1 tablet by mouth daily    Preventive measure       potassium citrate 10 MEQ (1080 MG) CR tablet    UROCIT-K    180 tablet    Take 1 tablet (10 mEq) by mouth 3 times daily (with meals)    Calculus of kidney       pyridOXINE 100 MG Tabs    VITAMIN B6    90 tablet    Take 1 tablet (100 mg) by mouth daily    Coronary artery disease involving autologous artery coronary bypass graft  without angina pectoris       STATIN NOT PRESCRIBED (INTENTIONAL)     0 each    1 each continuous Statin not prescribed intentionally due to Intolerance (with supporting documentation of trying a statin at least once within the last 5 years)    Hyperlipidemia LDL goal <70, Statin intolerance       vitamin D3 2000 units Caps     90 capsule    Take 1 capsule by mouth daily    Vitamin D deficiency disease

## 2018-10-22 ENCOUNTER — TELEPHONE (OUTPATIENT)
Dept: CARDIOLOGY | Facility: CLINIC | Age: 58
End: 2018-10-22

## 2018-10-22 DIAGNOSIS — Z78.9 STATIN INTOLERANCE: ICD-10-CM

## 2018-10-22 DIAGNOSIS — I25.810 CORONARY ARTERY DISEASE INVOLVING CORONARY BYPASS GRAFT OF NATIVE HEART WITHOUT ANGINA PECTORIS: ICD-10-CM

## 2018-10-22 DIAGNOSIS — I25.10 ASCVD (ARTERIOSCLEROTIC CARDIOVASCULAR DISEASE): Primary | ICD-10-CM

## 2018-10-22 DIAGNOSIS — E78.5 HYPERLIPIDEMIA LDL GOAL <70: ICD-10-CM

## 2018-10-22 NOTE — TELEPHONE ENCOUNTER
Health Call Center    Phone Message    May a detailed message be left on voicemail: yes    Reason for Call: per patient, he and Dr. Evangelista spoke about the patient switching insurance carriers and the new insurance does not like the medication armodafinil (NUVIGIL) 150 MG TABS tablet, so Dr. Evangelista told patient as soon as patient got the new insurance info to call so they could start the prior auth paperwork. I have updated the insurance.     Action Taken: Message routed to:  [facility] Cardio

## 2018-10-22 NOTE — TELEPHONE ENCOUNTER
Called and spoke with Pt.  He noted that the insurance has changed in our system and he is not under [facility] insurance as of 10/01/2018.  Informed Pt we would review his new insurance and see his coverage benefits for Praluent.  Pt informed he would be contacted with any additional information.    Elba Poole LPN

## 2018-10-23 NOTE — TELEPHONE ENCOUNTER
Patient's insurance prefers Repatha. Reaching out to Dr. Evangelista to discuss therapy change from Praluent to Repatha.

## 2018-10-23 NOTE — TELEPHONE ENCOUNTER
PA Initiation    Medication: Praluent 75MG/ML Pen Injector (PENDING)  Insurance Company: [employer] - Phone [phone] Fax [phone]  Pharmacy Filling the Rx: [facility] - [place], [place] - [facility] [address]  Filling Pharmacy Phone: [phone]  Filling Pharmacy Fax: [phone]  Start Date: 10/23/2018

## 2018-10-24 ENCOUNTER — TELEPHONE (OUTPATIENT)
Dept: CARDIOLOGY | Facility: CLINIC | Age: 58
End: 2018-10-24

## 2018-10-24 NOTE — TELEPHONE ENCOUNTER
PA Initiation    Medication: Repatha 140MG/ML Sureclick autoinjector  (PENDING)  Insurance Company: [employer] - Phone [phone] Fax [phone]  Pharmacy Filling the Rx: [facility] - [place], [place] - [facility] [address]   Filling Pharmacy Phone: [phone]  Filling Pharmacy Fax: [phone]  Start Date: 10/23/2018    Submitted PA via fax [phone]) to [facility] for Repatha.

## 2018-10-24 NOTE — TELEPHONE ENCOUNTER
Date: 10/24/2018    Time of Call: 9:32 AM     Diagnosis:  HLD     [ TORB ] Ordering provider: Dr Jaylan Evangelista  Order:   - Stop Praluent  - Start Repatha 140 mg q14d  - Repeat fasting labs 1 week after your second injection     Order received by: Elba Poole LPN     Follow-up/additional notes: Per

## 2018-10-24 NOTE — TELEPHONE ENCOUNTER
[facility] Call Center    Phone Message    May a detailed message be left on voicemail: yes    Reason for Call: Other: Pt returning lidseys call asking for a call back will be at home numer for a while      Action Taken: Message routed to:  [facility] gallito cardio

## 2018-10-25 DIAGNOSIS — I25.810 CORONARY ARTERY DISEASE INVOLVING CORONARY BYPASS GRAFT OF NATIVE HEART WITHOUT ANGINA PECTORIS: ICD-10-CM

## 2018-10-25 DIAGNOSIS — Z78.9 STATIN INTOLERANCE: ICD-10-CM

## 2018-10-25 DIAGNOSIS — E78.5 HYPERLIPIDEMIA LDL GOAL <70: ICD-10-CM

## 2018-10-25 DIAGNOSIS — I25.10 ASCVD (ARTERIOSCLEROTIC CARDIOVASCULAR DISEASE): ICD-10-CM

## 2018-10-25 NOTE — TELEPHONE ENCOUNTER
Pt returned call.  Informed him of the approval of PCSK9 and the preference of insurance to switch to Repatha.  Informed him orders have been placed with [facility] and the need for repeat fasting labs one week after the second injection.  Pt verbalized understanding, agreed to current plan and denied any further questions.      Elba Poole LPN

## 2018-10-25 NOTE — TELEPHONE ENCOUNTER
Please send new rx for Repatha, we didn't receive the one from 10/24  Thank you very kindly!  [facility]

## 2018-10-26 NOTE — TELEPHONE ENCOUNTER
PA Initiation    Medication: Repatha 140MG/ML Sureclick autoinjector  (PENDING)  Insurance Company: linkedFA - Phone [phone] Fax [phone]  Pharmacy Filling the Rx: [facility] - [place], [place] - [facility] [address]  Filling Pharmacy Phone: [phone]  Filling Pharmacy Fax: [phone]  Start Date: 10/24/2018    Submitted PA for Repatha electronically through Cover My Meds and manually faxed Proximetry questions back as request stating patient can change to Repatha therapy.

## 2018-10-29 NOTE — TELEPHONE ENCOUNTER
Prior Authorization Approval    Authorization Effective Date: 10/1/2018  Authorization Expiration Date: 10/26/2019  Medication: Repatha 140MG/ML Sureclick autoinjector  (APPROVED)  Approved Dose/Quantity: 2ML  Reference #: RX CASE# [id number] [id number]   Insurance Company: [employer] - Phone [phone] Fax [phone]  Expected CoPay:       CoPay Card Available: Yes   Foundation Assistance Needed:  [place]  Which Pharmacy is filling the prescription (Not needed for infusion/clinic administered): [facility] - [place], [place] - [facility] [address]  Pharmacy Notified: Yes  Patient Notified: Yes

## 2018-11-13 ENCOUNTER — THERAPY VISIT (OUTPATIENT)
Dept: PHYSICAL THERAPY | Facility: CLINIC | Age: 58
End: 2018-11-13
Payer: COMMERCIAL

## 2018-11-13 DIAGNOSIS — S76.319A HAMSTRING MUSCLE STRAIN: ICD-10-CM

## 2018-11-13 PROCEDURE — 97530 THERAPEUTIC ACTIVITIES: CPT | Mod: GP | Performed by: PHYSICAL THERAPIST

## 2018-11-13 PROCEDURE — 99207 C STAT DRY NEEDLING - IAM: CPT | Mod: 25 | Performed by: PHYSICAL THERAPIST

## 2018-11-13 NOTE — MR AVS SNAPSHOT
After Visit Summary   11/13/2018    Hong Pool    MRN: [id number]           Patient Information     Date Of Birth          1960        Visit Information        Provider Department      11/13/2018 1:50 PM Jorge Flores PT [facility]        Today's Diagnoses     Hamstring muscle strain           Follow-ups after your visit        Your next 10 appointments already scheduled     Nov 20, 2018 12:00 PM CST   ROSA ISELA Extremity with Jorge Flores PT   [facility] ([address] [address] [phone]              Who to contact     If you have questions or need follow up information about today's clinic visit or your schedule please contact [place] [employer] [facility] [place] directly at [phone].  Normal or non-critical lab and imaging results will be communicated to you by MyChart, letter or phone within 4 business days after the clinic has received the results. If you do not hear from us within 7 days, please contact the clinic through MyChart or phone. If you have a critical or abnormal lab result, we will notify you by phone as soon as possible.  Submit refill requests through [employer] or call your pharmacy and they will forward the refill request to us. Please allow 3 business days for your refill to be completed.          Additional Information About Your Visit        Care EveryWhere ID     This is your Care EveryWhere ID. This could be used by other organizations to access your [place] medical records  [id number]         Blood Pressure from Last 3 Encounters:   10/15/18 121/77   08/14/18 123/77   06/26/18 112/71    Weight from Last 3 Encounters:   10/15/18 97.3 kg (214 lb 9.6 oz)   08/14/18 97.3 kg (214 lb 9.6 oz)   06/26/18 96.8 kg (213 lb 6.4 oz)              We Performed the Following     Dry Needling - Qty 1     Therapeutic Activities        Primary Care  Provider Office Phone # Fax #    Jorge Singleton [phone]       [address] [address]        Equal Access to Services     [facility] : Hadii aad ku hadjessikagokul Blanca, lexy azeemmingo, honorio del angel, cuba larson isabellabrett valentine edis tan. So [facility] [phone].    ATENCIÓN: Si habla español, tiene a keane disposición servicios gratuitos de asistencia lingüística. Osiel al [phone].    We comply with applicable federal civil rights laws and [place] laws. We do not discriminate on the basis of race, color, national origin, age, disability, sex, sexual orientation, or gender identity.            Thank you!     Thank you for choosing [place] [facility]  for your care. Our goal is always to provide you with excellent care. Hearing back from our patients is one way we can continue to improve our services. Please take a few minutes to complete the written survey that you may receive in the mail after your visit with us. Thank you!             Your Updated Medication List - Protect others around you: Learn how to safely use, store and throw away your medicines at www.disposemymeds.org.          This list is accurate as of 11/13/18  3:24 PM.  Always use your most recent med list.                   Brand Name Dispense Instructions for use Diagnosis    alpha-d-galactosidase tablet     540 tablet    Take 2 tablets by mouth 3 times daily (before meals)    Bloating       aspirin 81 MG EC tablet     90 tablet    Take 1 tablet (81 mg) by mouth daily        Blood Pressure Monitor Kit     1 kit    1 each daily.    S/P CABG (coronary artery bypass graft), CAD (coronary artery disease)       CVS COENZYME Q10 100 MG Caps capsule   Generic drug:  co-enzyme Q-10      Take 1 capsule (100 mg) by mouth daily        evolocumab 140 MG/ML prefilled autoinjector    REPATHA    6 mL    Inject 1 mL (140 mg) Subcutaneous every 14 days    ASCVD (arteriosclerotic  cardiovascular disease), Coronary artery disease involving coronary bypass graft of native heart without angina pectoris, Hyperlipidemia LDL goal <70, Statin intolerance       LACTAID FAST ACT 9000 units Tabs tablet   Generic drug:  lactase           MENS MULTIVITAMIN PLUS Tabs     90 tablet    Take 1 tablet by mouth daily    Preventive measure       potassium citrate 10 MEQ (1080 MG) CR tablet    UROCIT-K    180 tablet    Take 1 tablet (10 mEq) by mouth 3 times daily (with meals)    Calculus of kidney       pyridOXINE 100 MG Tabs    VITAMIN B6    90 tablet    Take 1 tablet (100 mg) by mouth daily    Coronary artery disease involving autologous artery coronary bypass graft without angina pectoris       STATIN NOT PRESCRIBED (INTENTIONAL)     0 each    1 each continuous Statin not prescribed intentionally due to Intolerance (with supporting documentation of trying a statin at least once within the last 5 years)    Hyperlipidemia LDL goal <70, Statin intolerance       vitamin D3 2000 units Caps     90 capsule    Take 1 capsule by mouth daily    Vitamin D deficiency disease

## 2018-11-20 ENCOUNTER — THERAPY VISIT (OUTPATIENT)
Dept: PHYSICAL THERAPY | Facility: CLINIC | Age: 58
End: 2018-11-20
Payer: COMMERCIAL

## 2018-11-20 DIAGNOSIS — S76.319A HAMSTRING MUSCLE STRAIN: ICD-10-CM

## 2018-11-20 PROCEDURE — 97110 THERAPEUTIC EXERCISES: CPT | Mod: GP | Performed by: PHYSICAL THERAPIST

## 2018-11-20 PROCEDURE — 97530 THERAPEUTIC ACTIVITIES: CPT | Mod: GP | Performed by: PHYSICAL THERAPIST

## 2018-12-04 ENCOUNTER — THERAPY VISIT (OUTPATIENT)
Dept: PHYSICAL THERAPY | Facility: CLINIC | Age: 58
End: 2018-12-04
Payer: COMMERCIAL

## 2018-12-04 DIAGNOSIS — S76.319A HAMSTRING MUSCLE STRAIN: ICD-10-CM

## 2018-12-04 PROCEDURE — 97110 THERAPEUTIC EXERCISES: CPT | Mod: GP | Performed by: PHYSICAL THERAPIST

## 2018-12-04 PROCEDURE — 97530 THERAPEUTIC ACTIVITIES: CPT | Mod: GP | Performed by: PHYSICAL THERAPIST

## 2018-12-04 NOTE — MR AVS SNAPSHOT
After Visit Summary   12/4/2018    Hong Pool    MRN: [id number]           Patient Information     Date Of Birth          1960        Visit Information        Provider Department      12/4/2018 12:00 PM Jorge Flores PT [facility]        Today's Diagnoses     Hamstring muscle strain           Follow-ups after your visit        Your next 10 appointments already scheduled     Dec 07, 2018  8:30 AM CST   Lab with  LAB   [facility] ([facility])    [address] [place] [address] [phone]              Who to contact     If you have questions or need follow up information about today's clinic visit or your schedule please contact [place] [facility] [employer] [facility] directly at [phone].  Normal or non-critical lab and imaging results will be communicated to you by MyChart, letter or phone within 4 business days after the clinic has received the results. If you do not hear from us within 7 days, please contact the clinic through MyChart or phone. If you have a critical or abnormal lab result, we will notify you by phone as soon as possible.  Submit refill requests through [employer] or call your pharmacy and they will forward the refill request to us. Please allow 3 business days for your refill to be completed.          Additional Information About Your Visit        Care EveryWhere ID     This is your Care EveryWhere ID. This could be used by other organizations to access your [place] medical records  [id number]         Blood Pressure from Last 3 Encounters:   10/15/18 121/77   08/14/18 123/77   06/26/18 112/71    Weight from Last 3 Encounters:   10/15/18 97.3 kg (214 lb 9.6 oz)   08/14/18 97.3 kg (214 lb 9.6 oz)   06/26/18 96.8 kg (213 lb 6.4 oz)              We Performed the Following     Therapeutic Activities     Therapeutic Exercises        Primary Care Provider Office Phone # Fax #     Jorge Singleton [phone]       [address] [address]        Equal Access to Services     [facility] : Hadii aad ku hadjeremy Griffithskwesi, emelialec gannranchoha, latishatabitha champagnefarhan del angel, cuba valentine lajohnnyshannon tan. So [facility] [phone].    ATENCIÓN: Si habla español, tiene a keane disposición servicios gratuitos de asistencia lingüística. Llame al [phone].    We comply with applicable federal civil rights laws and [place] laws. We do not discriminate on the basis of race, color, national origin, age, disability, sex, sexual orientation, or gender identity.            Thank you!     Thank you for choosing [place] [facility]  for your care. Our goal is always to provide you with excellent care. Hearing back from our patients is one way we can continue to improve our services. Please take a few minutes to complete the written survey that you may receive in the mail after your visit with us. Thank you!             Your Updated Medication List - Protect others around you: Learn how to safely use, store and throw away your medicines at www.disposemymeds.org.          This list is accurate as of 12/4/18 12:35 PM.  Always use your most recent med list.                   Brand Name Dispense Instructions for use Diagnosis    alpha-d-galactosidase tablet     540 tablet    Take 2 tablets by mouth 3 times daily (before meals)    Bloating       aspirin 81 MG EC tablet    ASA    90 tablet    Take 1 tablet (81 mg) by mouth daily        Blood Pressure Monitor Kit     1 kit    1 each daily.    S/P CABG (coronary artery bypass graft), CAD (coronary artery disease)       CVS COENZYME Q10 100 MG Caps capsule   Generic drug:  co-enzyme Q-10      Take 1 capsule (100 mg) by mouth daily        evolocumab 140 MG/ML prefilled autoinjector    REPATHA    6 mL    Inject 1 mL (140 mg) Subcutaneous every 14 days    ASCVD (arteriosclerotic cardiovascular disease),  Coronary artery disease involving coronary bypass graft of native heart without angina pectoris, Hyperlipidemia LDL goal <70, Statin intolerance       LACTAID FAST ACT 9000 units Tabs tablet   Generic drug:  lactase           MENS MULTIVITAMIN PLUS Tabs     90 tablet    Take 1 tablet by mouth daily    Preventive measure       potassium citrate 10 MEQ (1080 MG) CR tablet    UROCIT-K    180 tablet    Take 1 tablet (10 mEq) by mouth 3 times daily (with meals)    Calculus of kidney       pyridOXINE 100 MG Tabs    VITAMIN B6    90 tablet    Take 1 tablet (100 mg) by mouth daily    Coronary artery disease involving autologous artery coronary bypass graft without angina pectoris       STATIN NOT PRESCRIBED    INTENTIONAL    0 each    1 each continuous Statin not prescribed intentionally due to Intolerance (with supporting documentation of trying a statin at least once within the last 5 years)    Hyperlipidemia LDL goal <70, Statin intolerance       vitamin D3 2000 units Caps     90 capsule    Take 1 capsule by mouth daily    Vitamin D deficiency disease

## 2018-12-04 NOTE — PROGRESS NOTES
S:  Aggrav: sitting in certain chairs-sitting in an automobile(foam padding) for more than 30 minutes, sitting at kitchen chair(oak chair).  Stretching in am after laying in bed.    O: decr flexibility quad and hamstring  No TTP  No pain with resisted hamstring MMT  SI screen negative  Lumbar spine screen negative  A:  Pt has been seen for 8 visits and has not responded to conservative care.  Treatment included stretching, strengthening, postural work, activity modification, isometrics-eccentrics, dry needling and has not seen any long term  Success.  Pt is been recommended for MD follow up with persistent left hip pain.

## 2018-12-07 ENCOUNTER — TELEPHONE (OUTPATIENT)
Dept: FAMILY MEDICINE | Facility: CLINIC | Age: 58
End: 2018-12-07

## 2018-12-07 DIAGNOSIS — I25.10 CAD (CORONARY ARTERY DISEASE): ICD-10-CM

## 2018-12-07 DIAGNOSIS — E78.5 HYPERLIPIDEMIA LDL GOAL <70: ICD-10-CM

## 2018-12-07 LAB
CHOLEST SERPL-MCNC: 121 MG/DL
HDLC SERPL-MCNC: 50 MG/DL
LDLC SERPL CALC-MCNC: 44 MG/DL
NONHDLC SERPL-MCNC: 71 MG/DL
TRIGL SERPL-MCNC: 134 MG/DL

## 2018-12-11 ENCOUNTER — OFFICE VISIT (OUTPATIENT)
Dept: FAMILY MEDICINE | Facility: CLINIC | Age: 58
End: 2018-12-11
Payer: COMMERCIAL

## 2018-12-11 VITALS
SYSTOLIC BLOOD PRESSURE: 139 MMHG | TEMPERATURE: 98.1 F | DIASTOLIC BLOOD PRESSURE: 87 MMHG | WEIGHT: 215.8 LBS | OXYGEN SATURATION: 97 % | HEART RATE: 52 BPM | BODY MASS INDEX: 29.27 KG/M2 | RESPIRATION RATE: 16 BRPM

## 2018-12-11 DIAGNOSIS — S76.312A STRAIN OF LEFT HAMSTRING MUSCLE, INITIAL ENCOUNTER: Primary | ICD-10-CM

## 2018-12-11 NOTE — PROGRESS NOTES
SUBJECTIVE: Hong Pool is a 58 year old male who has discomfort between buttocks and knee.  Comes and goes, sitting makes this worse.  Stretches in the morning and can feel this tighten up.  Shoveling snow and up and downstairs carrying is fine.  Sitting in a car is the worst.  Ok for 15-20 minutes, staying seated it is bothersome, cannot tolerate road trips of distance.  Pain is coming on quicker.  Years ago, 4 hours, now minutes and coming on easier and quicker with age.  PT and a bunch of things.  Donut towel didn't help so much, Used a J shape towel roll, pressure on back on the leg brings it on quicker.  But not just pressure because it hurts in the morning.  Exercises tried, hamstring stretch is worse.  Exercises felt good but same symptoms are there.  Dry needling, scraping technique.  Left good leaving the office.  Shoe tying ok.  Picking up paper clip, more knee action helps some.  Back seemed fine  5-6 years of discomfort, once or twice a year, now daily for 6 months  Ibuprofen, tylenol- avoids due to tendon in elbow, couldn't sleep  Ice, heat  Doesn't recall old injury  PAST MEDICAL, SOCIAL, SURGICAL AND FAMILY HISTORY: He  has a past medical history of Borderline diabetes mellitus (11/26/2015), Coronary artery disease (2012), Coronary artery disease involving coronary bypass graft of native heart without angina pectoris (11/18/2015), Depression, Goiter, Hyperlipidemia, Nephrolithiasis, Shoulder pain, and Statin intolerance (6/23/2016).  He  has a past surgical history that includes Bypass graft artery coronary (6/24/2012) and Esophagoscopy, gastroscopy, duodenoscopy (EGD), combined (N/A, 4/11/2017).  His family history includes C.A.D. in an other family member; Cardiovascular in his sister; Cerebrovascular Disease in his brother; Diabetes in his brother.  He reports that  has never smoked. he has never used smokeless tobacco. He reports that he does not drink alcohol or use drugs.      ALLERGIES:  He is allergic to lidocaine; band-aid anti-itch; cholestoff complete [plant sterol stanol-pantethine]; lactose; rosuvastatin; and zocor [simvastatin - high dose].    CURRENT MEDICATIONS: He has a current medication list which includes the following prescription(s): alpha-d-galactosidase, aspirin, blood pressure monitor, vitamin d3, evolocumab, lactaid fast act, mens multivitamin plus, potassium citrate, pyridoxine, co-enzyme q-10, and statin not prescribed.     REVIEW OF SYSTEMS: 10 point review of systems is negative except as noted above.    EXAM:  /87   Pulse 52   Temp 98.1  F (36.7  C) (Oral)   Resp 16   Wt 97.9 kg (215 lb 12.8 oz)   SpO2 97%   BMI 29.27 kg/m    CONSTITUTIONIAL: healthy, alert, no distress and cooperative  HEAD: Normocephalic. No masses, lesions, tenderness or abnormalities  ENT: ENT exam normal, no neck nodes or sinus tenderness  SKIN: no suspicious lesions or rashes  GAIT: normal  Stance: normal  NEUROLOGIC: Normal muscle tone and strength, reflexes normal, sensation grossly normal.  PSYCHIATRIC: affect normal/bright and mentation appears normal.    MUSCULOSKELETAL: left hamstring pain  Inspection; no swelling, no ecchymosis, no deformity  Palpation: Tender: left hamstring 2 inches down from proximal hamstring insertion  Non-tender: proximal 1/3 tibia, middle 1/3 tibia, distal 1/3 tibia, gastroc soleus muscle, anterior tibialis muscle  Strength: equal and non-painful with stressing hamstring  Special tests: negative SLR, no lumbar symptoms, able to heel and toe walk      ASSESSMENT/PLAN:  Pt is a 59 yo white male with PMHx of GERD, hyperlipidemia, CAD presenting with 6 months of left hamstring pain  1. Left hamstring pain- worse with sitting but also in the morning.  Seen by PT and not improving with exercises and dry needling  MRI pelvis to check hamstring insertion    RTC after imaging obtained    X-RAY INTERPRETATION:   none

## 2018-12-17 ENCOUNTER — ANCILLARY PROCEDURE (OUTPATIENT)
Dept: MRI IMAGING | Facility: CLINIC | Age: 58
End: 2018-12-17
Attending: FAMILY MEDICINE
Payer: COMMERCIAL

## 2018-12-17 DIAGNOSIS — S76.312A STRAIN OF LEFT HAMSTRING MUSCLE, INITIAL ENCOUNTER: ICD-10-CM

## 2018-12-18 ENCOUNTER — TELEPHONE (OUTPATIENT)
Dept: FAMILY MEDICINE | Facility: CLINIC | Age: 58
End: 2018-12-18

## 2018-12-18 DIAGNOSIS — S76.302A LEFT HAMSTRING INJURY, INITIAL ENCOUNTER: Primary | ICD-10-CM

## 2018-12-27 ENCOUNTER — THERAPY VISIT (OUTPATIENT)
Dept: PHYSICAL THERAPY | Facility: CLINIC | Age: 58
End: 2018-12-27
Attending: FAMILY MEDICINE
Payer: COMMERCIAL

## 2018-12-27 DIAGNOSIS — S76.302A LEFT HAMSTRING INJURY, INITIAL ENCOUNTER: ICD-10-CM

## 2018-12-27 DIAGNOSIS — S76.319A HAMSTRING MUSCLE STRAIN: ICD-10-CM

## 2018-12-27 PROCEDURE — 97530 THERAPEUTIC ACTIVITIES: CPT | Mod: GP | Performed by: PHYSICAL THERAPIST

## 2018-12-27 PROCEDURE — 97140 MANUAL THERAPY 1/> REGIONS: CPT | Mod: GP | Performed by: PHYSICAL THERAPIST

## 2018-12-27 PROCEDURE — 97110 THERAPEUTIC EXERCISES: CPT | Mod: GP | Performed by: PHYSICAL THERAPIST

## 2018-12-27 NOTE — PROGRESS NOTES
S: Saw Dr. Obrien-had MRI that showed tears and tendinosis-glut min and prox hamstring.  Pt would like to continue PT conservative care.  Pt reports left glut pain-no current pain.  Sitting tolerance 10 minutes.  O: left hip add causes mid hamstring pain   No ttp: left gr troch  Mild pain with hamstring PRE 4/5  A:  Pt will continue to benefit from skilled PT 1 time a week for 4 weeks including DN.  P: follow up in 1 week.

## 2019-01-04 ENCOUNTER — THERAPY VISIT (OUTPATIENT)
Dept: PHYSICAL THERAPY | Facility: CLINIC | Age: 59
End: 2019-01-04
Payer: COMMERCIAL

## 2019-01-04 DIAGNOSIS — S76.319A HAMSTRING MUSCLE STRAIN: ICD-10-CM

## 2019-01-04 PROCEDURE — 97112 NEUROMUSCULAR REEDUCATION: CPT | Mod: GP | Performed by: PHYSICAL THERAPIST

## 2019-01-04 PROCEDURE — 97110 THERAPEUTIC EXERCISES: CPT | Mod: GP | Performed by: PHYSICAL THERAPIST

## 2019-01-04 PROCEDURE — 97140 MANUAL THERAPY 1/> REGIONS: CPT | Mod: GP | Performed by: PHYSICAL THERAPIST

## 2019-02-15 ENCOUNTER — OFFICE VISIT (OUTPATIENT)
Dept: OPHTHALMOLOGY | Facility: CLINIC | Age: 59
End: 2019-02-15
Payer: COMMERCIAL

## 2019-02-15 DIAGNOSIS — H52.13 MYOPIA OF BOTH EYES WITH ASTIGMATISM AND PRESBYOPIA: ICD-10-CM

## 2019-02-15 DIAGNOSIS — H25.13 AGE-RELATED NUCLEAR CATARACT OF BOTH EYES: ICD-10-CM

## 2019-02-15 DIAGNOSIS — H52.203 MYOPIA OF BOTH EYES WITH ASTIGMATISM AND PRESBYOPIA: ICD-10-CM

## 2019-02-15 DIAGNOSIS — H47.233 OPTIC CUPPING OF BOTH EYES: ICD-10-CM

## 2019-02-15 DIAGNOSIS — H52.4 MYOPIA OF BOTH EYES WITH ASTIGMATISM AND PRESBYOPIA: ICD-10-CM

## 2019-02-15 PROBLEM — H25.10 AGE-RELATED NUCLEAR CATARACT: Status: ACTIVE | Noted: 2019-02-15

## 2019-02-15 PROCEDURE — G0463 HOSPITAL OUTPT CLINIC VISIT: HCPCS | Mod: ZF

## 2019-02-15 ASSESSMENT — REFRACTION_WEARINGRX
OD_AXIS: 107
OD_CYLINDER: +1.75
OS_SPHERE: -6.75
OS_ADD: +2.75
OD_ADD: +2.75
OD_SPHERE: -6.25
OS_AXIS: 090
OS_CYLINDER: +1.00

## 2019-02-15 ASSESSMENT — REFRACTION_MANIFEST
OD_AXIS: 106
OS_CYLINDER: +0.75
OS_SPHERE: -7.00
OD_ADD: +2.75
OD_CYLINDER: +2.25
OS_AXIS: 098
OS_ADD: +2.75
OD_SPHERE: -6.50

## 2019-02-15 ASSESSMENT — VISUAL ACUITY
METHOD: SNELLEN - LINEAR
OS_CC: 20/20
OD_CC: 20/20
OS_CC: J1+
OD_CC: J1
CORRECTION_TYPE: GLASSES

## 2019-02-15 ASSESSMENT — TONOMETRY
OD_IOP_MMHG: 15
OS_IOP_MMHG: 15
IOP_METHOD: APPLANATION

## 2019-02-15 ASSESSMENT — SLIT LAMP EXAM - LIDS
COMMENTS: NORMAL
COMMENTS: NORMAL

## 2019-02-15 ASSESSMENT — CUP TO DISC RATIO
OS_RATIO: 0.15
OD_RATIO: 0.45

## 2019-02-15 ASSESSMENT — CONF VISUAL FIELD
OD_NORMAL: 1
OS_NORMAL: 1

## 2019-02-15 ASSESSMENT — EXTERNAL EXAM - RIGHT EYE: OD_EXAM: NORMAL

## 2019-02-15 ASSESSMENT — EXTERNAL EXAM - LEFT EYE: OS_EXAM: NORMAL

## 2019-02-15 NOTE — PROGRESS NOTES
Assessment & Plan       Hong Pool is a 58 year old male with the following diagnoses:   1. Optic cupping of both eyes - Both Eyes    2. Myopia of both eyes with astigmatism and presbyopia - Both Eyes    3. Age-related nuclear cataract of both eyes - Both Eyes       Asymmetrical cupping right eye> left eye follow  New prescription for glasses   minimal cataracts follow       Recheck: 1 year(s)     Patient disposition:   Return in about 1 year (around 2/15/2020) for Annual Visit.          Complete documentation of historical and exam elements from today's encounter can be found in the full encounter summary report (not reduplicated in this progress note). I personally obtained the chief complaint(s) and history of present illness.  I confirmed and edited as necessary the review of systems, past medical/surgical history, family history, social history, and examination findings as documented by others; and I examined the patient myself. I personally reviewed the relevant tests, images, and reports as documented above. I formulated and edited as necessary the assessment and plan and discussed the findings and management plan with the patient and family.  Dr. Cesario Subramanian

## 2019-05-20 DIAGNOSIS — N20.0 CALCULUS OF KIDNEY: ICD-10-CM

## 2019-05-20 NOTE — TELEPHONE ENCOUNTER

## 2019-05-21 RX ORDER — POTASSIUM CITRATE 10 MEQ/1
10 TABLET, EXTENDED RELEASE ORAL
Qty: 180 TABLET | Refills: 3 | Status: SHIPPED | OUTPATIENT
Start: 2019-05-21 | End: 2019-06-17

## 2019-06-17 ENCOUNTER — TELEPHONE (OUTPATIENT)
Dept: CARDIOLOGY | Facility: CLINIC | Age: 59
End: 2019-06-17

## 2019-06-17 DIAGNOSIS — I25.810 CORONARY ARTERY DISEASE INVOLVING CORONARY BYPASS GRAFT OF NATIVE HEART WITHOUT ANGINA PECTORIS: ICD-10-CM

## 2019-06-17 DIAGNOSIS — Z78.9 STATIN INTOLERANCE: ICD-10-CM

## 2019-06-17 DIAGNOSIS — E78.5 HYPERLIPIDEMIA LDL GOAL <70: ICD-10-CM

## 2019-06-17 DIAGNOSIS — I25.10 ASCVD (ARTERIOSCLEROTIC CARDIOVASCULAR DISEASE): ICD-10-CM

## 2019-06-17 DIAGNOSIS — N20.0 CALCULUS OF KIDNEY: ICD-10-CM

## 2019-06-17 RX ORDER — POTASSIUM CITRATE 10 MEQ/1
10 TABLET, EXTENDED RELEASE ORAL
Qty: 180 TABLET | Refills: 3 | Status: SHIPPED | OUTPATIENT
Start: 2019-06-17 | End: 2020-08-11

## 2019-06-17 NOTE — TELEPHONE ENCOUNTER
[facility] Call Center    Phone Message    May a detailed message be left on voicemail: yes    Reason for Call: Other: Mathew calling to request a call back. He has some questions on evolocumab (REPATHA) 140 MG/ML prefilled autoinjector. Please call him back to discuss.      Action Taken: Message routed to:  [facility] ([facility]

## 2019-06-17 NOTE — TELEPHONE ENCOUNTER

## 2019-06-17 NOTE — TELEPHONE ENCOUNTER
Called and spoke with Pt.  He noted [facility] did not see a current prescription on file even though 10/2018 one was sent for one year.  Resent prescription and refill to pharmacy.  Pt denied any further questions.    Elba Poole LPN

## 2019-09-16 PROBLEM — S76.319A HAMSTRING MUSCLE STRAIN: Status: RESOLVED | Noted: 2018-08-16 | Resolved: 2019-09-16

## 2019-10-28 ENCOUNTER — TELEPHONE (OUTPATIENT)
Dept: CARDIOLOGY | Facility: CLINIC | Age: 59
End: 2019-10-28

## 2019-10-28 NOTE — TELEPHONE ENCOUNTER
PA Initiation    Medication: Repatha -pending  Insurance Company: [employer] [place] [employer] - Phone [phone] Fax [phone]  Pharmacy Filling the Rx: [facility] - [place], [place] - [facility] [address]  Filling Pharmacy Phone: [phone]  Filling Pharmacy Fax: [phone]  Start Date: 10/28/2019

## 2019-10-28 NOTE — TELEPHONE ENCOUNTER
Prior Authorization Approval    Authorization Effective Date: 9/28/2019  Authorization Expiration Date: 10/28/2020  Medication: Repatha -approved  Approved Dose/Quantity:2 for 28 days  Reference #: [id number]   Insurance Company: [employer] [address] - Phone [phone] Fax [phone]  Expected CoPay: $5     CoPay Card Available: Yes    Foundation Assistance Needed:    Which Pharmacy is filling the prescription (Not needed for infusion/clinic administered): [facility] - [place], [place] - [address]  Pharmacy Notified: Yes  Patient Notified: Yes

## 2019-11-13 ENCOUNTER — OFFICE VISIT (OUTPATIENT)
Dept: FAMILY MEDICINE | Facility: CLINIC | Age: 59
End: 2019-11-13
Payer: COMMERCIAL

## 2019-11-13 VITALS
HEIGHT: 73 IN | SYSTOLIC BLOOD PRESSURE: 144 MMHG | RESPIRATION RATE: 16 BRPM | WEIGHT: 230.6 LBS | BODY MASS INDEX: 30.56 KG/M2 | OXYGEN SATURATION: 97 % | DIASTOLIC BLOOD PRESSURE: 82 MMHG | HEART RATE: 64 BPM | TEMPERATURE: 98.6 F

## 2019-11-13 DIAGNOSIS — R21 RASH: ICD-10-CM

## 2019-11-13 DIAGNOSIS — N50.819 TESTICULAR PAIN: Primary | ICD-10-CM

## 2019-11-13 LAB
BILIRUBIN UR: NEGATIVE MG/DL
BLOOD UR: NEGATIVE MG/DL
GLUCOSE URINE: NEGATIVE
KETONES UR QL: NEGATIVE MG/DL
LEUKOCYTE ESTERASE UR: NEGATIVE
NITRITE UR QL STRIP: NEGATIVE MG/DL
PH UR STRIP: 6 [PH] (ref 4.5–8)
PROTEIN UR: NEGATIVE MG/DL
SP GR UR STRIP: 1.02 (ref 1–1.03)
UROBILINOGEN UR STRIP-ACNC: NORMAL E.U./DL

## 2019-11-13 ASSESSMENT — ENCOUNTER SYMPTOMS
FATIGUE: 0
FEVER: 0
DIFFICULTY URINATING: 0
DIARRHEA: 1
BLOOD IN STOOL: 0
DYSURIA: 0
FREQUENCY: 0
CHILLS: 0
FLANK PAIN: 0
HEMATURIA: 0
ABDOMINAL PAIN: 0

## 2019-11-13 ASSESSMENT — MIFFLIN-ST. JEOR: SCORE: 1921.61

## 2019-11-13 NOTE — PROGRESS NOTES
Preceptor Attestation:   Patient seen, evaluated and discussed with the resident. I have verified the content of the note, which accurately reflects my assessment of the patient and the plan of care.   Supervising Physician:  Carley Berumen MD

## 2019-11-13 NOTE — PATIENT INSTRUCTIONS
Here is the plan from today's visit    1. Testicular pain  - US Testicular & Scrotum w Doppler Ltd; Future  - Urinalysis, Micro If (UA) ([facility])    Follow up plan  We will call you with the results of your ultrasound.     Thank you for coming to [facility] today.  Lab Testing:  **If you had lab testing today and your results are reassuring or normal they will be mailed to you or sent through [employer] within 7 days.   **If the lab tests need quick action we will call you with the results.  The phone number we will call with results is # [phone] (home) . If this is not the best number please call our clinic and change the number.  Scheduling:  If you have any concerns about today's visit or wish to schedule another appointment please call our office during normal business hours [phone] (8-5:00 M-F)  If you had an XRay/CT/Ultrasound/MRI ordered the number is [phone] to schedule or change your radiology appointment.   Medical Concerns:  If you have urgent medical concerns please call [phone] at any time of the day.    Linda Nolan MD

## 2019-11-13 NOTE — PROGRESS NOTES
"       HPI       Hong Pool is a 59 year old male with history of CAD (s/p CABG 2012) and hyperlipidemia who presents for testicular pain.  Chief Complaint   Patient presents with     Pain     x's 6 days of testical pain. Left side constant pain. Pain 4-5/10. No injury per pt. Tenderness of the area.      Derm Problem      off and on for the last 3-4 months. Rash near testical's towards the rectum. Itching occuring. Recently had diarrhea issues making it hard to clean that area per the pt.      Testicular pain  Patient states that Thursday or Friday, 5-6 days ago, he has noticed a constant dull ache in his bilateral testicles, left slightly worse than right. The pain has slightly worsened during this time. He initially attributed the pain to possibly having \"bumped\" the area (does not recall any trauma) as he has had similar pain with minor trauma in the past which typically goes away within a few hours. He has not noticed swelling of the scrotum but feels like the left side may be slightly soria than the right. Pain actually improves with standing/walking, and is worst at night when he is lying down and trying to find a comfortable sleeping position. No urinary symptoms. He is sexually active with his wife, no concern for STI. No history of hernias. He has a history of  recurrent calcium oxalate kidney stones and notes the pain is similar in character but different location (typically back pain with his kidney stones).     Review of most recent CT abdomen/pelvis 6/04/2018 shows right ureteral stone and nonobstructive left upper pole stones. He had UA with +blood at that time.     Rash  He has also noticed a rash on his inner/posterior thighs. He initially developed a similar rash after bouts of watery diarrhea several months ago. His diarrhea has improved with pepto bismal, however, he has noticed the rash (particualrly the itching) over the last week. He has a history of sensitive skin for which he uses " "dye free detergent and double rinsing with his laundry. He gets similar \"rash\" underneath his socks and around his underwear line. He does not use any creams due to past sensitivity to creams and many medications.     With regards to his diarrhea, continues to require peptobismal daily to maintain formed stools. No bloody stools. His last colonoscopy was in 2017- 1 polyp, biopsy showed tubular adenoma.     +++++++    Problem, Medication and Allergy Lists were reviewed and updated if needed.  Patient is an established patient of this clinic.         Review of Systems:   Review of Systems   Constitutional: Negative for chills, fatigue and fever.   Gastrointestinal: Positive for diarrhea (controlled). Negative for abdominal pain and blood in stool.   Genitourinary: Positive for scrotal swelling and testicular pain. Negative for difficulty urinating, discharge, dysuria, flank pain, frequency, genital sores, hematuria, penile pain, penile swelling and urgency.   Skin: Positive for rash (inner thighs).            Physical Exam:     Vitals:    11/13/19 1016 11/13/19 1019   BP: (!) 160/102 (!) 144/82   Pulse: 64    Resp: 16    Temp: 98.6  F (37  C)    TempSrc: Oral    SpO2: 97%    Weight: 104.6 kg (230 lb 9.6 oz)    Height: 1.865 m (6' 1.43\")      Body mass index is 30.07 kg/m .  Vital signs normal except mildly elevated blood pressure.    Chaperone offered, patient declined.  Physical Exam  Constitutional:       General: He is not in acute distress.     Appearance: He is well-developed. He is not diaphoretic.   Cardiovascular:      Rate and Rhythm: Normal rate.   Pulmonary:      Effort: Pulmonary effort is normal. No respiratory distress.   Abdominal:      Hernia: There is no hernia in the right inguinal area or left inguinal area.   Genitourinary:     Pubic Area: No rash.       Penis: Normal. No lesions.       Scrotum/Testes:         Right: Tenderness (posteriorly) and swelling (mild) present. Mass, testicular hydrocele " or varicocele not present.         Left: Tenderness (posteriorly) present. Mass, swelling, testicular hydrocele or varicocele not present.      Comments: No transillumination appreciated.  Skin:     Findings: Rash present.      Comments: Very faint erythema of the posterior medial thighs near the perineal area bilaterally. No excoriations, crusting or lesions.    Neurological:      Mental Status: He is alert and oriented to person, place, and time.           Results:      Results from this visit  Results for orders placed or performed in visit on 11/13/19   Urinalysis, Micro If (UA) ([facility])     Status: None   Result Value Ref Range    Specific Gravity Urine 1.025 1.005 - 1.030    pH Urine 6.0 4.5 - 8.0    Leukocyte Esterase UR Negative NEGATIVE    Nitrite Urine Negative NEGATIVE mg/dL    Protein UR Negative NEGATIVE mg/dL    Glucose Urine Negative NEGATIVE    Ketones Urine Negative NEGATIVE mg/dL    Urobilinogen mg/dL 0.2 E.U./dL 0.2 E.U./dL E.U./dL    Bilirubin UR Negative NEGATIVE mg/dL    Blood UR Negative NEGATIVE mg/dL       Assessment and Plan        1. Testicular pain  2. Scrotal fullness  Patient with 5-6 days of bilateral testicular pain (L>R) and scrotal fullness, most likely due to epididymitis. Given prolonged duration, moderate severity and bilaterality of pain, testicular torsion unlikely. Varicocele considered but also less likely given improvement with standing and lack of findings consistent on exam. No nodules/masses palpated on exam, presence of tenderness and his age make testicular cancer much less likely, though pain could be due to reactive hydrocele. No illumination on exam to suggest hydrocele but still in differential. E.coli is the most common cause of epididymitis in his age group, Chlamydia or Gonorrhea less likely. UA and G/C testing obtained today. Also recommend a testicular ultrasound for further evaluation given the duration of his symptoms. If ultrasound is normal and pain  persists, would consider urology referral.   - US Testicular & Scrotum w Doppler Ltd; Future- scheduled for Friday 11/15/2019  - Urinalysis, Micro If (UA) ([facility])  - NEISSERIA GONORRHOEA PCR  - CHLAMYDIA TRACHOMATIS PCR    2. Rash  No significant rash noted on exam today, however, patient concerned the pruritus and potential for skin breakdown from scratching. He has a history of sensitive skin and reacting to creams/medications.   - diphenhydrAMINE-zinc acetate (BENADRYL) 1-0.1 % external cream; Apply topically 3 times daily as needed for itching  Dispense: 28 g; Refill: 0       There are no discontinued medications.    Options for treatment and follow-up care were reviewed with the patient. Hong Pool engaged in the decision making process and verbalized understanding of the options discussed and agreed with the final plan.    Linda Nolan MD

## 2019-11-14 LAB
C TRACH DNA SPEC QL NAA+PROBE: NEGATIVE
N GONORRHOEA DNA SPEC QL NAA+PROBE: NEGATIVE
SPECIMEN SOURCE: NORMAL
SPECIMEN SOURCE: NORMAL

## 2019-11-15 ENCOUNTER — ANCILLARY PROCEDURE (OUTPATIENT)
Dept: ULTRASOUND IMAGING | Facility: CLINIC | Age: 59
End: 2019-11-15
Attending: FAMILY MEDICINE
Payer: COMMERCIAL

## 2019-11-15 DIAGNOSIS — N50.819 TESTICULAR PAIN: ICD-10-CM

## 2019-11-19 ENCOUNTER — TELEPHONE (OUTPATIENT)
Dept: FAMILY MEDICINE | Facility: CLINIC | Age: 59
End: 2019-11-19

## 2019-11-19 DIAGNOSIS — N50.819 TESTICULAR PAIN: ICD-10-CM

## 2019-11-19 NOTE — TELEPHONE ENCOUNTER
"Called patient to discuss results of ultrasound, no answer. Left message for him to call clinic back.    Message for patient: \"Your ultrasound shows evidence of inflammation and varicocele (enlarged veins). This can be caused by an infection, but your other testing (urine testing, G/C) was negative. The main treatment for varicocele is supportive briefs/underwear. If you are still having significant pain, it would be reasonable to try a course of Cipro to empirically treat for an infection and see if it helps given the evidence of inflammation. You may also use Tylenol and ibuprofen to management the discomfort. If you are still having symptoms after those interventions, I would recommend seeing Urology.\"    Thanks!  Dolores Nolan MD  "

## 2019-11-20 NOTE — TELEPHONE ENCOUNTER
Patient called back, RN went over results with patient. He states that he has an atypical reaction to both ibuprofen and tylenol where it causes him to have insomnia. He rarely uses them due to this. He is interested in trying the antibiotic. He asked about things to watch for, RN explained that antibiotics can cause GI upset and potentially cause diarrhea and he should let us know if he were to experience severe diarrhea. Recommended a probiotic a few hours after the antibiotic to help prevent this. RN pended the pharmacy. He would like specific thoughts on recommendations for the supportive briefs because when RN recommended to avoid boxers or underwear with no support at all, he states he does not wear boxers so he is hoping for a more specific recommendation before going out to buy something.     Routing to Dr. Nolan to please order Ciprofloxacin course and advise on specific supportive brief recommendation.  Gretchen Lyn RN

## 2019-11-24 RX ORDER — CIPROFLOXACIN 500 MG/1
500 TABLET, FILM COATED ORAL 2 TIMES DAILY
Qty: 6 TABLET | Refills: 0 | Status: SHIPPED | OUTPATIENT
Start: 2019-11-24 | End: 2019-11-27

## 2019-11-25 NOTE — TELEPHONE ENCOUNTER
"Per Dr. Nolan, \"I've tried to call Mathew multiple times over the past few days with no luck (always goes to answering machine). I don't have specific recommendations for supportive briefs, but would be worth trying compression shorts. I will send in an antibiotic prescription for him ([facility] on [address]). \"    RN attempted reaching patient and could not reach. Left message letting him know I have a suggestion following up on our conversation from last week, and that the medication (did not leave name of it) was sent to the pharmacy. Requested a callback.    When he calls back, please transfer to RN.  Gretchen Lyn RN   "

## 2019-11-26 ENCOUNTER — DOCUMENTATION ONLY (OUTPATIENT)
Dept: CARE COORDINATION | Facility: CLINIC | Age: 59
End: 2019-11-26

## 2019-11-26 NOTE — TELEPHONE ENCOUNTER
Patient returned call and RN was able to relay providers message and informed him that if he does not notice improvement to call us, and we can place the Urology referral.  Patient verbalized understanding.  Gretchen Lyn RN

## 2020-01-23 DIAGNOSIS — Z78.9 STATIN INTOLERANCE: ICD-10-CM

## 2020-01-23 DIAGNOSIS — E78.5 HYPERLIPIDEMIA LDL GOAL <70: ICD-10-CM

## 2020-01-23 DIAGNOSIS — I25.10 ASCVD (ARTERIOSCLEROTIC CARDIOVASCULAR DISEASE): ICD-10-CM

## 2020-01-23 DIAGNOSIS — I25.10 ASCVD (ARTERIOSCLEROTIC CARDIOVASCULAR DISEASE): Primary | ICD-10-CM

## 2020-01-23 LAB
ALBUMIN SERPL-MCNC: 3.6 G/DL (ref 3.4–5)
ALP SERPL-CCNC: 62 U/L (ref 40–150)
ALT SERPL W P-5'-P-CCNC: 36 U/L (ref 0–70)
ANION GAP SERPL CALCULATED.3IONS-SCNC: 6 MMOL/L (ref 3–14)
AST SERPL W P-5'-P-CCNC: 23 U/L (ref 0–45)
BILIRUB SERPL-MCNC: 1.9 MG/DL (ref 0.2–1.3)
BUN SERPL-MCNC: 15 MG/DL (ref 7–30)
CALCIUM SERPL-MCNC: 8.9 MG/DL (ref 8.5–10.1)
CHLORIDE SERPL-SCNC: 108 MMOL/L (ref 94–109)
CHOLEST SERPL-MCNC: 137 MG/DL
CO2 SERPL-SCNC: 26 MMOL/L (ref 20–32)
CREAT SERPL-MCNC: 0.94 MG/DL (ref 0.66–1.25)
GFR SERPL CREATININE-BSD FRML MDRD: 89 ML/MIN/{1.73_M2}
GLUCOSE SERPL-MCNC: 128 MG/DL (ref 70–99)
HDLC SERPL-MCNC: 54 MG/DL
LDLC SERPL CALC-MCNC: 43 MG/DL
NONHDLC SERPL-MCNC: 83 MG/DL
POTASSIUM SERPL-SCNC: 4.3 MMOL/L (ref 3.4–5.3)
PROT SERPL-MCNC: 7.2 G/DL (ref 6.8–8.8)
SODIUM SERPL-SCNC: 140 MMOL/L (ref 133–144)
TRIGL SERPL-MCNC: 203 MG/DL

## 2020-01-27 ENCOUNTER — OFFICE VISIT (OUTPATIENT)
Dept: CARDIOLOGY | Facility: CLINIC | Age: 60
End: 2020-01-27
Attending: INTERNAL MEDICINE
Payer: COMMERCIAL

## 2020-01-27 VITALS
BODY MASS INDEX: 30.88 KG/M2 | SYSTOLIC BLOOD PRESSURE: 153 MMHG | HEIGHT: 72 IN | OXYGEN SATURATION: 98 % | HEART RATE: 70 BPM | DIASTOLIC BLOOD PRESSURE: 82 MMHG | WEIGHT: 228 LBS

## 2020-01-27 DIAGNOSIS — E78.5 HYPERLIPIDEMIA LDL GOAL <70: ICD-10-CM

## 2020-01-27 DIAGNOSIS — R73.03 BORDERLINE DIABETES MELLITUS: ICD-10-CM

## 2020-01-27 DIAGNOSIS — Z78.9 STATIN INTOLERANCE: ICD-10-CM

## 2020-01-27 DIAGNOSIS — I25.10 CORONARY ARTERY DISEASE INVOLVING NATIVE CORONARY ARTERY OF NATIVE HEART WITHOUT ANGINA PECTORIS: Primary | ICD-10-CM

## 2020-01-27 PROCEDURE — G0463 HOSPITAL OUTPT CLINIC VISIT: HCPCS | Mod: ZF

## 2020-01-27 PROCEDURE — 99213 OFFICE O/P EST LOW 20 MIN: CPT | Mod: ZP | Performed by: INTERNAL MEDICINE

## 2020-01-27 ASSESSMENT — MIFFLIN-ST. JEOR: SCORE: 1887.2

## 2020-01-27 ASSESSMENT — PAIN SCALES - GENERAL: PAINLEVEL: NO PAIN (0)

## 2020-01-27 NOTE — NURSING NOTE
Labs: Patient was given results of the laboratory testing obtained today. Patient was instructed to return for the next laboratory testing in one year. Patient demonstrated understanding of this information and agreed to call with further questions or concerns.   Med Reconcile: Reviewed and verified all current medications with the patient. The updated medication list was printed and given to the patient.  Return Appointment: Patient given instructions regarding scheduling next clinic visit. Patient demonstrated understanding of this information and agreed to call with further questions or concerns.  Patient stated he understood all health information given and agreed to call with further questions or concerns.    Elba Poloe LPN

## 2020-01-27 NOTE — PROGRESS NOTES
HPI:     I had the privilege to evaluate and examine Mr Anant Pool, who is a 56 year old male with a history of CAD with CABG in 2012. Last seen by Cardiology Oct 2018. He has had statin intolerance (atorvastatin 80 mg , simvastatin 80 mg caused both muscle pain and consequently was unable to walk ) and lower dosage pravastatin was not tolerated.  Tried to get praluent approved because of failure to zetia.     Patient was started on zetia 10mg a day, however did not have profound affect on cholesterol.    Patient is now on Repatha 140  mg subcut very 2 weeks.  Patient denies chest pain, shortness of breath, palpitations, intermittent claudication.            PAST MEDICAL HISTORY:  Past Medical History:   Diagnosis Date     Borderline diabetes mellitus 11/26/2015     Coronary artery disease 2012    CABG x4     Coronary artery disease involving coronary bypass graft of native heart without angina pectoris 11/18/2015     Depression      Goiter     With normal TSH     Hyperlipidemia     Started on statin 7/2007.  Discontinued 10/07 secondary to muscle aches and fatigue     Nephrolithiasis      Shoulder pain      Statin intolerance 6/23/2016       CURRENT MEDICATIONS:  Current Outpatient Medications   Medication Sig Dispense Refill     alpha-d-galactosidase (BEANO) tablet Take 2 tablets by mouth 3 times daily (before meals) 540 tablet 4     aspirin 81 MG EC tablet Take 1 tablet (81 mg) by mouth daily 90 tablet 4     Blood Pressure Monitor KIT 1 each daily. 1 kit 0     Cholecalciferol (VITAMIN D3) 2000 UNITS CAPS Take 1 capsule by mouth daily 90 capsule 4     evolocumab (REPATHA) 140 MG/ML prefilled autoinjector Inject 1 mL (140 mg) Subcutaneous every 14 days 6 mL 1     LACTAID FAST ACT 9000 units TABS tablet   4     Multiple Vitamins-Minerals (MENS MULTIVITAMIN PLUS) TABS Take 1 tablet by mouth daily 90 tablet 4     potassium citrate (UROCIT-K) 10 MEQ (1080 MG) CR tablet Take 1 tablet (10 mEq) by mouth 3 times daily  (with meals) 180 tablet 3     pyridOXINE (VITAMIN B6) 100 MG TABS Take 1 tablet (100 mg) by mouth daily 90 tablet 4     diphenhydrAMINE-zinc acetate (BENADRYL) 1-0.1 % external cream Apply topically 3 times daily as needed for itching (Patient not taking: Reported on 1/27/2020) 28 g 0     STATIN NOT PRESCRIBED, INTENTIONAL, 1 each continuous Statin not prescribed intentionally due to Intolerance (with supporting documentation of trying a statin at least once within the last 5 years) (Patient not taking: Reported on 10/15/2018) 0 each 0       PAST SURGICAL HISTORY:  Past Surgical History:   Procedure Laterality Date     BYPASS GRAFT ARTERY CORONARY  6/24/2012    Procedure: BYPASS GRAFT ARTERY CORONARY;  Median Sternotomy, Coronary Artery Bypass Grafting x 4 using Left Internal Mammary and Left Saphenous Vein  with Endovein Harvesting. On Pump Oxygenation;  Surgeon: Genesis Larsen MD;  Location:  OR     ESOPHAGOSCOPY, GASTROSCOPY, DUODENOSCOPY (EGD), COMBINED N/A 4/11/2017    Procedure: COMBINED ESOPHAGOSCOPY, GASTROSCOPY, DUODENOSCOPY (EGD), BIOPSY SINGLE OR MULTIPLE;  Surgeon: Corine Ly MD;  Location: [facility]       ALLERGIES     Allergies   Allergen Reactions     Lidocaine Rash     Break out     Band-Aid Anti-Itch      Cholestoff Complete [Plant Sterol Stanol-Pantethine] Other (See Comments)     Severe stomach pain     Lactose Diarrhea     Rosuvastatin Muscle Pain (Myalgia)     Zocor [Simvastatin - High Dose] Other (See Comments)     Muscle aches/soreness, confusion, disorganized thinking       FAMILY HISTORY:  Family History   Problem Relation Age of Onset     C.A.D. Other         Uncle     Diabetes Brother      Cerebrovascular Disease Brother      Cardiovascular Sister         tachyarrhythmia       SOCIAL HISTORY:  Social History     Socioeconomic History     Marital status:      Spouse name: None     Number of children: None     Years of education: None     Highest education level:  None   Occupational History     None   Social Needs     Financial resource strain: None     Food insecurity:     Worry: None     Inability: None     Transportation needs:     Medical: None     Non-medical: None   Tobacco Use     Smoking status: Never Smoker     Smokeless tobacco: Never Used   Substance and Sexual Activity     Alcohol use: No     Drug use: No     Sexual activity: Yes   Lifestyle     Physical activity:     Days per week: None     Minutes per session: None     Stress: None   Relationships     Social connections:     Talks on phone: None     Gets together: None     Attends Gnosticism service: None     Active member of club or organization: None     Attends meetings of clubs or organizations: None     Relationship status: None     Intimate partner violence:     Fear of current or ex partner: None     Emotionally abused: None     Physically abused: None     Forced sexual activity: None   Other Topics Concern     Parent/sibling w/ CABG, MI or angioplasty before 65F 55M? Not Asked   Social History Narrative    .        ROS:   Constitutional: No fever, chills, or sweats. No weight gain/loss   ENT: No visual disturbance, ear ache, epistaxis, sore throat  Allergies/Immunologic: Negative.   Respiratory: No cough, hemoptysia  Cardiovascular: As per HPI  GI: No nausea, vomiting, hematemesis, melena, or hematochezia  : No urinary frequency, dysuria, or hematuria  Integument: Negative  Psychiatric: Negative  Neuro: Negative  Endocrinology: Negative   Musculoskeletal: Negative    EXAM:  BP (!) 153/82 (BP Location: Right arm, Patient Position: Chair, Cuff Size: Adult Regular)   Pulse 70   Ht 1.829 m (6')   Wt 103.4 kg (228 lb)   SpO2 98%   BMI 30.92 kg/m    In general, the patient is a pleasant male in no apparent distress.    HEENT: NC/AT.  TAMIKO.  EOMI.  Sclerae white, not injected.  Nares clear.  Pharynx without erythema or exudate.  Dentition intact.    Neck: No adenopathy.  No thyromegaly. Carotids  +4/4 bilaterally without bruits.  No jugular venous distension.   Heart: RRR. Normal S1, S2 splits physiologically. No murmur, rub, click, or gallop. The PMI is in the 5th ICS in the midclavicular line. There is no heave.    Lungs: CTA.  No ronchi, wheezes, rales.  No dullness to percussion.   Abdomen: Soft, nontender, nondistended. No organomegaly.  No bruits.   Extremities: No clubbing, cyanosis, or edema.  The pulses are +4/4 at the radial, brachial, femoral, popliteal, DP, and PT sites bilaterally.  No bruits are noted.  Neurologic: Alert and oriented to person/place/time, normal speech, gait and affect  Skin: No petechiae, purpura or rash.    134-132/80-84    Labs:  LIPID RESULTS:  Lab Results   Component Value Date    CHOL 137 01/23/2020    HDL 54 01/23/2020    LDL 43 01/23/2020    TRIG 203 (H) 01/23/2020    CHOLHDLRATIO 5.1 (H) 11/24/2015    NHDL 83 01/23/2020       LIVER ENZYME RESULTS:  Lab Results   Component Value Date    AST 23 01/23/2020    ALT 36 01/23/2020       CBC RESULTS:  Lab Results   Component Value Date    WBC 4.9 06/24/2016    RBC 4.33 (L) 06/24/2016    HGB 15.7 05/25/2018    HCT 51.2 05/25/2018    .7 (H) 05/25/2018    MCH 31.5 05/25/2018    MCHC 30.7 (L) 05/25/2018    RDW 11.9 06/24/2016     (L) 06/24/2016       BMP RESULTS:  Lab Results   Component Value Date     01/23/2020    POTASSIUM 4.3 01/23/2020    CHLORIDE 108 01/23/2020    CO2 26 01/23/2020    ANIONGAP 6 01/23/2020     (H) 01/23/2020    BUN 15 01/23/2020    CR 0.94 01/23/2020    GFRESTIMATED 89 01/23/2020    GFRESTBLACK >90 01/23/2020    TYSON 8.9 01/23/2020        A1C RESULTS:  Lab Results   Component Value Date    A1C 5.9 02/15/2017       Procedures:      Assessment and Plan:       We discussed the results with patient.  We disucssed te importance of a hearthealthy lifestye and diet.  We discussed following recommendations  Continue with same medial regimen.  Please follow up: With Dr. Evangelista in one year  with fasting labs prior.    Jaylan Evangelista MD, PhD  Professor of Medicine  Division of Cardiology  CC  Patient Care Team:  Jorge Singleton MD as PCP - General (General Surgery)  Jaylan Evangelista MD as MD (Cardiology)  Rebeca Dunn MD as MD (Ophthalmology)  Corine Ly MD as MD (Internal Medicine)  Claudio Chavez MD as MD (Urology)  Carolyn Christiansen RN as Registered Nurse (Urology)  Elba Poole LPN as Clinic Care Coordinator (Cardiology)  SELF, REFERRED

## 2020-01-27 NOTE — LETTER
1/27/2020      RE: Hong Pool  [address] [facility] [address]       Dear Colleague,    Thank you for the opportunity to participate in the care of your patient, Hong Polo, at the [facility]. Please see a copy of my visit note below.    HPI:     I had the privilege to evaluate and examine Mr Anant Pool, who is a 56 year old male with a history of CAD with CABG in 2012. Last seen by Cardiology Oct 2018. He has had statin intolerance (atorvastatin 80 mg , simvastatin 80 mg caused both muscle pain and consequently was unable to walk ) and lower dosage pravastatin was not tolerated.  Tried to get praluent approved because of failure to zetia.     Patient was started on zetia 10mg a day, however did not have profound affect on cholesterol.    Patient is now on Repatha 140  mg subcut very 2 weeks.  Patient denies chest pain, shortness of breath, palpitations, intermittent claudication.            PAST MEDICAL HISTORY:  Past Medical History:   Diagnosis Date     Borderline diabetes mellitus 11/26/2015     Coronary artery disease 2012    CABG x4     Coronary artery disease involving coronary bypass graft of native heart without angina pectoris 11/18/2015     Depression      Goiter     With normal TSH     Hyperlipidemia     Started on statin 7/2007.  Discontinued 10/07 secondary to muscle aches and fatigue     Nephrolithiasis      Shoulder pain      Statin intolerance 6/23/2016       CURRENT MEDICATIONS:  Current Outpatient Medications   Medication Sig Dispense Refill     alpha-d-galactosidase (BEANO) tablet Take 2 tablets by mouth 3 times daily (before meals) 540 tablet 4     aspirin 81 MG EC tablet Take 1 tablet (81 mg) by mouth daily 90 tablet 4     Blood Pressure Monitor KIT 1 each daily. 1 kit 0     Cholecalciferol (VITAMIN D3) 2000 UNITS CAPS Take 1 capsule by mouth daily 90 capsule 4     evolocumab (REPATHA) 140 MG/ML prefilled  autoinjector Inject 1 mL (140 mg) Subcutaneous every 14 days 6 mL 1     LACTAID FAST ACT 9000 units TABS tablet   4     Multiple Vitamins-Minerals (MENS MULTIVITAMIN PLUS) TABS Take 1 tablet by mouth daily 90 tablet 4     potassium citrate (UROCIT-K) 10 MEQ (1080 MG) CR tablet Take 1 tablet (10 mEq) by mouth 3 times daily (with meals) 180 tablet 3     pyridOXINE (VITAMIN B6) 100 MG TABS Take 1 tablet (100 mg) by mouth daily 90 tablet 4     diphenhydrAMINE-zinc acetate (BENADRYL) 1-0.1 % external cream Apply topically 3 times daily as needed for itching (Patient not taking: Reported on 1/27/2020) 28 g 0     STATIN NOT PRESCRIBED, INTENTIONAL, 1 each continuous Statin not prescribed intentionally due to Intolerance (with supporting documentation of trying a statin at least once within the last 5 years) (Patient not taking: Reported on 10/15/2018) 0 each 0       PAST SURGICAL HISTORY:  Past Surgical History:   Procedure Laterality Date     BYPASS GRAFT ARTERY CORONARY  6/24/2012    Procedure: BYPASS GRAFT ARTERY CORONARY;  Median Sternotomy, Coronary Artery Bypass Grafting x 4 using Left Internal Mammary and Left Saphenous Vein  with Endovein Harvesting. On Pump Oxygenation;  Surgeon: Genesis Larsen MD;  Location:  OR     ESOPHAGOSCOPY, GASTROSCOPY, DUODENOSCOPY (EGD), COMBINED N/A 4/11/2017    Procedure: COMBINED ESOPHAGOSCOPY, GASTROSCOPY, DUODENOSCOPY (EGD), BIOPSY SINGLE OR MULTIPLE;  Surgeon: Corine Ly MD;  [facility]       ALLERGIES     Allergies   Allergen Reactions     Lidocaine Rash     Break out     Band-Aid Anti-Itch      Cholestoff Complete [Plant Sterol Stanol-Pantethine] Other (See Comments)     Severe stomach pain     Lactose Diarrhea     Rosuvastatin Muscle Pain (Myalgia)     Zocor [Simvastatin - High Dose] Other (See Comments)     Muscle aches/soreness, confusion, disorganized thinking       FAMILY HISTORY:  Family History   Problem Relation Age of Onset     C.A.D. Other          Uncle     Diabetes Brother      Cerebrovascular Disease Brother      Cardiovascular Sister         tachyarrhythmia       SOCIAL HISTORY:  Social History     Socioeconomic History     Marital status:      Spouse name: None     Number of children: None     Years of education: None     Highest education level: None   Occupational History     None   Social Needs     Financial resource strain: None     Food insecurity:     Worry: None     Inability: None     Transportation needs:     Medical: None     Non-medical: None   Tobacco Use     Smoking status: Never Smoker     Smokeless tobacco: Never Used   Substance and Sexual Activity     Alcohol use: No     Drug use: No     Sexual activity: Yes   Lifestyle     Physical activity:     Days per week: None     Minutes per session: None     Stress: None   Relationships     Social connections:     Talks on phone: None     Gets together: None     Attends Sikhism service: None     Active member of club or organization: None     Attends meetings of clubs or organizations: None     Relationship status: None     Intimate partner violence:     Fear of current or ex partner: None     Emotionally abused: None     Physically abused: None     Forced sexual activity: None   Other Topics Concern     Parent/sibling w/ CABG, MI or angioplasty before 65F 55M? Not Asked   Social History Narrative    .        ROS:   Constitutional: No fever, chills, or sweats. No weight gain/loss   ENT: No visual disturbance, ear ache, epistaxis, sore throat  Allergies/Immunologic: Negative.   Respiratory: No cough, hemoptysia  Cardiovascular: As per HPI  GI: No nausea, vomiting, hematemesis, melena, or hematochezia  : No urinary frequency, dysuria, or hematuria  Integument: Negative  Psychiatric: Negative  Neuro: Negative  Endocrinology: Negative   Musculoskeletal: Negative    EXAM:  BP (!) 153/82 (BP Location: Right arm, Patient Position: Chair, Cuff Size: Adult Regular)   Pulse 70    Ht 1.829 m (6')   Wt 103.4 kg (228 lb)   SpO2 98%   BMI 30.92 kg/m     In general, the patient is a pleasant male in no apparent distress.    HEENT: NC/AT.  PERRLA.  EOMI.  Sclerae white, not injected.  Nares clear.  Pharynx without erythema or exudate.  Dentition intact.    Neck: No adenopathy.  No thyromegaly. Carotids +4/4 bilaterally without bruits.  No jugular venous distension.   Heart: RRR. Normal S1, S2 splits physiologically. No murmur, rub, click, or gallop. The PMI is in the 5th ICS in the midclavicular line. There is no heave.    Lungs: CTA.  No ronchi, wheezes, rales.  No dullness to percussion.   Abdomen: Soft, nontender, nondistended. No organomegaly.  No bruits.   Extremities: No clubbing, cyanosis, or edema.  The pulses are +4/4 at the radial, brachial, femoral, popliteal, DP, and PT sites bilaterally.  No bruits are noted.  Neurologic: Alert and oriented to person/place/time, normal speech, gait and affect  Skin: No petechiae, purpura or rash.    134-132/80-84    Labs:  LIPID RESULTS:  Lab Results   Component Value Date    CHOL 137 01/23/2020    HDL 54 01/23/2020    LDL 43 01/23/2020    TRIG 203 (H) 01/23/2020    CHOLHDLRATIO 5.1 (H) 11/24/2015    NHDL 83 01/23/2020       LIVER ENZYME RESULTS:  Lab Results   Component Value Date    AST 23 01/23/2020    ALT 36 01/23/2020       CBC RESULTS:  Lab Results   Component Value Date    WBC 4.9 06/24/2016    RBC 4.33 (L) 06/24/2016    HGB 15.7 05/25/2018    HCT 51.2 05/25/2018    .7 (H) 05/25/2018    MCH 31.5 05/25/2018    MCHC 30.7 (L) 05/25/2018    RDW 11.9 06/24/2016     (L) 06/24/2016       BMP RESULTS:  Lab Results   Component Value Date     01/23/2020    POTASSIUM 4.3 01/23/2020    CHLORIDE 108 01/23/2020    CO2 26 01/23/2020    ANIONGAP 6 01/23/2020     (H) 01/23/2020    BUN 15 01/23/2020    CR 0.94 01/23/2020    GFRESTIMATED 89 01/23/2020    GFRESTBLACK >90 01/23/2020    TYSON 8.9 01/23/2020        A1C RESULTS:  Lab  Results   Component Value Date    A1C 5.9 02/15/2017       Procedures:      Assessment and Plan:       We discussed the results with patient.  We disucssed te importance of a hearthealthy lifestye and diet.  We discussed following recommendations  Continue with same medial regimen.  Please follow up: With Dr. Evangelista in one year with fasting labs prior.    Jaylan Evangelista MD, PhD  Professor of Medicine  Division of Cardiology    CC  Patient Care Team:  Jorge Singleton MD as PCP - General (General Surgery)  Jaylan Evangelista MD as MD (Cardiology)  Rebeca Dunn MD as MD (Ophthalmology)  Corine Ly MD as MD (Internal Medicine)  Claudio Chavez MD as MD (Urology)  Carolyn Christiansen, RN as Registered Nurse (Urology)  Elba Poole LPN as Clinic Care Coordinator (Cardiology)

## 2020-01-27 NOTE — PATIENT INSTRUCTIONS
Patient Instructions:  It was a pleasure to see you in the cardiology clinic today.      If you have any questions, you can reach my nurse, Elba TEJEDA LPN, at [phone].  Press Option #1 for the [facility], and then press Option #4 for nursing.    We are encouraging the use of [employer] to communicate with your HealthCare Provider    Medication Changes: None.    Recommendations: None.    Studies Ordered: None.    The results from today include: Labs.    Please follow up: With Dr. Evangelista in one year with fasting labs prior.    Sincerely,    Jaylan Evangelista MD     If you have an urgent need after hours (8:00 am to 4:30 pm) please call [phone] and ask for the cardiology fellow on call.

## 2020-01-27 NOTE — NURSING NOTE
Chief Complaint   Patient presents with     Follow Up     1 Yr F/U     Vitals were taken and medications were reconciled.     Yahaira Howell CMA    3:47 PM

## 2020-03-16 ENCOUNTER — TELEPHONE (OUTPATIENT)
Dept: CARDIOLOGY | Facility: CLINIC | Age: 60
End: 2020-03-16

## 2020-03-16 DIAGNOSIS — I25.810 CORONARY ARTERY DISEASE INVOLVING CORONARY BYPASS GRAFT OF NATIVE HEART WITHOUT ANGINA PECTORIS: ICD-10-CM

## 2020-03-16 DIAGNOSIS — I25.10 ASCVD (ARTERIOSCLEROTIC CARDIOVASCULAR DISEASE): ICD-10-CM

## 2020-03-16 DIAGNOSIS — E78.5 HYPERLIPIDEMIA LDL GOAL <70: ICD-10-CM

## 2020-03-16 DIAGNOSIS — Z78.9 STATIN INTOLERANCE: ICD-10-CM

## 2020-03-23 NOTE — TELEPHONE ENCOUNTER
[facility] Call Center    Phone Message    May a detailed message be left on voicemail: yes     Reason for Call: Other: Pharmacy calling because they are waiting still after placing three refill requestion for the pt Rx. Please call the pharmacy back to discuss.      Action Taken: Message routed to:  [facility] cardiology    Travel Screening: Not Applicable

## 2020-03-23 NOTE — TELEPHONE ENCOUNTER
Called and spoke with pharmacy tech who confirmed they have not seen today's prescription for Repatha that was sent.  Noted script may be held up by PA.  Will reach out to Katherin to see if medication has been released.    Elba Poole LPN

## 2020-08-07 DIAGNOSIS — N20.0 CALCULUS OF KIDNEY: ICD-10-CM

## 2020-08-07 NOTE — TELEPHONE ENCOUNTER
Verify that the refill encounter hasn't been started Yes    [facility] phone call message- patient requesting a refill:    Full Medication Name: potassium citrate (UROCIT-K) 10 MEQ (1080 MG) CR tablet     Dose:       Pharmacy confirmed as   [facility] - [place], [place] - [address]  [address] [address]  Phone: [phone] Fax: [phone]    [facility] - [place], [place] - [address]  [address] [facility] [address]  Phone: [phone] Fax: [phone]    [id number] - [place], [place] - [address] [place] [address] AT [address] [address]  Phone: [phone] Fax: [phone]  : Yes    Medication tab checked to see if medication has been sent  {NO    Additional Comments: pt request Rx refill    OK to leave a message on voice mail? Yes    Advised patient refill may take up to 2 business days? Yes    Primary language: English      needed? No    Call taken on August 7, 2020 at 4:37 PM by Pop Ibrahim    Route to [facility] MED REFILL

## 2020-08-11 RX ORDER — POTASSIUM CITRATE 10 MEQ/1
10 TABLET, EXTENDED RELEASE ORAL
Qty: 180 TABLET | Refills: 3 | Status: SHIPPED | OUTPATIENT
Start: 2020-08-11 | End: 2021-03-03

## 2020-08-11 NOTE — TELEPHONE ENCOUNTER

## 2020-11-12 ENCOUNTER — TELEPHONE (OUTPATIENT)
Dept: CARDIOLOGY | Facility: CLINIC | Age: 60
End: 2020-11-12

## 2020-11-12 DIAGNOSIS — E78.5 HYPERLIPIDEMIA LDL GOAL <70: ICD-10-CM

## 2020-11-12 DIAGNOSIS — Z78.9 STATIN INTOLERANCE: ICD-10-CM

## 2020-11-12 DIAGNOSIS — I25.10 ASCVD (ARTERIOSCLEROTIC CARDIOVASCULAR DISEASE): ICD-10-CM

## 2020-11-12 DIAGNOSIS — I25.810 CORONARY ARTERY DISEASE INVOLVING CORONARY BYPASS GRAFT OF NATIVE HEART WITHOUT ANGINA PECTORIS: ICD-10-CM

## 2020-11-12 NOTE — TELEPHONE ENCOUNTER
PA Initiation    Medication: Repatha pending (new ins)  Insurance Company: [employer] [place] - Phone [phone] Fax [phone]  Pharmacy Filling the Rx: [facility] - [place], [place] - [address] [facility] [address]  Filling Pharmacy Phone: [phone]  Filling Pharmacy Fax:    Start Date: 11/12/2020

## 2020-11-19 ENCOUNTER — TELEPHONE (OUTPATIENT)
Dept: CARDIOLOGY | Facility: CLINIC | Age: 60
End: 2020-11-19

## 2020-11-19 NOTE — TELEPHONE ENCOUNTER
[facility] Call Center    Phone Message    May a detailed message be left on voicemail: no     Reason for Call: Medication Refill Request    Has the patient contacted the pharmacy for the refill? Yes   Name of medication being requested: evolocumab (REPATHA) 140 MG/ML prefilled autoinjector  Provider who prescribed the medication: Dr. Evangelista  [facility]  Date medication is needed: Pt would need another new script in month after previous refill 11/12. Pt has scheduled appt with Dr. Evangelista on 1/21/21 for appt and needs two more extensions. Please call for questions/concerns.      Action Taken: Message routed to:  [facility]    Travel Screening: Not Applicable

## 2020-11-20 NOTE — TELEPHONE ENCOUNTER
Called and spoke with Pt.  Discussed [facility] received 90 day supply and was scheduled for delivery on 11/25.  Answered all of Pt's other questions.  Switching to telephone visit d/t no MyChart access.  Pt verbalized understanding, agreed to current plan and denied any further questions.    Elba Poole LPN

## 2020-11-25 ENCOUNTER — TELEPHONE (OUTPATIENT)
Dept: PHARMACY | Facility: CLINIC | Age: 60
End: 2020-11-25

## 2020-11-25 NOTE — TELEPHONE ENCOUNTER
PHARMACY TELEPHONE ENCOUNTER:    Reason: called to follow up on any medication question       Mathew feels his medications are well controlled and managed. He has a home blood pressure unit and has communicated these results with his cardiologist.  Cardiology visit scheduled for January.    [place] Xie Pharm.D.

## 2020-12-29 NOTE — TELEPHONE ENCOUNTER
Please call patient  OK to stop prilosec. He has been on it for 5 weeks  He has an endoscopy next week.    Cory     None

## 2021-01-18 DIAGNOSIS — Z78.9 STATIN INTOLERANCE: ICD-10-CM

## 2021-01-18 DIAGNOSIS — R73.03 BORDERLINE DIABETES MELLITUS: ICD-10-CM

## 2021-01-18 DIAGNOSIS — E78.5 HYPERLIPIDEMIA LDL GOAL <70: ICD-10-CM

## 2021-01-18 DIAGNOSIS — I25.10 CORONARY ARTERY DISEASE INVOLVING NATIVE CORONARY ARTERY OF NATIVE HEART WITHOUT ANGINA PECTORIS: ICD-10-CM

## 2021-01-18 LAB
ALBUMIN SERPL-MCNC: 3.9 G/DL (ref 3.4–5)
ALP SERPL-CCNC: 58 U/L (ref 40–150)
ALT SERPL W P-5'-P-CCNC: 30 U/L (ref 0–70)
ANION GAP SERPL CALCULATED.3IONS-SCNC: 3 MMOL/L (ref 3–14)
AST SERPL W P-5'-P-CCNC: 17 U/L (ref 0–45)
BILIRUB SERPL-MCNC: 1.4 MG/DL (ref 0.2–1.3)
BUN SERPL-MCNC: 24 MG/DL (ref 7–30)
CALCIUM SERPL-MCNC: 9 MG/DL (ref 8.5–10.1)
CHLORIDE SERPL-SCNC: 108 MMOL/L (ref 94–109)
CHOLEST SERPL-MCNC: 137 MG/DL
CO2 SERPL-SCNC: 30 MMOL/L (ref 20–32)
CREAT SERPL-MCNC: 1.06 MG/DL (ref 0.66–1.25)
GFR SERPL CREATININE-BSD FRML MDRD: 76 ML/MIN/{1.73_M2}
GLUCOSE SERPL-MCNC: 128 MG/DL (ref 70–99)
HBA1C MFR BLD: 5.9 % (ref 0–5.6)
HDLC SERPL-MCNC: 49 MG/DL
LDLC SERPL CALC-MCNC: 34 MG/DL
NONHDLC SERPL-MCNC: 88 MG/DL
POTASSIUM SERPL-SCNC: 4.1 MMOL/L (ref 3.4–5.3)
PROT SERPL-MCNC: 7.4 G/DL (ref 6.8–8.8)
SODIUM SERPL-SCNC: 141 MMOL/L (ref 133–144)
TRIGL SERPL-MCNC: 272 MG/DL

## 2021-01-18 PROCEDURE — 36415 COLL VENOUS BLD VENIPUNCTURE: CPT | Performed by: PATHOLOGY

## 2021-01-18 PROCEDURE — 80053 COMPREHEN METABOLIC PANEL: CPT | Performed by: PATHOLOGY

## 2021-01-18 PROCEDURE — 83036 HEMOGLOBIN GLYCOSYLATED A1C: CPT | Performed by: PATHOLOGY

## 2021-01-18 PROCEDURE — 80061 LIPID PANEL: CPT | Performed by: PATHOLOGY

## 2021-01-21 ENCOUNTER — VIRTUAL VISIT (OUTPATIENT)
Dept: CARDIOLOGY | Facility: CLINIC | Age: 61
End: 2021-01-21
Attending: INTERNAL MEDICINE
Payer: COMMERCIAL

## 2021-01-21 DIAGNOSIS — I25.10 ASCVD (ARTERIOSCLEROTIC CARDIOVASCULAR DISEASE): ICD-10-CM

## 2021-01-21 DIAGNOSIS — E78.5 HYPERLIPIDEMIA LDL GOAL <70: ICD-10-CM

## 2021-01-21 DIAGNOSIS — I25.810 CORONARY ARTERY DISEASE INVOLVING CORONARY BYPASS GRAFT OF NATIVE HEART WITHOUT ANGINA PECTORIS: ICD-10-CM

## 2021-01-21 DIAGNOSIS — R73.03 BORDERLINE DIABETES MELLITUS: Primary | ICD-10-CM

## 2021-01-21 DIAGNOSIS — Z78.9 STATIN INTOLERANCE: ICD-10-CM

## 2021-01-21 PROCEDURE — 99214 OFFICE O/P EST MOD 30 MIN: CPT | Mod: 95 | Performed by: INTERNAL MEDICINE

## 2021-01-21 ASSESSMENT — PATIENT HEALTH QUESTIONNAIRE - PHQ9: SUM OF ALL RESPONSES TO PHQ QUESTIONS 1-9: 10

## 2021-01-21 NOTE — LETTER
1/21/2021      RE: Hong Pool  [address] [facility] [id number]       Dear Colleague,    Thank you for the opportunity to participate in the care of your patient, Hong Pool, at the [facility] [place] [facility]. Please see a copy of my visit note below.    Mr Hong Cheung is a 60 year old who is being evaluated via a billable video visit.      How would you like to obtain your AVS? [email or web address]  If the video visit is dropped, the invitation should be resent by:  Connect via [employer]. If [employer] doesn't work call pt. [phone]   Will anyone else be joining your video visit?   Vitals - Patient Reported  Systolic (Patient Reported): 122  Diastolic (Patient Reported): 84  Weight (Patient Reported): 103.4 kg (228 lb)  Height (Patient Reported): 182.9 cm (6')  BMI (Based on Pt Reported Ht/Wt): 30.92  Pulse (Patient Reported): 72  Pain Score: No Pain (0)(No SOB)    Vitals were taken and medication reconciled.    IRINA Branch  12:06 PM      Location  Patient: home  Dr Evangelista: office [facility]      Time  Start: 12.30 pm  Stop: 1.00 pm    Video system: Kyte      HPI:       I had the privilege to evaluate and examine Mr Anant Pool, who is a 60 year old male with a history of CAD with CABG in 2012.   He has had statin intolerance (atorvastatin 80 mg , simvastatin 80 mg caused both muscle pain and consequently was unable to walk ) and lower dosage pravastatin was not tolerated.  T     Patient was started on zetia 10mg a day, however did not have profound affect on cholesterol.    Patient is now on Repatha 140  mg subcut very 2 weeks.  Patient denies chest pain, shortness of breath, palpitations, intermittent claudication.          PAST MEDICAL HISTORY:  Past Medical History:   Diagnosis Date     Borderline diabetes mellitus 11/26/2015     Coronary artery disease 2012    CABG x4     Coronary artery disease involving coronary bypass graft  of native heart without angina pectoris 11/18/2015     Depression      Goiter     With normal TSH     Hyperlipidemia     Started on statin 7/2007.  Discontinued 10/07 secondary to muscle aches and fatigue     Nephrolithiasis      Shoulder pain      Statin intolerance 6/23/2016       CURRENT MEDICATIONS:  Current Outpatient Medications   Medication Sig Dispense Refill     alpha-d-galactosidase (BEANO) tablet Take 2 tablets by mouth 3 times daily (before meals) 540 tablet 4     aspirin 81 MG EC tablet Take 1 tablet (81 mg) by mouth daily 90 tablet 4     Blood Pressure Monitor KIT 1 each daily. 1 kit 0     Cholecalciferol (VITAMIN D3) 2000 UNITS CAPS Take 1 capsule by mouth daily 90 capsule 4     evolocumab (REPATHA) 140 MG/ML prefilled autoinjector Inject 1 mL (140 mg) Subcutaneous every 14 days 6 mL 3     LACTAID FAST ACT 9000 units TABS tablet   4     Multiple Vitamins-Minerals (MENS MULTIVITAMIN PLUS) TABS Take 1 tablet by mouth daily 90 tablet 4     potassium citrate (UROCIT-K) 10 MEQ (1080 MG) CR tablet Take 1 tablet (10 mEq) by mouth 3 times daily (with meals) 180 tablet 3     pyridOXINE (VITAMIN B6) 100 MG TABS Take 1 tablet (100 mg) by mouth daily 90 tablet 4     diphenhydrAMINE-zinc acetate (BENADRYL) 1-0.1 % external cream Apply topically 3 times daily as needed for itching (Patient not taking: Reported on 1/27/2020) 28 g 0     STATIN NOT PRESCRIBED, INTENTIONAL, 1 each continuous Statin not prescribed intentionally due to Intolerance (with supporting documentation of trying a statin at least once within the last 5 years) (Patient not taking: Reported on 10/15/2018) 0 each 0       PAST SURGICAL HISTORY:  Past Surgical History:   Procedure Laterality Date     BYPASS GRAFT ARTERY CORONARY  6/24/2012    Procedure: BYPASS GRAFT ARTERY CORONARY;  Median Sternotomy, Coronary Artery Bypass Grafting x 4 using Left Internal Mammary and Left Saphenous Vein  with Endovein Harvesting. On Pump Oxygenation;  Surgeon:  Genesis Larsen MD;  Location:  OR     ESOPHAGOSCOPY, GASTROSCOPY, DUODENOSCOPY (EGD), COMBINED N/A 4/11/2017    Procedure: COMBINED ESOPHAGOSCOPY, GASTROSCOPY, DUODENOSCOPY (EGD), BIOPSY SINGLE OR MULTIPLE;  Surgeon: Corine Ly MD;  Location:  GI       ALLERGIES     Allergies   Allergen Reactions     Lidocaine Rash     Break out     Band-Aid Anti-Itch      Cholestoff Complete [Plant Sterol Stanol-Pantethine] Other (See Comments)     Severe stomach pain     Lactose Diarrhea     Rosuvastatin Muscle Pain (Myalgia)     Zocor [Simvastatin - High Dose] Other (See Comments)     Muscle aches/soreness, confusion, disorganized thinking       FAMILY HISTORY:  Family History   Problem Relation Age of Onset     C.A.D. Other         Uncle     Diabetes Brother      Cerebrovascular Disease Brother      Cardiovascular Sister         tachyarrhythmia       SOCIAL HISTORY:  Social History     Socioeconomic History     Marital status:      Spouse name: None     Number of children: None     Years of education: None     Highest education level: None   Occupational History     None   Social Needs     Financial resource strain: None     Food insecurity     Worry: None     Inability: None     Transportation needs     Medical: None     Non-medical: None   Tobacco Use     Smoking status: Never Smoker     Smokeless tobacco: Never Used   Substance and Sexual Activity     Alcohol use: No     Drug use: No     Sexual activity: Yes   Lifestyle     Physical activity     Days per week: None     Minutes per session: None     Stress: None   Relationships     Social connections     Talks on phone: None     Gets together: None     Attends Y[employer] service: None     Active member of club or organization: None     Attends meetings of clubs or organizations: None     Relationship status: None     Intimate partner violence     Fear of current or ex partner: None     Emotionally abused: None     Physically abused: None      Forced sexual activity: None   Other Topics Concern     Parent/sibling w/ CABG, MI or angioplasty before 65F 55M? Not Asked   Social History Narrative    .        ROS:   Constitutional: No fever, chills, or sweats. No weight gain/loss   ENT: No visual disturbance, ear ache, epistaxis, sore throat  Allergies/Immunologic: Negative.   Respiratory: No cough, hemoptysia  Cardiovascular: As per HPI  GI: No nausea, vomiting, hematemesis, melena, or hematochezia  : No urinary frequency, dysuria, or hematuria  Integument: Negative  Psychiatric: Negative  Neuro: Negative  Endocrinology: Negative   Musculoskeletal: Negative    EXAM:  There were no vitals taken for this visit.  Virtual visit    Labs:  LIPID RESULTS:  Lab Results   Component Value Date    CHOL 137 01/18/2021    HDL 49 01/18/2021    LDL 34 01/18/2021    TRIG 272 (H) 01/18/2021    CHOLHDLRATIO 5.1 (H) 11/24/2015    NHDL 88 01/18/2021       LIVER ENZYME RESULTS:  Lab Results   Component Value Date    AST 17 01/18/2021    ALT 30 01/18/2021       CBC RESULTS:  Lab Results   Component Value Date    WBC 4.9 06/24/2016    RBC 4.33 (L) 06/24/2016    HGB 15.7 05/25/2018    HCT 51.2 05/25/2018    .7 (H) 05/25/2018    MCH 31.5 05/25/2018    MCHC 30.7 (L) 05/25/2018    RDW 11.9 06/24/2016     (L) 06/24/2016       BMP RESULTS:  Lab Results   Component Value Date     01/18/2021    POTASSIUM 4.1 01/18/2021    CHLORIDE 108 01/18/2021    CO2 30 01/18/2021    ANIONGAP 3 01/18/2021     (H) 01/18/2021    BUN 24 01/18/2021    CR 1.06 01/18/2021    GFRESTIMATED 76 01/18/2021    GFRESTBLACK 88 01/18/2021    TYSON 9.0 01/18/2021        A1C RESULTS:  Lab Results   Component Value Date    A1C 5.9 (H) 01/18/2021       INR RESULTS:  Lab Results   Component Value Date    INR 1.53 (H) 06/24/2012    INR 1.73 (H) 06/24/2012       Procedures:      Assessment and Plan:     We disucssed the results with patient.  We discussed the importance of a heart healthy diet  and lifestyle.  We discusse following items:    Medication Changes: None.     Recommendations: Repeat fasting labs in one year prior to seeing Dr Evangelista.     .  The results from today include: Labs.     Please follow up: With Dr. Evangelista in one year.       Jaylan Evangelista MD, PhD  Professor of Medicine  Division of Cardiology  CC  Patient Care Team:  Jorge Singleton MD as PCP - General (General Surgery)  Jaylan Evangelista MD as MD (Cardiology)  Rebeca Dunn MD as MD (Ophthalmology)  Corine Ly MD as MD (Internal Medicine)  Claudio Chavez MD as MD (Urology)  Carolyn Christiansen, RN as Registered Nurse (Urology)  Elba Poole LPN as Clinic Care Coordinator (Cardiology)  Jorge Singleton MD as Assigned PCP  Jaylan Evangelista MD as Assigned Heart and Vascular Provider  [place] as Pharmacist (Pharmacist)  JAYLAN EVANGELISTA      Please do not hesitate to contact me if you have any questions/concerns.     Sincerely,     Jaylan Evangelista MD

## 2021-01-21 NOTE — PROGRESS NOTES
Mr Hong Cheung is a 60 year old who is being evaluated via a billable video visit.      How would you like to obtain your AVS? [email or web address]  If the video visit is dropped, the invitation should be resent by:  Connect via [employer]. If [employer] doesn't work call pt. [phone]   Will anyone else be joining your video visit?   Vitals - Patient Reported  Systolic (Patient Reported): 122  Diastolic (Patient Reported): 84  Weight (Patient Reported): 103.4 kg (228 lb)  Height (Patient Reported): 182.9 cm (6')  BMI (Based on Pt Reported Ht/Wt): 30.92  Pulse (Patient Reported): 72  Pain Score: No Pain (0)(No SOB)    Vitals were taken and medication reconciled.    IRINA Branch  12:06 PM      Location  Patient: home  Dr Evangelista: office [facility]      Time  Start: 12.30 pm  Stop: 1.00 pm    Video system: [employer]      HPI:       I had the privilege to evaluate and examine Mr Anant Pool, who is a 60 year old male with a history of CAD with CABG in 2012.   He has had statin intolerance (atorvastatin 80 mg , simvastatin 80 mg caused both muscle pain and consequently was unable to walk ) and lower dosage pravastatin was not tolerated.  T     Patient was started on zetia 10mg a day, however did not have profound affect on cholesterol.    Patient is now on Repatha 140  mg subcut very 2 weeks.  Patient denies chest pain, shortness of breath, palpitations, intermittent claudication.          PAST MEDICAL HISTORY:  Past Medical History:   Diagnosis Date     Borderline diabetes mellitus 11/26/2015     Coronary artery disease 2012    CABG x4     Coronary artery disease involving coronary bypass graft of native heart without angina pectoris 11/18/2015     Depression      Goiter     With normal TSH     Hyperlipidemia     Started on statin 7/2007.  Discontinued 10/07 secondary to muscle aches and fatigue     Nephrolithiasis      Shoulder pain      Statin intolerance 6/23/2016       CURRENT MEDICATIONS:  Current Outpatient Medications   Medication  Sig Dispense Refill     alpha-d-galactosidase (BEANO) tablet Take 2 tablets by mouth 3 times daily (before meals) 540 tablet 4     aspirin 81 MG EC tablet Take 1 tablet (81 mg) by mouth daily 90 tablet 4     Blood Pressure Monitor KIT 1 each daily. 1 kit 0     Cholecalciferol (VITAMIN D3) 2000 UNITS CAPS Take 1 capsule by mouth daily 90 capsule 4     evolocumab (REPATHA) 140 MG/ML prefilled autoinjector Inject 1 mL (140 mg) Subcutaneous every 14 days 6 mL 3     LACTAID FAST ACT 9000 units TABS tablet   4     Multiple Vitamins-Minerals (MENS MULTIVITAMIN PLUS) TABS Take 1 tablet by mouth daily 90 tablet 4     potassium citrate (UROCIT-K) 10 MEQ (1080 MG) CR tablet Take 1 tablet (10 mEq) by mouth 3 times daily (with meals) 180 tablet 3     pyridOXINE (VITAMIN B6) 100 MG TABS Take 1 tablet (100 mg) by mouth daily 90 tablet 4     diphenhydrAMINE-zinc acetate (BENADRYL) 1-0.1 % external cream Apply topically 3 times daily as needed for itching (Patient not taking: Reported on 1/27/2020) 28 g 0     STATIN NOT PRESCRIBED, INTENTIONAL, 1 each continuous Statin not prescribed intentionally due to Intolerance (with supporting documentation of trying a statin at least once within the last 5 years) (Patient not taking: Reported on 10/15/2018) 0 each 0       PAST SURGICAL HISTORY:  Past Surgical History:   Procedure Laterality Date     BYPASS GRAFT ARTERY CORONARY  6/24/2012    Procedure: BYPASS GRAFT ARTERY CORONARY;  Median Sternotomy, Coronary Artery Bypass Grafting x 4 using Left Internal Mammary and Left Saphenous Vein  with Endovein Harvesting. On Pump Oxygenation;  Surgeon: Genesis Larsen MD;  Location:  OR     ESOPHAGOSCOPY, GASTROSCOPY, DUODENOSCOPY (EGD), COMBINED N/A 4/11/2017    Procedure: COMBINED ESOPHAGOSCOPY, GASTROSCOPY, DUODENOSCOPY (EGD), BIOPSY SINGLE OR MULTIPLE;  Surgeon: Corine Ly MD;  Location:  GI       ALLERGIES     Allergies   Allergen Reactions     Lidocaine Rash     Break  out     Band-Aid Anti-Itch      Cholestoff Complete [Plant Sterol Stanol-Pantethine] Other (See Comments)     Severe stomach pain     Lactose Diarrhea     Rosuvastatin Muscle Pain (Myalgia)     Zocor [Simvastatin - High Dose] Other (See Comments)     Muscle aches/soreness, confusion, disorganized thinking       FAMILY HISTORY:  Family History   Problem Relation Age of Onset     C.A.D. Other         Uncle     Diabetes Brother      Cerebrovascular Disease Brother      Cardiovascular Sister         tachyarrhythmia       SOCIAL HISTORY:  Social History     Socioeconomic History     Marital status:      Spouse name: None     Number of children: None     Years of education: None     Highest education level: None   Occupational History     None   Social Needs     Financial resource strain: None     Food insecurity     Worry: None     Inability: None     Transportation needs     Medical: None     Non-medical: None   Tobacco Use     Smoking status: Never Smoker     Smokeless tobacco: Never Used   Substance and Sexual Activity     Alcohol use: No     Drug use: No     Sexual activity: Yes   Lifestyle     Physical activity     Days per week: None     Minutes per session: None     Stress: None   Relationships     Social connections     Talks on phone: None     Gets together: None     Attends [employer] service: None     Active member of club or organization: None     Attends meetings of clubs or organizations: None     Relationship status: None     Intimate partner violence     Fear of current or ex partner: None     Emotionally abused: None     Physically abused: None     Forced sexual activity: None   Other Topics Concern     Parent/sibling w/ CABG, MI or angioplasty before 65F 55M? Not Asked   Social History Narrative    .        ROS:   Constitutional: No fever, chills, or sweats. No weight gain/loss   ENT: No visual disturbance, ear ache, epistaxis, sore throat  Allergies/Immunologic: Negative.   Respiratory: No  cough, hemoptysia  Cardiovascular: As per HPI  GI: No nausea, vomiting, hematemesis, melena, or hematochezia  : No urinary frequency, dysuria, or hematuria  Integument: Negative  Psychiatric: Negative  Neuro: Negative  Endocrinology: Negative   Musculoskeletal: Negative    EXAM:  There were no vitals taken for this visit.  Virtual visit    Labs:  LIPID RESULTS:  Lab Results   Component Value Date    CHOL 137 01/18/2021    HDL 49 01/18/2021    LDL 34 01/18/2021    TRIG 272 (H) 01/18/2021    CHOLHDLRATIO 5.1 (H) 11/24/2015    NHDL 88 01/18/2021       LIVER ENZYME RESULTS:  Lab Results   Component Value Date    AST 17 01/18/2021    ALT 30 01/18/2021       CBC RESULTS:  Lab Results   Component Value Date    WBC 4.9 06/24/2016    RBC 4.33 (L) 06/24/2016    HGB 15.7 05/25/2018    HCT 51.2 05/25/2018    .7 (H) 05/25/2018    MCH 31.5 05/25/2018    MCHC 30.7 (L) 05/25/2018    RDW 11.9 06/24/2016     (L) 06/24/2016       BMP RESULTS:  Lab Results   Component Value Date     01/18/2021    POTASSIUM 4.1 01/18/2021    CHLORIDE 108 01/18/2021    CO2 30 01/18/2021    ANIONGAP 3 01/18/2021     (H) 01/18/2021    BUN 24 01/18/2021    CR 1.06 01/18/2021    GFRESTIMATED 76 01/18/2021    GFRESTBLACK 88 01/18/2021    TYSON 9.0 01/18/2021        A1C RESULTS:  Lab Results   Component Value Date    A1C 5.9 (H) 01/18/2021       INR RESULTS:  Lab Results   Component Value Date    INR 1.53 (H) 06/24/2012    INR 1.73 (H) 06/24/2012       Procedures:      Assessment and Plan:     We disucssed the results with patient.  We discussed the importance of a heart healthy diet and lifestyle.  We discusse following items:    Medication Changes: None.     Recommendations: Repeat fasting labs in one year prior to seeing Dr Evangelista.     .  The results from today include: Labs.     Please follow up: With Dr. Evangelista in one year.       Jaylan Evangelista MD, PhD  Professor of Medicine  Division of Cardiology  CC  Patient Care  Team:  Jorge Singleton MD as PCP - General (General Surgery)  Jaylan Evangelista MD as MD (Cardiology)  Rebeca Dunn MD as MD (Ophthalmology)  Corine Ly MD as MD (Internal Medicine)  Claudio Chavez MD as MD (Urology)  Carolyn Christiansen, RN as Registered Nurse (Urology)  Elba Poole LPN as Clinic Care Coordinator (Cardiology)  Jorge Singleton MD as Assigned PCP  Jaylan Evangelista MD as Assigned Heart and Vascular Provider  [place] as Pharmacist (Pharmacist)  JAYLAN EVANGELISTA

## 2021-01-21 NOTE — PATIENT INSTRUCTIONS
Patient Instructions:  It was a pleasure to see you in the cardiology clinic today.      If you have any questions, you can reach my nurse, Elba TEJEDA LPN, at [phone].  Press Option #1 for the [facility], and then press Option #4 for nursing.    We are encouraging the use of [employer] to communicate with your HealthCare Provider    Medication Changes: None.    Recommendations: Repeat fasting labs in one year prior to seeing Dr Evangelista.    Studies Ordered: None.    The results from today include: Labs.    Please follow up: With Dr. Evangelista in one year.    Sincerely,    Jaylan Evangelista MD     If you have an urgent need after hours (8:00 am to 4:30 pm) please call [phone] and ask for the cardiology fellow on call.

## 2021-01-30 ENCOUNTER — HEALTH MAINTENANCE LETTER (OUTPATIENT)
Age: 61
End: 2021-01-30

## 2021-03-01 DIAGNOSIS — N20.0 CALCULUS OF KIDNEY: ICD-10-CM

## 2021-03-01 NOTE — TELEPHONE ENCOUNTER

## 2021-03-03 RX ORDER — POTASSIUM CITRATE 10 MEQ/1
10 TABLET, EXTENDED RELEASE ORAL
Qty: 180 TABLET | Refills: 3 | Status: SHIPPED | OUTPATIENT
Start: 2021-03-03 | End: 2021-09-28

## 2021-03-04 ENCOUNTER — MYC MEDICAL ADVICE (OUTPATIENT)
Dept: FAMILY MEDICINE | Facility: CLINIC | Age: 61
End: 2021-03-04

## 2021-03-04 DIAGNOSIS — R68.84 JAW PAIN: Primary | ICD-10-CM

## 2021-03-08 NOTE — TELEPHONE ENCOUNTER
FUTURE VISIT INFORMATION      FUTURE VISIT INFORMATION:    Date: 4/30/21    Time: 10:30 AM    Location: [facility]  REFERRAL INFORMATION:    Referring provider:  Dr. Jorge Singleton    Referring providers clinic:  [facility]    Reason for visit/diagnosis: Jaw Pain with Facial swelling, not dental related    RECORDS REQUESTED FROM:       Clinic name Comments Records Status Imaging Status   [facility] 3/4/21 - Mychart Referral from Dr. Singleton [facility]          [facility]   Fax: [phone] 3/2/21 - OV with Dr. GONZALEZ 3/24 Sent to Scan                        * 3/8/21 8:25 AM faxed req to [facility] for records. - Katie  * 3/24/21 7:48 AM Faxed 2nd urg req to [facility] for records to be faxed to clinic. - Katie  * 3/24/21 10:31 AM Records received from [facility] and sent to HIM to be scanned into the chart. - Katie

## 2021-03-22 ENCOUNTER — TELEPHONE (OUTPATIENT)
Dept: CARDIOLOGY | Facility: CLINIC | Age: 61
End: 2021-03-22

## 2021-03-22 NOTE — TELEPHONE ENCOUNTER
[facility] Call Center    Phone Message    May a detailed message be left on voicemail: yes     Reason for Call: Medication Question or concern regarding medication   Prescription Clarification  Name of Medication: evolocumab (REPATHA) 140 MG/ML prefilled autoinjector  Prescribing Provider: Dr. Evangelista   Pharmacy: NA   What on the order needs clarification? Mathew is wondering if he should stop taking the repatha temporarily when he gets his Covid Vaccination as he read somewhere that it may cause a reaction with the vaccine.  Please call him back to discuss.          Action Taken: Message routed to:  [facility]  Cardiology    Travel Screening: Not Applicable

## 2021-03-24 ENCOUNTER — TRANSFERRED RECORDS (OUTPATIENT)
Dept: HEALTH INFORMATION MANAGEMENT | Facility: CLINIC | Age: 61
End: 2021-03-24

## 2021-03-26 NOTE — TELEPHONE ENCOUNTER
Called and left  for Pt.  Noted per Dr Evangelista that Pt should be fine continuing PCSK9 with COVID vaccine, but if he would like to hold temporarily that would be ok.  Requested a return call to clinic to discuss further if needed.  Clinic telephone provided.    Elba Poole LPN

## 2021-03-30 ENCOUNTER — DOCUMENTATION ONLY (OUTPATIENT)
Dept: CARE COORDINATION | Facility: CLINIC | Age: 61
End: 2021-03-30

## 2021-03-31 ENCOUNTER — IMMUNIZATION (OUTPATIENT)
Dept: NURSING | Facility: CLINIC | Age: 61
End: 2021-03-31
Payer: COMMERCIAL

## 2021-03-31 PROCEDURE — 91300 PR COVID VAC PFIZER DIL RECON 30 MCG/0.3 ML IM: CPT

## 2021-03-31 PROCEDURE — 0001A PR COVID VAC PFIZER DIL RECON 30 MCG/0.3 ML IM: CPT

## 2021-04-06 NOTE — TELEPHONE ENCOUNTER
Prior Authorization Approval    Authorization Effective Date: 11/12/2020  Authorization Expiration Date: 11/12/2021  Medication: Repatha approved  Approved Dose/Quantity: 2 for 28  Reference #: [id number]   Insurance Company: [employer] [place] - Phone [phone] Fax [phone]  Expected CoPay: $5     CoPay Card Available: Yes    Foundation Assistance Needed:    Which Pharmacy is filling the prescription (Not needed for infusion/clinic administered): [facility] - [place], [place] - [address]  Pharmacy Notified: Yes  Patient Notified: Yes

## 2021-04-21 ENCOUNTER — IMMUNIZATION (OUTPATIENT)
Dept: NURSING | Facility: CLINIC | Age: 61
End: 2021-04-21
Attending: INTERNAL MEDICINE
Payer: COMMERCIAL

## 2021-04-21 PROCEDURE — 91300 PR COVID VAC PFIZER DIL RECON 30 MCG/0.3 ML IM: CPT

## 2021-04-21 PROCEDURE — 0002A PR COVID VAC PFIZER DIL RECON 30 MCG/0.3 ML IM: CPT

## 2021-04-28 NOTE — PATIENT INSTRUCTIONS
1. You were seen in the ENT Clinic today by Dr. Plascencia.  If you have any questions or concerns after your appointment, please call   - Option 1: ENT Clinic: [phone]   - Option 2: Jerrica (Dr. Plascencia's Nurse): [phone]    2.  CT scan of temporal bones. You can contact radiology at [phone] to schedule.    3. CT scan of sinuses you can contact radiology as above to schedule    4. TMJ clinic referral. They will contact you to schedule.    5. Return to clinic once the above have all been completed.    Jerrica Martinez, LPN  [facility] - Otolaryngology    The patient presents with a history of facial pain that began with an initial event of facial swelling, fever and dizziness. He will be referred for a CT scan of the temporal bones and a CT scan of the sinuses as well as a temporomandibular joint disorder evaluation with our temporomandibular joint disorder specialists in the Dental Department. He will be seen again after these evaluations are completed.

## 2021-04-30 ENCOUNTER — OFFICE VISIT (OUTPATIENT)
Dept: OTOLARYNGOLOGY | Facility: CLINIC | Age: 61
End: 2021-04-30
Attending: FAMILY MEDICINE
Payer: COMMERCIAL

## 2021-04-30 ENCOUNTER — PRE VISIT (OUTPATIENT)
Dept: OTOLARYNGOLOGY | Facility: CLINIC | Age: 61
End: 2021-04-30

## 2021-04-30 VITALS
HEART RATE: 63 BPM | TEMPERATURE: 97.4 F | HEIGHT: 72 IN | WEIGHT: 222.66 LBS | OXYGEN SATURATION: 100 % | SYSTOLIC BLOOD PRESSURE: 151 MMHG | DIASTOLIC BLOOD PRESSURE: 78 MMHG | RESPIRATION RATE: 13 BRPM | BODY MASS INDEX: 30.16 KG/M2

## 2021-04-30 DIAGNOSIS — R51.9 FACIAL PAIN: ICD-10-CM

## 2021-04-30 DIAGNOSIS — J32.0 CHRONIC MAXILLARY SINUSITIS: Primary | ICD-10-CM

## 2021-04-30 DIAGNOSIS — M26.609 TMJ (TEMPOROMANDIBULAR JOINT SYNDROME): ICD-10-CM

## 2021-04-30 PROCEDURE — 99203 OFFICE O/P NEW LOW 30 MIN: CPT | Performed by: OTOLARYNGOLOGY

## 2021-04-30 RX ORDER — SACCHAROMYCES BOULARDII 250 MG
250 CAPSULE ORAL 2 TIMES DAILY
COMMUNITY
End: 2021-12-08

## 2021-04-30 RX ORDER — AMOXICILLIN 500 MG/1
CAPSULE ORAL
COMMUNITY
Start: 2021-02-16 | End: 2021-04-30

## 2021-04-30 ASSESSMENT — PATIENT HEALTH QUESTIONNAIRE - PHQ9: SUM OF ALL RESPONSES TO PHQ QUESTIONS 1-9: 11

## 2021-04-30 ASSESSMENT — MIFFLIN-ST. JEOR: SCORE: 1858

## 2021-04-30 ASSESSMENT — PAIN SCALES - GENERAL: PAINLEVEL: MILD PAIN (2)

## 2021-04-30 NOTE — PROGRESS NOTES
The patient presents with a history of facial pain associated with facial swelling on the left side of the face. This began in the past few months, although he has experienced sinus infections in the past. He reports pain and swelling radiating into the temporal area and into the upper neck. The patient denies nasal obstruction or purulent nasal discharge. The patient denies chronic or recurrent tonsillitis, chronic or recurrent pharyngitis, but he reports soreness of his throat. The patient denies reports that during his initial event of facial pain this winter, he also developed a mild fever and dizziness. He was evaluated by a dental specialist and initially recommended to have root canal on an upper left tooth. However, a second specialist could not identify a cause for his pain and did not recommend this surgery.       This patient is seen in consultation at the request of Dr. Jorge Singleton.     All other systems were reviewed and they are either negative or they are not directly pertinent to this Otolaryngology examination.      Past Medical History:    Past Medical History:   Diagnosis Date     Borderline diabetes mellitus 11/26/2015     Coronary artery disease 2012    CABG x4     Coronary artery disease involving coronary bypass graft of native heart without angina pectoris 11/18/2015     Depression      Goiter     With normal TSH     Hyperlipidemia     Started on statin 7/2007.  Discontinued 10/07 secondary to muscle aches and fatigue     Nephrolithiasis      Shoulder pain      Statin intolerance 6/23/2016       Past Surgical History:    Past Surgical History:   Procedure Laterality Date     BYPASS GRAFT ARTERY CORONARY  6/24/2012    Procedure: BYPASS GRAFT ARTERY CORONARY;  Median Sternotomy, Coronary Artery Bypass Grafting x 4 using Left Internal Mammary and Left Saphenous Vein  with Endovein Harvesting. On Pump Oxygenation;  Surgeon: Genesis Larsen MD;  [facility]     ESOPHAGOSCOPY, GASTROSCOPY,  DUODENOSCOPY (EGD), COMBINED N/A 4/11/2017    Procedure: COMBINED ESOPHAGOSCOPY, GASTROSCOPY, DUODENOSCOPY (EGD), BIOPSY SINGLE OR MULTIPLE;  Surgeon: Corine Ly MD;  Location:  GI       Medications:      Current Outpatient Medications:      alpha-d-galactosidase (BEANO) tablet, Take 2 tablets by mouth 3 times daily (before meals), Disp: 540 tablet, Rfl: 4     aspirin 81 MG EC tablet, Take 1 tablet (81 mg) by mouth daily, Disp: 90 tablet, Rfl: 4     Blood Pressure Monitor KIT, 1 each daily., Disp: 1 kit, Rfl: 0     Cholecalciferol (VITAMIN D3) 2000 UNITS CAPS, Take 1 capsule by mouth daily, Disp: 90 capsule, Rfl: 4     evolocumab (REPATHA) 140 MG/ML prefilled autoinjector, Inject 1 mL (140 mg) Subcutaneous every 14 days, Disp: 6 mL, Rfl: 3     LACTAID FAST ACT 9000 units TABS tablet, , Disp: , Rfl: 4     Multiple Vitamins-Minerals (MENS MULTIVITAMIN PLUS) TABS, Take 1 tablet by mouth daily, Disp: 90 tablet, Rfl: 4     potassium citrate (UROCIT-K) 10 MEQ (1080 MG) CR tablet, Take 1 tablet (10 mEq) by mouth 3 times daily (with meals), Disp: 180 tablet, Rfl: 3     pyridOXINE (VITAMIN B6) 100 MG TABS, Take 1 tablet (100 mg) by mouth daily, Disp: 90 tablet, Rfl: 4     saccharomyces boulardii (FLORASTOR) 250 MG capsule, Take 250 mg by mouth 2 times daily, Disp: , Rfl:      STATIN NOT PRESCRIBED, INTENTIONAL,, 1 each continuous Statin not prescribed intentionally due to Intolerance (with supporting documentation of trying a statin at least once within the last 5 years), Disp: 0 each, Rfl: 0    Allergies:    Lidocaine, Band-aid anti-itch, Cholestoff complete [plant sterol stanol-pantethine], Lactose, Rosuvastatin, and Zocor [simvastatin - high dose]    Physical Examination:    The patient is a well developed, well nourished male in no apparent distress.  He is normocepahlic, atraumatic with pupils equally round and reactive to light.    Oral Cavity Examination: Normal Mucosa with no masses or  lesions  Nasal Examination: Normal Mucosa with no masses or lesions  Ear Examination: Ear canals clear, tympanic membranes and middle ear spaces normal  Neurological Examination: Facial nerve function intact and symmetric  Integumentary Examination: No lesions on the skin of the head or neck  Neck Examination: No masses or lesions, no lymphadenopathy  Endocrine Examination: Normal thyroid examination  Temporomandibular Joint Examination: Malrotation of the temporomandibular joints bilaterally.     Assessment and Plan:    The patient presents with a history of facial pain that began with an initial event of facial swelling, fever and dizziness. He will be referred for a CT scan of the temporal bones and a CT scan of the sinuses as well as a temporomandibular joint disorder evaluation with our temporomandibular joint disorder specialists in the Dental Department. He will be seen again after these evaluations are completed.     CC: Dr. Jorge Singleton

## 2021-04-30 NOTE — LETTER
4/30/2021        RE: Hong Pool  [address] [facility] [id number]     Dear Colleague,    Thank you for referring your patient, Hong Pool, to the [facility] [place] at [facility]. Please see a copy of my visit note below.    The patient presents with a history of facial pain associated with facial swelling on the left side of the face. This began in the past few months, although he has experienced sinus infections in the past. He reports pain and swelling radiating into the temporal area and into the upper neck. The patient denies nasal obstruction or purulent nasal discharge. The patient denies chronic or recurrent tonsillitis, chronic or recurrent pharyngitis, but he reports soreness of his throat. The patient denies reports that during his initial event of facial pain this winter, he also developed a mild fever and dizziness. He was evaluated by a dental specialist and initially recommended to have root canal on an upper left tooth. However, a second specialist could not identify a cause for his pain and did not recommend this surgery.       This patient is seen in consultation at the request of Dr. Jorge Singleton.     All other systems were reviewed and they are either negative or they are not directly pertinent to this Otolaryngology examination.      Past Medical History:    Past Medical History:   Diagnosis Date     Borderline diabetes mellitus 11/26/2015     Coronary artery disease 2012    CABG x4     Coronary artery disease involving coronary bypass graft of native heart without angina pectoris 11/18/2015     Depression      Goiter     With normal TSH     Hyperlipidemia     Started on statin 7/2007.  Discontinued 10/07 secondary to muscle aches and fatigue     Nephrolithiasis      Shoulder pain      Statin intolerance 6/23/2016       Past Surgical History:    Past Surgical History:   Procedure  Laterality Date     BYPASS GRAFT ARTERY CORONARY  6/24/2012    Procedure: BYPASS GRAFT ARTERY CORONARY;  Median Sternotomy, Coronary Artery Bypass Grafting x 4 using Left Internal Mammary and Left Saphenous Vein  with Endovein Harvesting. On Pump Oxygenation;  Surgeon: Genesis Larsen MD;  Location:  OR     ESOPHAGOSCOPY, GASTROSCOPY, DUODENOSCOPY (EGD), COMBINED N/A 4/11/2017    Procedure: COMBINED ESOPHAGOSCOPY, GASTROSCOPY, DUODENOSCOPY (EGD), BIOPSY SINGLE OR MULTIPLE;  Surgeon: Corine Ly MD;  Location:  GI       Medications:      Current Outpatient Medications:      alpha-d-galactosidase (BEANO) tablet, Take 2 tablets by mouth 3 times daily (before meals), Disp: 540 tablet, Rfl: 4     aspirin 81 MG EC tablet, Take 1 tablet (81 mg) by mouth daily, Disp: 90 tablet, Rfl: 4     Blood Pressure Monitor KIT, 1 each daily., Disp: 1 kit, Rfl: 0     Cholecalciferol (VITAMIN D3) 2000 UNITS CAPS, Take 1 capsule by mouth daily, Disp: 90 capsule, Rfl: 4     evolocumab (REPATHA) 140 MG/ML prefilled autoinjector, Inject 1 mL (140 mg) Subcutaneous every 14 days, Disp: 6 mL, Rfl: 3     LACTAID FAST ACT 9000 units TABS tablet, , Disp: , Rfl: 4     Multiple Vitamins-Minerals (MENS MULTIVITAMIN PLUS) TABS, Take 1 tablet by mouth daily, Disp: 90 tablet, Rfl: 4     potassium citrate (UROCIT-K) 10 MEQ (1080 MG) CR tablet, Take 1 tablet (10 mEq) by mouth 3 times daily (with meals), Disp: 180 tablet, Rfl: 3     pyridOXINE (VITAMIN B6) 100 MG TABS, Take 1 tablet (100 mg) by mouth daily, Disp: 90 tablet, Rfl: 4     saccharomyces boulardii (FLORASTOR) 250 MG capsule, Take 250 mg by mouth 2 times daily, Disp: , Rfl:      STATIN NOT PRESCRIBED, INTENTIONAL,, 1 each continuous Statin not prescribed intentionally due to Intolerance (with supporting documentation of trying a statin at least once within the last 5 years), Disp: 0 each, Rfl: 0    Allergies:    Lidocaine, Band-aid anti-itch, Cholestoff complete [plant  sterol stanol-pantethine], Lactose, Rosuvastatin, and Zocor [simvastatin - high dose]    Physical Examination:    The patient is a well developed, well nourished male in no apparent distress.  He is normocepahlic, atraumatic with pupils equally round and reactive to light.    Oral Cavity Examination: Normal Mucosa with no masses or lesions  Nasal Examination: Normal Mucosa with no masses or lesions  Ear Examination: Ear canals clear, tympanic membranes and middle ear spaces normal  Neurological Examination: Facial nerve function intact and symmetric  Integumentary Examination: No lesions on the skin of the head or neck  Neck Examination: No masses or lesions, no lymphadenopathy  Endocrine Examination: Normal thyroid examination  Temporomandibular Joint Examination: Malrotation of the temporomandibular joints bilaterally.     Assessment and Plan:    The patient presents with a history of facial pain that began with an initial event of facial swelling, fever and dizziness. He will be referred for a CT scan of the temporal bones and a CT scan of the sinuses as well as a temporomandibular joint disorder evaluation with our temporomandibular joint disorder specialists in the Dental Department. He will be seen again after these evaluations are completed.     CC: Dr. Jorge Singleton        Again, thank you for allowing me to participate in the care of your patient.      Sincerely,    Benton Plascencia MD

## 2021-04-30 NOTE — NURSING NOTE
Chief Complaint   Patient presents with     Consult     facial swelling      Blood pressure (!) 151/78, pulse 63, temperature 97.4  F (36.3  C), resp. rate 13, height 1.829 m (6'), weight 101 kg (222 lb 10.6 oz), SpO2 100 %.    Nguyễn Fairchild LPN

## 2021-05-06 ENCOUNTER — ANCILLARY PROCEDURE (OUTPATIENT)
Dept: CT IMAGING | Facility: CLINIC | Age: 61
End: 2021-05-06
Attending: OTOLARYNGOLOGY
Payer: COMMERCIAL

## 2021-05-06 DIAGNOSIS — J32.0 CHRONIC MAXILLARY SINUSITIS: ICD-10-CM

## 2021-05-06 PROCEDURE — 70480 CT ORBIT/EAR/FOSSA W/O DYE: CPT | Performed by: RADIOLOGY

## 2021-05-06 PROCEDURE — 70486 CT MAXILLOFACIAL W/O DYE: CPT | Performed by: RADIOLOGY

## 2021-05-25 ENCOUNTER — TELEPHONE (OUTPATIENT)
Dept: OTOLARYNGOLOGY | Facility: CLINIC | Age: 61
End: 2021-05-25

## 2021-05-28 NOTE — TELEPHONE ENCOUNTER
FUTURE VISIT INFORMATION      FUTURE VISIT INFORMATION:    Date: 6/28/2021    Time: 1:30PM    Location: [facility]  REFERRAL INFORMATION:    Referring provider:  Dr Benton Plascencia    Referring providers clinic:  [facility] [place]     Reason for visit/diagnosis  SSCD- Referred by Dr. Plascencia. Send link to [email or web address]    RECORDS REQUESTED FROM:       Clinic name Comments Records Status Imaging Status   [facility] [place]  6/25/2021 Audio and VEMP (sched)  4/30/2021 note from Dr Plascencia  Epic    Imaging 5/6/2021 CT Temporal Bone   5/6/2021 CT Sinus  [facility] PACS

## 2021-06-21 DIAGNOSIS — R42 DIZZINESS: Primary | ICD-10-CM

## 2021-06-24 NOTE — PATIENT INSTRUCTIONS
1. You were seen in the ENT Clinic today by Dr. Palumbo.  If you have any questions or concerns after your appointment, please call   - Option 1: ENT Clinic: [phone]   - Option 2: Jerrica (Dr. Palumbo's Nurse): [phone]                   Rupali(Dr. Palumbo's Nurse): [phone]    2.   Plan to return to clinic as needed    Jerrica Martinez LPN  [facility] - Otol[facility]ology

## 2021-06-25 ENCOUNTER — OFFICE VISIT (OUTPATIENT)
Dept: AUDIOLOGY | Facility: CLINIC | Age: 61
End: 2021-06-25
Payer: COMMERCIAL

## 2021-06-25 DIAGNOSIS — H83.8X2 SUPERIOR SEMICIRCULAR CANAL DEHISCENCE OF LEFT EAR: Primary | ICD-10-CM

## 2021-06-25 DIAGNOSIS — H90.12 CONDUCTIVE HEARING LOSS OF LEFT EAR WITH UNRESTRICTED HEARING OF RIGHT EAR: Primary | ICD-10-CM

## 2021-06-25 PROCEDURE — 92519 VEMP TST I&R CERVICAL&OCULAR: CPT | Performed by: AUDIOLOGIST

## 2021-06-25 PROCEDURE — 92565 STENGER TEST PURE TONE: CPT | Performed by: AUDIOLOGIST

## 2021-06-25 PROCEDURE — 92557 COMPREHENSIVE HEARING TEST: CPT | Performed by: AUDIOLOGIST

## 2021-06-25 PROCEDURE — 92550 TYMPANOMETRY & REFLEX THRESH: CPT | Performed by: AUDIOLOGIST

## 2021-06-25 NOTE — Clinical Note
Hi Dr. Palumbo,   Hearing test and VEMP testing today are consistent with left SSCD. See my note/audio for details.    Thanks,  Rafiq

## 2021-06-25 NOTE — PROGRESS NOTES
AUDIOLOGY REPORT    SUBJECTIVE: Hong Pool was seen in the Audiology Clinic at the [facility] [place] on 6/25/2021 for a vestibular evoked myogenic potential (VEMP) evaluation, referred by Denisa Palumbo M.D. Hong reports left aural pressure, facial swelling and pain and fever in February 2021. Symptoms reportedly resolved after treatment with antibiotics, with the exception of a toothache on the left side.     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 Patient denies any dizziness or balance concerns. He denies any dizziness triggered by loud sounds, pressure changes or exertion. He denies concerns with hearing loss, autophony, or hearing eye movements/blinking. He reports occasionally hearing his heartbeat in both ears when doing strenuous exercise. He reports one fall in the past year, while walking on sidewalk in the winter, with a resulting ankle sprain.    Hearing evaluation completed today revealed normal hearing in the right ear and mild low frequency conductive hearing loss rising to normal hearing in the left ear. Patient denies any recent ear pain, drainage, tinnitus, and history of ear surgeries.    Hong reports occasional headaches, which can be triggered by busy visual patterns. He denies any history of migraines, or associated visual aura, light/sound sensitivity, or nausea with his headaches. He denies history of eye surgeries or vision concerns, head trauma, TMJ,  neurological concerns, or cancer/chemotherapy. Other medical conditions of note: history of kidney stones, quadruple bipass surgery, and lipoma removed from his cheek many years ago.      OBJECTIVE:   Abuse Screening:  Do you feel unsafe at home or work/school? No  Do you feel threatened by someone? No  Does anyone try to keep you from having contact  with others, or doing things outside of your home? No  Physical signs of abuse present? No    VEMP testing is performed using an auditory evoked potentials system. Surface electrodes are placed in several locations on the patient's head and neck in order to record muscle motoneuron activity in response to loud auditory stimuli. This testing assesses very specific vestibular pathways in the inner ear and central nervous system. cVEMP testing is performed with electrodes placed on the patient's sternocleidomastoid muscle, and is used to assess the saccular organ, afferent inferior vestibular nerve and vestibular nuclei within the brainstem. oVEMP testing is performed with electrodes placed under the patient's eyes, and is used to assess the utricle and afferent superior vestibular nerve and nuclei within the brainstem.  Patients that may benefit from this procedure are those with complaints of vertigo and imbalance or those suspected of superior canal dehiscence. A total of 90 minutes was spent completing the VEMP testing today.    Tympanograms : Performed during hearing test prior to VEMP testing     RIGHT: normal eardrum mobility bilaterally.     LEFT: normal eardrum mobility bilaterally    cVEMP Thresholds via 500 Hz toneburst:    RIGHT: 97 dB nHL which  suggest normal saccular and inferior vestibular nerve function    LEFT: 60 dB nHL which  indicate abnormal saccular or inferior vestibular nerve function    AMPLITUDE ASYMMETRY: Abnormal: 65%, left amplitude larger (greater than 33% is considered abnormal)    oVEMP Thresholds via 500 Hz toneburst:     RIGHT: 97 dB nHL which suggests normal utricle and superior vestibular nerve function    LEFT: 70 dB nHL which suggests abnormal utricle or superior vestibular nerve function    AMPLITUDE ASYMMETRY: Abnormal: 82%, left amplitude larger (greater than 33% is considered abnormal)  Note: Patient reported the lights appeared to be moving/shaking during oVEMP testing on the  left.    ASSESSMENT: Today's results suggest an abnormal VEMP on the left side. These results indicate abnormal saccular or inferior vestibular nerve function and abnormal utricle or superior vestibular nerve function on the left side. 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    PLAN:The patient will follow up with Denisa Palumbo M.D. for medical management. Please call this clinic with questions regarding these results or recommendations.      Rakesh White.  Licensed Audiologist  [place] # [id number]

## 2021-06-25 NOTE — PROGRESS NOTES
AUDIOLOGY REPORT    SUMMARY: Audiology visit completed. See audiogram for results.      RECOMMENDATIONS: Follow-up with ENT.    Rakesh White.  Licensed Audiologist  [place] # [id number]

## 2021-06-28 ENCOUNTER — PRE VISIT (OUTPATIENT)
Dept: OTOLARYNGOLOGY | Facility: CLINIC | Age: 61
End: 2021-06-28

## 2021-06-28 ENCOUNTER — TELEPHONE (OUTPATIENT)
Dept: OTOLARYNGOLOGY | Facility: CLINIC | Age: 61
End: 2021-06-28

## 2021-06-28 ENCOUNTER — VIRTUAL VISIT (OUTPATIENT)
Dept: OTOLARYNGOLOGY | Facility: CLINIC | Age: 61
End: 2021-06-28
Payer: COMMERCIAL

## 2021-06-28 VITALS — HEIGHT: 72 IN | BODY MASS INDEX: 28.44 KG/M2 | WEIGHT: 210 LBS

## 2021-06-28 DIAGNOSIS — H83.8X2 SUPERIOR SEMICIRCULAR CANAL DEHISCENCE OF LEFT EAR: Primary | ICD-10-CM

## 2021-06-28 PROCEDURE — 99212 OFFICE O/P EST SF 10 MIN: CPT | Mod: TEL | Performed by: OTOLARYNGOLOGY

## 2021-06-28 ASSESSMENT — PAIN SCALES - GENERAL: PAINLEVEL: NO PAIN (0)

## 2021-06-28 ASSESSMENT — MIFFLIN-ST. JEOR: SCORE: 1800.55

## 2021-06-28 NOTE — TELEPHONE ENCOUNTER
[facility] Call Center    Phone Message    May a detailed message be left on voicemail: yes     Reason for Call: Other: Pt's wife Claudette wanted to get message to clinic about Poor communication on Husbands Appt that was for today ,she would like a call back to discuss  Thank you,    Action Taken: Message routed to:  [facility] ENT    Travel Screening: Not Applicable

## 2021-06-28 NOTE — PROGRESS NOTES
ZACHARIAH is a 60 year old who is being evaluated via a billable telephone visit.      What phone number would you like to be contacted at? [phone]  How would you like to obtain your AVS? Meglauro    Hong Pool is seen in consultation from Dr. Plascencia.  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 He was initially seen for left facial swelling and redness from what he thought was an infected upper tooth. He saw one dentist who recommended a root canal and then another dentist who recommended antibiotics. He has taken the antibiotics and the facial swelling has decreased but he still has tooth pain. The dentist recommended he see ENT. Dr. 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    He has a slight amount of autophony to voice and sometimes to his heartbeat but he does not hear his other body noises such as his eyes moving or his jaw moving. He denies any vertigo including with straining.    Past Medical History:   Diagnosis Date     Borderline diabetes mellitus 11/26/2015     Coronary artery disease 2012    CABG x4     Coronary artery disease involving coronary bypass graft of native heart without angina pectoris 11/18/2015     Depression      Goiter     With normal TSH     Hyperlipidemia     Started on statin 7/2007.  Discontinued 10/07 secondary to muscle aches and fatigue     Nephrolithiasis      Shoulder pain      Statin intolerance 6/23/2016       Past Surgical History:   Procedure Laterality Date     BYPASS GRAFT ARTERY CORONARY  6/24/2012    Procedure: BYPASS GRAFT ARTERY CORONARY;  Median Sternotomy, Coronary Artery Bypass Grafting x 4 using Left Internal Mammary and Left Saphenous Vein  with Endovein Harvesting. On Pump Oxygenation;  Surgeon: Genesis Larsen MD;  Location:  OR     ESOPHAGOSCOPY, GASTROSCOPY, DUODENOSCOPY (EGD),  COMBINED N/A 4/11/2017    Procedure: COMBINED ESOPHAGOSCOPY, GASTROSCOPY, DUODENOSCOPY (EGD), BIOPSY SINGLE OR MULTIPLE;  Surgeon: Corine Ly MD;  Location:  GI       Family History   Problem Relation Age of Onset     C.A.D. Other         Uncle     Diabetes Brother      Cerebrovascular Disease Brother      Cardiovascular Sister         tachyarrhythmia       Social History     Tobacco Use     Smoking status: Never Smoker     Smokeless tobacco: Never Used   Substance Use Topics     Alcohol use: No     Drug use: No       Patient Supplied Answers to Review of Systems  [facility] ROS 4/30/2021   Constitutional Problems with sleep   Neurology Dizzy spells, Headache   Ears, Nose, Throat Ear pain, Nasal congestion or drainage, Sore throat   Cardiopulmonary Cough   The remainder of the 10 point review of systems is otherwise negative.    Physical examination:  Vitals - Patient Reported  Pain Score: No Pain (0)  healthy, alert and no distress  PSYCH: Alert and oriented times 3; coherent speech, normal   rate and volume, able to articulate logical thoughts, able   to abstract reason, no tangential thoughts, no hallucinations   or delusions  His affect is normal  RESP: No cough, no audible wheezing, able to talk in full sentences  Remainder of exam unable to be completed due to telephone visits    Audiogram:  Audiogram was independently reviewed. Right normal hearing. Left mild low frequency conductive hearing loss. Bone thresholds are -5dB on the left at 250 and 500Hz. Excellent speech discrimination bilaterally, normal tympanograms, present reflexes bilaterally.    CT: Temporal bone CT was independently reviewed. Right temporal bone normal, covering over the superior semicircular canal is thin but present. Left temporal bone with superior semicircular canal dehiscence, no other abnormalities.    VEMP: Results reviewed. It showed low thresholds on the left consistent with superior semicircular canal  dehiscence.    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 We discussed this and, since he is essentially asymptomatic beyond non bothersome autophony, recommendation was made for observation. He was pleased nothing needed to be done.    Phone call duration: 11 minutes

## 2021-06-28 NOTE — LETTER
6/28/2021       RE: Hong Pool  [address] [facility] [address]     Dear Colleague,    Thank you for referring your patient, Hong Pool, to the [facility] [place] [facility]. Please see a copy of my visit note below.    ZACHARIAH is a 60 year old who is being evaluated via a billable telephone visit.      What phone number would you like to be contacted at? [phone]  How would you like to obtain your AVS? Gabriel    Hong Pool is seen in consultation from Dr. Plascencia.  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 He was initially seen for left facial swelling and redness from what he thought was an infected upper tooth. He saw one dentist who recommended a root canal and then another dentist who recommended antibiotics. He has taken the antibiotics and the facial swelling has decreased but he still has tooth pain. The dentist recommended he see ENT. Dr. 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    He has a slight amount of autophony to voice and sometimes to his heartbeat but he does not hear his other body noises such as his eyes moving or his jaw moving. He denies any vertigo including with straining.    Past Medical History:   Diagnosis Date     Borderline diabetes mellitus 11/26/2015     Coronary artery disease 2012    CABG x4     Coronary artery disease involving coronary bypass graft of native heart without angina pectoris 11/18/2015     Depression      Goiter     With normal TSH     Hyperlipidemia     Started on statin 7/2007.  Discontinued 10/07 secondary to muscle aches and fatigue     Nephrolithiasis      Shoulder pain      Statin intolerance 6/23/2016       Past Surgical History:   Procedure Laterality Date     BYPASS GRAFT  ARTERY CORONARY  6/24/2012    Procedure: BYPASS GRAFT ARTERY CORONARY;  Median Sternotomy, Coronary Artery Bypass Grafting x 4 using Left Internal Mammary and Left Saphenous Vein  with Endovein Harvesting. On Pump Oxygenation;  Surgeon: Genesis Larsen MD;  Location:  OR     ESOPHAGOSCOPY, GASTROSCOPY, DUODENOSCOPY (EGD), COMBINED N/A 4/11/2017    Procedure: COMBINED ESOPHAGOSCOPY, GASTROSCOPY, DUODENOSCOPY (EGD), BIOPSY SINGLE OR MULTIPLE;  Surgeon: Corine Ly MD;  Location:  GI       Family History   Problem Relation Age of Onset     C.A.D. Other         Uncle     Diabetes Brother      Cerebrovascular Disease Brother      Cardiovascular Sister         tachyarrhythmia       Social History     Tobacco Use     Smoking status: Never Smoker     Smokeless tobacco: Never Used   Substance Use Topics     Alcohol use: No     Drug use: No       Patient Supplied Answers to Review of Systems  [facility] ROS 4/30/2021   Constitutional Problems with sleep   Neurology Dizzy spells, Headache   Ears, Nose, Throat Ear pain, Nasal congestion or drainage, Sore throat   Cardiopulmonary Cough   The remainder of the 10 point review of systems is otherwise negative.    Physical examination:  Vitals - Patient Reported  Pain Score: No Pain (0)  healthy, alert and no distress  PSYCH: Alert and oriented times 3; coherent speech, normal   rate and volume, able to articulate logical thoughts, able   to abstract reason, no tangential thoughts, no hallucinations   or delusions  His affect is normal  RESP: No cough, no audible wheezing, able to talk in full sentences  Remainder of exam unable to be completed due to telephone visits    Audiogram:  Audiogram was independently reviewed. Right normal hearing. Left mild low frequency conductive hearing loss. Bone thresholds are -5dB on the left at 250 and 500Hz. Excellent speech discrimination bilaterally, normal tympanograms, present reflexes bilaterally.    CT: Temporal bone CT  was independently reviewed. Right temporal bone normal, covering over the superior semicircular canal is thin but present. Left temporal bone with superior semicircular canal dehiscence, no other abnormalities.    VEMP: Results reviewed. It showed low thresholds on the left consistent with superior semicircular canal dehiscence.    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 We discussed this and, since he is essentially asymptomatic beyond non bothersome autophony, recommendation was made for observation. He was pleased nothing needed to be done.    Phone call duration: 11 minutes        Again, thank you for allowing me to participate in the care of your patient.      Sincerely,    Denisa Palumbo MD

## 2021-09-05 ENCOUNTER — HEALTH MAINTENANCE LETTER (OUTPATIENT)
Age: 61
End: 2021-09-05

## 2021-09-28 DIAGNOSIS — N20.0 CALCULUS OF KIDNEY: ICD-10-CM

## 2021-09-28 RX ORDER — POTASSIUM CITRATE 10 MEQ/1
10 TABLET, EXTENDED RELEASE ORAL
Qty: 180 TABLET | Refills: 3 | Status: SHIPPED | OUTPATIENT
Start: 2021-09-28 | End: 2022-01-03

## 2021-09-28 NOTE — TELEPHONE ENCOUNTER
"Pharmacy requesting refill for potassium citrate. Last office visit 11/13/2019. Routing to PCP to refill if appropriate.     Request for medication refill:    Providers if patient needs an appointment and you are willing to give a one month supply please refill for one month and  send a letter/MyChart using \".SMILLIMITEDREFILL\" .smillimited and route chart to \"P SMI \" (Giving one month refill in non controlled medications is strongly recommended before denial)    If refill has been denied, meaning absolutely no refills without visit, please complete the smart phrase \".smirxrefuse\" and route it to the \"P SMI MED REFILLS\"  pool to inform the patient and the pharmacy.    Taj Rodriguez RN        "

## 2021-10-14 ENCOUNTER — TELEPHONE (OUTPATIENT)
Dept: CARDIOLOGY | Facility: CLINIC | Age: 61
End: 2021-10-14

## 2021-10-14 NOTE — TELEPHONE ENCOUNTER
Prior Authorization Specialty Medication Request    Medication/Dose: Renewal for Repatha - current PA expires on 2021    Received a fax from [employer] that they have initiated an ePA via Cover My Meds at [email or web address]  Requested by them to submit by 10/15/21.      Enter Key:      [id number]  Last Name:   Corine  :               1960

## 2021-10-19 NOTE — TELEPHONE ENCOUNTER
Prior Authorization Approval    Authorization Effective Date:  11/13/2021  Authorization Expiration Date:  11/13/22  Medication: Renewal for Repatha  Approved Dose/Quantity: 2/28  Reference #:     Insurance Company:    Expected CoPay:       CoPay Card Available:      Foundation Assistance Needed:    Which Pharmacy is filling the prescription (Not needed for infusion/clinic administered):    Pharmacy Notified:    Patient Notified:

## 2021-11-02 ENCOUNTER — TELEPHONE (OUTPATIENT)
Dept: CARDIOLOGY | Facility: CLINIC | Age: 61
End: 2021-11-02

## 2021-11-02 NOTE — TELEPHONE ENCOUNTER
Spoke with patient and he is to come back in 1 year with labs prior.    He will call to get them scheduled.     Patient states understanding and agrees to call with any questions or concerns.

## 2021-11-02 NOTE — TELEPHONE ENCOUNTER
[facility] Call Center    Phone Message    May a detailed message be left on voicemail: yes     Reason for Call: Other: Mathew called wanting to know if he needs to see Dr. Evangelista yearly or if he can see him every two years. Please advise. Thank you.      Action Taken: Other: Cardio    Travel Screening: Not Applicable

## 2021-11-11 ENCOUNTER — OFFICE VISIT (OUTPATIENT)
Dept: URGENT CARE | Facility: URGENT CARE | Age: 61
End: 2021-11-11
Payer: COMMERCIAL

## 2021-11-11 VITALS
SYSTOLIC BLOOD PRESSURE: 129 MMHG | TEMPERATURE: 97.2 F | DIASTOLIC BLOOD PRESSURE: 77 MMHG | WEIGHT: 210 LBS | BODY MASS INDEX: 28.48 KG/M2 | OXYGEN SATURATION: 98 % | HEART RATE: 58 BPM

## 2021-11-11 DIAGNOSIS — R30.0 DYSURIA: Primary | ICD-10-CM

## 2021-11-11 LAB
ALBUMIN UR-MCNC: NEGATIVE MG/DL
APPEARANCE UR: CLEAR
BILIRUB UR QL STRIP: NEGATIVE
COLOR UR AUTO: YELLOW
GLUCOSE UR STRIP-MCNC: NEGATIVE MG/DL
HGB UR QL STRIP: NEGATIVE
KETONES UR STRIP-MCNC: NEGATIVE MG/DL
LEUKOCYTE ESTERASE UR QL STRIP: NEGATIVE
NITRATE UR QL: NEGATIVE
PH UR STRIP: 7 [PH] (ref 5–7)
SP GR UR STRIP: 1.02 (ref 1–1.03)
UROBILINOGEN UR STRIP-ACNC: 0.2 E.U./DL

## 2021-11-11 PROCEDURE — 81003 URINALYSIS AUTO W/O SCOPE: CPT | Performed by: FAMILY MEDICINE

## 2021-11-11 PROCEDURE — 99213 OFFICE O/P EST LOW 20 MIN: CPT | Performed by: FAMILY MEDICINE

## 2021-11-11 NOTE — PROGRESS NOTES
Assessment & Plan     Dysuria  Unremarkable UA given new onset presentation I recommended good oral hydration and close f/u should things progress or not improve in 2 days return to be evaluated  - UA Macro with Reflex to Micro and Culture - lab collect  - UA Macro with Reflex to Micro and Culture - lab collect       Jay Machuca MD   [place] [facility]    Jsoe SONI is a 61 year old male who presents to clinic today for the following health issues:  Chief Complaint   Patient presents with     Urgent Care     UTI     c/o dysuria for 1 day     HPI    Pain with urination for last day. No penile discharge.   Denies ongoing flank pain. No nausea/vomiting/fever.     Hx of kidney stones but the pain with these episodes in the past were much more prominent    No recent hx of UTI or kidney stones  There is no current or significant pain.   No blood seen in urine.     He drinks on average approximately at least 60 ounces of water a day.       Patient Active Problem List    Diagnosis Date Noted     ASCVD (arteriosclerotic cardiovascular disease) 06/24/2012     Priority: High     CAD (coronary artery disease) 06/22/2012     Priority: High     Optic cupping of both eyes 02/15/2019     Priority: Medium     Myopia of both eyes with astigmatism and presbyopia 02/15/2019     Priority: Medium     Age-related nuclear cataract 02/15/2019     Priority: Medium     Functional dyspepsia 05/17/2017     Priority: Medium     GI eval in winter 2017       Gastroesophageal reflux disease without esophagitis 05/17/2017     Priority: Medium     EGD 4/11/2017  Impression:          - Z-line regular, 41 cm from the incisors.                        - Normal esophagus.                        - A few gastric polyps. Resected and retrieved.                        - A single duodenal polyp. Resected and retrieved.                        - Normal examined duodenum.                        - Biopsies were taken with a cold forceps for                         Helicobacter pylori testing.       Kidney stone 12/08/2016     Priority: Medium     Lithotripsy to remove 1 stone, cystoscopy to remove 2nd  ER May 2018 - CT   IMPRESSION:   1. 4 mm stone within the proximal right ureter with mild right  hydronephrosis. Nonobstructing nephrolithiasis in the left kidney    Urology 6/2018 - CaOxalate recurrent stone former       Statin intolerance 06/23/2016     Priority: Medium     Calculus of gallbladder without cholecystitis without obstruction 06/21/2016     Priority: Medium     Borderline diabetes mellitus 11/26/2015     Priority: Medium     Coronary artery disease involving coronary bypass graft of native heart without angina pectoris 11/18/2015     Priority: Medium     Fatigue 01/08/2013     Priority: Medium     Adjustment disorder with depressed mood 10/25/2012     Priority: Medium     Testicular hypofunction 10/25/2012     Priority: Medium     Problem list name updated by automated process. Provider to review       Simple goiter 10/25/2012     Priority: Medium     Problem list name updated by automated process. Provider to review       Hyperlipidemia LDL goal <70 09/07/2012     Priority: Medium     Statin intolerance         Current Outpatient Medications   Medication     alpha-d-galactosidase (BEANO) tablet     aspirin 81 MG EC tablet     Blood Pressure Monitor KIT     Cholecalciferol (VITAMIN D3) 2000 UNITS CAPS     evolocumab (REPATHA) 140 MG/ML prefilled autoinjector     LACTAID FAST ACT 9000 units TABS tablet     Multiple Vitamins-Minerals (MENS MULTIVITAMIN PLUS) TABS     potassium citrate (UROCIT-K) 10 MEQ (1080 MG) CR tablet     pyridOXINE (VITAMIN B6) 100 MG TABS     saccharomyces boulardii (FLORASTOR) 250 MG capsule     STATIN NOT PRESCRIBED, INTENTIONAL,     No current facility-administered medications for this visit.         Objective    /77   Pulse 58   Temp 97.2  F (36.2  C) (Tympanic)   Wt 95.3 kg (210 lb)   SpO2 98%   BMI 28.48  kg/m    Physical Exam     : deferred    Results for orders placed or performed in visit on 11/11/21   UA Macro with Reflex to Micro and Culture - lab collect     Status: Normal    Specimen: Urine, Midstream   Result Value Ref Range    Color Urine Yellow Colorless, Straw, Light Yellow, Yellow    Appearance Urine Clear Clear    Glucose Urine Negative Negative mg/dL    Bilirubin Urine Negative Negative    Ketones Urine Negative Negative mg/dL    Specific Gravity Urine 1.020 1.003 - 1.035    Blood Urine Negative Negative    pH Urine 7.0 5.0 - 7.0    Protein Albumin Urine Negative Negative mg/dL    Urobilinogen Urine 0.2 0.2, 1.0 E.U./dL    Nitrite Urine Negative Negative    Leukocyte Esterase Urine Negative Negative    Narrative    Microscopic not indicated                 The use of Dragon/avox dictation services may have been used to construct the content in this note; any grammatical or spelling errors are non-intentional. Please contact the author of this note directly if you are in need of any clarification.

## 2021-11-11 NOTE — PATIENT INSTRUCTIONS
Drink 100 ounces of water a day to stay hydrated      If symptoms worsen at any point or if you seen no improvement in next few days return to be evaluated      If you develop abdominal pain, flank pain, fever -- please come in right away

## 2021-11-19 ENCOUNTER — OFFICE VISIT (OUTPATIENT)
Dept: FAMILY MEDICINE | Facility: CLINIC | Age: 61
End: 2021-11-19
Payer: COMMERCIAL

## 2021-11-19 VITALS
OXYGEN SATURATION: 98 % | HEART RATE: 62 BPM | DIASTOLIC BLOOD PRESSURE: 83 MMHG | HEIGHT: 73 IN | BODY MASS INDEX: 28.76 KG/M2 | SYSTOLIC BLOOD PRESSURE: 142 MMHG | TEMPERATURE: 98.8 F | WEIGHT: 217 LBS | RESPIRATION RATE: 16 BRPM

## 2021-11-19 DIAGNOSIS — F32.A DEPRESSION, UNSPECIFIED DEPRESSION TYPE: ICD-10-CM

## 2021-11-19 DIAGNOSIS — Z00.00 ROUTINE GENERAL MEDICAL EXAMINATION AT A HEALTH CARE FACILITY: Primary | ICD-10-CM

## 2021-11-19 LAB — HBA1C MFR BLD: 6.2 % (ref 0–5.6)

## 2021-11-19 PROCEDURE — 83036 HEMOGLOBIN GLYCOSYLATED A1C: CPT | Performed by: STUDENT IN AN ORGANIZED HEALTH CARE EDUCATION/TRAINING PROGRAM

## 2021-11-19 PROCEDURE — 36415 COLL VENOUS BLD VENIPUNCTURE: CPT | Performed by: STUDENT IN AN ORGANIZED HEALTH CARE EDUCATION/TRAINING PROGRAM

## 2021-11-19 PROCEDURE — 99396 PREV VISIT EST AGE 40-64: CPT | Mod: GC | Performed by: STUDENT IN AN ORGANIZED HEALTH CARE EDUCATION/TRAINING PROGRAM

## 2021-11-19 PROCEDURE — 87389 HIV-1 AG W/HIV-1&-2 AB AG IA: CPT | Performed by: STUDENT IN AN ORGANIZED HEALTH CARE EDUCATION/TRAINING PROGRAM

## 2021-11-19 PROCEDURE — 86803 HEPATITIS C AB TEST: CPT | Performed by: STUDENT IN AN ORGANIZED HEALTH CARE EDUCATION/TRAINING PROGRAM

## 2021-11-19 RX ORDER — BISMUTH SUBSALICYLATE 262 MG/1
1 TABLET, CHEWABLE ORAL 3 TIMES DAILY PRN
COMMUNITY

## 2021-11-19 SDOH — ECONOMIC STABILITY: FOOD INSECURITY: WITHIN THE PAST 12 MONTHS, THE FOOD YOU BOUGHT JUST DIDN'T LAST AND YOU DIDN'T HAVE MONEY TO GET MORE.: NEVER TRUE

## 2021-11-19 SDOH — ECONOMIC STABILITY: FOOD INSECURITY: WITHIN THE PAST 12 MONTHS, YOU WORRIED THAT YOUR FOOD WOULD RUN OUT BEFORE YOU GOT MONEY TO BUY MORE.: NEVER TRUE

## 2021-11-19 SDOH — HEALTH STABILITY: PHYSICAL HEALTH: ON AVERAGE, HOW MANY DAYS PER WEEK DO YOU ENGAGE IN MODERATE TO STRENUOUS EXERCISE (LIKE A BRISK WALK)?: 2 DAYS

## 2021-11-19 SDOH — ECONOMIC STABILITY: INCOME INSECURITY: IN THE LAST 12 MONTHS, WAS THERE A TIME WHEN YOU WERE NOT ABLE TO PAY THE MORTGAGE OR RENT ON TIME?: NO

## 2021-11-19 SDOH — ECONOMIC STABILITY: TRANSPORTATION INSECURITY
IN THE PAST 12 MONTHS, HAS THE LACK OF TRANSPORTATION KEPT YOU FROM MEDICAL APPOINTMENTS OR FROM GETTING MEDICATIONS?: NO

## 2021-11-19 SDOH — HEALTH STABILITY: PHYSICAL HEALTH: ON AVERAGE, HOW MANY MINUTES DO YOU ENGAGE IN EXERCISE AT THIS LEVEL?: 20 MIN

## 2021-11-19 SDOH — ECONOMIC STABILITY: TRANSPORTATION INSECURITY
IN THE PAST 12 MONTHS, HAS LACK OF TRANSPORTATION KEPT YOU FROM MEETINGS, WORK, OR FROM GETTING THINGS NEEDED FOR DAILY LIVING?: NO

## 2021-11-19 ASSESSMENT — LIFESTYLE VARIABLES
HOW MANY STANDARD DRINKS CONTAINING ALCOHOL DO YOU HAVE ON A TYPICAL DAY: 1 OR 2
HOW OFTEN DO YOU HAVE A DRINK CONTAINING ALCOHOL: MONTHLY OR LESS
HOW OFTEN DO YOU HAVE SIX OR MORE DRINKS ON ONE OCCASION: NEVER

## 2021-11-19 ASSESSMENT — SOCIAL DETERMINANTS OF HEALTH (SDOH)
WITHIN THE LAST YEAR, HAVE TO BEEN RAPED OR FORCED TO HAVE ANY KIND OF SEXUAL ACTIVITY BY YOUR PARTNER OR EX-PARTNER?: NO
HOW HARD IS IT FOR YOU TO PAY FOR THE VERY BASICS LIKE FOOD, HOUSING, MEDICAL CARE, AND HEATING?: NOT VERY HARD
WITHIN THE LAST YEAR, HAVE YOU BEEN KICKED, HIT, SLAPPED, OR OTHERWISE PHYSICALLY HURT BY YOUR PARTNER OR EX-PARTNER?: NO
WITHIN THE LAST YEAR, HAVE YOU BEEN HUMILIATED OR EMOTIONALLY ABUSED IN OTHER WAYS BY YOUR PARTNER OR EX-PARTNER?: NO
WITHIN THE LAST YEAR, HAVE YOU BEEN AFRAID OF YOUR PARTNER OR EX-PARTNER?: NO

## 2021-11-19 ASSESSMENT — MIFFLIN-ST. JEOR: SCORE: 1840.57

## 2021-11-19 NOTE — PATIENT INSTRUCTIONS
Here is the plan from today's visit    1. Routine general medical examination at a health care facility    - Hemoglobin A1c; Future  - Adult Gastro Ref - Procedure Only; Future  - Hepatitis C Screen Reflex to HCV RNA Quant and Genotype; Future  - HIV Antigen Antibody Combo; Future    2. Depression, unspecified depression type    - Behavioral Health Referral ([facility] internal and external)    Please call or return to clinic if your symptoms don't go away.        Thank you for coming to [facility] today.  Lab Testing:  **If you had lab testing today and your results are reassuring or normal they will be mailed to you or sent through [employer] within 7 days.   **If the lab tests need quick action we will call you with the results.  The phone number we will call with results is # [phone] (home) . If this is not the best number please call our clinic and change the number.  Medication Refills:  If you need any refills please call your pharmacy and they will contact us.   If you need to  your refill at a new pharmacy, please contact the new pharmacy directly. The new pharmacy will help you get your medications transferred faster.   Scheduling:  If you have any concerns about today's visit or wish to schedule another appointment please call our office during normal business hours [phone] (8-5:00 M-F)   eferrals to [facility] please call [phone].   Mammogram Scheduling [phone]     XRay/CT/Ultrasound/MRI Scheduling [phone]    Medical Concerns:  If you have urgent medical concerns please call [phone] at any time of the day.    Laxmi Kasper MD    Preventive Health Recommendations  Male Ages 50 - 64    Yearly exam:             See your health care provider every year in order to  o   Review health changes.   o   Discuss preventive care.    o   Review your medicines if your doctor has prescribed any.     Have a cholesterol test every 5 years, or more frequently  if you are at risk for high cholesterol/heart disease.     Have a diabetes test (fasting glucose) every three years. If you are at risk for diabetes, you should have this test more often.     Have a colonoscopy at age 50, or have a yearly FIT test (stool test). These exams will check for colon cancer.      Talk with your health care provider about whether or not a prostate cancer screening test (PSA) is right for you.    You should be tested each year for STDs (sexually transmitted diseases), if you re at risk.     Shots: Get a flu shot each year. Get a tetanus shot every 10 years.     Nutrition:    Eat at least 5 servings of fruits and vegetables daily.     Eat whole-grain bread, whole-wheat pasta and brown rice instead of white grains and rice.     Get adequate Calcium and Vitamin D.     Lifestyle    Exercise for at least 150 minutes a week (30 minutes a day, 5 days a week). This will help you control your weight and prevent disease.     Limit alcohol to one drink per day.     No smoking.     Wear sunscreen to prevent skin cancer.     See your dentist every six months for an exam and cleaning.     See your eye doctor every 1 to 2 years.    Patient Education     Patient Education    Mirtazapine Oral disintegrating tablet    Mirtazapine Oral tablet  Mirtazapine Oral tablet  What is this medicine?  MIRTAZAPINE (deny LAY a peen) is used to treat depression.  This medicine may be used for other purposes; ask your health care provider or pharmacist if you have questions.  What should I tell my health care provider before I take this medicine?  They need to know if you have any of these conditions:    bipolar disorder    glaucoma    kidney disease    liver disease    suicidal thoughts    an unusual or allergic reaction to mirtazapine, other medicines, foods, dyes, or preservatives    pregnant or trying to get pregnant    breast-feeding  How should I use this medicine?  Take this medicine by mouth with a glass of water.  Follow the directions on the prescription label. Take your medicine at regular intervals. Do not take your medicine more often than directed. Do not stop taking this medicine suddenly except upon the advice of your doctor. Stopping this medicine too quickly may cause serious side effects or your condition may worsen.  A special MedGuide will be given to you by the pharmacist with each prescription and refill. Be sure to read this information carefully each time.  Talk to your pediatrician regarding the use of this medicine in children. Special care may be needed.  Overdosage: If you think you have taken too much of this medicine contact a poison control center or emergency room at once.  NOTE: This medicine is only for you. Do not share this medicine with others.  What if I miss a dose?  If you miss a dose, take it as soon as you can. If it is almost time for your next dose, take only that dose. Do not take double or extra doses.  What may interact with this medicine?  Do not take this medicine with any of the following medications:    linezolid    MAOIs like Carbex, Eldepryl, Marplan, Nardil, and Parnate    methylene blue (injected into a vein)  This medicine may also interact with the following medications:    alcohol    antiviral medicines for HIV or AIDS    certain medicines that treat or prevent blood clots like warfarin    certain medicines for depression, anxiety, or psychotic disturbances    certain medicines for fungal infections like ketoconazole and itraconazole    certain medicines for migraine headache like almotriptan, eletriptan, frovatriptan, naratriptan, rizatriptan, sumatriptan, zolmitriptan    certain medicines for seizures like carbamazepine or phenytoin    certain medicines for sleep    cimetidine    erythromycin    fentanyl    lithium    medicines for blood pressure    nefazodone    rasagiline    rifampin    supplements like Gurdon's wort, kava kava, valerian    tramadol    tryptophan  This  list may not describe all possible interactions. Give your health care provider a list of all the medicines, herbs, non-prescription drugs, or dietary supplements you use. Also tell them if you smoke, drink alcohol, or use illegal drugs. Some items may interact with your medicine.  What should I watch for while using this medicine?  Tell your doctor if your symptoms do not get better or if they get worse. Visit your doctor or health care professional for regular checks on your progress. Because it may take several weeks to see the full effects of this medicine, it is important to continue your treatment as prescribed by your doctor.  Patients and their families should watch out for new or worsening thoughts of suicide or depression. Also watch out for sudden changes in feelings such as feeling anxious, agitated, panicky, irritable, hostile, aggressive, impulsive, severely restless, overly excited and hyperactive, or not being able to sleep. If this happens, especially at the beginning of treatment or after a change in dose, call your health care professional.  You may get drowsy or dizzy. Do not drive, use machinery, or do anything that needs mental alertness until you know how this medicine affects you. Do not stand or sit up quickly, especially if you are an older patient. This reduces the risk of dizzy or fainting spells. Alcohol may interfere with the effect of this medicine. Avoid alcoholic drinks.  This medicine may cause dry eyes and blurred vision. If you wear contact lenses you may feel some discomfort. Lubricating drops may help. See your eye doctor if the problem does not go away or is severe.  Your mouth may get dry. Chewing sugarless gum or sucking hard candy, and drinking plenty of water may help. Contact your doctor if the problem does not go away or is severe.  What side effects may I notice from receiving this medicine?  Side effects that you should report to your doctor or health care professional  as soon as possible:    allergic reactions like skin rash, itching or hives, swelling of the face, lips, or tongue    breathing problems    confusion    fever, sore throat, or mouth ulcers or blisters    flu like symptoms including fever, chills, cough, muscle or joint aches and pains    stomach pain with nausea and/or vomiting    suicidal thoughts or other mood changes    swelling of the hands or feet    unusual bleeding or bruising    unusually weak or tired    vomiting  Side effects that usually do not require medical attention (report to your doctor or health care professional if they continue or are bothersome):    constipation    increased appetite    weight gain  This list may not describe all possible side effects. Call your doctor for medical advice about side effects. You may report side effects to FDA at 2-564-FDA-9418.  Where should I keep my medicine?  Keep out of the reach of children.  Store at room temperature between 15 and 30 degrees C (59 and 86 degrees F) Protect from light and moisture. Throw away any unused medicine after the expiration date.  NOTE:This sheet is a summary. It may not cover all possible information. If you have questions about this medicine, talk to your doctor, pharmacist, or health care provider. Copyright  2016 [employer]

## 2021-11-19 NOTE — PROGRESS NOTES
Male Physical Note          HPI         Concerns today: difficulty sleeping; 2.5 years ago started new job, that has been stressful.     Patient Active Problem List   Diagnosis     CAD (coronary artery disease)     ASCVD (arteriosclerotic cardiovascular disease)     Hyperlipidemia LDL goal <70     Adjustment disorder with depressed mood     Testicular hypofunction     Simple goiter     Fatigue     Coronary artery disease involving coronary bypass graft of native heart without angina pectoris     Borderline diabetes mellitus     Calculus of gallbladder without cholecystitis without obstruction     Statin intolerance     Kidney stone     Functional dyspepsia     Gastroesophageal reflux disease without esophagitis     Optic cupping of both eyes     Myopia of both eyes with astigmatism and presbyopia     Age-related nuclear cataract       Past Medical History:   Diagnosis Date     Borderline diabetes mellitus 11/26/2015     Coronary artery disease 2012    CABG x4     Coronary artery disease involving coronary bypass graft of native heart without angina pectoris 11/18/2015     Depression      Goiter     With normal TSH     Hyperlipidemia     Started on statin 7/2007.  Discontinued 10/07 secondary to muscle aches and fatigue     Nephrolithiasis      Shoulder pain      Statin intolerance 6/23/2016       Previous Medical Care      Family History   Problem Relation Age of Onset     C.A.D. Other         Uncle     Diabetes Brother      Cerebrovascular Disease Brother      Cardiovascular Sister         tachyarrhythmia              Review of Systems:     Review of Systems:  CONSTITUTIONAL: NEGATIVE for fever, chills, change in weight  INTEGUMENTARY/SKIN: NEGATIVE for worrisome rashes, moles or lesions  EYES: NEGATIVE for vision changes or irritation  ENT/MOUTH: NEGATIVE for ear, mouth and throat problems  RESP: NEGATIVE for significant cough or SOB  BREAST: NEGATIVE for masses, tenderness or discharge  CV: NEGATIVE for chest  pain, palpitations or peripheral edema  GI: NEGATIVE for nausea, abdominal pain, heartburn, or change in bowel habits  : NEGATIVE for frequency, dysuria, or hematuria  MUSCULOSKELETAL: NEGATIVE for significant arthralgias or myalgia  NEURO: NEGATIVE for weakness, dizziness or paresthesias  ENDOCRINE: NEGATIVE for temperature intolerance, skin/hair changes  HEME/ALLERGY: NEGATIVE for bleeding problems  PSYCHIATRIC: NEGATIVE for changes in mood or affect  Sleep:   Do you snore most or the night (as reported by a family member)? No  Do you feel sleepy or extremely tired during most of the day? Yes         Social History     Social History     Socioeconomic History     Marital status:      Spouse name: Not on file     Number of children: Not on file     Years of education: Not on file     Highest education level: Not on file   Occupational History     Not on file   Tobacco Use     Smoking status: Never Smoker     Smokeless tobacco: Never Used   Vaping Use     Vaping Use: Never used   Substance and Sexual Activity     Alcohol use: No     Drug use: No     Sexual activity: Yes   Other Topics Concern     Parent/sibling w/ CABG, MI or angioplasty before 65F 55M? Not Asked   Social History Narrative    .      Social Determinants of Health     Financial Resource Strain: Low Risk      Difficulty of Paying Living Expenses: Not very hard   Food Insecurity: No Food Insecurity     Worried About Running Out of Food in the Last Year: Never true     Ran Out of Food in the Last Year: Never true   Transportation Needs: No Transportation Needs     Lack of Transportation (Medical): No     Lack of Transportation (Non-Medical): No   Physical Activity: Insufficiently Active     Days of Exercise per Week: 2 days     Minutes of Exercise per Session: 20 min   Stress: Stress Concern Present     Feeling of Stress : To some extent   Social Connections: Not on file   Intimate Partner Violence: Not At Risk     Fear of Current or  "Ex-Partner: No     Emotionally Abused: No     Physically Abused: No     Sexually Abused: No   Housing Stability: Unknown     Unable to Pay for Housing in the Last Year: No     Number of Places Lived in the Last Year: Not on file     Unstable Housing in the Last Year: No       Marital Status:  Who lives in your household? wife    Has anyone hurt you physically, for example by pushing, hitting, slapping or kicking you or forcing you to have sex? Denies  Do you feel threatened or controlled by a partner, ex-partner or anyone in your life? Denies    Sexual Health     Sexual concerns: No   STI History: Neg      Recommended Screening     Cholesterol Level (>44 yo or at risk):  Recommended and patient accepted testing. and ASA use (>3% risk in 5 y):  Recommended and patient declined testing; plans to complete this at follow up with cardiologist.  Colon CA Screening (>50-75 ):  Recommended and patient accepted testing.         Physical Exam:     Vitals: BP (!) 142/83   Pulse 62   Temp 98.8  F (37.1  C) (Oral)   Resp 16   Ht 1.85 m (6' 0.84\")   Wt 98.4 kg (217 lb)   SpO2 98%   BMI 28.76 kg/m    BMI= Body mass index is 28.76 kg/m .  GENERAL: healthy, alert and no distress  EYES: Eyes grossly normal to inspection, extraocular movements - intact, and PERRL  NECK: no tenderness, no adenopathy, no asymmetry, no masses, no stiffness; thyroid- normal to palpation  RESP: lungs clear to auscultation - no rales, no rhonchi, no wheezes  CV: regular rates and rhythm, normal S1 S2, no S3 or S4 and no murmur, no click or rub -  ABDOMEN: soft, no tenderness, no  hepatosplenomegaly, no masses, normal bowel sounds  MS: extremities- no gross deformities noted, no edema  SKIN: no suspicious lesions, no rashes  NEURO: strength and tone- normal, sensory exam- grossly normal, mentation- intact, speech- normal, reflexes- symmetric  BACK: no CVA tenderness, no paralumbar tenderness  PSYCH: Alert and oriented times 3; speech- coherent " , normal rate and volume; able to articulate logical thoughts, able to abstract reason, no tangential thoughts, no hallucinations or delusions, affect- normal  LYMPHATICS: ant. cervical- normal, post. cervical- normal, supraclavicular- normal    Assessment and Plan      Hong was seen today for physical.    Diagnoses and all orders for this visit:    Routine general medical examination at a health care facility  -     Adult Gastro Ref - Procedure Only (colonoscopy); Future  -     Hemoglobin A1c  -     Hepatitis C Screen Reflex to HCV RNA Quant and Genotype  -     HIV Antigen Antibody Combo    Depression, unspecified depression type  Endorsing anhedonia, difficulty sleeping, decreased energy and concentration over the past month. Not interested in starting anti-depressant medication at this time. Open to therapy.  -     Behavioral Health Referral ([facility] internal and external)      Options for treatment and follow-up care were reviewed with the patient. Hong Pool and/or guardian engaged in the decision making process and verbalized understanding of the options discussed and agreed with the final plan.    Laxmi Kasper MD

## 2021-11-22 LAB
HCV AB SERPL QL IA: NONREACTIVE
HIV 1+2 AB+HIV1 P24 AG SERPL QL IA: NONREACTIVE

## 2021-11-24 ENCOUNTER — TELEPHONE (OUTPATIENT)
Dept: FAMILY MEDICINE | Facility: CLINIC | Age: 61
End: 2021-11-24
Payer: COMMERCIAL

## 2021-11-24 ENCOUNTER — TELEPHONE (OUTPATIENT)
Dept: PSYCHOLOGY | Facility: CLINIC | Age: 61
End: 2021-11-24
Payer: COMMERCIAL

## 2021-11-24 NOTE — TELEPHONE ENCOUNTER
Mental Health Referral:  Please schedule with Dr. Davis     Please let patient know this is for primary care behavioral health services which means we provide short-term therapy services.  Therapists at our clinic are able to see patients for 8-12 sessions. If further assistance is needed, they will help the patient connect with ongoing services in the community.    Check with the patient if prefer an in person, video or telephone visit.  If they choose a video visit, they will need access to either a smartphone or a laptop with camera/microphone. If they can do a video visit, please schedule as a video visit. If they do not have the technology to do a video visit, please schedule as a telephone visit. For either visit, please put the phone number or email in the appt notes that the provider is supposed to call or send the visit invite. There are some instructions regarding how the video visits work for patients below. This can be copied/pasted into a "[email or web address] message if the patient would like more information.      If you are unable to reach the patient after two phone attempts, please send a letter and close the encounter.      Thank you!    Marcela Kevin PsyD

## 2021-11-29 ENCOUNTER — TELEPHONE (OUTPATIENT)
Dept: FAMILY MEDICINE | Facility: CLINIC | Age: 61
End: 2021-11-29
Payer: COMMERCIAL

## 2021-12-01 NOTE — PROGRESS NOTES
Preceptor Attestation:   Patient seen, evaluated and discussed with the resident. I have verified the content of the note, which accurately reflects my assessment of the patient and the plan of care.   Supervising Physician:  Ilsa Seaman, DO    SUHA Pittman calling states he has found alternative that is available for iron supplement. Med is called se-tan plus capsules. Please advise on new rx.

## 2021-12-08 ENCOUNTER — OFFICE VISIT (OUTPATIENT)
Dept: FAMILY MEDICINE | Facility: CLINIC | Age: 61
End: 2021-12-08
Payer: COMMERCIAL

## 2021-12-08 VITALS
SYSTOLIC BLOOD PRESSURE: 148 MMHG | RESPIRATION RATE: 16 BRPM | BODY MASS INDEX: 28.55 KG/M2 | WEIGHT: 215.4 LBS | DIASTOLIC BLOOD PRESSURE: 84 MMHG | OXYGEN SATURATION: 98 % | TEMPERATURE: 98.4 F | HEART RATE: 54 BPM

## 2021-12-08 DIAGNOSIS — M25.542 JOINT PAIN IN FINGERS OF LEFT HAND: ICD-10-CM

## 2021-12-08 DIAGNOSIS — M25.572 PAIN IN JOINT INVOLVING ANKLE AND FOOT, LEFT: ICD-10-CM

## 2021-12-08 DIAGNOSIS — R03.0 ELEVATED BLOOD PRESSURE READING WITHOUT DIAGNOSIS OF HYPERTENSION: ICD-10-CM

## 2021-12-08 DIAGNOSIS — R53.82 CHRONIC FATIGUE: Primary | ICD-10-CM

## 2021-12-08 LAB
CRP SERPL-MCNC: 3 MG/L (ref 0–8)
ERYTHROCYTE [SEDIMENTATION RATE] IN BLOOD BY WESTERGREN METHOD: 10 MM/HR
TSH SERPL DL<=0.005 MIU/L-ACNC: 0.88 MU/L (ref 0.4–4)

## 2021-12-08 PROCEDURE — 85652 RBC SED RATE AUTOMATED: CPT | Performed by: STUDENT IN AN ORGANIZED HEALTH CARE EDUCATION/TRAINING PROGRAM

## 2021-12-08 PROCEDURE — 99214 OFFICE O/P EST MOD 30 MIN: CPT | Mod: GC | Performed by: STUDENT IN AN ORGANIZED HEALTH CARE EDUCATION/TRAINING PROGRAM

## 2021-12-08 PROCEDURE — 84443 ASSAY THYROID STIM HORMONE: CPT | Performed by: STUDENT IN AN ORGANIZED HEALTH CARE EDUCATION/TRAINING PROGRAM

## 2021-12-08 PROCEDURE — 36415 COLL VENOUS BLD VENIPUNCTURE: CPT | Performed by: STUDENT IN AN ORGANIZED HEALTH CARE EDUCATION/TRAINING PROGRAM

## 2021-12-08 PROCEDURE — 86140 C-REACTIVE PROTEIN: CPT | Performed by: STUDENT IN AN ORGANIZED HEALTH CARE EDUCATION/TRAINING PROGRAM

## 2021-12-08 PROCEDURE — 82306 VITAMIN D 25 HYDROXY: CPT | Performed by: STUDENT IN AN ORGANIZED HEALTH CARE EDUCATION/TRAINING PROGRAM

## 2021-12-08 NOTE — PROGRESS NOTES
"  Assessment & Plan     Chronic fatigue  Joint pain in fingers of left hand  Pain in joint involving ankle and foot, left  61 year old male with multiple nonspecific symptoms including fatigue and multiple large and small joint pains for months.  Differential is broad and includes GIBRAN, vitamin deficiency, rheumatologic process, depression, OA, among others. Will start with screening labs, including left hand x-ray given the DIP joint pain and deformity. Prefers to wait until his appointment with Cardiology next month to check CMP, A1c, lipids.   - Sleep Home Study Referral; Future  - CRP inflammation; Future  - Erythrocyte sedimentation rate auto; Future  - Vitamin D deficiency screening; Future  - SLEEP EVALUATION & MANAGEMENT REFERRAL - ADULT -; Future  - TSH with free T4 reflex; Future  - XR Hand Left 2 Views; Future    Elevated blood pressure reading without diagnosis of hypertension  148/84 today, 147/92 last visit, 151/78 at 4/30/2021 visit. 112/77 at recent dental visit  No prior diagnosis of hypertension. Endorses some anxiety about this visit.   Outpatient BP cuff prescribed by Cardiology - encouraged him to keep record of BP 1-2 times per week until his visit with Dr. Evangelista and bring to that visit   With additional concern of poor sleep and fatigue, recommend sleep study to evaluate for GIBRAN    BMI:   Estimated body mass index is 28.55 kg/m  as calculated from the following:    Height as of 11/19/21: 1.85 m (6' 0.84\").    Weight as of this encounter: 97.7 kg (215 lb 6.4 oz).   Weight management plan: Discussed healthy diet and exercise guidelines    No follow-ups on file.    Linda Nolan MD  [facility] ZHANNA SONI is a 61 year old left-handed male with CAD s/p CABG x4 (2012), hyperlipidemia, prediabetes, GERD who presents for the following health issues     HPI     11/19/2021 CPE with Dr. Kasper. Addressed depression (anhedonia, difficulty sleeping, decreased " "energy and concentration x1 valentine). Was not interested in antidepressant, open to therapy- referred to [facility] Dr. Davis though not yet scheduled. Screening A1c 6.2 from 5.9 one year prior.     He presents today for:  Fatigue: 2-4 weeks  - can't concentrate on tasks, can't keep eyes open  - wife suspects he is better if he gets a good nights sleep  - trouble sleeping \"sometimes\", wakes up for no particular reason up to 2-3 times per night (not related to pain or having to urinate), able to fall back asleep within a few minutes  - generally does not snore unless he is sick with URI symptoms, wife has cpap  - occasional headaches  - depression? states he was \"crabby\" last visit and thinks that affected his responses, declined to complete PHQ9 today     left ankle injury  - slipped last spring 2021 on the ice, swelled up, not evaluated   - hobbled around on crutches for a couple weeks  - tried some exercises his wife had from prior ankle injury   - mid/top L foot pain improved but still bothers him just about every day  - comes and goes, no particular motions, not necessarily with more activity   - covers on top of it at nighttime seems to exacerbate it    Arthritis  - generalized joint/muscle pains  - if he sits for a period of time when he goes to get up he notices muscle pains- shoulders, arms, anterior thighs, anterior lower extremities  - dull pain that improves after 3-5 of movement  - L hand 2th and 4th DIP joints \"act up\" - joint pain, no redness or swelling, a couple months   - R wrist stabbing, intermittent, worse with activity   - R hip pain, intermittent x20 years. Did have a prior hip injury (MRI 2018, did PT, nonsurgical management)   - no family history of rheumatoid issues per patient's (mom passed away when patient was 17, father when he was 19 or 20)       Review of Systems    ROS: 10 point ROS neg other than the symptoms noted above in the HPI.       Objective    BP (!) 148/84   Pulse 54   Temp " 98.4  F (36.9  C) (Oral)   Resp 16   Wt 97.7 kg (215 lb 6.4 oz)   SpO2 98%   BMI 28.55 kg/m    Body mass index is 28.55 kg/m .  Physical Exam  Constitutional:       General: He is not in acute distress.     Appearance: He is well-developed. He is not diaphoretic.   Cardiovascular:      Rate and Rhythm: Normal rate.   Pulmonary:      Effort: Pulmonary effort is normal. No respiratory distress.   Musculoskeletal:         General: No swelling or tenderness.      Right lower leg: No edema.      Left lower leg: No edema.      Comments: Left 4th digit DIP with slight medial angulation deformity    Skin:     General: Skin is warm.      Coloration: Skin is not jaundiced.      Findings: No bruising or rash.      Comments: No hair growth to lower half of bilateral lower extremities   Neurological:      General: No focal deficit present.      Mental Status: He is alert.      Cranial Nerves: No cranial nerve deficit.      Motor: No weakness.      Gait: Gait normal.   Psychiatric:         Attention and Perception: Attention normal.         Speech: Speech is tangential.        Results for orders placed or performed in visit on 12/08/21 (from the past 24 hour(s))   Erythrocyte sedimentation rate auto   Result Value Ref Range    Erythrocyte Sedimentation Rate 10 mm/hr

## 2021-12-08 NOTE — PATIENT INSTRUCTIONS
Muscle/joint pains  Fatigue  - we checked some labs looking for inflammation which may suggest a rheumatologic etiology of your symptoms  - XR of your left hand   - additional labs can wait until you see your Cardiologist next month   - Sleep study given fatigue and borderline high blood pressure

## 2021-12-09 LAB — DEPRECATED CALCIDIOL+CALCIFEROL SERPL-MC: 58 UG/L (ref 20–75)

## 2021-12-14 ENCOUNTER — ANCILLARY PROCEDURE (OUTPATIENT)
Dept: GENERAL RADIOLOGY | Facility: CLINIC | Age: 61
End: 2021-12-14
Attending: STUDENT IN AN ORGANIZED HEALTH CARE EDUCATION/TRAINING PROGRAM
Payer: COMMERCIAL

## 2021-12-14 DIAGNOSIS — M25.542 JOINT PAIN IN FINGERS OF LEFT HAND: ICD-10-CM

## 2021-12-14 PROCEDURE — 73130 X-RAY EXAM OF HAND: CPT | Mod: LT | Performed by: RADIOLOGY

## 2021-12-24 ENCOUNTER — TELEPHONE (OUTPATIENT)
Dept: FAMILY MEDICINE | Facility: CLINIC | Age: 61
End: 2021-12-24
Payer: COMMERCIAL

## 2021-12-29 DIAGNOSIS — M79.645 PAIN OF FINGER OF LEFT HAND: Primary | ICD-10-CM

## 2021-12-29 NOTE — TELEPHONE ENCOUNTER
Patient seen today for follow-up    Patient seems to be continued to show psychomotor sluggishness    Mood down    Affect blunted    Discussed with patient antidepressant medications for her mood    Patient seems to be agreeable for the same    Recommendation was venlafaxine IR 25 mg twice daily    Will initiate the medication today    Continue with ongoing supportive therapy    Continue with ongoing follow-up with the patient for ongoing medication evaluation and stabilization as indicated   Phoned [employer] appeals department and supplied case number of [id number]. Appeal was received and is in process. Appeal may take up to 24 hours.

## 2022-01-03 DIAGNOSIS — N20.0 CALCULUS OF KIDNEY: ICD-10-CM

## 2022-01-03 RX ORDER — POTASSIUM CITRATE 10 MEQ/1
10 TABLET, EXTENDED RELEASE ORAL
Qty: 180 TABLET | Refills: 3 | Status: SHIPPED | OUTPATIENT
Start: 2022-01-03 | End: 2022-12-01

## 2022-01-03 NOTE — TELEPHONE ENCOUNTER
"Request for medication refill: potassium citrate (UROCIT-K) 10 MEQ (1080 MG) CR tablet    Providers if patient needs an appointment and you are willing to give a one month supply please refill for one month and  send a letter/MyChart using \".SMILLIMITEDREFILL\" .smillimited and route chart to \"[facility] \" (Giving one month refill in non controlled medications is strongly recommended before denial)    If refill has been denied, meaning absolutely no refills without visit, please complete the smart phrase \".smirxrefuse\" and route it to the \"[facility] MED REFILLS\"  pool to inform the patient and the pharmacy.    Dang Dolan        "

## 2022-01-04 DIAGNOSIS — I25.810 CORONARY ARTERY DISEASE INVOLVING CORONARY BYPASS GRAFT OF NATIVE HEART WITHOUT ANGINA PECTORIS: ICD-10-CM

## 2022-01-04 DIAGNOSIS — E78.5 HYPERLIPIDEMIA LDL GOAL <70: ICD-10-CM

## 2022-01-04 DIAGNOSIS — I25.10 ASCVD (ARTERIOSCLEROTIC CARDIOVASCULAR DISEASE): ICD-10-CM

## 2022-01-04 DIAGNOSIS — Z78.9 STATIN INTOLERANCE: ICD-10-CM

## 2022-01-05 NOTE — TELEPHONE ENCOUNTER
evolocumab (REPATHA) 140 MG/ML prefilled autoinjector      Last Written Prescription Date:  1/21/21  Last Fill Quantity: 6 ml,   # refills: 3  Last Office Visit : Jaylan Evangelista MD  Cardiovascular Disease  1/21/2021  [facility] Office visit:  1/13/22    Routing refill request to provider for review/approval because:  Drug not on cardiology refill protocol

## 2022-01-06 ENCOUNTER — LAB (OUTPATIENT)
Dept: LAB | Facility: CLINIC | Age: 62
End: 2022-01-06
Attending: INTERNAL MEDICINE
Payer: COMMERCIAL

## 2022-01-06 ENCOUNTER — TELEPHONE (OUTPATIENT)
Dept: CARDIOLOGY | Facility: CLINIC | Age: 62
End: 2022-01-06

## 2022-01-06 DIAGNOSIS — R73.03 BORDERLINE DIABETES MELLITUS: ICD-10-CM

## 2022-01-06 DIAGNOSIS — E78.5 HYPERLIPIDEMIA LDL GOAL <70: ICD-10-CM

## 2022-01-06 DIAGNOSIS — I25.810 CORONARY ARTERY DISEASE INVOLVING CORONARY BYPASS GRAFT OF NATIVE HEART WITHOUT ANGINA PECTORIS: ICD-10-CM

## 2022-01-06 DIAGNOSIS — I25.10 ASCVD (ARTERIOSCLEROTIC CARDIOVASCULAR DISEASE): ICD-10-CM

## 2022-01-06 DIAGNOSIS — Z78.9 STATIN INTOLERANCE: ICD-10-CM

## 2022-01-06 LAB
ALBUMIN SERPL-MCNC: 3.9 G/DL (ref 3.4–5)
ALP SERPL-CCNC: 61 U/L (ref 40–150)
ALT SERPL W P-5'-P-CCNC: 40 U/L (ref 0–70)
ANION GAP SERPL CALCULATED.3IONS-SCNC: 8 MMOL/L (ref 3–14)
AST SERPL W P-5'-P-CCNC: 22 U/L (ref 0–45)
BILIRUB SERPL-MCNC: 1 MG/DL (ref 0.2–1.3)
BUN SERPL-MCNC: 19 MG/DL (ref 7–30)
CALCIUM SERPL-MCNC: 9.1 MG/DL (ref 8.5–10.1)
CHLORIDE BLD-SCNC: 106 MMOL/L (ref 94–109)
CHOLEST SERPL-MCNC: 135 MG/DL
CO2 SERPL-SCNC: 29 MMOL/L (ref 20–32)
CREAT SERPL-MCNC: 1.14 MG/DL (ref 0.66–1.25)
FASTING STATUS PATIENT QL REPORTED: YES
GFR SERPL CREATININE-BSD FRML MDRD: 73 ML/MIN/1.73M2
GLUCOSE BLD-MCNC: 137 MG/DL (ref 70–99)
HBA1C MFR BLD: 6.2 % (ref 0–5.6)
HDLC SERPL-MCNC: 57 MG/DL
LDLC SERPL CALC-MCNC: 41 MG/DL
NONHDLC SERPL-MCNC: 78 MG/DL
POTASSIUM BLD-SCNC: 4 MMOL/L (ref 3.4–5.3)
PROT SERPL-MCNC: 7.3 G/DL (ref 6.8–8.8)
SODIUM SERPL-SCNC: 143 MMOL/L (ref 133–144)
TRIGL SERPL-MCNC: 185 MG/DL

## 2022-01-06 PROCEDURE — 80053 COMPREHEN METABOLIC PANEL: CPT | Performed by: PATHOLOGY

## 2022-01-06 PROCEDURE — 36415 COLL VENOUS BLD VENIPUNCTURE: CPT | Performed by: PATHOLOGY

## 2022-01-06 PROCEDURE — 83036 HEMOGLOBIN GLYCOSYLATED A1C: CPT | Performed by: PATHOLOGY

## 2022-01-06 PROCEDURE — 80061 LIPID PANEL: CPT | Performed by: PATHOLOGY

## 2022-01-06 NOTE — TELEPHONE ENCOUNTER
[facility] Prior Authorization Team Request    Requested Action: Prior Authorization  Medication: Repatha 140mg  Insurance ID: [id number]  Insurance Phone: [phone]  Person Requesting Action: Patient  Any additional information:

## 2022-01-07 ENCOUNTER — THERAPY VISIT (OUTPATIENT)
Dept: OCCUPATIONAL THERAPY | Facility: CLINIC | Age: 62
End: 2022-01-07
Attending: FAMILY MEDICINE
Payer: COMMERCIAL

## 2022-01-07 DIAGNOSIS — M79.645 PAIN OF FINGER OF LEFT HAND: ICD-10-CM

## 2022-01-07 PROCEDURE — 97110 THERAPEUTIC EXERCISES: CPT | Mod: GO | Performed by: OCCUPATIONAL THERAPIST

## 2022-01-07 PROCEDURE — 97760 ORTHOTIC MGMT&TRAING 1ST ENC: CPT | Mod: GO | Performed by: OCCUPATIONAL THERAPIST

## 2022-01-07 PROCEDURE — 97165 OT EVAL LOW COMPLEX 30 MIN: CPT | Mod: GO | Performed by: OCCUPATIONAL THERAPIST

## 2022-01-07 NOTE — TELEPHONE ENCOUNTER
PA Initiation    Medication: evolocumab (REPATHA) 140 MG/ML prefilled autoinjector   Insurance Company: Other (see comments)  Pharmacy Filling the Rx: [facility] - [place], [place] - [facility] [address]  Filling Pharmacy Phone: [phone]  Filling Pharmacy Fax: [phone]  Start Date: 1/7/2022

## 2022-01-07 NOTE — PROGRESS NOTES
Hand Therapy Initial Evaluation    Current Date:  1/7/2022    Diagnosis: Pain of finger of left hand, IF (Reports in all fingers B)   DOI: 12/29/21  DOS: NA  Procedure:  NA    Precautions: None     Subjective:  Hong Pool is a 61 year old male.    Patient reports symptoms of the left IF which occurred due to unknown cause. Since onset symptoms are Gradually getting worse.  General health as reported by patient is good.  Pertinent medical history includes:Anemia, Heart Problems, Kidney Disease, Cardiac issues. Medical allergies:none.  Surgical history: other: kidney stone removal.  Medication history: None.    Current occupation is computer work    Job Tasks: Computer Work, Prolonged Sitting, Repetitive Tasks    Occupational Profile Information:  Left hand dominant  Prior functional level:  no limitations  Patient reports symptoms of pain, stiffness/loss of motion and weakness/loss of strength  Special tests:  x-ray.    Previous treatment: None   Barriers include:none  Mobility: No difficulty  Transportation: drives  Currently working in normal job without restrictions  Leisure activities/hobbies: Clockwork repair (hasn't done on a few years), home repair      Other: [facility], reports good home ergo set-up, has 4 screens, one track ball and one regular mouse. Has AE of jar gripper.     Functional Outcome Measure:   Upper Extremity Functional Index Score:  SCORE:   Column Totals: /80: 63   (A lower score indicates greater disability.)    Objective:  Pain Level (Scale 0-10)   1/7/2022   At Rest 0   With Use 9     Pain Description  Date 1/7/2022   Location R ulnar wrist, R fin.   L IF, RF especially, all L fingers as well    Pain Quality Aching and Burning   Frequency intermittent     Pain is worst  daytime or nighttime   Exacerbated by  Gripping, pinching, resistive activities    Relieved by Unsure    Progression Getting worse      Edema  Mild   Edema (Circumference measured in cm)   1/7/2022 1/7/2022   IF R L   P1  "7.5 7.5   PIP 6.8 6.8   P2 6.2 5.8   DIP 5.9 5.9     ROM  Hand 1/7/2022 1/7/2022   AROM(PROM) R L   Index MP 0/80 0/80+   PIP 0/100 0/90+   DIP 0/70 0/70+   MCGOVERN 250 240   Long MP 0/85 0/90   PIP 0/95 0/100   DIP 0/75 0/75   MCGOVERN 255 265   Ring MP 0/85 0/80+   PIP 0/95 0/95+   DIP 0/70 0/75+   MCGOVERN 250 250   Small MP 0/85 0/75   PIP 0/100 0/70   DIP 0/70 0/70   MCGOVERN 255 215     Kapandji Oppostion Scale  10 BUE   Sensation  WNL throughout all nerve distributions; per patient report    ROM  Pain Report:  - none    + mild    ++ moderate    +++ severe   AROM( PROM) 1/7/2022   Finger flexion from Distal Palmar Crease R: -  L:-   Ulnar drift of MCP joints (measured at long finger) R:-  L:-   Wrist Extension R:75  L:70   Wrist Flexion R:65  L:70     Observation  - none    + mild    ++ moderate    +++ severe    1/7/2022   Dorsal wrist extensor synovitis R:-  L:-   MP joints swelling R:-  L:-   Volar subluxation of MP joints R:-  L:-   Ulnar drift of DIP  R: +  L:+   Ulnar drift of MP joints R:-  L:-   EDC subluxation at MP joints R:-  L:-   Boutonierre deformity R:-  L:-   [place] neck deformity R:-  L:-   Zig zag collapse  R:-  L:-   Intrinsic tightness R:+  L:+     Shoulder Screen  (Full or Limited)   1/7/2022 1/7/2022    R L   Reach behind head F F   Reach contralateral shoulder \" \"   Reach low back \" \"   Reach mid back \" \"   Reach scapula \" \"     Cervical Screen  Pain Report:  - none    + mild    ++ moderate    +++ severe      Active with Gentle Overpressure 1/7/2022   Flexion -   Extension -   Lateral Flexion Right -   Lateral Flexion Left -   Rotation Right -   Rotation Left -     Strength:   (Measured in pounds)  Pain Report:  - none    + mild    ++ moderate    +++ severe    1/7/2022 1/7/2022   Trials R L   1 80 70+     Lat Pinch 1/7/2022 1/7/2022   Trials R L   1 25 23     3 Pt Pinch 1/7/2022 1/7/2022   Trials R L   1 20 11++     Assessment:  Patient presents with symptoms consistent with diagnosis stated above, with " conservative intervention.     Patient's limitations or Problem List includes:  Pain, Decreased ROM/motion, Increased edema, Weakness, Hypomobility, Decreased , Decreased pinch and Tightness in musculature of the left index finger which interferes with the patient's ability to perform Self Care Tasks (dressing), Work Tasks, Recreational Activities and Household Chores as compared to previous level of function.    Rehab Potential:  Excellent - Return to full activity, no limitations    Patient will benefit from skilled Occupational Therapy to increase ROM, flexibility, motion, overall strength,  strength, pinch strength, coordination and dexterity and decrease pain and edema to return to previous activity level and resume normal daily tasks and to reach their rehab potential.    Barriers to Learning:  No barrier    Communication Issues:  Patient appears to be able to clearly communicate and understand verbal and written communication and follow directions correctly.    Chart Review: Chart Review and Simple history review with patient    Identified Performance Deficits: dressing, home establishment and management, shopping, school, work, leisure activities and social participation    Assessment of Occupational Performance:  5 or more Performance Deficits    Clinical Decision Making (Complexity): Low complexity    Treatment Explanation:  The following has been discussed with the patient:  RX ordered/plan of care  Anticipated outcomes  Possible risks and side effects    Plan:  Frequency:  1 X week, once daily  Duration:  for 8 weeks    Treatment Plan:    Modalities:    US, Iontophoresis, Fluidotherapy, Paraffin, TENS and E-Stim   Therapeutic Exercise:    AROM, AAROM, PROM, Tendon Gliding, Blocking, Reverse Blocking, Place and Hold, Contract Relax, Extensor Tracking, Isotonics, Isometrics and Stabilization  Therapeutic Activities:   Functional activities   Neuromuscular re-ed:   Nerve Gliding,  "Coordination/Dexterity, Sensory re-education, Proprioceptive Training, Posture, Kinesiotaping, Strain Counter Strain, Isometrics and Stabilization  Manual Techniques:   Coordination/Dexterity, Joint mobilization, Friction massage, Myofascial release and Manual edema mobilization  Orthotic Fabrication:    Static, Static progressive and Dynamic  Self Care:    Self Care Tasks, Ergonomic Considerations and Work Tasks    Discharge Plan:  Achieve all LTG.  Independent in home treatment program.  Reach maximal therapeutic benefit.    Home Exercise Program:  Joint Protection Education:  Respect pain  Balance rest and activity  Reduce force/effort  Avoid positions of deformity  Use larger joints  Avoid firm grasp or pinch  Use adaptive equipment    Exercise:  Warmth for morning stiffness, consider home paraffin  Gentle pain free AROM   Active intrinsic stretches  \"Walk\" fingers radially to minimize ulnar drift    Orthosis  Night time wear of L IF gutter    Next Visit:  Assess HEP  Assess orthotic   Paraffin   "

## 2022-01-10 NOTE — TELEPHONE ENCOUNTER
Prior Authorization Approval    Authorization Effective Date: 1/7/2022  Authorization Expiration Date: 1/7/2023  Medication: evolocumab (REPATHA) 140 MG/ML prefilled autoinjector--APPROVED  Approved Dose/Quantity:   Reference #:     Insurance Company: Other (see comments)  Expected CoPay:       CoPay Card Available:      Foundation Assistance Needed:    Which Pharmacy is filling the prescription (Not needed for infusion/clinic administered): [facility] - [place], [place] - [address]  Pharmacy Notified: Yes  Patient Notified: Yes **Instructed pharmacy to notify patient when script is ready to /ship.**

## 2022-01-13 ENCOUNTER — OFFICE VISIT (OUTPATIENT)
Dept: CARDIOLOGY | Facility: CLINIC | Age: 62
End: 2022-01-13
Attending: INTERNAL MEDICINE
Payer: COMMERCIAL

## 2022-01-13 VITALS
BODY MASS INDEX: 28.63 KG/M2 | HEART RATE: 61 BPM | HEIGHT: 73 IN | WEIGHT: 216 LBS | SYSTOLIC BLOOD PRESSURE: 131 MMHG | DIASTOLIC BLOOD PRESSURE: 77 MMHG | OXYGEN SATURATION: 96 %

## 2022-01-13 DIAGNOSIS — I25.10 ASCVD (ARTERIOSCLEROTIC CARDIOVASCULAR DISEASE): ICD-10-CM

## 2022-01-13 DIAGNOSIS — I25.810 CORONARY ARTERY DISEASE INVOLVING CORONARY BYPASS GRAFT OF NATIVE HEART WITHOUT ANGINA PECTORIS: ICD-10-CM

## 2022-01-13 DIAGNOSIS — Z78.9 STATIN INTOLERANCE: ICD-10-CM

## 2022-01-13 DIAGNOSIS — E78.5 HYPERLIPIDEMIA LDL GOAL <70: ICD-10-CM

## 2022-01-13 DIAGNOSIS — R73.03 BORDERLINE DIABETES MELLITUS: ICD-10-CM

## 2022-01-13 PROCEDURE — G0463 HOSPITAL OUTPT CLINIC VISIT: HCPCS

## 2022-01-13 PROCEDURE — 99214 OFFICE O/P EST MOD 30 MIN: CPT | Performed by: INTERNAL MEDICINE

## 2022-01-13 ASSESSMENT — MIFFLIN-ST. JEOR: SCORE: 1839.14

## 2022-01-13 ASSESSMENT — PAIN SCALES - GENERAL: PAINLEVEL: NO PAIN (0)

## 2022-01-13 NOTE — PROGRESS NOTES
HPI:     I had the privilege to evaluate and examine Mr Anant Pool, who is a 61 year old male with a history of CAD with CABG in 2012.   He has had statin intolerance (atorvastatin 80 mg , simvastatin 80 mg caused both muscle pain and consequently was unable to walk ) and lower dosage pravastatin was not tolerated.        Patient is now on Repatha 140  mg subcut very 2 weeks.  Patient denies chest pain, shortness of breath, palpitations, intermittent claudication.       PAST MEDICAL HISTORY:  Past Medical History:   Diagnosis Date     Borderline diabetes mellitus 11/26/2015     Coronary artery disease 2012    CABG x4     Coronary artery disease involving coronary bypass graft of native heart without angina pectoris 11/18/2015     Depression      Goiter     With normal TSH     Hyperlipidemia     Started on statin 7/2007.  Discontinued 10/07 secondary to muscle aches and fatigue     Nephrolithiasis      Shoulder pain      Statin intolerance 6/23/2016       CURRENT MEDICATIONS:  Current Outpatient Medications   Medication Sig Dispense Refill     alpha-d-galactosidase (BEANO) tablet Take 2 tablets by mouth 3 times daily (before meals) 540 tablet 4     aspirin 81 MG EC tablet Take 1 tablet (81 mg) by mouth daily 90 tablet 4     bismuth subsalicylate (PEPTO BISMOL) 262 MG chewable tablet Take 1 tablet by mouth 3 times daily as needed       Blood Pressure Monitor KIT 1 each daily. 1 kit 0     Cholecalciferol (VITAMIN D3) 2000 UNITS CAPS Take 1 capsule by mouth daily 90 capsule 4     evolocumab (REPATHA) 140 MG/ML prefilled autoinjector Inject 1 mL (140 mg) Subcutaneous every 14 days 6 mL 3     LACTAID FAST ACT 9000 units TABS tablet   4     melatonin 1 MG TABS tablet Take 1 mg by mouth nightly as needed for sleep       Multiple Vitamins-Minerals (MENS MULTIVITAMIN PLUS) TABS Take 1 tablet by mouth daily 90 tablet 4     potassium citrate (UROCIT-K) 10 MEQ (1080 MG) CR tablet Take 1 tablet (10 mEq) by mouth 3 times  daily (with meals) 180 tablet 3     pyridOXINE (VITAMIN B6) 100 MG TABS Take 1 tablet (100 mg) by mouth daily 90 tablet 4     STATIN NOT PRESCRIBED, INTENTIONAL, 1 each continuous Statin not prescribed intentionally due to Intolerance (with supporting documentation of trying a statin at least once within the last 5 years) (Patient not taking: Reported on 1/13/2022) 0 each 0       PAST SURGICAL HISTORY:  Past Surgical History:   Procedure Laterality Date     BYPASS GRAFT ARTERY CORONARY  6/24/2012    Procedure: BYPASS GRAFT ARTERY CORONARY;  Median Sternotomy, Coronary Artery Bypass Grafting x 4 using Left Internal Mammary and Left Saphenous Vein  with Endovein Harvesting. On Pump Oxygenation;  Surgeon: Genesis Larsen MD;  Location: [facility] OR     ESOPHAGOSCOPY, GASTROSCOPY, DUODENOSCOPY (EGD), COMBINED N/A 4/11/2017    Procedure: COMBINED ESOPHAGOSCOPY, GASTROSCOPY, DUODENOSCOPY (EGD), BIOPSY SINGLE OR MULTIPLE;  Surgeon: Corine Ly MD;  Location: [facility] GI       ALLERGIES     Allergies   Allergen Reactions     Lidocaine Rash     Break out     Band-Aid Anti-Itch      Cholestoff Complete [Plant Sterol Stanol-Pantethine] Other (See Comments)     Severe stomach pain     Lactose Diarrhea     Rosuvastatin Muscle Pain (Myalgia)     Zocor [Simvastatin - High Dose] Other (See Comments)     Muscle aches/soreness, confusion, disorganized thinking       FAMILY HISTORY:  Family History   Problem Relation Age of Onset     C.A.D. Other         Uncle     Diabetes Brother      Cerebrovascular Disease Brother      Cardiovascular Sister         tachyarrhythmia       SOCIAL HISTORY:  Social History     Socioeconomic History     Marital status:      Spouse name: None     Number of children: None     Years of education: None     Highest education level: None   Occupational History     None   Tobacco Use     Smoking status: Never Smoker     Smokeless tobacco: Never Used   Vaping Use     Vaping Use: Never used  "  Substance and Sexual Activity     Alcohol use: No     Drug use: No     Sexual activity: Yes   Other Topics Concern     Parent/sibling w/ CABG, MI or angioplasty before 65F 55M? Not Asked   Social History Narrative    .      Social Determinants of Health     Financial Resource Strain: Low Risk      Difficulty of Paying Living Expenses: Not very hard   Food Insecurity: No Food Insecurity     Worried About Running Out of Food in the Last Year: Never true     Ran Out of Food in the Last Year: Never true   Transportation Needs: No Transportation Needs     Lack of Transportation (Medical): No     Lack of Transportation (Non-Medical): No   Physical Activity: Insufficiently Active     Days of Exercise per Week: 2 days     Minutes of Exercise per Session: 20 min   Stress: Stress Concern Present     Feeling of Stress : To some extent   Social Connections: Not on file   Intimate Partner Violence: Not At Risk     Fear of Current or Ex-Partner: No     Emotionally Abused: No     Physically Abused: No     Sexually Abused: No   Housing Stability: Unknown     Unable to Pay for Housing in the Last Year: No     Number of Places Lived in the Last Year: Not on file     Unstable Housing in the Last Year: No       ROS:   Constitutional: No fever, chills, or sweats. No weight gain/loss   ENT: No visual disturbance, ear ache, epistaxis, sore throat  Allergies/Immunologic: Negative.   Respiratory: No cough, hemoptysia  Cardiovascular: As per HPI  GI: No nausea, vomiting, hematemesis, melena, or hematochezia  : No urinary frequency, dysuria, or hematuria  Integument: Negative  Psychiatric: Negative  Neuro: Negative  Endocrinology: Negative   Musculoskeletal: Negative    EXAM:  /77 (BP Location: Right arm, Patient Position: Chair, Cuff Size: Adult Regular)   Pulse 61   Ht 1.855 m (6' 1.03\")   Wt 98 kg (216 lb)   SpO2 96%   BMI 28.47 kg/m    In general, the patient is a pleasant male in no apparent distress.    HEENT: " NC/AT.  PERRLA.  EOMI.  Sclerae white, not injected.  Nares clear.  Pharynx without erythema or exudate.  Dentition intact.    Neck: No adenopathy.  No thyromegaly. Carotids +4/4 bilaterally without bruits.  No jugular venous distension.   Heart: RRR. Normal S1, S2 splits physiologically. No murmur, rub, click, or gallop. The PMI is in the 5th ICS in the midclavicular line. There is no heave.    Lungs: CTA.  No ronchi, wheezes, rales.  No dullness to percussion.   Abdomen: Soft, nontender, nondistended. No organomegaly.  No bruits.   Extremities: No clubbing, cyanosis, or edema.  The pulses are +4/4 at the radial, brachial, femoral, popliteal, DP, and PT sites bilaterally.  No bruits are noted.  Neurologic: Alert and oriented to person/place/time, normal speech, gait and affect  Skin: No petechiae, purpura or rash.    Labs:  LIPID RESULTS:  Lab Results   Component Value Date    CHOL 135 01/06/2022    CHOL 137 01/18/2021    HDL 57 01/06/2022    HDL 49 01/18/2021    LDL 41 01/06/2022    LDL 34 01/18/2021    TRIG 185 (H) 01/06/2022    TRIG 272 (H) 01/18/2021    CHOLHDLRATIO 5.1 (H) 11/24/2015    NHDL 78 01/06/2022    NHDL 88 01/18/2021       LIVER ENZYME RESULTS:  Lab Results   Component Value Date    AST 22 01/06/2022    AST 17 01/18/2021    ALT 40 01/06/2022    ALT 30 01/18/2021       CBC RESULTS:  Lab Results   Component Value Date    WBC 4.9 06/24/2016    RBC 4.33 (L) 06/24/2016    HGB 15.7 05/25/2018    HCT 51.2 05/25/2018    .7 (H) 05/25/2018    MCH 31.5 05/25/2018    MCHC 30.7 (L) 05/25/2018    RDW 11.9 06/24/2016     (L) 06/24/2016       BMP RESULTS:  Lab Results   Component Value Date     01/06/2022     01/18/2021    POTASSIUM 4.0 01/06/2022    POTASSIUM 4.1 01/18/2021    CHLORIDE 106 01/06/2022    CHLORIDE 108 01/18/2021    CO2 29 01/06/2022    CO2 30 01/18/2021    ANIONGAP 8 01/06/2022    ANIONGAP 3 01/18/2021     (H) 01/06/2022     (H) 01/18/2021    BUN 19 01/06/2022     BUN 24 01/18/2021    CR 1.14 01/06/2022    CR 1.06 01/18/2021    GFRESTIMATED 73 01/06/2022    GFRESTIMATED 76 01/18/2021    GFRESTBLACK 88 01/18/2021    TYSON 9.1 01/06/2022    TYSON 9.0 01/18/2021        A1C RESULTS:  Lab Results   Component Value Date    A1C 6.2 (H) 01/06/2022    A1C 5.9 (H) 01/18/2021       INR RESULTS:  Lab Results   Component Value Date    INR 1.53 (H) 06/24/2012    INR 1.73 (H) 06/24/2012         Assessment and Plan:     I discussed the results with the patient.  I discussed the importance of a heart healthy diet and lifestyle.    No change in the medication.  Follow-up within 1 year with fasting labs.    Jaylan Evangelista MD, PhD  Professor of Medicine  Division of Cardiology        CC  Patient Care Team:  Jorge Singleton MD as PCP - General (General Surgery)  Jaylan Evangelista MD as MD (Cardiology)  Rebeca Dunn MD as MD (Ophthalmology)  Corine Ly MD as MD (Internal Medicine)  Claudio Chavez MD as MD (Urology)  Carolyn Christiansen RN as Registered Nurse (Urology)  Jaylan Evangelista MD as Assigned Heart and Vascular Provider  [facility] as Pharmacist (Pharmacist)  Denisa Palumbo MD as Assigned Surgical Provider  Jorge Singleton MD as Assigned PCP  Catina Crump RN as Specialty Care Coordinator (Cardiology)  SELF, REFERRED

## 2022-01-13 NOTE — NURSING NOTE
January 13, 2022    Medication Changes: None    Follow Up:   -In one year with fasting labs prior    Patient verbalized understanding of all health information given and agreed to call with further questions or concerns.      Catina Crump RN

## 2022-01-13 NOTE — LETTER
1/13/2022      RE: Hong Pool  [address] [facility] [id number]       Dear Colleague,    Thank you for the opportunity to participate in the care of your patient, Hong Pool, at the [facility]. Please see a copy of my visit note below.    HPI:     I had the privilege to evaluate and examine Mr Anant Pool, who is a 61 year old male with a history of CAD with CABG in 2012.   He has had statin intolerance (atorvastatin 80 mg , simvastatin 80 mg caused both muscle pain and consequently was unable to walk ) and lower dosage pravastatin was not tolerated.        Patient is now on Repatha 140  mg subcut very 2 weeks.  Patient denies chest pain, shortness of breath, palpitations, intermittent claudication.       PAST MEDICAL HISTORY:  Past Medical History:   Diagnosis Date     Borderline diabetes mellitus 11/26/2015     Coronary artery disease 2012    CABG x4     Coronary artery disease involving coronary bypass graft of native heart without angina pectoris 11/18/2015     Depression      Goiter     With normal TSH     Hyperlipidemia     Started on statin 7/2007.  Discontinued 10/07 secondary to muscle aches and fatigue     Nephrolithiasis      Shoulder pain      Statin intolerance 6/23/2016       CURRENT MEDICATIONS:  Current Outpatient Medications   Medication Sig Dispense Refill     alpha-d-galactosidase (BEANO) tablet Take 2 tablets by mouth 3 times daily (before meals) 540 tablet 4     aspirin 81 MG EC tablet Take 1 tablet (81 mg) by mouth daily 90 tablet 4     bismuth subsalicylate (PEPTO BISMOL) 262 MG chewable tablet Take 1 tablet by mouth 3 times daily as needed       Blood Pressure Monitor KIT 1 each daily. 1 kit 0     Cholecalciferol (VITAMIN D3) 2000 UNITS CAPS Take 1 capsule by mouth daily 90 capsule 4     evolocumab (REPATHA) 140 MG/ML prefilled autoinjector Inject 1 mL (140 mg) Subcutaneous every  14 days 6 mL 3     LACTAID FAST ACT 9000 units TABS tablet   4     melatonin 1 MG TABS tablet Take 1 mg by mouth nightly as needed for sleep       Multiple Vitamins-Minerals (MENS MULTIVITAMIN PLUS) TABS Take 1 tablet by mouth daily 90 tablet 4     potassium citrate (UROCIT-K) 10 MEQ (1080 MG) CR tablet Take 1 tablet (10 mEq) by mouth 3 times daily (with meals) 180 tablet 3     pyridOXINE (VITAMIN B6) 100 MG TABS Take 1 tablet (100 mg) by mouth daily 90 tablet 4     STATIN NOT PRESCRIBED, INTENTIONAL, 1 each continuous Statin not prescribed intentionally due to Intolerance (with supporting documentation of trying a statin at least once within the last 5 years) (Patient not taking: Reported on 1/13/2022) 0 each 0       PAST SURGICAL HISTORY:  Past Surgical History:   Procedure Laterality Date     BYPASS GRAFT ARTERY CORONARY  6/24/2012    Procedure: BYPASS GRAFT ARTERY CORONARY;  Median Sternotomy, Coronary Artery Bypass Grafting x 4 using Left Internal Mammary and Left Saphenous Vein  with Endovein Harvesting. On Pump Oxygenation;  Surgeon: Genesis Larsen MD;  Location:  OR     ESOPHAGOSCOPY, GASTROSCOPY, DUODENOSCOPY (EGD), COMBINED N/A 4/11/2017    Procedure: COMBINED ESOPHAGOSCOPY, GASTROSCOPY, DUODENOSCOPY (EGD), BIOPSY SINGLE OR MULTIPLE;  Surgeon: Corine Ly MD;  Location:  GI       ALLERGIES     Allergies   Allergen Reactions     Lidocaine Rash     Break out     Band-Aid Anti-Itch      Cholestoff Complete [Plant Sterol Stanol-Pantethine] Other (See Comments)     Severe stomach pain     Lactose Diarrhea     Rosuvastatin Muscle Pain (Myalgia)     Zocor [Simvastatin - High Dose] Other (See Comments)     Muscle aches/soreness, confusion, disorganized thinking       FAMILY HISTORY:  Family History   Problem Relation Age of Onset     C.A.D. Other         Uncle     Diabetes Brother      Cerebrovascular Disease Brother      Cardiovascular Sister         tachyarrhythmia       SOCIAL  HISTORY:  Social History     Socioeconomic History     Marital status:      Spouse name: None     Number of children: None     Years of education: None     Highest education level: None   Occupational History     None   Tobacco Use     Smoking status: Never Smoker     Smokeless tobacco: Never Used   Vaping Use     Vaping Use: Never used   Substance and Sexual Activity     Alcohol use: No     Drug use: No     Sexual activity: Yes   Other Topics Concern     Parent/sibling w/ CABG, MI or angioplasty before 65F 55M? Not Asked   Social History Narrative    .      Social Determinants of Health     Financial Resource Strain: Low Risk      Difficulty of Paying Living Expenses: Not very hard   Food Insecurity: No Food Insecurity     Worried About Running Out of Food in the Last Year: Never true     Ran Out of Food in the Last Year: Never true   Transportation Needs: No Transportation Needs     Lack of Transportation (Medical): No     Lack of Transportation (Non-Medical): No   Physical Activity: Insufficiently Active     Days of Exercise per Week: 2 days     Minutes of Exercise per Session: 20 min   Stress: Stress Concern Present     Feeling of Stress : To some extent   Social Connections: Not on file   Intimate Partner Violence: Not At Risk     Fear of Current or Ex-Partner: No     Emotionally Abused: No     Physically Abused: No     Sexually Abused: No   Housing Stability: Unknown     Unable to Pay for Housing in the Last Year: No     Number of Places Lived in the Last Year: Not on file     Unstable Housing in the Last Year: No       ROS:   Constitutional: No fever, chills, or sweats. No weight gain/loss   ENT: No visual disturbance, ear ache, epistaxis, sore throat  Allergies/Immunologic: Negative.   Respiratory: No cough, hemoptysia  Cardiovascular: As per HPI  GI: No nausea, vomiting, hematemesis, melena, or hematochezia  : No urinary frequency, dysuria, or hematuria  Integument: Negative  Psychiatric:  "Negative  Neuro: Negative  Endocrinology: Negative   Musculoskeletal: Negative    EXAM:  /77 (BP Location: Right arm, Patient Position: Chair, Cuff Size: Adult Regular)   Pulse 61   Ht 1.855 m (6' 1.03\")   Wt 98 kg (216 lb)   SpO2 96%   BMI 28.47 kg/m    In general, the patient is a pleasant male in no apparent distress.    HEENT: NC/AT.  PERRLA.  EOMI.  Sclerae white, not injected.  Nares clear.  Pharynx without erythema or exudate.  Dentition intact.    Neck: No adenopathy.  No thyromegaly. Carotids +4/4 bilaterally without bruits.  No jugular venous distension.   Heart: RRR. Normal S1, S2 splits physiologically. No murmur, rub, click, or gallop. The PMI is in the 5th ICS in the midclavicular line. There is no heave.    Lungs: CTA.  No ronchi, wheezes, rales.  No dullness to percussion.   Abdomen: Soft, nontender, nondistended. No organomegaly.  No bruits.   Extremities: No clubbing, cyanosis, or edema.  The pulses are +4/4 at the radial, brachial, femoral, popliteal, DP, and PT sites bilaterally.  No bruits are noted.  Neurologic: Alert and oriented to person/place/time, normal speech, gait and affect  Skin: No petechiae, purpura or rash.    Labs:  LIPID RESULTS:  Lab Results   Component Value Date    CHOL 135 01/06/2022    CHOL 137 01/18/2021    HDL 57 01/06/2022    HDL 49 01/18/2021    LDL 41 01/06/2022    LDL 34 01/18/2021    TRIG 185 (H) 01/06/2022    TRIG 272 (H) 01/18/2021    CHOLHDLRATIO 5.1 (H) 11/24/2015    NHDL 78 01/06/2022    NHDL 88 01/18/2021       LIVER ENZYME RESULTS:  Lab Results   Component Value Date    AST 22 01/06/2022    AST 17 01/18/2021    ALT 40 01/06/2022    ALT 30 01/18/2021       CBC RESULTS:  Lab Results   Component Value Date    WBC 4.9 06/24/2016    RBC 4.33 (L) 06/24/2016    HGB 15.7 05/25/2018    HCT 51.2 05/25/2018    .7 (H) 05/25/2018    MCH 31.5 05/25/2018    MCHC 30.7 (L) 05/25/2018    RDW 11.9 06/24/2016     (L) 06/24/2016       BMP RESULTS:  Lab " Results   Component Value Date     01/06/2022     01/18/2021    POTASSIUM 4.0 01/06/2022    POTASSIUM 4.1 01/18/2021    CHLORIDE 106 01/06/2022    CHLORIDE 108 01/18/2021    CO2 29 01/06/2022    CO2 30 01/18/2021    ANIONGAP 8 01/06/2022    ANIONGAP 3 01/18/2021     (H) 01/06/2022     (H) 01/18/2021    BUN 19 01/06/2022    BUN 24 01/18/2021    CR 1.14 01/06/2022    CR 1.06 01/18/2021    GFRESTIMATED 73 01/06/2022    GFRESTIMATED 76 01/18/2021    GFRESTBLACK 88 01/18/2021    TYSON 9.1 01/06/2022    TYSON 9.0 01/18/2021        A1C RESULTS:  Lab Results   Component Value Date    A1C 6.2 (H) 01/06/2022    A1C 5.9 (H) 01/18/2021       INR RESULTS:  Lab Results   Component Value Date    INR 1.53 (H) 06/24/2012    INR 1.73 (H) 06/24/2012         Assessment and Plan:     I discussed the results with the patient.  I discussed the importance of a heart healthy diet and lifestyle.    No change in the medication.  Follow-up within 1 year with fasting labs.    Jaylan Evangelista MD, PhD  Professor of Medicine  Division of Cardiology        CC  Patient Care Team:  Jorge Singleton MD as PCP - General (General Surgery)  Rebeca Dunn MD as MD (Ophthalmology)  Corine Ly MD as MD (Internal Medicine)  Claudio Chavez MD as MD (Urology)  Carolyn Christiansen RN as Registered Nurse (Urology)  [place] as Pharmacist (Pharmacist)  Denisa Palumbo MD as Assigned Surgical Provider  Catina Crump RN as Specialty Care Coordinator (Cardiology)

## 2022-01-13 NOTE — PATIENT INSTRUCTIONS
January 13, 2022    Cardiology Provider You Saw During Your Visit: Dr. Evangelista      Medication Changes: None      Follow Up:   -In one year with fasting labs prior      Labs:     Results for ZACHARIAH ESCALERA (MRN [id number]) as of 1/13/2022 17:04   Ref. Range 1/6/2022 08:12   Sodium Latest Ref Range: 133 - 144 mmol/L 143   Potassium Latest Ref Range: 3.4 - 5.3 mmol/L 4.0   Chloride Latest Ref Range: 94 - 109 mmol/L 106   Carbon Dioxide Latest Ref Range: 20 - 32 mmol/L 29   Urea Nitrogen Latest Ref Range: 7 - 30 mg/dL 19   Creatinine Latest Ref Range: 0.66 - 1.25 mg/dL 1.14   GFR Estimate Latest Ref Range: >60 mL/min/1.73m2 73   Calcium Latest Ref Range: 8.5 - 10.1 mg/dL 9.1   Anion Gap Latest Ref Range: 3 - 14 mmol/L 8   Albumin Latest Ref Range: 3.4 - 5.0 g/dL 3.9   Protein Total Latest Ref Range: 6.8 - 8.8 g/dL 7.3   Bilirubin Total Latest Ref Range: 0.2 - 1.3 mg/dL 1.0   Alkaline Phosphatase Latest Ref Range: 40 - 150 U/L 61   ALT Latest Ref Range: 0 - 70 U/L 40   AST Latest Ref Range: 0 - 45 U/L 22   Cholesterol Latest Ref Range: <200 mg/dL 135   Patient Fasting > 8hrs? Unknown Yes   HDL Cholesterol Latest Ref Range: >=40 mg/dL 57   Hemoglobin A1C Latest Ref Range: 0.0 - 5.6 % 6.2 (H)   LDL Cholesterol Calculated Latest Ref Range: <=100 mg/dL 41   Non HDL Cholesterol Latest Ref Range: <130 mg/dL 78   Triglycerides Latest Ref Range: <150 mg/dL 185 (H)   Glucose Latest Ref Range: 70 - 99 mg/dL 137 (H)         Follow the American Heart Association Diet and Lifestyle Recommendations:  -Limit saturated fat, trans fat, sodium, red meat, sweets and sugar-sweetened beverages. If you choose to eat red meat, compare labels and select the leanest cuts available.  -Aim for at least 150 minutes of moderate physical activity or 75 minutes of vigorous physical activity - or an equal combination of both - each week.      To Reach Us:  -During business hours: [phone], press option # 1 to be routed to the University then  "option # 4 for \"To send a message to your care team\"     -After hours, weekends or holidays: [phone], press option #4 and ask to speak to the on-call cardiologist. Inform them you are a patient at the [facility].      Catina Crump RN  Cardiology Care Coordinator - General Cardiology  [facility]    "

## 2022-01-13 NOTE — NURSING NOTE
Chief Complaint   Patient presents with     Follow Up     Yearly Fiorellaz follow up     Vitals were taken and medications reconciled.    Ronald Butler, EMT  4:15 PM

## 2022-01-14 ENCOUNTER — THERAPY VISIT (OUTPATIENT)
Dept: OCCUPATIONAL THERAPY | Facility: CLINIC | Age: 62
End: 2022-01-14
Payer: COMMERCIAL

## 2022-01-14 DIAGNOSIS — M79.645 PAIN OF FINGER OF LEFT HAND: ICD-10-CM

## 2022-01-14 PROCEDURE — 97110 THERAPEUTIC EXERCISES: CPT | Mod: GO | Performed by: OCCUPATIONAL THERAPIST

## 2022-01-15 NOTE — PROGRESS NOTES
[facility]    OUTPATIENT Occupational Therapy ORTHOPEDIC EVALUATION  PLAN OF TREATMENT FOR OUTPATIENT REHABILITATION  (COMPLETE FOR INITIAL CLAIMS ONLY)  Patient's Last Name, First Name, M.I.  YOB: 1960  Hong Pool    Provider s Name:  [facility]   Medical Record No.  [id number]   Start of Care Date:      Onset Date:   12/29/21   Type:     _X__PT   ___OT Medical Diagnosis:    Encounter Diagnosis   Name Primary?     Pain of finger of left hand         Treatment Diagnosis:  Pain of finger of left hand        Goals:     01/07/22 0500   Goal #1   Goal #1 dressing   Previous Performance Level Independent   Current Functional Task Tie   Current Performance Level Quite a bit of difficulty   STG Target Performance Shoe   STG Target Perform Level Moderate difficulty    Due Date  02/05/22   LTG Target Task/Performance No Difficulty   Due date 03/07/22       Therapy Frequency:     Predicted Duration of Therapy Intervention:       KATHERINE HDZ OT                 I CERTIFY THE NEED FOR THESE SERVICES FURNISHED UNDER        THIS PLAN OF TREATMENT AND WHILE UNDER MY CARE     (Physician attestation of this document indicates review and certification of the therapy plan).                       Certification Date From:      Certification Date To:       Referring Provider:  Linda Nolan    Initial Assessment        See Epic Evaluation

## 2022-01-28 ENCOUNTER — THERAPY VISIT (OUTPATIENT)
Dept: OCCUPATIONAL THERAPY | Facility: CLINIC | Age: 62
End: 2022-01-28
Payer: COMMERCIAL

## 2022-01-28 DIAGNOSIS — M79.645 PAIN OF FINGER OF LEFT HAND: ICD-10-CM

## 2022-01-28 PROCEDURE — 97110 THERAPEUTIC EXERCISES: CPT | Mod: GO | Performed by: OCCUPATIONAL THERAPIST

## 2022-01-28 PROCEDURE — 97760 ORTHOTIC MGMT&TRAING 1ST ENC: CPT | Mod: GO | Performed by: OCCUPATIONAL THERAPIST

## 2022-02-11 ENCOUNTER — THERAPY VISIT (OUTPATIENT)
Dept: OCCUPATIONAL THERAPY | Facility: CLINIC | Age: 62
End: 2022-02-11
Payer: COMMERCIAL

## 2022-02-11 DIAGNOSIS — M79.645 PAIN OF FINGER OF LEFT HAND: ICD-10-CM

## 2022-02-11 PROCEDURE — 97110 THERAPEUTIC EXERCISES: CPT | Mod: GO | Performed by: OCCUPATIONAL THERAPIST

## 2022-02-11 PROCEDURE — 97535 SELF CARE MNGMENT TRAINING: CPT | Mod: GO | Performed by: OCCUPATIONAL THERAPIST

## 2022-02-11 NOTE — PROGRESS NOTES
Hand Therapy Progress Note    Current Date:  2/11/2022    Diagnosis: Pain of finger of left hand, IF (Reports in all fingers B)   DOI: 12/29/21  DOS: NA  Procedure:  NA    Precautions: None     Reporting period is 1/7/21 - 2/11/22    Subjective:   Subjective changes noted by patient:  Pt reports improved ability to perform functional tasks with occasional pain in L IF IP.   Functional changes noted by patient:  Improvement in Self Care Tasks (dressing), Work Tasks and Household Chores  Patient has noted adverse reaction to:  None    Functional Outcome Measure:  Upper Extremity Functional Index Score:  SCORE:   Column Totals: /80: 67   (A lower score indicates greater disability.)    Objective:    Please refer to the daily flowsheet for treatment provided today.     Objective:  Pain Level (Scale 0-10)   1/7/2022 02/11/22     At Rest 0 0   With Use 9 7     Pain Description  Date 1/7/2022 02/11/22     Location R ulnar wrist, R fin.   L IF, RF especially, all L fingers as well  L IF IP joint    Pain Quality Aching and Burning Aching and Burning   Frequency intermittent   intermittent     Pain is worst  daytime or nighttime  daytime or nighttime   Exacerbated by  Gripping, pinching, resistive activities  Gripping   Relieved by Unsure  Rest, HEP     Progression Getting worse  Getting better      Edema  Mild   Edema (Circumference measured in cm)   1/7/2022 1/7/2022 02/11/22     IF R L L   P1 7.5 7.5 7.2   PIP 6.8 6.8 6.6   P2 6.2 5.8 6.0   DIP 5.9 5.9 5.8     ROM  Hand 1/7/2022 1/7/2022 02/11/22     AROM(PROM) R L L    Index MP 0/80 0/80+ 93   PIP 0/100 0/90+ 105   DIP 0/70 0/70+ 75   MCGOVERN 250 240    Long MP 0/85 0/90    PIP 0/95 0/100    DIP 0/75 0/75    MCGOVERN 255 265    Ring MP 0/85 0/80+    PIP 0/95 0/95+    DIP 0/70 0/75+    MCGOVERN 250 250    Small MP 0/85 0/75    PIP 0/100 0/70    DIP 0/70 0/70    MCGOVERN 255 215      Kapandji Oppostion Scale  10 BUE   Sensation  WNL throughout all nerve distributions; per patient  "report    ROM  Pain Report:  - none    + mild    ++ moderate    +++ severe   AROM( PROM) 1/7/2022   Finger flexion from Distal Palmar Crease R: -  L:-   Ulnar drift of MCP joints (measured at long finger) R:-  L:-   Wrist Extension R:75  L:70   Wrist Flexion R:65  L:70     Observation  - none    + mild    ++ moderate    +++ severe    1/7/2022   Dorsal wrist extensor synovitis R:-  L:-   MP joints swelling R:-  L:-   Volar subluxation of MP joints R:-  L:-   Ulnar drift of DIP  R: +  L:+   Ulnar drift of MP joints R:-  L:-   EDC subluxation at MP joints R:-  L:-   Boutonierre deformity R:-  L:-   Westby neck deformity R:-  L:-   Zig zag collapse  R:-  L:-   Intrinsic tightness R:+  L:+     Shoulder Screen  (Full or Limited)   1/7/2022 1/7/2022    R L   Reach behind head F F   Reach contralateral shoulder \" \"   Reach low back \" \"   Reach mid back \" \"   Reach scapula \" \"     Cervical Screen  Pain Report:  - none    + mild    ++ moderate    +++ severe      Active with Gentle Overpressure 1/7/2022   Flexion -   Extension -   Lateral Flexion Right -   Lateral Flexion Left -   Rotation Right -   Rotation Left -     Strength:   (Measured in pounds)  Pain Report:  - none    + mild    ++ moderate    +++ severe    1/7/2022 1/7/2022    Trials R L L   1 80 70+ 70+     Lat Pinch 1/7/2022 1/7/2022 02/11/22     Trials R L    1 25 23 21     3 Pt Pinch 1/7/2022 1/7/2022 02/11/22     Trials R L L   1 20 11++ 20+ FA        Assessment:  Response to therapy has been improvement to:  ROM of Finger, IF DIP   Strength:   and pinch  Pain:  frequency is less and intensity of pain is decreased    Overall Assessment:  Pt is independent with HEP, able to progress independently.   STG/LTG:  STGoals have been reviewed and progress or achievement has occurred;  see goal sheet for details and updates.    Plan:  Discharge at this time.     Home Exercise Program:  PTRX Report  The following values have been sent to [facility].  Finger Active Range of " Motion Tendon Glides Fist Series  EMR Notes  HEP - Sets  Reps 5-10  Sessions per day 2-3  Notes  Radial abduction finger walking  EMR Notes  HEP - Sets  Reps 5-10  Sessions per day 2-3  Notes  Warmth  EMR Notes  HEP - Sets  Reps  Sessions per day  Notes Can use warm washcloth or water. 20 min at a time.  Orthosis Wear and Care  EMR Notes  HEP - Sets  Reps  Sessions per day  Notes  Hook Fist and Pull Back with a Marker for Intrinsic Hand Muscle Stretching  EMR Notes  HEP - Sets  Reps 5-10  Sessions per day 2-3  Notes  Finger Stability  EMR Notes  HEP - Sets  Reps  Sessions per day  Notes Can perform with all fingers together, hold fo 3 sec at a time.  Finger Passive Range of Motion Composite Flexion  EMR Notes  HEP - Sets  Reps 5-10  Sessions per day 2-3  Notes As needed

## 2022-07-01 ENCOUNTER — TELEPHONE (OUTPATIENT)
Dept: CARDIOLOGY | Facility: CLINIC | Age: 62
End: 2022-07-01

## 2022-07-01 NOTE — TELEPHONE ENCOUNTER
Prior Authorization Not Needed per Insurance    Medication: Repatha sureclick - patient is requesting a 3 month supply pa  Insurance Company: Other (see comments)  Expected CoPay:      Pharmacy Filling the Rx: [facility] - [place], [place] - [address]   Pharmacy Notified:  yes  Patient Notified:  yes    Previously approved, effective until 1/7/2023    Made pharmacy aware the max days supply insurance allows is 2 per 28 days, 90 days supply is now allowed.  No PA can be done for larger day supply

## 2022-10-29 ENCOUNTER — HEALTH MAINTENANCE LETTER (OUTPATIENT)
Age: 62
End: 2022-10-29

## 2022-11-11 ENCOUNTER — TELEPHONE (OUTPATIENT)
Dept: FAMILY MEDICINE | Facility: CLINIC | Age: 62
End: 2022-11-11

## 2022-11-11 NOTE — TELEPHONE ENCOUNTER
RN called pt to relay he is due this year and can do at upcoming physical    Zara Timothy Kern RN

## 2022-11-11 NOTE — TELEPHONE ENCOUNTER
[facility] phone call message- general phone call:    Reason for call: The patient would like to know when is he due for a tetanus booster.    Return call needed: Yes    OK to leave a message on voice mail? Yes    Primary language: English      needed? No    Call taken on November 11, 2022 at 3:18 PM by Yolis Zavaleta

## 2022-11-13 ENCOUNTER — HOSPITAL ENCOUNTER (EMERGENCY)
Facility: CLINIC | Age: 62
Discharge: HOME | End: 2022-11-13
Payer: COMMERCIAL

## 2022-11-13 ENCOUNTER — ANCILLARY PROCEDURE (OUTPATIENT)
Dept: GENERAL RADIOLOGY | Facility: CLINIC | Age: 62
End: 2022-11-13
Attending: PHYSICIAN ASSISTANT
Payer: COMMERCIAL

## 2022-11-13 ENCOUNTER — OFFICE VISIT (OUTPATIENT)
Dept: FAMILY MEDICINE | Facility: CLINIC | Age: 62
End: 2022-11-13
Payer: COMMERCIAL

## 2022-11-13 VITALS
TEMPERATURE: 97.4 F | SYSTOLIC BLOOD PRESSURE: 141 MMHG | DIASTOLIC BLOOD PRESSURE: 87 MMHG | HEART RATE: 73 BPM | OXYGEN SATURATION: 97 % | RESPIRATION RATE: 17 BRPM

## 2022-11-13 DIAGNOSIS — S82.851A TRIMALLEOLAR FRACTURE OF ANKLE, CLOSED, RIGHT, INITIAL ENCOUNTER: Primary | ICD-10-CM

## 2022-11-13 DIAGNOSIS — M25.471 RIGHT ANKLE EFFUSION: ICD-10-CM

## 2022-11-13 PROCEDURE — 99214 OFFICE O/P EST MOD 30 MIN: CPT | Performed by: PHYSICIAN ASSISTANT

## 2022-11-13 PROCEDURE — 73610 X-RAY EXAM OF ANKLE: CPT | Mod: TC | Performed by: RADIOLOGY

## 2022-11-13 NOTE — PATIENT INSTRUCTIONS
Do not eat or drink anything by mouth  Present to the [facility] quick walk-in care for definitive evaluation and treatment       1

## 2022-11-13 NOTE — PROGRESS NOTES
Patient presents with:  right ankle injury : Fall yesterday      Clinical Decision Making:  Patient has a trimalleolar fracture and is requiring surgical repair of the intra-articular fracture and was placed in a cam boot with protected weightbearing status and crutch ambulation.  Patient is directed to be n.p.o. and present to [facility] quick walk-in for evaluation and treatment with physician on-call for surgical repair of the trimalleolar fracture.  Alternatively, the patient may contact [facility] during office hours tomorrow with the orthopedic  referral for evaluation and treatment.  Indication for emergent return was gone over with the patient questions were answered to his satisfaction before discharge.       ICD-10-CM    1. Trimalleolar fracture of ankle, closed, right, initial encounter  S82.851A Orthopedic  Referral      2. Right ankle effusion  M25.471 XR Ankle Right G/E 3 Views     Ankle/Foot Bracing Supplies Order for DME - ONLY FOR DME          Patient Instructions   Do not eat or drink anything by mouth  Present to the Ortho quick walk-in care for definitive evaluation and treatment          HPI:  Hong Pool is a 62 year old male who presents today for a 1 day history right ankle pain.  Patient states he has been walking down the stairs unless stairs missed the last step and suffered an inversion injury to the right ankle.  Subsequently has had pain swelling and inability to bear weight on the right lower extremity.  He does not have any knee hip pain of the ipsilateral side contralateral left lower extremity pelvic back or bilateral upper extremity injuries to report.    History obtained from chart review and the patient.    Problem List:  2022-01: Pain of finger of left hand  2019-02: Optic cupping of both eyes  2019-02: Myopia of both eyes with astigmatism and presbyopia  2019-02: Age-related nuclear cataract  2018-08: Hamstring muscle  strain  2017-05: Functional dyspepsia  2017-05: Gastroesophageal reflux disease without esophagitis  2016-12: Kidney stone  2016-06: Statin intolerance  2016-06: Calculus of gallbladder without cholecystitis without   obstruction  2015-11: Borderline diabetes mellitus  2015-11: Coronary artery disease involving coronary bypass graft of   native heart without angina pectoris  2013-01: Knee pain  2013-01: Fatigue  2012-10: Adjustment disorder with depressed mood  2012-10: Testicular hypofunction  2012-10: Simple goiter  2012-09: Hyperlipidemia LDL goal <70  2012-06: ASCVD (arteriosclerotic cardiovascular disease)  2012-06: CAD (coronary artery disease)  2010-12: Pain in joint, shoulder region      Past Medical History:   Diagnosis Date     Borderline diabetes mellitus 11/26/2015     Coronary artery disease 2012    CABG x4     Coronary artery disease involving coronary bypass graft of native heart without angina pectoris 11/18/2015     Depression      Goiter     With normal TSH     Hyperlipidemia     Started on statin 7/2007.  Discontinued 10/07 secondary to muscle aches and fatigue     Nephrolithiasis      Shoulder pain      Statin intolerance 6/23/2016       Social History     Tobacco Use     Smoking status: Never     Smokeless tobacco: Never   Substance Use Topics     Alcohol use: No       Review of Systems  As above in HPI otherwise negative.    Vitals:    11/13/22 1311   BP: (!) 141/87   Pulse: 73   Resp: 17   Temp: 97.4  F (36.3  C)   SpO2: 97%       General: Patient is presenting nonambulatory in a wheelchair.  HEENT: Head is normocephalic atraumatic   Skin: Without rash non-diaphoretic  Musculoskeletal: Right lower extremity shows that the hip and knee are nontender palpation full range of motion without effusion.  The right ankle is effused tender over the medial and lateral malleoli and on the distal third of the tibia and fibula.  Positive squeeze test.  Right foot is nontender over Lisfranc ligament and is  sensory intact to light touch no tenderness over the tarsals or metatarsals of the right foot    Physical Exam      Labs:  Results for orders placed or performed in visit on 11/13/22   XR Ankle Right G/E 3 Views     Status: None    Narrative    EXAM: XR ANKLE RIGHT G/E 3 VIEWS  LOCATION: [facility]  DATE/TIME: 11/13/2022 1:55 PM    INDICATION: Pain following injury  COMPARISON: None.      Impression    IMPRESSION: Acute fracture of the distal fibular metaphysis. Acute fracture of the medial malleolus. The distal fibular and medial malleolar fragments remain aligned with the talus. There is mild lateral displacement of the talus relative to the tibial   plafond and. Calcifications along the anterior margin of the ankle joint. No posterior malleolar fracture is evident. Diffuse soft tissue swelling.       At the end of the encounter, I discussed results, diagnosis, medications. Discussed red flags for immediate return to clinic/ER, as well as indications for follow up if no improvement. Patient understood and agreed to plan. Patient was stable for discharge.

## 2022-11-14 ENCOUNTER — TELEPHONE (OUTPATIENT)
Dept: ORTHOPEDICS | Facility: CLINIC | Age: 62
End: 2022-11-14

## 2022-11-14 NOTE — TELEPHONE ENCOUNTER
[facility] Call Center    Phone Message    May a detailed message be left on voicemail: yes     Reason for Call: Appointment Intake    Referring Provider Name: ED f/u  Diagnosis and/or Symptoms: Trimalleolar fracture of ankle, closed, right    Action Taken: Message routed to:  [facility]o    Travel Screening: Not Applicable     Imaging in system --  Please call back with who to see and when

## 2022-11-14 NOTE — TELEPHONE ENCOUNTER
Patient was called back.  He was offered an appointment with Dr. Mora tomorrow 11/15/22.  He was agreeable with this plan and has our location and phone number.  He is aware that he will get all information for treatment plan tomorrow.  He had no other questions.

## 2022-11-14 NOTE — TELEPHONE ENCOUNTER
DIAGNOSIS: RT Ankle Fracture   APPOINTMENT DATE: 11/15/2022   NOTES STATUS DETAILS   OFFICE NOTE from referring provider Internal 11/13/2022 - Tom Cevallos PA-C - [id number]   DISCHARGE REPORT from the ER Internal 11/13/2022 - [facility] ED   MEDICATION LIST Internal    LABS     XRAYS (IMAGES & REPORTS) Internal 11/13/2022 - RT Ankle

## 2022-11-15 ENCOUNTER — PRE VISIT (OUTPATIENT)
Dept: ORTHOPEDICS | Facility: CLINIC | Age: 62
End: 2022-11-15

## 2022-11-16 ENCOUNTER — TRANSFERRED RECORDS (OUTPATIENT)
Dept: HEALTH INFORMATION MANAGEMENT | Facility: CLINIC | Age: 62
End: 2022-11-16

## 2022-11-16 ENCOUNTER — OFFICE VISIT (OUTPATIENT)
Dept: FAMILY MEDICINE | Facility: CLINIC | Age: 62
End: 2022-11-16
Payer: COMMERCIAL

## 2022-11-16 VITALS
BODY MASS INDEX: 28.63 KG/M2 | WEIGHT: 216 LBS | HEIGHT: 73 IN | OXYGEN SATURATION: 97 % | HEART RATE: 73 BPM | SYSTOLIC BLOOD PRESSURE: 121 MMHG | DIASTOLIC BLOOD PRESSURE: 80 MMHG | TEMPERATURE: 98.5 F

## 2022-11-16 DIAGNOSIS — Z01.818 PRE-OPERATIVE GENERAL PHYSICAL EXAMINATION: Primary | ICD-10-CM

## 2022-11-16 PROCEDURE — 85025 COMPLETE CBC W/AUTO DIFF WBC: CPT | Performed by: NURSE PRACTITIONER

## 2022-11-16 PROCEDURE — 80053 COMPREHEN METABOLIC PANEL: CPT | Performed by: NURSE PRACTITIONER

## 2022-11-16 PROCEDURE — 83036 HEMOGLOBIN GLYCOSYLATED A1C: CPT | Performed by: NURSE PRACTITIONER

## 2022-11-16 NOTE — NURSING NOTE
"ROOM:2  [facility]    Preferred Name: ZACHARIAH     WILLIE AGREED:              WILLIE DECLINED:              62 year old  Chief Complaint   Patient presents with     Pre-Op Exam       Blood pressure 121/80, pulse 73, temperature 98.5  F (36.9  C), temperature source Oral, height 1.855 m (6' 1.03\"), weight 98 kg (216 lb), SpO2 97 %. Body mass index is 28.47 kg/m .  BP completed using cuff size:        Patient Active Problem List   Diagnosis     CAD (coronary artery disease)     ASCVD (arteriosclerotic cardiovascular disease)     Hyperlipidemia LDL goal <70     Adjustment disorder with depressed mood     Testicular hypofunction     Fatigue     Coronary artery disease involving coronary bypass graft of native heart without angina pectoris     Borderline diabetes mellitus     Calculus of gallbladder without cholecystitis without obstruction     Statin intolerance     Kidney stone     Functional dyspepsia     Gastroesophageal reflux disease without esophagitis     Optic cupping of both eyes     Myopia of both eyes with astigmatism and presbyopia     Age-related nuclear cataract       Wt Readings from Last 2 Encounters:   11/16/22 98 kg (216 lb)   01/13/22 98 kg (216 lb)     BP Readings from Last 3 Encounters:   11/16/22 121/80   11/13/22 (!) 141/87   01/13/22 131/77       Allergies   Allergen Reactions     Lidocaine Rash     Break out     Adhesive Tape      Adhesive     Band-Aid Anti-Itch      Pad on the bandaid, antibacterial gauze that causes reaction     Cholestoff Complete [Plant Sterol Stanol-Pantethine] Other (See Comments)     Severe stomach pain     Lactose Diarrhea     Rosuvastatin Muscle Pain (Myalgia)     Simvastatin      Other reaction(s): Muscle Aches     Zocor [Simvastatin - High Dose] Other (See Comments)     Muscle aches/soreness, confusion, disorganized thinking       Current Outpatient Medications   Medication     alpha-d-galactosidase (BEANO) tablet     aspirin 81 MG EC tablet     bismuth subsalicylate " (PEPTO BISMOL) 262 MG chewable tablet     Blood Pressure Monitor KIT     Cholecalciferol (VITAMIN D3) 2000 UNITS CAPS     evolocumab (REPATHA) 140 MG/ML prefilled autoinjector     LACTAID FAST ACT 9000 units TABS tablet     melatonin 1 MG TABS tablet     Multiple Vitamins-Minerals (MENS MULTIVITAMIN PLUS) TABS     potassium citrate (UROCIT-K) 10 MEQ (1080 MG) CR tablet     pyridOXINE (VITAMIN B6) 100 MG TABS     STATIN NOT PRESCRIBED, INTENTIONAL,     No current facility-administered medications for this visit.       Social History     Tobacco Use     Smoking status: Never     Smokeless tobacco: Never   Vaping Use     Vaping Use: Never used   Substance Use Topics     Alcohol use: No     Drug use: No       Honoring Choices - Health Care Directive Guide offered to patient at time of visit.    Health Maintenance Due   Topic Date Due     ADVANCE CARE PLANNING  Never done     Pneumococcal Vaccine: Pediatrics (0 to 5 Years) and At-Risk Patients (6 to 64 Years) (1 - PCV) Never done     COLORECTAL CANCER SCREENING  11/15/2020     DTAP/TDAP/TD IMMUNIZATION (4 - Td or Tdap) 06/18/2022     LIPID  07/06/2022     INFLUENZA VACCINE (1) 09/01/2022     ZOSTER IMMUNIZATION (2 of 2) 08/22/2022     YEARLY PREVENTIVE VISIT  11/19/2022       Immunization History   Administered Date(s) Administered     COVID-19,PF,Pfizer (12+ Yrs) 03/31/2021, 04/21/2021, 11/15/2021     Flu, Unspecified 11/24/1989     HepB 11/04/1998, 12/16/1998, 05/18/1999     Hib (PRP-T) 12/16/1998     Influenza (IIV3) PF 11/03/2008, 08/31/2009, 10/12/2011, 09/07/2012, 11/01/2014     Influenza Quad, Recombinant, pf(RIV4) (Flublok) 11/07/2021     Influenza Vaccine IM > 6 months Valent IIV4 (Alfuria,Fluzone) 01/17/2018, 11/06/2018, 10/05/2019, 01/03/2021     TD (ADULT, 7+) 11/04/1998, 01/23/2006     Tdap (Adacel,Boostrix) 06/18/2012       No results found for: PAP    Recent Labs   Lab Test 01/06/22  0812 12/08/21  1440 11/19/21  1431 01/18/21  0804 01/23/20  0921   A1C  6.2*  --  6.2* 5.9*  --    LDL 41  --   --  34 43   HDL 57  --   --  49 54   TRIG 185*  --   --  272* 203*   ALT 40  --   --  30 36   CR 1.14  --   --  1.06 0.94   GFRESTIMATED 73  --   --  76 89   GFRESTBLACK  --   --   --  88 >90   ALBUMIN 3.9  --   --  3.9 3.6   POTASSIUM 4.0  --   --  4.1 4.3   TSH  --  0.88  --   --   --        PHQ-2 ( 1999 Pfizer) 1/13/2022 11/19/2021   Q1: Little interest or pleasure in doing things 1 2   Q2: Feeling down, depressed or hopeless 1 2   PHQ-2 Score 2 4   PHQ-2 Total Score (12-17 Years)- Positive if 3 or more points; Administer PHQ-A if positive - -       PHQ-9 SCORE 6/21/2016 8/14/2018 1/21/2021 4/30/2021   PHQ-9 Total Score 13 6 10 11       No flowsheet data found.    No flowsheet data found.    Shobha Winters    November 16, 2022 2:33 PM

## 2022-11-16 NOTE — PROGRESS NOTES
"[facility]  [address] [place] [id number]  Phone: [phone]  Fax: [phone]  Primary Provider: Jorge Singleton  Pre-op Performing Provider: CRISTY TORRES    PREOPERATIVE EVALUATION:  Today's date: 11/16/2022    Hong Pool is a 62 year old male who presents for a preoperative evaluation.    Surgical Information:  Surgery/Procedure: Ankle Surgery R Tanvir ORIF  Surgery Location: [facility]  Surgeon: Claudio Torres  Surgery Date: 11/18/2022  Time of Surgery: TBD  Where patient plans to recover: At home with family  Fax number for surgical facility: [phone]    Type of Anesthesia Anticipated: Block below the knee    Subjective     HPI related to upcoming procedure: On 11/13/22  Mr. Pool missed a stair and landed very hard on his right foot, going down stairs at home.  He went to the [facility], then following up with [facility].  He then was recommended to have the above surgery with Dr. Claudio Torres on 11/18/2022.        Preop Questions 11/16/2022   1. Have you ever had a heart attack or stroke? No   2. Have you ever had surgery on your heart or blood vessels, such as a stent placement, a coronary artery bypass, or surgery on an artery in your head, neck, heart, or legs? Quad Bypass Surgery June of 2012- follows cardiology yearly   3. Do you have chest pain with activity? No   4. Do you have a history of  heart failure? No   5. Do you currently have a cold, bronchitis or symptoms of other infection? No   6. Do you have a cough, shortness of breath, or wheezing? No   7. Do you or anyone in your family have previous history of blood clots? No   8. Do you or does anyone in your family have a serious bleeding problem such as prolonged bleeding following surgeries or cuts? No   9. Have you ever had problems with anemia or been told to take iron pills? YES- history of anemia \"in the past,\" takes vitamin with iron   10. Have " you had any abnormal blood loss such as black, tarry or bloody stools? No   11. Have you ever had a blood transfusion? YES    11a. Have you ever had a transfusion reaction? No   12. Are you willing to have a blood transfusion if it is medically needed before, during, or after your surgery? Yes   13. Have you or any of your relatives ever had problems with anesthesia? No   14. Do you have sleep apnea, excessive snoring or daytime drowsiness? No   15. Do you have any artifical heart valves or other implanted medical devices like a pacemaker, defibrillator, or continuous glucose monitor? No   16. Do you have artificial joints? No   17. Are you allergic to latex? No     Health Care Directive:  Patient does not have a Health Care Directive or Living Will: Discussed advance care planning with patient; information given to patient to review.    Preoperative Review of :   reviewed - no record of controlled substances prescribed.    Review of Systems  CONSTITUTIONAL: NEGATIVE for fever, chills, change in weight  INTEGUMENTARY/SKIN: NEGATIVE for worrisome rashes, moles or lesions  EYES: NEGATIVE for vision changes or irritation  ENT/MOUTH: NEGATIVE for ear, mouth and throat problems  RESP: NEGATIVE for significant cough or SOB  CV: NEGATIVE for chest pain, palpitations or peripheral edema.  Patient has a history, 2012, of CABG X4.  He follows with Dr. Evangelista on an annual basis.  His last echocardiogram was in 2016:  Interpretation Summary  Right ventricular function, chamber size, wall motion, and thickness are  normal.  Borderline dilated LV with normal geometry and wall motion, LVEF 58%. Normal  diastolic function  GI: NEGATIVE for nausea, abdominal pain, heartburn, or change in bowel habits  : NEGATIVE for frequency, dysuria, or hematuria  MUSCULOSKELETAL: NEGATIVE for significant arthralgias or myalgia  NEURO: NEGATIVE for weakness, dizziness or paresthesias  ENDOCRINE: NEGATIVE for temperature intolerance,  skin/hair changes  HEME: NEGATIVE for bleeding problems  PSYCHIATRIC: NEGATIVE for changes in mood or affect    Patient Active Problem List    Diagnosis Date Noted     ASCVD (arteriosclerotic cardiovascular disease) 06/24/2012     Priority: High     CAD (coronary artery disease) 06/22/2012     Priority: High     Optic cupping of both eyes 02/15/2019     Priority: Medium     Myopia of both eyes with astigmatism and presbyopia 02/15/2019     Priority: Medium     Age-related nuclear cataract 02/15/2019     Priority: Medium     Functional dyspepsia 05/17/2017     Priority: Medium     GI eval in winter 2017       Gastroesophageal reflux disease without esophagitis 05/17/2017     Priority: Medium     EGD 4/11/2017  Impression:          - Z-line regular, 41 cm from the incisors.                        - Normal esophagus.                        - A few gastric polyps. Resected and retrieved.                        - A single duodenal polyp. Resected and retrieved.                        - Normal examined duodenum.                        - Biopsies were taken with a cold forceps for                        Helicobacter pylori testing.       Kidney stone 12/08/2016     Priority: Medium     Lithotripsy to remove 1 stone, cystoscopy to remove 2nd  ER May 2018 - CT   IMPRESSION:   1. 4 mm stone within the proximal right ureter with mild right  hydronephrosis. Nonobstructing nephrolithiasis in the left kidney    Urology 6/2018 - CaOxalate recurrent stone former       Statin intolerance 06/23/2016     Priority: Medium     Calculus of gallbladder without cholecystitis without obstruction 06/21/2016     Priority: Medium     Borderline diabetes mellitus 11/26/2015     Priority: Medium     Coronary artery disease involving coronary bypass graft of native heart without angina pectoris 11/18/2015     Priority: Medium     Fatigue 01/08/2013     Priority: Medium     Adjustment disorder with depressed mood 10/25/2012     Priority: Medium      Testicular hypofunction 10/25/2012     Priority: Medium     Problem list name updated by automated process. Provider to review       Hyperlipidemia LDL goal <70 09/07/2012     Priority: Medium     Statin intolerance        Past Medical History:   Diagnosis Date     Borderline diabetes mellitus 11/26/2015     Coronary artery disease 2012    CABG x4     Coronary artery disease involving coronary bypass graft of native heart without angina pectoris 11/18/2015     Depression      Goiter     With normal TSH     Hyperlipidemia     Started on statin 7/2007.  Discontinued 10/07 secondary to muscle aches and fatigue     Nephrolithiasis      Shoulder pain      Statin intolerance 6/23/2016     Past Surgical History:   Procedure Laterality Date     BYPASS GRAFT ARTERY CORONARY  6/24/2012    Procedure: BYPASS GRAFT ARTERY CORONARY;  Median Sternotomy, Coronary Artery Bypass Grafting x 4 using Left Internal Mammary and Left Saphenous Vein  with Endovein Harvesting. On Pump Oxygenation;  Surgeon: Genesis Larsen MD;  Location:  OR     ESOPHAGOSCOPY, GASTROSCOPY, DUODENOSCOPY (EGD), COMBINED N/A 4/11/2017    Procedure: COMBINED ESOPHAGOSCOPY, GASTROSCOPY, DUODENOSCOPY (EGD), BIOPSY SINGLE OR MULTIPLE;  Surgeon: Corine Ly MD;  Location:  GI     Current Outpatient Medications   Medication Sig Dispense Refill     alpha-d-galactosidase (BEANO) tablet Take 2 tablets by mouth 3 times daily (before meals) 540 tablet 4     aspirin 81 MG EC tablet Take 1 tablet (81 mg) by mouth daily 90 tablet 4     bismuth subsalicylate (PEPTO BISMOL) 262 MG chewable tablet Take 1 tablet by mouth 3 times daily as needed       Blood Pressure Monitor KIT 1 each daily. 1 kit 0     Cholecalciferol (VITAMIN D3) 2000 UNITS CAPS Take 1 capsule by mouth daily 90 capsule 4     evolocumab (REPATHA) 140 MG/ML prefilled autoinjector Inject 1 mL (140 mg) Subcutaneous every 14 days 6 mL 3     LACTAID FAST ACT 9000 units TABS tablet   4  "    melatonin 1 MG TABS tablet Take 1 mg by mouth nightly as needed for sleep       Multiple Vitamins-Minerals (MENS MULTIVITAMIN PLUS) TABS Take 1 tablet by mouth daily 90 tablet 4     potassium citrate (UROCIT-K) 10 MEQ (1080 MG) CR tablet Take 1 tablet (10 mEq) by mouth 3 times daily (with meals) 180 tablet 3     pyridOXINE (VITAMIN B6) 100 MG TABS Take 1 tablet (100 mg) by mouth daily 90 tablet 4     STATIN NOT PRESCRIBED, INTENTIONAL, 1 each continuous Statin not prescribed intentionally due to Intolerance (with supporting documentation of trying a statin at least once within the last 5 years) (Patient not taking: Reported on 11/16/2022) 0 each 0       Allergies   Allergen Reactions     Lidocaine Rash     Break out     Adhesive Tape      Adhesive     Band-Aid Anti-Itch      Pad on the bandaid, antibacterial gauze that causes reaction     Cholestoff Complete [Plant Sterol Stanol-Pantethine] Other (See Comments)     Severe stomach pain     Lactose Diarrhea     Rosuvastatin Muscle Pain (Myalgia)     Simvastatin      Other reaction(s): Muscle Aches     Zocor [Simvastatin - High Dose] Other (See Comments)     Muscle aches/soreness, confusion, disorganized thinking        Social History     Tobacco Use     Smoking status: Never     Smokeless tobacco: Never   Substance Use Topics     Alcohol use: No     Family History   Problem Relation Age of Onset     C.A.D. Other         Uncle     Diabetes Brother      Cerebrovascular Disease Brother      Cardiovascular Sister         tachyarrhythmia     History   Drug Use No         Objective     /80   Pulse 73   Temp 98.5  F (36.9  C) (Oral)   Ht 1.855 m (6' 1.03\")   Wt 98 kg (216 lb)   SpO2 97%   BMI 28.47 kg/m      Physical Exam    GENERAL APPEARANCE: healthy, alert and no distress     EYES: EOMI,  PERRL     HENT: ear canals and TM's normal and nose and mouth without ulcers or lesions     NECK: no adenopathy, no asymmetry, masses, or scars and thyroid normal to " palpation     RESP: lungs clear to auscultation - no rales, rhonchi or wheezes     CV: regular rates and rhythm, normal S1 S2, no S3 or S4 and no murmur, click or rub     ABDOMEN:  soft, nontender, no HSM or masses and bowel sounds normal     MS: Right foot and ankle in an orthopedic boot, remainder of musculoskeletal exam normal.       SKIN: no suspicious lesions or rashes     NEURO: Normal strength and tone, sensory exam grossly normal, mentation intact and speech normal     PSYCH: mentation appears normal. and affect normal/bright     LYMPHATICS: No cervical adenopathy    Recent Labs   Lab Test 01/06/22  0812 11/19/21  1431 01/18/21  0804     --  141   POTASSIUM 4.0  --  4.1   CR 1.14  --  1.06   A1C 6.2* 6.2* 5.9*        Diagnostics:  Labs pending at this time.  Results will be reviewed when available.   EKG: appears normal, NSR, normal axis, normal intervals, no acute ST/T changes c/w ischemia, no LVH by voltage criteria, unchanged from previous tracings    His EKG on 11/16/22 was NSR.  Most recent ECHO, 2016:   Interpretation Summary  Right ventricular function, chamber size, wall motion, and thickness are  normal.  Borderline dilated LV with normal geometry and wall motion, LVEF 58%. Normal  diastolic function    I spoke with Dr. Evangelista who stated he would not clear Mathew for surgery from a CV standpoint.  He stated he had not seen him for 6 months, he reiterated his 2016 Echo results and that he had had CABG surgery in 2012.  HE stated he needed to see him before he could make a determination for surgery.  I did let Dr. Evangelista know that the plan was to use a block for anesthesia.  He reiterated that he would not clear him.    I informed Mathew and Dang, his wife, of Dr. Evangelista's decision and they will call his office to make an appointment to be seen.   I called the surgeon's office and informed Dr. Claudio Macias's nurse of Dr. Evangelista's conversation and decision.        Signed Electronically by: Trinidad  KAMALJIT Macias, NP  Copy of this evaluation report is provided to requesting physician.

## 2022-11-17 ENCOUNTER — HOSPITAL ENCOUNTER (EMERGENCY)
Facility: CLINIC | Age: 62
Discharge: HOME | End: 2022-11-17
Attending: EMERGENCY MEDICINE | Admitting: EMERGENCY MEDICINE
Payer: COMMERCIAL

## 2022-11-17 ENCOUNTER — TELEPHONE (OUTPATIENT)
Dept: CARDIOLOGY | Facility: CLINIC | Age: 62
End: 2022-11-17

## 2022-11-17 VITALS
BODY MASS INDEX: 28.49 KG/M2 | HEIGHT: 73 IN | SYSTOLIC BLOOD PRESSURE: 163 MMHG | HEART RATE: 73 BPM | OXYGEN SATURATION: 99 % | WEIGHT: 215 LBS | DIASTOLIC BLOOD PRESSURE: 77 MMHG | TEMPERATURE: 98 F | RESPIRATION RATE: 16 BRPM

## 2022-11-17 DIAGNOSIS — I25.810 CORONARY ARTERY DISEASE INVOLVING CORONARY BYPASS GRAFT OF NATIVE HEART WITHOUT ANGINA PECTORIS: Primary | ICD-10-CM

## 2022-11-17 DIAGNOSIS — I25.10 CORONARY ARTERY DISEASE INVOLVING NATIVE CORONARY ARTERY OF NATIVE HEART WITHOUT ANGINA PECTORIS: ICD-10-CM

## 2022-11-17 DIAGNOSIS — Z01.818 PREOP TESTING: ICD-10-CM

## 2022-11-17 LAB
ALBUMIN SERPL BCG-MCNC: 4.4 G/DL (ref 3.5–5.2)
ALP SERPL-CCNC: 48 U/L (ref 40–129)
ALT SERPL W P-5'-P-CCNC: 23 U/L (ref 10–50)
ANION GAP SERPL CALCULATED.3IONS-SCNC: 13 MMOL/L (ref 7–15)
AST SERPL W P-5'-P-CCNC: 28 U/L (ref 10–50)
BASOPHILS # BLD AUTO: 0 10E3/UL (ref 0–0.2)
BASOPHILS NFR BLD AUTO: 1 %
BILIRUB SERPL-MCNC: 1.9 MG/DL
BUN SERPL-MCNC: 16.6 MG/DL (ref 8–23)
CALCIUM SERPL-MCNC: 9.7 MG/DL (ref 8.8–10.2)
CHLORIDE SERPL-SCNC: 104 MMOL/L (ref 98–107)
CREAT SERPL-MCNC: 1.05 MG/DL (ref 0.67–1.17)
DEPRECATED HCO3 PLAS-SCNC: 23 MMOL/L (ref 22–29)
EOSINOPHIL # BLD AUTO: 0.1 10E3/UL (ref 0–0.7)
EOSINOPHIL NFR BLD AUTO: 1 %
ERYTHROCYTE [DISTWIDTH] IN BLOOD BY AUTOMATED COUNT: 12.4 % (ref 10–15)
GFR SERPL CREATININE-BSD FRML MDRD: 80 ML/MIN/1.73M2
GLUCOSE SERPL-MCNC: 118 MG/DL (ref 70–99)
HBA1C MFR BLD: 6.4 %
HCT VFR BLD AUTO: 43.4 % (ref 40–53)
HGB BLD-MCNC: 14.5 G/DL (ref 13.3–17.7)
IMM GRANULOCYTES # BLD: 0 10E3/UL
IMM GRANULOCYTES NFR BLD: 0 %
LYMPHOCYTES # BLD AUTO: 0.8 10E3/UL (ref 0.8–5.3)
LYMPHOCYTES NFR BLD AUTO: 13 %
MCH RBC QN AUTO: 32.2 PG (ref 26.5–33)
MCHC RBC AUTO-ENTMCNC: 33.4 G/DL (ref 31.5–36.5)
MCV RBC AUTO: 96 FL (ref 78–100)
MONOCYTES # BLD AUTO: 0.4 10E3/UL (ref 0–1.3)
MONOCYTES NFR BLD AUTO: 6 %
NEUTROPHILS # BLD AUTO: 4.9 10E3/UL (ref 1.6–8.3)
NEUTROPHILS NFR BLD AUTO: 79 %
NRBC # BLD AUTO: 0 10E3/UL
NRBC BLD AUTO-RTO: 0 /100
PLATELET # BLD AUTO: 168 10E3/UL (ref 150–450)
POTASSIUM SERPL-SCNC: 4.4 MMOL/L (ref 3.4–5.3)
PROT SERPL-MCNC: 7.1 G/DL (ref 6.4–8.3)
RBC # BLD AUTO: 4.5 10E6/UL (ref 4.4–5.9)
SODIUM SERPL-SCNC: 140 MMOL/L (ref 136–145)
WBC # BLD AUTO: 6.3 10E3/UL (ref 4–11)

## 2022-11-17 PROCEDURE — 99282 EMERGENCY DEPT VISIT SF MDM: CPT

## 2022-11-17 NOTE — TELEPHONE ENCOUNTER
[facility] Call Center    Phone Message    May a detailed message be left on voicemail: yes     Reason for Call: patient is scheduled tonight @ 5 PM for a Cardiac Clearance for Ankle surgery for tomorrow at [facility] in [place].  Pt is unsure if he needs testing or if this is just a check in, that is needed.  Please reach out if anything is needed prior to the appointment.  Patient [place] [facility] [place] clinic.  That Should be in chart or will be faxed over.  Please adivse if anything else is needed before 5PM appt    Action Taken: Other: Cardio    Travel Screening: Not Applicable

## 2022-11-17 NOTE — TELEPHONE ENCOUNTER
Called and talked with pt.    Notified pt that Dr. Evangelista states he'll need a lexiscan and an echo prior to seeing him in clinic for cardiac clearance.     Will place orders and reach out to schedulers to help with these appointments and to reschedule today's visit (for after testing, per Dr. Evangelista).    Tried to review lexiscan instructions with pt/family over the phone, but wife stated they would just review the instructions via ncyclot and call with any questions.

## 2022-11-18 ENCOUNTER — ANESTHESIA EVENT (OUTPATIENT)
Dept: SURGERY | Facility: CLINIC | Age: 62
End: 2022-11-18
Payer: COMMERCIAL

## 2022-11-18 ENCOUNTER — HOSPITAL ENCOUNTER (EMERGENCY)
Facility: CLINIC | Age: 62
Discharge: HOME | End: 2022-11-18
Attending: EMERGENCY MEDICINE | Admitting: EMERGENCY MEDICINE
Payer: COMMERCIAL

## 2022-11-18 ENCOUNTER — ANESTHESIA (OUTPATIENT)
Dept: SURGERY | Facility: CLINIC | Age: 62
End: 2022-11-18
Payer: COMMERCIAL

## 2022-11-18 ENCOUNTER — APPOINTMENT (OUTPATIENT)
Dept: RADIOLOGY | Facility: CLINIC | Age: 62
End: 2022-11-18
Attending: ORTHOPAEDIC SURGERY
Payer: COMMERCIAL

## 2022-11-18 ENCOUNTER — TRANSFERRED RECORDS (OUTPATIENT)
Dept: HEALTH INFORMATION MANAGEMENT | Facility: CLINIC | Age: 62
End: 2022-11-18

## 2022-11-18 VITALS
SYSTOLIC BLOOD PRESSURE: 149 MMHG | HEART RATE: 74 BPM | OXYGEN SATURATION: 96 % | HEIGHT: 73 IN | DIASTOLIC BLOOD PRESSURE: 78 MMHG | WEIGHT: 215 LBS | RESPIRATION RATE: 20 BRPM | TEMPERATURE: 98.5 F | BODY MASS INDEX: 28.49 KG/M2

## 2022-11-18 DIAGNOSIS — S82.841A BIMALLEOLAR ANKLE FRACTURE, RIGHT, CLOSED, INITIAL ENCOUNTER: Primary | ICD-10-CM

## 2022-11-18 LAB
ATRIAL RATE - MUSE: 68 BPM
DIASTOLIC BLOOD PRESSURE - MUSE: NORMAL MMHG
INTERPRETATION ECG - MUSE: NORMAL
P AXIS - MUSE: NORMAL DEGREES
PR INTERVAL - MUSE: 156 MS
QRS DURATION - MUSE: 88 MS
QT - MUSE: 416 MS
QTC - MUSE: 442 MS
R AXIS - MUSE: 34 DEGREES
SARS-COV-2 RNA RESP QL NAA+PROBE: NEGATIVE
SYSTOLIC BLOOD PRESSURE - MUSE: NORMAL MMHG
T AXIS - MUSE: 57 DEGREES
VENTRICULAR RATE- MUSE: 68 BPM

## 2022-11-18 PROCEDURE — 999N000141 HC STATISTIC PRE-PROCEDURE NURSING ASSESSMENT: Performed by: ORTHOPAEDIC SURGERY

## 2022-11-18 PROCEDURE — 99221 1ST HOSP IP/OBS SF/LOW 40: CPT | Performed by: INTERNAL MEDICINE

## 2022-11-18 PROCEDURE — C1713 ANCHOR/SCREW BN/BN,TIS/BN: HCPCS | Performed by: ORTHOPAEDIC SURGERY

## 2022-11-18 PROCEDURE — 250N000011 HC RX IP 250 OP 636: Performed by: NURSE ANESTHETIST, CERTIFIED REGISTERED

## 2022-11-18 PROCEDURE — 710N000012 HC RECOVERY PHASE 2, PER MINUTE: Performed by: ORTHOPAEDIC SURGERY

## 2022-11-18 PROCEDURE — C9803 HOPD COVID-19 SPEC COLLECT: HCPCS

## 2022-11-18 PROCEDURE — 999N000182 XR SURGERY CARM FLUORO GREATER THAN 5 MIN

## 2022-11-18 PROCEDURE — 250N000011 HC RX IP 250 OP 636: Performed by: ORTHOPAEDIC SURGERY

## 2022-11-18 PROCEDURE — U0003 INFECTIOUS AGENT DETECTION BY NUCLEIC ACID (DNA OR RNA); SEVERE ACUTE RESPIRATORY SYNDROME CORONAVIRUS 2 (SARS-COV-2) (CORONAVIRUS DISEASE [COVID-19]), AMPLIFIED PROBE TECHNIQUE, MAKING USE OF HIGH THROUGHPUT TECHNOLOGIES AS DESCRIBED BY CMS-2020-01-R: HCPCS | Performed by: ORTHOPAEDIC SURGERY

## 2022-11-18 PROCEDURE — 710N000010 HC RECOVERY PHASE 1, LEVEL 2, PER MIN: Performed by: ORTHOPAEDIC SURGERY

## 2022-11-18 PROCEDURE — 360N000084 HC SURGERY LEVEL 4 W/ FLUORO, PER MIN: Performed by: ORTHOPAEDIC SURGERY

## 2022-11-18 PROCEDURE — 258N000003 HC RX IP 258 OP 636: Performed by: NURSE ANESTHETIST, CERTIFIED REGISTERED

## 2022-11-18 PROCEDURE — 250N000009 HC RX 250: Performed by: ANESTHESIOLOGY

## 2022-11-18 PROCEDURE — 258N000003 HC RX IP 258 OP 636: Performed by: ANESTHESIOLOGY

## 2022-11-18 PROCEDURE — 250N000009 HC RX 250

## 2022-11-18 PROCEDURE — 250N000011 HC RX IP 250 OP 636: Performed by: ANESTHESIOLOGY

## 2022-11-18 PROCEDURE — 250N000025 HC SEVOFLURANE, PER MIN: Performed by: ORTHOPAEDIC SURGERY

## 2022-11-18 PROCEDURE — 250N000009 HC RX 250: Performed by: NURSE ANESTHETIST, CERTIFIED REGISTERED

## 2022-11-18 PROCEDURE — 370N000017 HC ANESTHESIA TECHNICAL FEE, PER MIN: Performed by: ORTHOPAEDIC SURGERY

## 2022-11-18 PROCEDURE — 272N000001 HC OR GENERAL SUPPLY STERILE: Performed by: ORTHOPAEDIC SURGERY

## 2022-11-18 PROCEDURE — 93005 ELECTROCARDIOGRAM TRACING: CPT | Performed by: EMERGENCY MEDICINE

## 2022-11-18 PROCEDURE — 99285 EMERGENCY DEPT VISIT HI MDM: CPT | Mod: 25

## 2022-11-18 PROCEDURE — 250N000013 HC RX MED GY IP 250 OP 250 PS 637: Performed by: ORTHOPAEDIC SURGERY

## 2022-11-18 DEVICE — LO-PRO SCRW,SS 4.0X 12MMCANCLUS
Type: IMPLANTABLE DEVICE | Site: ANKLE | Status: FUNCTIONAL
Brand: ARTHREX®

## 2022-11-18 DEVICE — LO-PRO SCRW,SS 4.0X 30MMCANCLUS
Type: IMPLANTABLE DEVICE | Site: ANKLE | Status: FUNCTIONAL
Brand: ARTHREX®

## 2022-11-18 DEVICE — LO-PRO SCRW TM,SS 3.5X 14MMCORT
Type: IMPLANTABLE DEVICE | Site: ANKLE | Status: FUNCTIONAL
Brand: ARTHREX®

## 2022-11-18 DEVICE — KREULOCK COMPRESSION SCREW, SS, 3.5X14MM
Type: IMPLANTABLE DEVICE | Site: ANKLE | Status: FUNCTIONAL
Brand: ARTHREX®

## 2022-11-18 DEVICE — LO-PRO SCRW TM,SS 3.5X 44MMCORT
Type: IMPLANTABLE DEVICE | Site: ANKLE | Status: FUNCTIONAL
Brand: ARTHREX®

## 2022-11-18 DEVICE — LO-PRO SCRW TM,SS 3.5X 26MMCORT
Type: IMPLANTABLE DEVICE | Site: ANKLE | Status: FUNCTIONAL
Brand: ARTHREX®

## 2022-11-18 DEVICE — LOCKING THIRD TUBULAR PLATE, SS, 7H
Type: IMPLANTABLE DEVICE | Site: ANKLE | Status: FUNCTIONAL
Brand: ARTHREX®

## 2022-11-18 RX ORDER — ACETAMINOPHEN 500 MG
1000 TABLET ORAL EVERY 8 HOURS
Status: DISCONTINUED | OUTPATIENT
Start: 2022-11-18 | End: 2022-11-18 | Stop reason: HOSPADM

## 2022-11-18 RX ORDER — MEPERIDINE HYDROCHLORIDE 25 MG/ML
12.5 INJECTION INTRAMUSCULAR; INTRAVENOUS; SUBCUTANEOUS
Status: DISCONTINUED | OUTPATIENT
Start: 2022-11-18 | End: 2022-11-18 | Stop reason: HOSPADM

## 2022-11-18 RX ORDER — FENTANYL CITRATE 50 UG/ML
25 INJECTION, SOLUTION INTRAMUSCULAR; INTRAVENOUS
Status: DISCONTINUED | OUTPATIENT
Start: 2022-11-18 | End: 2022-11-18 | Stop reason: HOSPADM

## 2022-11-18 RX ORDER — ONDANSETRON 2 MG/ML
4 INJECTION INTRAMUSCULAR; INTRAVENOUS EVERY 30 MIN PRN
Status: DISCONTINUED | OUTPATIENT
Start: 2022-11-18 | End: 2022-11-18 | Stop reason: HOSPADM

## 2022-11-18 RX ORDER — BUPIVACAINE HYDROCHLORIDE 5 MG/ML
INJECTION, SOLUTION PERINEURAL
Status: DISCONTINUED
Start: 2022-11-18 | End: 2022-11-18 | Stop reason: HOSPADM

## 2022-11-18 RX ORDER — ONDANSETRON 4 MG/1
4 TABLET, ORALLY DISINTEGRATING ORAL EVERY 30 MIN PRN
Status: DISCONTINUED | OUTPATIENT
Start: 2022-11-18 | End: 2022-11-18 | Stop reason: HOSPADM

## 2022-11-18 RX ORDER — LIDOCAINE 40 MG/G
CREAM TOPICAL
Status: DISCONTINUED | OUTPATIENT
Start: 2022-11-18 | End: 2022-11-18 | Stop reason: HOSPADM

## 2022-11-18 RX ORDER — PROPOFOL 10 MG/ML
INJECTION, EMULSION INTRAVENOUS PRN
Status: DISCONTINUED | OUTPATIENT
Start: 2022-11-18 | End: 2022-11-18

## 2022-11-18 RX ORDER — HYDROMORPHONE HCL IN WATER/PF 6 MG/30 ML
0.2 PATIENT CONTROLLED ANALGESIA SYRINGE INTRAVENOUS EVERY 5 MIN PRN
Status: DISCONTINUED | OUTPATIENT
Start: 2022-11-18 | End: 2022-11-18 | Stop reason: HOSPADM

## 2022-11-18 RX ORDER — BUPIVACAINE HYDROCHLORIDE AND EPINEPHRINE 5; 5 MG/ML; UG/ML
INJECTION, SOLUTION PERINEURAL
Status: COMPLETED | OUTPATIENT
Start: 2022-11-18 | End: 2022-11-18

## 2022-11-18 RX ORDER — CEFAZOLIN SODIUM/WATER 2 G/20 ML
2 SYRINGE (ML) INTRAVENOUS SEE ADMIN INSTRUCTIONS
Status: DISCONTINUED | OUTPATIENT
Start: 2022-11-18 | End: 2022-11-18 | Stop reason: HOSPADM

## 2022-11-18 RX ORDER — CEFAZOLIN SODIUM/WATER 2 G/20 ML
2 SYRINGE (ML) INTRAVENOUS
Status: COMPLETED | OUTPATIENT
Start: 2022-11-18 | End: 2022-11-18

## 2022-11-18 RX ORDER — FENTANYL CITRATE 50 UG/ML
100 INJECTION, SOLUTION INTRAMUSCULAR; INTRAVENOUS ONCE
Status: COMPLETED | OUTPATIENT
Start: 2022-11-18 | End: 2022-11-18

## 2022-11-18 RX ORDER — KETAMINE HYDROCHLORIDE 10 MG/ML
INJECTION INTRAMUSCULAR; INTRAVENOUS PRN
Status: DISCONTINUED | OUTPATIENT
Start: 2022-11-18 | End: 2022-11-18

## 2022-11-18 RX ORDER — HYDROMORPHONE HCL IN WATER/PF 6 MG/30 ML
0.4 PATIENT CONTROLLED ANALGESIA SYRINGE INTRAVENOUS EVERY 5 MIN PRN
Status: DISCONTINUED | OUTPATIENT
Start: 2022-11-18 | End: 2022-11-18 | Stop reason: HOSPADM

## 2022-11-18 RX ORDER — SODIUM CHLORIDE, SODIUM LACTATE, POTASSIUM CHLORIDE, CALCIUM CHLORIDE 600; 310; 30; 20 MG/100ML; MG/100ML; MG/100ML; MG/100ML
INJECTION, SOLUTION INTRAVENOUS CONTINUOUS
Status: DISCONTINUED | OUTPATIENT
Start: 2022-11-18 | End: 2022-11-18 | Stop reason: HOSPADM

## 2022-11-18 RX ORDER — TRANEXAMIC ACID 650 MG/1
1950 TABLET ORAL ONCE
Status: COMPLETED | OUTPATIENT
Start: 2022-11-18 | End: 2022-11-18

## 2022-11-18 RX ORDER — HYDROXYZINE HYDROCHLORIDE 25 MG/1
25 TABLET, FILM COATED ORAL 3 TIMES DAILY PRN
Qty: 30 TABLET | Refills: 0 | Status: SHIPPED | OUTPATIENT
Start: 2022-11-18 | End: 2024-07-03

## 2022-11-18 RX ORDER — OXYCODONE HYDROCHLORIDE 5 MG/1
5-10 TABLET ORAL EVERY 4 HOURS PRN
Qty: 20 TABLET | Refills: 0 | Status: SHIPPED | OUTPATIENT
Start: 2022-11-18 | End: 2024-07-03

## 2022-11-18 RX ORDER — EPHEDRINE SULFATE 50 MG/ML
INJECTION, SOLUTION INTRAMUSCULAR; INTRAVENOUS; SUBCUTANEOUS PRN
Status: DISCONTINUED | OUTPATIENT
Start: 2022-11-18 | End: 2022-11-18

## 2022-11-18 RX ORDER — DEXAMETHASONE SODIUM PHOSPHATE 10 MG/ML
INJECTION, SOLUTION INTRAMUSCULAR; INTRAVENOUS PRN
Status: DISCONTINUED | OUTPATIENT
Start: 2022-11-18 | End: 2022-11-18

## 2022-11-18 RX ORDER — ACETAMINOPHEN 325 MG/1
1000 TABLET ORAL EVERY 8 HOURS
Qty: 50 TABLET | Refills: 0 | COMMUNITY
Start: 2022-11-18 | End: 2024-07-03

## 2022-11-18 RX ORDER — FENTANYL CITRATE 50 UG/ML
50 INJECTION, SOLUTION INTRAMUSCULAR; INTRAVENOUS EVERY 5 MIN PRN
Status: DISCONTINUED | OUTPATIENT
Start: 2022-11-18 | End: 2022-11-18 | Stop reason: HOSPADM

## 2022-11-18 RX ORDER — FENTANYL CITRATE 50 UG/ML
25 INJECTION, SOLUTION INTRAMUSCULAR; INTRAVENOUS EVERY 5 MIN PRN
Status: DISCONTINUED | OUTPATIENT
Start: 2022-11-18 | End: 2022-11-18 | Stop reason: HOSPADM

## 2022-11-18 RX ORDER — FENTANYL CITRATE 50 UG/ML
100 INJECTION, SOLUTION INTRAMUSCULAR; INTRAVENOUS
Status: COMPLETED | OUTPATIENT
Start: 2022-11-18 | End: 2022-11-18

## 2022-11-18 RX ADMIN — FENTANYL CITRATE 50 MCG: 50 INJECTION, SOLUTION INTRAMUSCULAR; INTRAVENOUS at 15:47

## 2022-11-18 RX ADMIN — KETAMINE HYDROCHLORIDE 40 MG: 10 INJECTION, SOLUTION INTRAMUSCULAR; INTRAVENOUS at 16:20

## 2022-11-18 RX ADMIN — BUPIVACAINE HYDROCHLORIDE AND EPINEPHRINE BITARTRATE 25 ML: 5; .005 INJECTION, SOLUTION PERINEURAL at 14:29

## 2022-11-18 RX ADMIN — ONDANSETRON 4 MG: 2 INJECTION INTRAMUSCULAR; INTRAVENOUS at 16:07

## 2022-11-18 RX ADMIN — FENTANYL CITRATE 50 MCG: 50 INJECTION, SOLUTION INTRAMUSCULAR; INTRAVENOUS at 16:19

## 2022-11-18 RX ADMIN — Medication 2 G: at 15:40

## 2022-11-18 RX ADMIN — MIDAZOLAM HYDROCHLORIDE 1 MG: 1 INJECTION, SOLUTION INTRAMUSCULAR; INTRAVENOUS at 14:28

## 2022-11-18 RX ADMIN — SODIUM CHLORIDE, POTASSIUM CHLORIDE, SODIUM LACTATE AND CALCIUM CHLORIDE: 600; 310; 30; 20 INJECTION, SOLUTION INTRAVENOUS at 16:20

## 2022-11-18 RX ADMIN — PHENYLEPHRINE HYDROCHLORIDE 100 MCG: 10 INJECTION INTRAVENOUS at 15:52

## 2022-11-18 RX ADMIN — SODIUM CHLORIDE, POTASSIUM CHLORIDE, SODIUM LACTATE AND CALCIUM CHLORIDE: 600; 310; 30; 20 INJECTION, SOLUTION INTRAVENOUS at 14:02

## 2022-11-18 RX ADMIN — PROPOFOL 200 MG: 10 INJECTION, EMULSION INTRAVENOUS at 15:48

## 2022-11-18 RX ADMIN — FENTANYL CITRATE 50 MCG: 50 INJECTION INTRAMUSCULAR; INTRAVENOUS at 14:29

## 2022-11-18 RX ADMIN — TRANEXAMIC ACID 1950 MG: 650 TABLET ORAL at 13:26

## 2022-11-18 RX ADMIN — Medication 5 MG: at 16:28

## 2022-11-18 RX ADMIN — DEXAMETHASONE SODIUM PHOSPHATE 10 MG: 10 INJECTION, SOLUTION INTRAMUSCULAR; INTRAVENOUS at 15:52

## 2022-11-18 RX ADMIN — BUPIVACAINE HYDROCHLORIDE AND EPINEPHRINE 10 ML: 5; 5 INJECTION, SOLUTION PERINEURAL at 14:34

## 2022-11-18 RX ADMIN — PHENYLEPHRINE HYDROCHLORIDE 0.5 MCG/KG/MIN: 10 INJECTION INTRAVENOUS at 15:51

## 2022-11-18 ASSESSMENT — ENCOUNTER SYMPTOMS: ARTHRALGIAS: 1

## 2022-11-18 ASSESSMENT — ACTIVITIES OF DAILY LIVING (ADL)
ADLS_ACUITY_SCORE: 41

## 2022-11-18 NOTE — ED TRIAGE NOTES
Patient presnts to the ED with a known broken R ankle from a fall last Saturday. Dr. Macias from [facility] needs to do a procedure on it but he needs cardiac clearance. Dr. Jaylan Evangelista is his cardiologist. Cards is recommending a stress test because he had a bypass 10 years ago but he is unable to get into stress test for two weeks. MD recommended to come to ER.     Triage Assessment     Row Name 11/17/22 5698       Triage Assessment (Adult)    Airway WDL WDL       Respiratory WDL    Respiratory WDL WDL       Skin Circulation/Temperature WDL    Skin Circulation/Temperature WDL WDL       Cardiac WDL    Cardiac WDL WDL       Peripheral/Neurovascular WDL    Peripheral Neurovascular WDL WDL       Cognitive/Neuro/Behavioral WDL    Cognitive/Neuro/Behavioral WDL WDL

## 2022-11-18 NOTE — ANESTHESIA CARE TRANSFER NOTE
Patient: Hong Pool    Procedure: Procedure(s):  OPEN REDUCTION INTERNAL FIXATION, FRACTURE, ANKLE       Diagnosis: Bimalleolar ankle fracture, right, closed, initial encounter [S87.522B]  Diagnosis Additional Information: No value filed.    Anesthesia Type:   General     Note:    Oropharynx: oropharynx clear of all foreign objects and spontaneously breathing  Level of Consciousness: drowsy  Oxygen Supplementation: face mask  Level of Supplemental Oxygen (L/min / FiO2): 8  Independent Airway: airway patency satisfactory and stable    Vital Signs Stable: post-procedure vital signs reviewed and stable  Report to RN Given: handoff report given  Patient transferred to: PACU    Handoff Report: Identifed the Patient, Identified the Reponsible Provider, Reviewed the pertinent medical history, Discussed the surgical course, Reviewed Intra-OP anesthesia mangement and issues during anesthesia, Set expectations for post-procedure period and Allowed opportunity for questions and acknowledgement of understanding      Vitals:  Vitals Value Taken Time   /71 11/18/22 1700   Temp 36.2  C (97.1  F) 11/18/22 1700   Pulse 71 11/18/22 1703   Resp 9 11/18/22 1703   SpO2 100 % 11/18/22 1703   Vitals shown include unvalidated device data.    Electronically Signed By: DAFNE Joyner CRNA  November 18, 2022  5:05 PM

## 2022-11-18 NOTE — OP NOTE
Preoperative diagnosis: Bimalleolar ankle fracture, right  Postoperative diagnosis: Same    Procedure: ORIF right bimalleolar ankle fracture    Surgeon: Claudio Macias DO  Assistant: Dawna Rajput PA-C.  Assistance required for patient positioning, soft tissue retraction, nucleation of the operative extremity.  A skilled assistant was essential to the completion of the case.    Date of surgery: 11/18/2022    Implants: Arthrex    Anesthesia: LMA plus block    Specimens: None    Complications: None    Blood loss: Minimal    Indications: Mathew is a pleasant 62-year-old who injured his right ankle.  He sustained a bimalleolar ankle fracture and was seen treated and discharged in the emergency department.  He presented to my clinic and we discussed surgical need given the displacement of his bimalleolar ankle fracture.  We discussed risk benefit and usual postoperative course.  As routine part of his preoperative evaluation preoperative history and physical was required for outpatient surgery.  He was seen for this yesterday.  He has a history of coronary artery bypass grafting and his primary care provider reached out to cardiology, however cardiology was unable to clear/risk stratify him due to his coronary artery bypass grafting without echocardiogram or nuclear stress test.  Neither of these were able to be completed in expedited fashion.  Due to the displacement of his fracture and surgical urgency we had him come to the emergency department where inpatient clearance would be easier and expedited.  He presents today for surgical treatment of his ankle fracture having been seen, evaluated, and risk stratified by cardiology.     Description of procedure: Hong was identified in preoperative holding using 2 unique patient identifiers.  His wife was at the bedside.  Risk, benefit, and usual postoperative course were again discussed.  Risk discussed include wound healing problems, infection, malunion,  nonunion, chronic ankle pain, arthritic changes, the need for additional surgery.  We discussed risks of numbness tingling or CRPS.  He understood these risks and wished to proceed.  A block was performed in preoperative holding.  Consent was electronically signed.    Following this the patient was rolled to the operating room.  He was placed supine on the operating room table.  An LMA was induced after induction of anesthesia.  The patient was bumped, tourniquet was applied, bony prominences were padded.  Following this the patient was prepped and draped in a sterile fashion.  Following this all members of the operative team performed a perioperative timeout and identified the correct patient's, side, site, and procedure.  Preoperative administration of antibiotics was confirmed.    Following this a lateral approach to the distal fibula was undertaken.  Careful dissection was used to identify protect and mobilized the superficial peroneal nerve.  Subperiosteal reflection was used to expose the fibular fracture.  This was debrided of interposed soft tissue and hematoma.  Using standard reduction maneuver is able to reduce the distal fibular fracture and achieve an anatomic cortical read.  This was clamped.  Following this a 3.5 mm lag screw was placed with excellent purchase and compression of the fracture site.  A second leg screw was then placed.  Both the screws had excellent purchase.  Following this a one third tubular locking plate was positioned on the lateral cortex of the fibula.  This was contoured and secured proximally with bicortical screws.  I placed 1 nonlocking cancellous screw distally and 1 locking screw into the distal fibular portion of the plate.  Following this we turned our attention to the medial ankle.  A longitudinal incision was made.  Careful dissection was used to protect saphenous neurovascular structures.  Subperiosteal reflection revealed the medial malleolus fracture.  This was  debrided of interposed hematoma and soft tissue.  I was able to achieve an anatomic cortical read and secured the fracture with a clamp.  I then secured and compressed the reduction with 2 3.5 mm solid screws.    We then obtained final x-rays including AP, mortise, and lateral projections.  Solid fixation was noted with anatomic alignment and well-placed hardware.  We performed external Tatian stress testing as well as cotton stress test under live fluoroscopy to confirm syndesmotic stability.  We then copiously irrigated and closed medial laterally in layers.  Tourniquet released.  Hemostasis noted.  Clean dry dressings were applied.  The foot was found to pink, warm, well perfused.  His dressings were overwrapped with an Ace bandage he was placed back into his cam boot.    The patient was awakened from anesthesia and transported to PACU in stable condition with anesthesia personnel present.  There were no complications.  He tolerated surgery well.    Plan: Okay to discharge from PACU to home  Ice and elevation  He takes an aspirin 81 mg daily, I recommended doubling that to twice daily for DVT prophylaxis.  He does not have a history of DVT or other venous thrombolic event.  Nonweightbearing for 4 to 6 weeks depending upon pain swelling and postoperative course  He will follow-up with us in 2 weeks for wound check and x-ray

## 2022-11-18 NOTE — ANESTHESIA PROCEDURE NOTES
"Adductor canal Procedure Note    Pre-Procedure   Staff -        Anesthesiologist:  Licha San MD       Performed By: anesthesiologist       Location: pre-op       Procedure Start/Stop Times: 11/18/2022 2:34 PM and 11/18/2022 2:37 PM       Pre-Anesthestic Checklist: patient identified, IV checked, site marked, risks and benefits discussed, informed consent, monitors and equipment checked, pre-op evaluation, at physician/surgeon's request and post-op pain management  Timeout:       Correct Patient: Yes        Correct Procedure: Yes        Correct Site: Yes        Correct Position: Yes        Correct Laterality: Yes        Site Marked: Yes  Procedure Documentation  Procedure: Adductor canal       Diagnosis: REQUESTED BY SURGEON FOR POSTOP PAIN       Laterality: right       Patient Position: supine       Patient Prep/Sterile Barriers: sterile gloves, mask       Skin prep: Chloraprep       Needle Type: other (echogenic)       Needle Gauge: 20.        Needle Length (Inches): 4        Ultrasound guided       1. Ultrasound was used to identify targeted nerve, plexus, vascular marker, or fascial plane and place a needle adjacent to it in real-time.       2. Ultrasound was used to visualize the spread of anesthetic in close proximity to the above referenced structure.       3. A permanent image is entered into the patient's record.       4. The visualized anatomic structures appeared normal.       5. There were no apparent abnormal pathologic findings.    Assessment/Narrative         The placement was negative for: blood aspirated, painful injection and site bleeding       Paresthesias: No.       Complications: none    Medication(s) Administered   Bupivacaine 0.5% w/ 1:200K Epi (Other) - Other   10 mL - 11/18/2022 2:34:00 PM  Medication Administration Time: 11/18/2022 2:34 PM      FOR [facility] ([facility] ONLY:   Pain Team Contact information: please page the Pain Team Via [employer]. Search \"Pain\". During daytime hours, " please page the attending first. At night please page the resident first.

## 2022-11-18 NOTE — ANESTHESIA PREPROCEDURE EVALUATION
Anesthesia Pre-Procedure Evaluation    Patient: Hong Pool   MRN: [id number] : 1960        Procedure : Procedure(s):  OPEN REDUCTION INTERNAL FIXATION, FRACTURE, ANKLE          Past Medical History:   Diagnosis Date     Borderline diabetes mellitus 2015     Coronary artery disease     CABG x4     Coronary artery disease involving coronary bypass graft of native heart without angina pectoris 2015     Depression      Goiter     With normal TSH     Hyperlipidemia     Started on statin 2007.  Discontinued 10/07 secondary to muscle aches and fatigue     Nephrolithiasis      Shoulder pain      Statin intolerance 2016      Past Surgical History:   Procedure Laterality Date     BYPASS GRAFT ARTERY CORONARY  2012    Procedure: BYPASS GRAFT ARTERY CORONARY;  Median Sternotomy, Coronary Artery Bypass Grafting x 4 using Left Internal Mammary and Left Saphenous Vein  with Endovein Harvesting. On Pump Oxygenation;  Surgeon: Genesis Larsen MD;  Location: [facility]     ESOPHAGOSCOPY, GASTROSCOPY, DUODENOSCOPY (EGD), COMBINED N/A 2017    Procedure: COMBINED ESOPHAGOSCOPY, GASTROSCOPY, DUODENOSCOPY (EGD), BIOPSY SINGLE OR MULTIPLE;  Surgeon: Corine Ly MD;  Location: [facility]      Allergies   Allergen Reactions     Lidocaine Rash     Break out     Adhesive Tape      Adhesive     Band-Aid Anti-Itch      Pad on the bandaid, antibacterial gauze that causes reaction     Cholestoff Complete [Plant Sterol Stanol-Pantethine] Other (See Comments)     Severe stomach pain     Lactose Diarrhea     Rosuvastatin Muscle Pain (Myalgia)     Simvastatin      Other reaction(s): Muscle Aches     Zocor [Simvastatin - High Dose] Other (See Comments)     Muscle aches/soreness, confusion, disorganized thinking      Social History     Tobacco Use     Smoking status: Never     Smokeless tobacco: Never   Substance Use Topics     Alcohol use: No      Wt Readings from Last 1 Encounters:   22  97.5 kg (215 lb)        Anesthesia Evaluation            ROS/MED HX  ENT/Pulmonary:  - neg pulmonary ROS     Neurologic:  - neg neurologic ROS     Cardiovascular:       METS/Exercise Tolerance:     Hematologic:  - neg hematologic  ROS     Musculoskeletal:  - neg musculoskeletal ROS     GI/Hepatic:       Renal/Genitourinary:       Endo:       Psychiatric/Substance Use:  - neg psychiatric ROS     Infectious Disease:  - neg infectious disease ROS     Malignancy:  - neg malignancy ROS     Other:  - neg other ROS          Physical Exam    Airway  airway exam normal      Mallampati: II       Respiratory Devices and Support         Dental  no notable dental history         Cardiovascular   cardiovascular exam normal          Pulmonary   pulmonary exam normal                OUTSIDE LABS:  CBC:   Lab Results   Component Value Date    WBC 6.3 11/16/2022    WBC 4.9 06/24/2016    HGB 14.5 11/16/2022    HGB 15.7 05/25/2018    HCT 43.4 11/16/2022    HCT 51.2 05/25/2018     11/16/2022     (L) 06/24/2016     BMP:   Lab Results   Component Value Date     11/16/2022     01/06/2022    POTASSIUM 4.4 11/16/2022    POTASSIUM 4.0 01/06/2022    CHLORIDE 104 11/16/2022    CHLORIDE 106 01/06/2022    CO2 23 11/16/2022    CO2 29 01/06/2022    BUN 16.6 11/16/2022    BUN 19 01/06/2022    CR 1.05 11/16/2022    CR 1.14 01/06/2022     (H) 11/16/2022     (H) 01/06/2022     COAGS:   Lab Results   Component Value Date    PTT 33 06/24/2012    INR 1.53 (H) 06/24/2012    FIBR 262 06/24/2012     POC:   Lab Results   Component Value Date     (H) 06/29/2012     HEPATIC:   Lab Results   Component Value Date    ALBUMIN 4.4 11/16/2022    PROTTOTAL 7.1 11/16/2022    ALT 23 11/16/2022    AST 28 11/16/2022    ALKPHOS 48 11/16/2022    BILITOTAL 1.9 (H) 11/16/2022     OTHER:   Lab Results   Component Value Date    PH 7.38 06/24/2012    A1C 6.4 (H) 11/16/2022    TYSON 9.7 11/16/2022    PHOS 3.0 06/26/2012    MAG 2.2  06/28/2012    LIPASE 114 05/25/2018    AMYLASE 56 02/22/2005    TSH 0.88 12/08/2021    T4 1.03 05/07/2010    CRP 3.0 12/08/2021    SED 10 12/08/2021       Anesthesia Plan    ASA Status:  3      Anesthesia Type: General.     - Airway: LMA              Consents    Anesthesia Plan(s) and associated risks, benefits, and realistic alternatives discussed. Questions answered and patient/representative(s) expressed understanding.    - Discussed:     - Discussed with:  Patient         Postoperative Care    Pain management: Peripheral nerve block (Single Shot).        Comments:                Mahesh Zapien MD

## 2022-11-18 NOTE — ED PROVIDER NOTES
"EMERGENCY DEPARTMENT ENCOUNTER      NAME: Hong Pool  AGE: 62 year old male  YOB: 1960  MRN: [id number]  EVALUATION DATE & TIME: 11/17/2022  9:36 PM    PCP: Jorge Singleton    ED PROVIDER: Yisel Vallejo MD    Chief Complaint   Patient presents with     Trauma     Ankle Pain         FINAL IMPRESSION:  1. Preop testing    2. Coronary artery disease involving native coronary artery of native heart without angina pectoris          ED COURSE & MEDICAL DECISION MAKING:    Pertinent Labs & Imaging studies reviewed. (See chart for details)  62 year old male with history of CAD with previous CABG, HLD, DM who presents to the Emergency Department for evaluation of need for echocardiogram and stress test for preoperative clearance of a ORIF of the ankle.  Unfortunately I am not able to accommodate getting either of these done at 10:00 on a Thursday night for a nonemergent asymptomatic patient.  Patient and wife state that he was directed here specifically by his orthopedic surgeon who supposedly called ahead to speak with the \"head of the hospital\" to discuss.  I did not receive any phone call with prearrival information nor did any of my physician colleagues.  I attempted to reach out to Dr. Macias specifically, but his answering service would not page him.  This was discussed with patient and spouse.  The best I can do is give him a referral to rapid access cardiology clinic to hopefully get him in for outpatient preop testing.  Discharged home.      ED Course as of 11/17/22 2227   Th Nov 17, 2022 2155 I introduced myself to the patient, obtained patient history, performed a physical exam, and discussed plan for ED workup including potential diagnostic laboratory/imaging studies and interventions.   2201 Call placed to orthopedics.   2204 Attempted to page Dr. Macias specifically to discuss the issue.  His call service will not page out to him because he is not on-call.  It would not be overly " fruitful to speak with a orthopedic surgeon who is not involved in his care given the nonemergent issue.   2222 Updated the patient.       Medical Decision Making    History:    Supplemental history from: Family Member    External Record(s) reviewed: Outpatient Record (Details documented in chart).    Work Up:    Chart documentation includes differential considered and any EKGs or imaging interpreted by provider.    In additional to work up documented, I considered the following work up: See chart documentation, if applicable.    External consultation:    Discussion of management with another provider: See chart documentation, if applicable    Discussed with radiology regarding test interpretation: N/A    Complicating factors:    Care impacted by chronic illness: Diabetes, Heart Disease, Hyperlipidemia and Hypertension    Care affected by social determinants of health: N/A    Disposition considerations: Discharge. No recommendations on prescription medications. I did not consider admission.        At the conclusion of the encounter I discussed the results of all of the tests and the disposition. The questions were answered. The patient or family acknowledged understanding and was agreeable with the care plan.      MEDICATIONS GIVEN IN THE EMERGENCY:  Medications - No data to display    NEW PRESCRIPTIONS STARTED AT TODAY'S ER VISIT  New Prescriptions    No medications on file          =================================================================    HPI    Patient information was obtained from: Patient    Use of Intrepreter: N/A     Hong Pool is a 62 year old male with pertinent medical history of CAD and CABG x4 who presents for evaluation of ankle problem.    The patient reports he fell 5 days ago and injured his right ankle. The next day he had an x-ray which showed a right ankle fracture. The patient is scheduled to have surgery tomorrow at 0630, however they required a pre-op evaluation before  surgery. At the patient's pre-op appointment his cardiologist recommended the patient have a echocardiogram and a stress test before surgery. The patient's cardiologist was unable to get a stress test or echo for over 2 weeks. The patient then contacted his orthopedic surgeon who recommended the patient come to the ED this evening in an attempt to get a stress test and echo prior to surgery.      REVIEW OF SYSTEMS  Constitutional:  Denies fever, chills, weight loss or weakness  Respiratory: No SOB, wheeze or cough  Cardiovascular:  No CP, palpitations  Musculoskeletal: Ankle pain  All other systems negative unless noted in HPI.      PAST MEDICAL HISTORY:  Past Medical History:   Diagnosis Date     Borderline diabetes mellitus 2015     Coronary artery disease     CABG x4     Coronary artery disease involving coronary bypass graft of native heart without angina pectoris 2015     Depression      Goiter     With normal TSH     Hyperlipidemia     Started on statin 2007.  Discontinued 10/07 secondary to muscle aches and fatigue     Nephrolithiasis      Shoulder pain      Statin intolerance 2016       PAST SURGICAL HISTORY:  Past Surgical History:   Procedure Laterality Date     BYPASS GRAFT ARTERY CORONARY  2012    Procedure: BYPASS GRAFT ARTERY CORONARY;  Median Sternotomy, Coronary Artery Bypass Grafting x 4 using Left Internal Mammary and Left Saphenous Vein  with Endovein Harvesting. On Pump Oxygenation;  Surgeon: Genesis Larsen MD;  Location: [facility]     ESOPHAGOSCOPY, GASTROSCOPY, DUODENOSCOPY (EGD), COMBINED N/A 2017    Procedure: COMBINED ESOPHAGOSCOPY, GASTROSCOPY, DUODENOSCOPY (EGD), BIOPSY SINGLE OR MULTIPLE;  Surgeon: Corine Ly MD;  Location: [facility]       CURRENT MEDICATIONS:    Prior to Admission Medications   Prescriptions Last Dose Informant Patient Reported? Taking?   Blood Pressure Monitor KIT   No No   Si each daily.   Cholecalciferol (VITAMIN  D3) 2000 UNITS CAPS   No No   Sig: Take 1 capsule by mouth daily   LACTAID FAST ACT 9000 units TABS tablet   Yes No   Multiple Vitamins-Minerals (MENS MULTIVITAMIN PLUS) TABS   No No   Sig: Take 1 tablet by mouth daily   STATIN NOT PRESCRIBED, INTENTIONAL,   No No   Si each continuous Statin not prescribed intentionally due to Intolerance (with supporting documentation of trying a statin at least once within the last 5 years)   Patient not taking: Reported on 2022   alpha-d-galactosidase (BEANO) tablet   No No   Sig: Take 2 tablets by mouth 3 times daily (before meals)   aspirin 81 MG EC tablet   No No   Sig: Take 1 tablet (81 mg) by mouth daily   bismuth subsalicylate (PEPTO BISMOL) 262 MG chewable tablet   Yes No   Sig: Take 1 tablet by mouth 3 times daily as needed   evolocumab (REPATHA) 140 MG/ML prefilled autoinjector   No No   Sig: Inject 1 mL (140 mg) Subcutaneous every 14 days   melatonin 1 MG TABS tablet   Yes No   Sig: Take 1 mg by mouth nightly as needed for sleep   potassium citrate (UROCIT-K) 10 MEQ (1080 MG) CR tablet   No No   Sig: Take 1 tablet (10 mEq) by mouth 3 times daily (with meals)   pyridOXINE (VITAMIN B6) 100 MG TABS   No No   Sig: Take 1 tablet (100 mg) by mouth daily      Facility-Administered Medications: None       ALLERGIES:  Allergies   Allergen Reactions     Lidocaine Rash     Break out     Adhesive Tape      Adhesive     Band-Aid Anti-Itch      Pad on the bandaid, antibacterial gauze that causes reaction     Cholestoff Complete [Plant Sterol Stanol-Pantethine] Other (See Comments)     Severe stomach pain     Lactose Diarrhea     Rosuvastatin Muscle Pain (Myalgia)     Simvastatin      Other reaction(s): Muscle Aches     Zocor [Simvastatin - High Dose] Other (See Comments)     Muscle aches/soreness, confusion, disorganized thinking       FAMILY HISTORY:  Family History   Problem Relation Age of Onset     C.A.D. Other         Uncle     Diabetes Brother      Cerebrovascular  "Disease Brother      Cardiovascular Sister         tachyarrhythmia       SOCIAL HISTORY:  Social History     Tobacco Use     Smoking status: Never     Smokeless tobacco: Never   Vaping Use     Vaping Use: Never used   Substance Use Topics     Alcohol use: No     Drug use: No        VITALS:  Patient Vitals for the past 24 hrs:   BP Temp Temp src Pulse Resp SpO2 Height Weight   11/17/22 2131 (!) 156/91 98  F (36.7  C) Temporal 71 19 97 % 1.854 m (6' 1\") 97.5 kg (215 lb)       PHYSICAL EXAM    General Appearance: Well-appearing, well-nourished, no acute distress, laying in bed with right ankle elevated  Head:  Normocephalic  Eyes:   conjunctiva/corneas clear  ENT:  membranes are moist without pallor  Cardio:  Regular rate and rhythm  Pulm:  No respiratory distress  Extremities: Ambulates with crutches  Skin:  Skin warm, dry, no rashes  Neuro:  Alert and oriented ×3, moving all extremities, no gross sensory defects       The creation of this record is based on the scribe s observations of the work being performed by Yisel Vallejo MD and the provider s statements to them. It was created on her behalf by Betito Cannon, a trained medical scribe. This document has been checked and approved by the attending provider.    Yisel Vallejo MD  Emergency Medicine  [facility] EMERGENCY ROOM  [address] [id number] [phone]  Dept: [phone]       Yisel Vallejo MD  11/17/22 2229    "

## 2022-11-18 NOTE — H&P (VIEW-ONLY)
[facility]   [phone]          Assessment/Recommendations   Patient with known history of coronary artery disease, status post bypass surgery by Dr. Genesis Larsen in 2012.  He has not had recurrence of his symptoms prior to bypass surgery and maintains an excellent functional capacity.  No symptoms or signs of congestive heart failure and no anginal symptoms.  He is tentatively scheduled for an urgent surgery for fracture of his ankle which is a complicated fracture.  According to the nurse practitioner preop note it is a below the knee block that is going to be used.    The patient is at very low risk for any cardiovascular morbidity or mortality, particularly given this will be a local block as opposed to general anesthesia.  Even with general anesthesia his risk would be low as he has no evidence of congestive heart failure, no anginal symptoms and the description of his ECG is unremarkable.  We will get an EKG here so we have a baseline study.    I would not recommend any further cardiac testing prior to his surgical procedure and this could be carried out today and we would be happy to follow him postoperatively if there are any questions or concerns.    Thank you for allowing us to participate in his care.       History of Present Illness/Subjective    Mr. Hong Pool is a 62 year old male with known coronary artery disease with bypass surgery at the [facility] in 2012 by Cj Larsen.  He did not tolerate 3 separate statins and was placed on Repatha and has had a nice reduction in his LDL cholesterol.  Most recent LDL cholesterol was 41.  Patient last had an echocardiogram in 2016 which showed ejection fraction of 58%.    The patient tripped on the last step going down and fractured his right ankle.  He did not have a syncopal fall syncopal episode.  He denies any chest discomfort, orthopnea, paroxysmal nocturnal dyspnea, peripheral edema, syncope or near syncopal  "episodes.  He gets rare palpitations about once a year not associated with any lightheadedness or syncope.  Prior to his bypass surgery had yawning and fatigue and this has never recurred.  He maintains an active lifestyle, mowing his lawn with a push mower, doing all the household chores as he is caring for his wife as well.  He feels like his functional capacity is excellent and really does not feel like he has any limitations.    Patient lives in the [facility] in [place].  They came to [facility] because there is seem to be reduced wait time to be seen and eventually got plugged in with [place] [facility].  Patient has a cardiologist he follows with at the [facility].        ECG: Personally reviewed.  Sinus rhythm at 68 bpm.  The ECG is considered within normal limits.  Recent blood work showed normal BUN and creatinine slightly elevated glucose at 118.  Hemoglobin was 14.5.       Physical Examination Review of Systems   /71   Pulse 67   Temp 97.7  F (36.5  C) (Temporal)   Resp 18   Ht 1.854 m (6' 1\")   Wt 97.5 kg (215 lb)   SpO2 98%   BMI 28.37 kg/m    Body mass index is 28.37 kg/m .  Wt Readings from Last 3 Encounters:   11/18/22 97.5 kg (215 lb)   11/17/22 97.5 kg (215 lb)   11/16/22 98 kg (216 lb)     General Appearance:   Alert, cooperative and in no acute distress.   ENT/Mouth: Patient wearing a mask.      EYES:  no scleral icterus, normal conjunctivae   Neck: JVP normal. No Hepatojugular reflux. Thyroid not visualized.   Chest/Lungs:   Lungs are clear to auscultation, equal chest wall expansion.   Cardiovascular:   S1, S2 without murmur ,clicks or rubs. Brachial, radial  pulses are intact and symetric. No carotid bruits noted   Abdomen:  Nontender.    Extremities: No cyanosis, clubbing or edema on the left.  Right foot and ankle are in a high boot.   Skin: no xanthelasma, warm.    Neurologic: normal arm movement bilateral, no tremors     Psychiatric: " "Appropriate affect.      Enc Vitals  BP: 121/71  Pulse: 67  Resp: 18  Temp: 97.7  F (36.5  C)  Temp src: Temporal  SpO2: 98 %  Weight: 97.5 kg (215 lb)  Height: 185.4 cm (6' 1\")                                           Medical History  Surgical History Family History Social History   Past Medical History:   Diagnosis Date     Borderline diabetes mellitus 11/26/2015     Coronary artery disease 2012    CABG x4     Coronary artery disease involving coronary bypass graft of native heart without angina pectoris 11/18/2015     Depression      Goiter     With normal TSH     Hyperlipidemia     Started on statin 7/2007.  Discontinued 10/07 secondary to muscle aches and fatigue     Nephrolithiasis      Shoulder pain      Statin intolerance 6/23/2016    Past Surgical History:   Procedure Laterality Date     BYPASS GRAFT ARTERY CORONARY  6/24/2012    Procedure: BYPASS GRAFT ARTERY CORONARY;  Median Sternotomy, Coronary Artery Bypass Grafting x 4 using Left Internal Mammary and Left Saphenous Vein  with Endovein Harvesting. On Pump Oxygenation;  Surgeon: Genesis Larsen MD;  Location: [facility]     ESOPHAGOSCOPY, GASTROSCOPY, DUODENOSCOPY (EGD), COMBINED N/A 4/11/2017    Procedure: COMBINED ESOPHAGOSCOPY, GASTROSCOPY, DUODENOSCOPY (EGD), BIOPSY SINGLE OR MULTIPLE;  Surgeon: Corine Ly MD;  Location: [facility]    Family History   Problem Relation Age of Onset     C.A.D. Other         Uncle     Diabetes Brother      Cerebrovascular Disease Brother      Cardiovascular Sister         tachyarrhythmia    Social History     Socioeconomic History     Marital status:      Spouse name: Not on file     Number of children: Not on file     Years of education: Not on file     Highest education level: Not on file   Occupational History     Not on file   Tobacco Use     Smoking status: Never     Smokeless tobacco: Never   Vaping Use     Vaping Use: Never used   Substance and Sexual Activity     Alcohol use: No     Drug " use: No     Sexual activity: Yes   Other Topics Concern     Parent/sibling w/ CABG, MI or angioplasty before 65F 55M? Not Asked   Social History Narrative    .      Social Determinants of Health     Financial Resource Strain: Low Risk      Difficulty of Paying Living Expenses: Not very hard   Food Insecurity: No Food Insecurity     Worried About Running Out of Food in the Last Year: Never true     Ran Out of Food in the Last Year: Never true   Transportation Needs: No Transportation Needs     Lack of Transportation (Medical): No     Lack of Transportation (Non-Medical): No   Physical Activity: Insufficiently Active     Days of Exercise per Week: 2 days     Minutes of Exercise per Session: 20 min   Stress: Stress Concern Present     Feeling of Stress : To some extent   Social Connections: Not on file   Intimate Partner Violence: Not At Risk     Fear of Current or Ex-Partner: No     Emotionally Abused: No     Physically Abused: No     Sexually Abused: No   Housing Stability: Unknown     Unable to Pay for Housing in the Last Year: No     Number of Places Lived in the Last Year: Not on file     Unstable Housing in the Last Year: No          Medications  Allergies   Current Outpatient Medications   Medication Sig Dispense Refill     alpha-d-galactosidase (BEANO) tablet Take 2 tablets by mouth 3 times daily (before meals) 540 tablet 4     aspirin 81 MG EC tablet Take 1 tablet (81 mg) by mouth daily 90 tablet 4     bismuth subsalicylate (PEPTO BISMOL) 262 MG chewable tablet Take 1 tablet by mouth 3 times daily as needed       Blood Pressure Monitor KIT 1 each daily. 1 kit 0     Cholecalciferol (VITAMIN D3) 2000 UNITS CAPS Take 1 capsule by mouth daily 90 capsule 4     evolocumab (REPATHA) 140 MG/ML prefilled autoinjector Inject 1 mL (140 mg) Subcutaneous every 14 days 6 mL 3     LACTAID FAST ACT 9000 units TABS tablet   4     melatonin 1 MG TABS tablet Take 1 mg by mouth nightly as needed for sleep       Multiple  Vitamins-Minerals (MENS MULTIVITAMIN PLUS) TABS Take 1 tablet by mouth daily 90 tablet 4     potassium citrate (UROCIT-K) 10 MEQ (1080 MG) CR tablet Take 1 tablet (10 mEq) by mouth 3 times daily (with meals) 180 tablet 3     pyridOXINE (VITAMIN B6) 100 MG TABS Take 1 tablet (100 mg) by mouth daily 90 tablet 4     STATIN NOT PRESCRIBED, INTENTIONAL, 1 each continuous Statin not prescribed intentionally due to Intolerance (with supporting documentation of trying a statin at least once within the last 5 years) (Patient not taking: Reported on 11/16/2022) 0 each 0    Allergies   Allergen Reactions     Lidocaine Rash     Break out     Adhesive Tape      Adhesive     Band-Aid Anti-Itch      Pad on the bandaid, antibacterial gauze that causes reaction     Cholestoff Complete [Plant Sterol Stanol-Pantethine] Other (See Comments)     Severe stomach pain     Lactose Diarrhea     Rosuvastatin Muscle Pain (Myalgia)     Simvastatin      Other reaction(s): Muscle Aches     Zocor [Simvastatin - High Dose] Other (See Comments)     Muscle aches/soreness, confusion, disorganized thinking         Lab Results    Chemistry/lipid CBC Cardiac Enzymes/BNP/TSH/INR   Lab Results   Component Value Date    CHOL 135 01/06/2022    HDL 57 01/06/2022    TRIG 185 (H) 01/06/2022    BUN 16.6 11/16/2022     11/16/2022    CO2 23 11/16/2022    Lab Results   Component Value Date    WBC 6.3 11/16/2022    HGB 14.5 11/16/2022    HCT 43.4 11/16/2022    MCV 96 11/16/2022     11/16/2022    Lab Results   Component Value Date    TSH 0.88 12/08/2021    INR 1.53 (H) 06/24/2012

## 2022-11-18 NOTE — ED NOTES
Expected Patient Referral to ED  7:24 AM    Referring Clinic/Provider:  Dr. Macias of [facility]    Reason for referral/Clinical facts:  Needs urgent surgery and needs clearance by cardiology. Struggling to get patient echo and stress for clearance. Ideally wound have had surgery already. Referred to [facility] but Ortho worried needs to have surgery sooner and is requesting admission for cards clearance and surgery.     Recommendations provided:  Send to ED for further evaluation    Caller was informed that this institution does possess the capabilities and/or resources to provide for patient and should be transferred to our facility.    Discussed that if direct admit is sought and any hurdles are encountered, this ED would be happy to see the patient and evaluate.    Informed caller that recommendations provided are recommendations based only on the facts provided and that they responsible to accept or reject the advice, or to seek a formal in person consultation as needed and that this ED will see/treat patient should they arrive.      Anthony Louis MD  [facility] EMERGENCY ROOM  [facility] [facility] [address]  [phone]       Anthony Louis MD  11/18/22 0727

## 2022-11-18 NOTE — ED PROVIDER NOTES
EMERGENCY DEPARTMENT ENCOUNTER      NAME: Hong Pool  AGE: 62 year old male  YOB: 1960  MRN: [id number]  EVALUATION DATE & TIME: 11/18/2022 11:10 AM    PCP: Jorge Singleton    ED PROVIDER: Victor M Louis MD        Chief Complaint   Patient presents with     Ankle Pain         FINAL IMPRESSION:  1. Bimalleolar ankle fracture, right, closed, initial encounter          ED COURSE & MEDICAL DECISION MAKING:    Pertinent Labs & Imaging studies reviewed. (See chart for details)  62 year old male presents to the Emergency Department for evaluation of needs operative clearance for OR today    Has had difficulty scheduling follow-up to get cleared by cardiology.  Orthopedic states patient needs to go to the OR today.    Spoke with cardiology who came and evaluated patient and cleared patient for OR.    Spoke with orthopedic surgeon who took patient to the OR and will discharge patient from the OR         11:11 AM I discussed patient with Dr. Christiansen, Cardiology  11:50 AM Dr. Christiansen cleared patient for surgery  12:20 PM I met with the patient to gather history and perform an initial exam. PPE: gloves, N95 mask  1:11 PM Plan is for patient to go to OR and be discharged from OR afterwards      Medical Decision Making    Supplemental history from: N/A    External Record(s) Reviewed: Reviewed cardiology outpatient recommendations and other ER visit    Differential Diagnosis: See MDM charting for differential considered.     I performed an independent interpretation of the: N/A    Discussed with radiology regarding test interpretation: N/A    Discussion of management with another provider: See chart documentation, if applicable and Cardiology    The following testing was considered but ultimately not selected: None    I considered prescription management with: N/A    The patient's care impacted: Heart Disease    Consideration of Admission/Observation: N/A - Patient discharged without consideration for admission.  " Patient transferred the OR    Care significantly affected by Social Determinants of Health including: N/A          At the conclusion of the encounter I discussed the results of all of the tests and the disposition. The questions were answered. The patient or family acknowledged understanding and was agreeable with the care plan.       MEDICATIONS GIVEN IN THE EMERGENCY:  Medications   ceFAZolin Sodium (ANCEF) injection 2 g (has no administration in time range)   ceFAZolin Sodium (ANCEF) injection 2 g (has no administration in time range)   lidocaine 1 % 0.1-1 mL (has no administration in time range)   lidocaine (LMX4) cream (has no administration in time range)   sodium chloride (PF) 0.9% PF flush 3 mL (has no administration in time range)   sodium chloride (PF) 0.9% PF flush 3 mL (has no administration in time range)   lactated ringers infusion ( Intravenous New Bag 11/18/22 1402)   fentaNYL (PF) (SUBLIMAZE) injection 100 mcg (has no administration in time range)   midazolam (VERSED) injection 2 mg (has no administration in time range)   lactated ringers infusion (has no administration in time range)   ondansetron (ZOFRAN ODT) ODT tab 4 mg (has no administration in time range)     Or   ondansetron (ZOFRAN) injection 4 mg (has no administration in time range)   prochlorperazine (COMPAZINE) injection 5 mg (has no administration in time range)   meperidine (DEMEROL) injection 12.5 mg (has no administration in time range)   tranexamic acid (LYSTEDA) tablet 1,950 mg (1,950 mg Oral Given 11/18/22 1326)   fentaNYL (PF) (SUBLIMAZE) injection 100 mcg (50 mcg Intravenous Given 11/18/22 1429)   midazolam (VERSED) injection 2 mg (1 mg Intravenous Given 11/18/22 1428)       NEW PRESCRIPTIONS STARTED AT TODAY'S ER VISIT  Current Discharge Medication List             =================================================================    HPI    Triage note  \"  Arrives to ED with referral from [facility]. Pt has R ankle fx. " "Requires surgery. Needs clearance from cardiology for surgery. Cardiology unable to clear pt in a timely manner.      Triage Assessment     Row Name 11/18/22 1027       Triage Assessment (Adult)    Airway WDL WDL       Respiratory WDL    Respiratory WDL WDL       Skin Circulation/Temperature WDL    Skin Circulation/Temperature WDL WDL       Cardiac WDL    Cardiac WDL WDL       Peripheral/Neurovascular WDL    Peripheral Neurovascular WDL WDL       Cognitive/Neuro/Behavioral WDL    Cognitive/Neuro/Behavioral WDL WDL              \"      Patient information was obtained from: Patient     Use of : N/A        Hong Pool is a 62 year old male with a pertinent history of trimalleolar fracture of right ankle, CAD, arteriosclerotic cardiovascular disease,  who presents to this ED for evaluation of ankle pain    Patient has a fracture to his right ankle. Patient needed to be cleared for surgery by cardiology, but has been unable to get cleared in a timely manner. Patient was referred to ED by [facility] for cardiology clearance in order to get surgery process started. Patient reports he was getting pain relief with ibuprofen, but has not taken it for the past week due to anticipated surgery. Patient has not eaten or drank anything today.       REVIEW OF SYSTEMS   Review of Systems   Musculoskeletal: Positive for arthralgias (right ankle d/t fracture).       PAST MEDICAL HISTORY:  Past Medical History:   Diagnosis Date     Borderline diabetes mellitus 11/26/2015     Coronary artery disease 2012    CABG x4     Coronary artery disease involving coronary bypass graft of native heart without angina pectoris 11/18/2015     Depression      Goiter     With normal TSH     Hyperlipidemia     Started on statin 7/2007.  Discontinued 10/07 secondary to muscle aches and fatigue     Nephrolithiasis      Shoulder pain      Statin intolerance 6/23/2016       PAST SURGICAL HISTORY:  Past Surgical History:   Procedure " Laterality Date     BYPASS GRAFT ARTERY CORONARY  6/24/2012    Procedure: BYPASS GRAFT ARTERY CORONARY;  Median Sternotomy, Coronary Artery Bypass Grafting x 4 using Left Internal Mammary and Left Saphenous Vein  with Endovein Harvesting. On Pump Oxygenation;  Surgeon: Genesis Larsen MD;  Location:  OR     ESOPHAGOSCOPY, GASTROSCOPY, DUODENOSCOPY (EGD), COMBINED N/A 4/11/2017    Procedure: COMBINED ESOPHAGOSCOPY, GASTROSCOPY, DUODENOSCOPY (EGD), BIOPSY SINGLE OR MULTIPLE;  Surgeon: Corine Ly MD;  Location: [facility]           CURRENT MEDICATIONS:    No current outpatient medications on file.      ALLERGIES:  Allergies   Allergen Reactions     Lidocaine Rash     Break out     Adhesive Tape      Adhesive     Band-Aid Anti-Itch      Pad on the bandaid, antibacterial gauze that causes reaction     Cholestoff Complete [Plant Sterol Stanol-Pantethine] Other (See Comments)     Severe stomach pain     Lactose Diarrhea     Rosuvastatin Muscle Pain (Myalgia)     Simvastatin      Other reaction(s): Muscle Aches     Zocor [Simvastatin - High Dose] Other (See Comments)     Muscle aches/soreness, confusion, disorganized thinking       FAMILY HISTORY:  Family History   Problem Relation Age of Onset     C.A.D. Other         Uncle     Diabetes Brother      Cerebrovascular Disease Brother      Cardiovascular Sister         tachyarrhythmia       SOCIAL HISTORY:   Social History     Socioeconomic History     Marital status:    Tobacco Use     Smoking status: Never     Smokeless tobacco: Never   Vaping Use     Vaping Use: Never used   Substance and Sexual Activity     Alcohol use: No     Drug use: No     Sexual activity: Yes   Social History Narrative    .      Social Determinants of Health     Financial Resource Strain: Low Risk      Difficulty of Paying Living Expenses: Not very hard   Food Insecurity: No Food Insecurity     Worried About Running Out of Food in the Last Year: Never true     Ran Out  "of Food in the Last Year: Never true   Transportation Needs: No Transportation Needs     Lack of Transportation (Medical): No     Lack of Transportation (Non-Medical): No   Physical Activity: Insufficiently Active     Days of Exercise per Week: 2 days     Minutes of Exercise per Session: 20 min   Stress: Stress Concern Present     Feeling of Stress : To some extent   Intimate Partner Violence: Not At Risk     Fear of Current or Ex-Partner: No     Emotionally Abused: No     Physically Abused: No     Sexually Abused: No   Housing Stability: Unknown     Unable to Pay for Housing in the Last Year: No     Unstable Housing in the Last Year: No       VITALS:  BP (!) 145/67   Pulse 59   Temp 98.4  F (36.9  C) (Temporal)   Resp 12   Ht 1.854 m (6' 1\")   Wt 97.5 kg (215 lb)   SpO2 100%   BMI 28.37 kg/m      PHYSICAL EXAM      Vitals: BP (!) 145/67   Pulse 59   Temp 98.4  F (36.9  C) (Temporal)   Resp 12   Ht 1.854 m (6' 1\")   Wt 97.5 kg (215 lb)   SpO2 100%   BMI 28.37 kg/m    General: Appears in no acute distress, awake, alert, interactive.  Eyes: Conjunctivae non-injected. Sclera anicteric.  HENT: Atraumatic.  Neck: Supple.  Respiratory/Chest: Respiration unlabored.  Abdomen: non distended  Musculoskeletal:  Right ankle in boot due to fracture  Skin: Normal color. No rash or diaphoresis.  Neurologic: Face symmetric, moves all extremities spontaneously. Speech clear.  Psychiatric: Oriented to person, place, and time. Affect appropriate.         LAB:  All pertinent labs reviewed and interpreted.  Results for orders placed or performed during the hospital encounter of 11/18/22   Asymptomatic COVID-19 Virus (Coronavirus) by PCR Nasopharyngeal    Specimen: Nasopharyngeal; Swab   Result Value Ref Range    SARS CoV2 PCR Negative Negative   ECG 12-LEAD WITH MUSE (LHE)   Result Value Ref Range    Systolic Blood Pressure  mmHg    Diastolic Blood Pressure  mmHg    Ventricular Rate 68 BPM    Atrial Rate 68 BPM    NH " Interval 156 ms    QRS Duration 88 ms     ms    QTc 442 ms    P Axis  degrees    R AXIS 34 degrees    T Axis 57 degrees    Interpretation ECG       Sinus rhythm  Normal ECG  When compared with ECG of 25-JUN-2012 05:36,  Sinus rhythm has replaced Electronic atrial pacemaker  Confirmed by SEE ED PROVIDER NOTE FOR, ECG INTERPRETATION ([id number]),  MICHELLEMARIANA GARCIA ([id number]) on 11/18/2022 1:58:05 PM         RADIOLOGY:  Reviewed all pertinent imaging. Please see official radiology report.  POC US Guidance Needle Placement   Final Result            I, Lisa Gil, am serving as a scribe to document services personally performed by Anthony Louis MD based on my observation and the provider's statements to me. I, Dr. Anthony Louis, attest that Lisa Gil is acting in a scribe capacity, has observed my performance of the services and has documented them in accordance with my direction.    Anthony Louis MD  Emergency Medicine  [facility] EMERGENCY ROOM  [facility] [facility] [address] [phone]       Anthony Louis MD  11/18/22 8018

## 2022-11-18 NOTE — INTERVAL H&P NOTE
"I have reviewed the surgical (or preoperative) H&P that is linked to this encounter, and examined the patient. There are no significant changes    Patient received preoparative cardiac evaluation and is cleared for surgery.  Plan for discharge following surgery    Clinical Conditions Present on Arrival:  Clinically Significant Risk Factors Present on Admission                    # Overweight: Estimated body mass index is 28.37 kg/m  as calculated from the following:    Height as of this encounter: 1.854 m (6' 1\").    Weight as of this encounter: 97.5 kg (215 lb).       "

## 2022-11-18 NOTE — ANESTHESIA PROCEDURE NOTES
"Popliteal Procedure Note    Pre-Procedure   Staff -        Anesthesiologist:  Licha San MD       Performed By: anesthesiologist       Location: pre-op       Procedure Start/Stop Times: 11/18/2022 2:29 PM and 11/18/2022 2:34 PM       Pre-Anesthestic Checklist: patient identified, IV checked, site marked, risks and benefits discussed, informed consent, monitors and equipment checked, pre-op evaluation, at physician/surgeon's request and post-op pain management  Timeout:       Correct Patient: Yes        Correct Procedure: Yes        Correct Site: Yes        Correct Position: Yes        Correct Laterality: Yes        Site Marked: Yes  Procedure Documentation  Procedure: Popliteal       Diagnosis: REQUESTED BY SURGEON FOR POSTOP PAIN       Laterality: right       Patient Position: supine       Patient Prep/Sterile Barriers: sterile gloves, mask       Skin prep: Chloraprep       Needle Type: other (echogenic)       Needle Gauge: 20.        Needle Length (Inches): 4        Ultrasound guided       1. Ultrasound was used to identify targeted nerve, plexus, vascular marker, or fascial plane and place a needle adjacent to it in real-time.       2. Ultrasound was used to visualize the spread of anesthetic in close proximity to the above referenced structure.       3. A permanent image is entered into the patient's record.       4. The visualized anatomic structures appeared normal.       5. There were no apparent abnormal pathologic findings.    Assessment/Narrative         The placement was negative for: blood aspirated, painful injection and site bleeding       Paresthesias: No.       Complications: none    Medication(s) Administered   Bupivacaine 0.5% w/ 1:200K Epi (Other) - Other   25 mL - 11/18/2022 2:29:00 PM  Medication Administration Time: 11/18/2022 2:29 PM      FOR [facility] ([facility]) ONLY:   Pain Team Contact information: please page the Pain Team Via [employer]. Search \"Pain\". During daytime hours, please " page the attending first. At night please page the resident first.

## 2022-11-18 NOTE — BRIEF OP NOTE
[facility]    Brief Operative Note    Pre-operative diagnosis: Bimalleolar ankle fracture, right, closed, initial encounter [T44.509T]  Post-operative diagnosis Same as pre-operative diagnosis    Procedure: Procedure(s):  OPEN REDUCTION INTERNAL FIXATION, FRACTURE, ANKLE  Surgeon: Surgeon(s) and Role:     * Claudio Macias, DO - Primary     * Dawna Rajput PA-C - Assisting  Anesthesia: Choice with Block   Estimated Blood Loss: Minimal    Drains: None  Specimens: * No specimens in log *  Findings:   None.  Complications: None.  Implants:   Implant Name Type Inv. Item Serial No.  Lot No. LRB No. Used Action   IMP PLATE ARTHREX LOCKING 1/3 TUBULAR 7H SS [id number] - [id number] Metallic Hardware/[place] IMP PLATE ARTHREX LOCKING 1/3 TUBULAR 7H SS [id number]  ARTHREX  Right 1 Implanted   SCREW BN 14MM 3.5MM SS CMPR KREULOCK [id number] - [id number] Metallic Hardware/[place] SCREW BN 14MM 3.5MM SS CMPR [place] [id number]  ARTHREX  Right 1 Implanted   IMP SCR ARTHREX LP ROXY TM 3.5X14MM SS [id number] - [id number] Metallic Hardware/[place] IMP SCR ARTHREX LP ROXY TM 3.5X14MM SS [id number]  ARTHREX  Right 3 Implanted   IMP SCR ARTHREX LP ROXY TM 3.5X26MM SS AR-8835-26 - [id number] Metallic Hardware/[place] IMP SCR ARTHREX LP ROXY TM 3.5X26MM SS [id number]  ARTHREX  Right 1 Implanted   IMP SCR ARTHREX LP ROXY TM 3.5X44MM SS AR-8835-44 - [id number] Metallic Hardware/[place] IMP SCR ARTHREX LP ROXY TM 3.5X44MM SS [id number]  ARTHREX  Right 2 Implanted   IMP SCR ARTHREX LP CANC 4.0X12MM SS AR-8840-12 - [id number] Metallic Hardware/[place] IMP SCR ARTHREX LP CANC 4.0X12MM SS AR-8840-12  ARTHREX  Right 1 Implanted   IMP SCR ARTHREX LP CANC 4.0X30MM SS AR-8840-30 - [id number] Metallic Hardware/[place] IMP SCR ARTHREX LP CANC 4.0X30MM SS [id number]-8840-30  ARTHREX  Right 1 Implanted

## 2022-11-18 NOTE — ED TRIAGE NOTES
Arrives to ED with referral from [facility]. Pt has R ankle fx. Requires surgery. Needs clearance from cardiology for surgery. Cardiology unable to clear pt in a timely manner.      Triage Assessment     Row Name 11/18/22 1027       Triage Assessment (Adult)    Airway WDL WDL       Respiratory WDL    Respiratory WDL WDL       Skin Circulation/Temperature WDL    Skin Circulation/Temperature WDL WDL       Cardiac WDL    Cardiac WDL WDL       Peripheral/Neurovascular WDL    Peripheral Neurovascular WDL WDL       Cognitive/Neuro/Behavioral WDL    Cognitive/Neuro/Behavioral WDL WDL

## 2022-11-18 NOTE — CONSULTS
[facility]   [phone]          Assessment/Recommendations   Patient with known history of coronary artery disease, status post bypass surgery by Dr. Genesis Larsen in 2012.  He has not had recurrence of his symptoms prior to bypass surgery and maintains an excellent functional capacity.  No symptoms or signs of congestive heart failure and no anginal symptoms.  He is tentatively scheduled for an urgent surgery for fracture of his ankle which is a complicated fracture.  According to the nurse practitioner preop note it is a below the knee block that is going to be used.    The patient is at very low risk for any cardiovascular morbidity or mortality, particularly given this will be a local block as opposed to general anesthesia.  Even with general anesthesia his risk would be low as he has no evidence of congestive heart failure, no anginal symptoms and the description of his ECG is unremarkable.  We will get an EKG here so we have a baseline study.    I would not recommend any further cardiac testing prior to his surgical procedure and this could be carried out today and we would be happy to follow him postoperatively if there are any questions or concerns.    Thank you for allowing us to participate in his care.       History of Present Illness/Subjective    Mr. Hong Pool is a 62 year old male with known coronary artery disease with bypass surgery at the [facility] in 2012 by Cj Larsen.  He did not tolerate 3 separate statins and was placed on Repatha and has had a nice reduction in his LDL cholesterol.  Most recent LDL cholesterol was 41.  Patient last had an echocardiogram in 2016 which showed ejection fraction of 58%.    The patient tripped on the last step going down and fractured his right ankle.  He did not have a syncopal fall syncopal episode.  He denies any chest discomfort, orthopnea, paroxysmal nocturnal dyspnea, peripheral edema, syncope or near syncopal  "episodes.  He gets rare palpitations about once a year not associated with any lightheadedness or syncope.  Prior to his bypass surgery had yawning and fatigue and this has never recurred.  He maintains an active lifestyle, mowing his lawn with a push mower, doing all the household chores as he is caring for his wife as well.  He feels like his functional capacity is excellent and really does not feel like he has any limitations.    Patient lives in the [facility] in [place].  They came to [facility] because there is seem to be reduced wait time to be seen and eventually got plugged in with [facility].  Patient has a cardiologist he follows with at the [facility].        ECG: Personally reviewed.  Sinus rhythm at 68 bpm.  The ECG is considered within normal limits.  Recent blood work showed normal BUN and creatinine slightly elevated glucose at 118.  Hemoglobin was 14.5.       Physical Examination Review of Systems   /71   Pulse 67   Temp 97.7  F (36.5  C) (Temporal)   Resp 18   Ht 1.854 m (6' 1\")   Wt 97.5 kg (215 lb)   SpO2 98%   BMI 28.37 kg/m    Body mass index is 28.37 kg/m .  Wt Readings from Last 3 Encounters:   11/18/22 97.5 kg (215 lb)   11/17/22 97.5 kg (215 lb)   11/16/22 98 kg (216 lb)     General Appearance:   Alert, cooperative and in no acute distress.   ENT/Mouth: Patient wearing a mask.      EYES:  no scleral icterus, normal conjunctivae   Neck: JVP normal. No Hepatojugular reflux. Thyroid not visualized.   Chest/Lungs:   Lungs are clear to auscultation, equal chest wall expansion.   Cardiovascular:   S1, S2 without murmur ,clicks or rubs. Brachial, radial  pulses are intact and symetric. No carotid bruits noted   Abdomen:  Nontender.    Extremities: No cyanosis, clubbing or edema on the left.  Right foot and ankle are in a high boot.   Skin: no xanthelasma, warm.    Neurologic: normal arm movement bilateral, no tremors     Psychiatric: " "Appropriate affect.      Enc Vitals  BP: 121/71  Pulse: 67  Resp: 18  Temp: 97.7  F (36.5  C)  Temp src: Temporal  SpO2: 98 %  Weight: 97.5 kg (215 lb)  Height: 185.4 cm (6' 1\")                                           Medical History  Surgical History Family History Social History   Past Medical History:   Diagnosis Date     Borderline diabetes mellitus 11/26/2015     Coronary artery disease 2012    CABG x4     Coronary artery disease involving coronary bypass graft of native heart without angina pectoris 11/18/2015     Depression      Goiter     With normal TSH     Hyperlipidemia     Started on statin 7/2007.  Discontinued 10/07 secondary to muscle aches and fatigue     Nephrolithiasis      Shoulder pain      Statin intolerance 6/23/2016    Past Surgical History:   Procedure Laterality Date     BYPASS GRAFT ARTERY CORONARY  6/24/2012    Procedure: BYPASS GRAFT ARTERY CORONARY;  Median Sternotomy, Coronary Artery Bypass Grafting x 4 using Left Internal Mammary and Left Saphenous Vein  with Endovein Harvesting. On Pump Oxygenation;  Surgeon: Genesis Larsen MD;  Location: [facility] OR     ESOPHAGOSCOPY, GASTROSCOPY, DUODENOSCOPY (EGD), COMBINED N/A 4/11/2017    Procedure: COMBINED ESOPHAGOSCOPY, GASTROSCOPY, DUODENOSCOPY (EGD), BIOPSY SINGLE OR MULTIPLE;  Surgeon: Corine Ly MD;  Location: [facility] GI    Family History   Problem Relation Age of Onset     C.A.D. Other         Uncle     Diabetes Brother      Cerebrovascular Disease Brother      Cardiovascular Sister         tachyarrhythmia    Social History     Socioeconomic History     Marital status:      Spouse name: Not on file     Number of children: Not on file     Years of education: Not on file     Highest education level: Not on file   Occupational History     Not on file   Tobacco Use     Smoking status: Never     Smokeless tobacco: Never   Vaping Use     Vaping Use: Never used   Substance and Sexual Activity     Alcohol use: No     Drug " use: No     Sexual activity: Yes   Other Topics Concern     Parent/sibling w/ CABG, MI or angioplasty before 65F 55M? Not Asked   Social History Narrative    .      Social Determinants of Health     Financial Resource Strain: Low Risk      Difficulty of Paying Living Expenses: Not very hard   Food Insecurity: No Food Insecurity     Worried About Running Out of Food in the Last Year: Never true     Ran Out of Food in the Last Year: Never true   Transportation Needs: No Transportation Needs     Lack of Transportation (Medical): No     Lack of Transportation (Non-Medical): No   Physical Activity: Insufficiently Active     Days of Exercise per Week: 2 days     Minutes of Exercise per Session: 20 min   Stress: Stress Concern Present     Feeling of Stress : To some extent   Social Connections: Not on file   Intimate Partner Violence: Not At Risk     Fear of Current or Ex-Partner: No     Emotionally Abused: No     Physically Abused: No     Sexually Abused: No   Housing Stability: Unknown     Unable to Pay for Housing in the Last Year: No     Number of Places Lived in the Last Year: Not on file     Unstable Housing in the Last Year: No          Medications  Allergies   Current Outpatient Medications   Medication Sig Dispense Refill     alpha-d-galactosidase (BEANO) tablet Take 2 tablets by mouth 3 times daily (before meals) 540 tablet 4     aspirin 81 MG EC tablet Take 1 tablet (81 mg) by mouth daily 90 tablet 4     bismuth subsalicylate (PEPTO BISMOL) 262 MG chewable tablet Take 1 tablet by mouth 3 times daily as needed       Blood Pressure Monitor KIT 1 each daily. 1 kit 0     Cholecalciferol (VITAMIN D3) 2000 UNITS CAPS Take 1 capsule by mouth daily 90 capsule 4     evolocumab (REPATHA) 140 MG/ML prefilled autoinjector Inject 1 mL (140 mg) Subcutaneous every 14 days 6 mL 3     LACTAID FAST ACT 9000 units TABS tablet   4     melatonin 1 MG TABS tablet Take 1 mg by mouth nightly as needed for sleep       Multiple  Vitamins-Minerals (MENS MULTIVITAMIN PLUS) TABS Take 1 tablet by mouth daily 90 tablet 4     potassium citrate (UROCIT-K) 10 MEQ (1080 MG) CR tablet Take 1 tablet (10 mEq) by mouth 3 times daily (with meals) 180 tablet 3     pyridOXINE (VITAMIN B6) 100 MG TABS Take 1 tablet (100 mg) by mouth daily 90 tablet 4     STATIN NOT PRESCRIBED, INTENTIONAL, 1 each continuous Statin not prescribed intentionally due to Intolerance (with supporting documentation of trying a statin at least once within the last 5 years) (Patient not taking: Reported on 11/16/2022) 0 each 0    Allergies   Allergen Reactions     Lidocaine Rash     Break out     Adhesive Tape      Adhesive     Band-Aid Anti-Itch      Pad on the bandaid, antibacterial gauze that causes reaction     Cholestoff Complete [Plant Sterol Stanol-Pantethine] Other (See Comments)     Severe stomach pain     Lactose Diarrhea     Rosuvastatin Muscle Pain (Myalgia)     Simvastatin      Other reaction(s): Muscle Aches     Zocor [Simvastatin - High Dose] Other (See Comments)     Muscle aches/soreness, confusion, disorganized thinking         Lab Results    Chemistry/lipid CBC Cardiac Enzymes/BNP/TSH/INR   Lab Results   Component Value Date    CHOL 135 01/06/2022    HDL 57 01/06/2022    TRIG 185 (H) 01/06/2022    BUN 16.6 11/16/2022     11/16/2022    CO2 23 11/16/2022    Lab Results   Component Value Date    WBC 6.3 11/16/2022    HGB 14.5 11/16/2022    HCT 43.4 11/16/2022    MCV 96 11/16/2022     11/16/2022    Lab Results   Component Value Date    TSH 0.88 12/08/2021    INR 1.53 (H) 06/24/2012

## 2022-11-18 NOTE — ANESTHESIA PROCEDURE NOTES
Airway       Patient location during procedure: OR  Staff -        CRNA: Deepika St APRN CRNA       Performed By: CRNA  Consent for Airway        Urgency: elective  Indications and Patient Condition       Indications for airway management: ashlee-procedural       Induction type:intravenous       Mask difficulty assessment: 1 - vent by mask    Final Airway Details       Final airway type: supraglottic airway    Supraglottic Airway Details        Type: LMA       LMA size: 5    Post intubation assessment        Placement verified by: capnometry, equal breath sounds and chest rise        Number of attempts at approach: 1       Number of other approaches attempted: 0       Secured with: silk tape       Ease of procedure: easy       Dentition: Unchanged

## 2022-11-18 NOTE — PHARMACY-ADMISSION MEDICATION HISTORY
Pharmacy Note - Admission Medication History    Pertinent Provider Information: none     ______________________________________________________________________    Prior To Admission (PTA) med list completed and updated in EMR.       PTA Med List   Medication Sig Last Dose     alpha-d-galactosidase (BEANO) tablet Take 2 tablets by mouth 3 times daily (before meals) 11/17/2022     aspirin 81 MG EC tablet Take 1 tablet (81 mg) by mouth daily 11/13/2022     bismuth subsalicylate (PEPTO BISMOL) 262 MG chewable tablet Take 1 tablet by mouth 3 times daily as needed Past Week     Cholecalciferol (VITAMIN D3) 2000 UNITS CAPS Take 1 capsule by mouth daily 11/17/2022     evolocumab (REPATHA) 140 MG/ML prefilled autoinjector Inject 1 mL (140 mg) Subcutaneous every 14 days 11/17/2022     LACTAID FAST ACT 9000 units TABS tablet 3,000-9,000 Units as needed Past Week     Multiple Vitamins-Minerals (MENS MULTIVITAMIN PLUS) TABS Take 1 tablet by mouth daily 11/17/2022     potassium citrate (UROCIT-K) 10 MEQ (1080 MG) CR tablet Take 1 tablet (10 mEq) by mouth 3 times daily (with meals) 11/17/2022     pyridOXINE (VITAMIN B6) 100 MG TABS Take 1 tablet (100 mg) by mouth daily 11/17/2022       Information source(s): Patient, Family member and CareEverywhere/[facility]  Method of interview communication: in-person    Summary of Changes to PTA Med List  New: none    Patient was asked about OTC/herbal products specifically.  PTA med list reflects this.      The information provided in this note is only as accurate as the sources available at the time of the update(s).    Thank you for the opportunity to participate in the care of this patient.    Malik Martinez RPH  11/18/2022 12:51 PM

## 2022-11-19 NOTE — PLAN OF CARE
Physical Therapy Discharge Summary    Reason for therapy discharge:  Patient discharged from PACU without receiving physical therapy evaluation or services.

## 2022-11-19 NOTE — ANESTHESIA POSTPROCEDURE EVALUATION
Patient: Hong Pool    Procedure: Procedure(s):  OPEN REDUCTION INTERNAL FIXATION, FRACTURE, ANKLE       Anesthesia Type:  General    Note:     Postop Pain Control: Uneventful            Sign Out: Well controlled pain   PONV: No   Neuro/Psych: Uneventful            Sign Out: Acceptable/Baseline neuro status   Airway/Respiratory: Uneventful            Sign Out: Acceptable/Baseline resp. status   CV/Hemodynamics: Uneventful            Sign Out: Acceptable CV status; No obvious hypovolemia; No obvious fluid overload   Other NRE: NONE   DID A NON-ROUTINE EVENT OCCUR? No           Last vitals:  Vitals Value Taken Time   /71 11/18/22 1740   Temp 36.2  C (97.1  F) 11/18/22 1700   Pulse 68 11/18/22 1743   Resp 17 11/18/22 1742   SpO2 96 % 11/18/22 1743   Vitals shown include unvalidated device data.    Electronically Signed By: Mahesh Zapien MD  November 18, 2022  6:14 PM

## 2022-11-21 ENCOUNTER — TELEPHONE (OUTPATIENT)
Dept: CARDIOLOGY | Facility: CLINIC | Age: 62
End: 2022-11-21

## 2022-11-21 NOTE — TELEPHONE ENCOUNTER
Called and talked with pt/family.    Canceled December appointments -- as these were for cardiac clearance and pt already had surgery.

## 2022-11-21 NOTE — TELEPHONE ENCOUNTER
[facility] Call Center    Phone Message    May a detailed message be left on voicemail: yes     Reason for Call: Other: Pt would like to speak to the [facility]se about appts that he has in December as he is not sure they are still needed. Please reach out to discuss.     Action Taken: Other: Cardio    Travel Screening: Not Applicable       Thank you!  [facility]

## 2022-11-30 DIAGNOSIS — N20.0 CALCULUS OF KIDNEY: ICD-10-CM

## 2022-11-30 NOTE — TELEPHONE ENCOUNTER
"Request for medication refill:  potassium citrate (UROCIT-K) 10 MEQ (1080 MG) CR tablet  Last office visit was 12/8/21  Last CMP was on 11/16, potassium 4.4  Last refill 1/3/22 #180 with 3 additional refills    Providers if patient needs an appointment and you are willing to give a one month supply please refill for one month and  send a letter/MyChart using \".SMILLIMITEDREFILL\" .smillimited and route chart to \"P [facility] \" (Giving one month refill in non controlled medications is strongly recommended before denial)    If refill has been denied, meaning absolutely no refills without visit, please complete the smart phrase \".smirxrefuse\" and route it to the \"P [facility] MED REFILLS\"  pool to inform the patient and the pharmacy.    Corine Stark RN      "

## 2022-12-01 RX ORDER — POTASSIUM CITRATE 10 MEQ/1
10 TABLET, EXTENDED RELEASE ORAL
Qty: 180 TABLET | Refills: 3 | Status: SHIPPED | OUTPATIENT
Start: 2022-12-01 | End: 2023-05-16

## 2022-12-12 ENCOUNTER — OFFICE VISIT (OUTPATIENT)
Dept: FAMILY MEDICINE | Facility: CLINIC | Age: 62
End: 2022-12-12
Payer: COMMERCIAL

## 2022-12-12 VITALS
SYSTOLIC BLOOD PRESSURE: 126 MMHG | DIASTOLIC BLOOD PRESSURE: 76 MMHG | HEART RATE: 65 BPM | OXYGEN SATURATION: 96 % | RESPIRATION RATE: 16 BRPM | TEMPERATURE: 98.2 F

## 2022-12-12 DIAGNOSIS — R19.8 DIGESTIVE SYMPTOMS: ICD-10-CM

## 2022-12-12 DIAGNOSIS — Z23 ENCOUNTER FOR IMMUNIZATION: ICD-10-CM

## 2022-12-12 DIAGNOSIS — Z00.00 ROUTINE GENERAL MEDICAL EXAMINATION AT A HEALTH CARE FACILITY: Primary | ICD-10-CM

## 2022-12-12 DIAGNOSIS — I25.10 CORONARY ARTERY DISEASE INVOLVING NATIVE CORONARY ARTERY OF NATIVE HEART WITHOUT ANGINA PECTORIS: ICD-10-CM

## 2022-12-12 DIAGNOSIS — M19.049 OSTEOARTHRITIS OF FINGER, UNSPECIFIED LATERALITY: ICD-10-CM

## 2022-12-12 PROCEDURE — 90715 TDAP VACCINE 7 YRS/> IM: CPT | Performed by: FAMILY MEDICINE

## 2022-12-12 PROCEDURE — 90471 IMMUNIZATION ADMIN: CPT | Performed by: FAMILY MEDICINE

## 2022-12-12 PROCEDURE — 99396 PREV VISIT EST AGE 40-64: CPT | Mod: 25 | Performed by: FAMILY MEDICINE

## 2022-12-12 ASSESSMENT — ENCOUNTER SYMPTOMS
FREQUENCY: 0
HEARTBURN: 0
PARESTHESIAS: 0
ARTHRALGIAS: 1
PALPITATIONS: 0
FEVER: 0
SORE THROAT: 0
COUGH: 0
CONSTIPATION: 0
JOINT SWELLING: 0
WEAKNESS: 0
ABDOMINAL PAIN: 0
NERVOUS/ANXIOUS: 0
SHORTNESS OF BREATH: 0
DIARRHEA: 0
EYE PAIN: 0
DIZZINESS: 0
HEADACHES: 0
NAUSEA: 0
HEMATOCHEZIA: 0
MYALGIAS: 0
DYSURIA: 0
CHILLS: 0
HEMATURIA: 0

## 2022-12-12 ASSESSMENT — PATIENT HEALTH QUESTIONNAIRE - PHQ9
10. IF YOU CHECKED OFF ANY PROBLEMS, HOW DIFFICULT HAVE THESE PROBLEMS MADE IT FOR YOU TO DO YOUR WORK, TAKE CARE OF THINGS AT HOME, OR GET ALONG WITH OTHER PEOPLE: NOT DIFFICULT AT ALL
SUM OF ALL RESPONSES TO PHQ QUESTIONS 1-9: 6
SUM OF ALL RESPONSES TO PHQ QUESTIONS 1-9: 6

## 2022-12-12 NOTE — PROGRESS NOTES
SUBJECTIVE:   CC: Mathew is an 62 year old who presents for preventative health visit.   Patient has been advised of split billing requirements and indicates understanding: Yes     Healthy Habits:     Getting at least 3 servings of Calcium per day:  Yes    Bi-annual eye exam:  NO    Dental care twice a year:  NO    Sleep apnea or symptoms of sleep apnea:  None    Diet:  Low salt and Low fat/cholesterol    Frequency of exercise:  6-7 days/week    Duration of exercise:  30-45 minutes    Taking medications regularly:  No    Barriers to taking medications:  None    Medication side effects:  Other    PHQ-2 Total Score: 3    Additional concerns today:  Yes    Concerns  1. Bilateral hand jonit pain    2. Digestive issues  Diarrhea x 10 yrs stopped after antibiotics for tooth infection  Improved x 1yr8mo  Now loose stool, urgency  Using peptobismol, yogurt/lactaid      3. Hip pain  Has ankle fracture with recent ORIF  Will likely do PT for Hip in in addition for ankle  Address later    4. Sleep issues  Need additional appointment for this issue    5. Jaw          Today's PHQ-2 Score:   PHQ-2 ( 1999 [employer]) 12/12/2022   Q1: Little interest or pleasure in doing things 2   Q2: Feeling down, depressed or hopeless 1   PHQ-2 Score 3   PHQ-2 Total Score (12-17 Years)- Positive if 3 or more points; Administer PHQ-A if positive -   Q1: Little interest or pleasure in doing things Nearly every day   Q2: Feeling down, depressed or hopeless More than half the days   PHQ-2 Score 5       Social History     Tobacco Use     Smoking status: Never     Smokeless tobacco: Never   Substance Use Topics     Alcohol use: No     If you drink alcohol do you typically have >3 drinks per day or >7 drinks per week? No    Alcohol Use 12/12/2022   Prescreen: >3 drinks/day or >7 drinks/week? No       Last PSA: No results found for: PSA    Reviewed and updated as needed this visit by clinical staff    Allergies               Reviewed and updated as needed  this visit by Provider                     Review of Systems   Constitutional: Negative for chills and fever.   HENT: Negative for congestion, ear pain, hearing loss and sore throat.    Eyes: Negative for pain and visual disturbance.   Respiratory: Negative for cough and shortness of breath.    Cardiovascular: Negative for chest pain, palpitations and peripheral edema.   Gastrointestinal: Negative for abdominal pain, constipation, diarrhea, heartburn, hematochezia and nausea.   Genitourinary: Negative for dysuria, frequency, genital sores, hematuria, impotence, penile discharge and urgency.   Musculoskeletal: Positive for arthralgias. Negative for joint swelling and myalgias.   Skin: Negative for rash.   Neurological: Negative for dizziness, weakness, headaches and paresthesias.   Psychiatric/Behavioral: Negative for mood changes. The patient is not nervous/anxious.          OBJECTIVE:   /76 (BP Location: Left arm, Patient Position: Sitting, Cuff Size: Adult Large)   Pulse 65   Temp 98.2  F (36.8  C) (Oral)   Resp 16   SpO2 96%     Physical Exam    BMI= There is no height or weight on file to calculate BMI.  In WC with cast    GENERAL: healthy, alert and no distress  MS: R Foot - cast boot  MS: 2nd finger DIP bilaterally swollen, deviated  NEURO: strength and tone- normal, sensory exam- grossly normal, mentation- intact, speech- normal, reflexes- symmetric  PSYCH: Alert and oriented times 3; speech- coherent , normal rate and volume; able to articulate logical thoughts, able to abstract reason, no tangential thoughts, no hallucinations or delusions, affect- normal        ASSESSMENT/PLAN:     1. Routine general medical examination at a health care facility  2. Encounter for immunization  Up-to-date with blood work  Gets Lipid next month with Cardiology  Colonoscopy after done with ankle fracture    - TDAP VACCINE (Adacel, Boostrix)  [[id number]]    3. Osteoarthritis of finger, unspecified laterality  Presently  in hand therapy    4. Coronary artery disease involving native coronary artery of native heart without angina pectoris  Stable  appt with cardiology next month      5. Digestive symptoms  Loose stools again. Wondering about getting antibiotics which changed symtpoms dramatically 18 months ago.  Probiotics  Avoid known foods causing problems    6. Ankle Fracture  S/p ORIF  Continue with ortho recommendations  PT to follow    7. RTC for video visit  Sleep issues  Check if he got new probiotics      COUNSELING:   Reviewed preventive health counseling, as reflected in patient instructions        He reports that he has never smoked. He has never used smokeless tobacco.            Jorge Singleton MD  [facility]  Answers for HPI/ROS submitted by the patient on 12/12/2022  If you checked off any problems, how difficult have these problems made it for you to do your work, take care of things at home, or get along with other people?: Not difficult at all  PHQ9 TOTAL SCORE: 6

## 2022-12-15 PROBLEM — M19.049 OSTEOARTHRITIS OF FINGER, UNSPECIFIED LATERALITY: Status: ACTIVE | Noted: 2022-12-15

## 2022-12-15 PROBLEM — R19.8 DIGESTIVE SYMPTOMS: Status: ACTIVE | Noted: 2022-12-15

## 2022-12-27 ENCOUNTER — TRANSCRIBE ORDERS (OUTPATIENT)
Dept: OTHER | Age: 62
End: 2022-12-27

## 2022-12-27 DIAGNOSIS — S82.843A BIMALLEOLAR ANKLE FRACTURE: ICD-10-CM

## 2022-12-27 DIAGNOSIS — Z48.89 AFTERCARE FOLLOWING SURGERY: Primary | ICD-10-CM

## 2023-01-02 ENCOUNTER — TELEPHONE (OUTPATIENT)
Dept: CARDIOLOGY | Facility: CLINIC | Age: 63
End: 2023-01-02

## 2023-01-02 NOTE — TELEPHONE ENCOUNTER
[facility]    Fax:[phone]    Spec: [phone]    MO: [phone]

## 2023-01-02 NOTE — TELEPHONE ENCOUNTER
Central Prior Authorization Team   Phone: [phone]    PA Initiation    Medication: Repatha Sureclick 140mg/ml soaj  Insurance Company: [employer] - Phone [phone] Fax [phone]  Pharmacy Filling the Rx: [facility] - [place], [place] - [facility] [address]  Filling Pharmacy Phone: [phone]  Filling Pharmacy Fax:    Start Date: 1/2/2023

## 2023-01-03 NOTE — TELEPHONE ENCOUNTER
Prior Authorization Approval    Authorization Effective Date: 1/2/2023  Authorization Expiration Date: 1/3/2024  Medication: Repatha Sureclick 140mg/ml soaj  Approved Dose/Quantity:   Reference #:     Insurance Company: [employer] - resmio [phone] Fax [phone]  Expected CoPay:       CoPay Card Available:      Foundation Assistance Needed:    Which Pharmacy is filling the prescription (Not needed for infusion/clinic administered): [facility] - [place], [place] - [address]  Pharmacy Notified: Yes  Patient Notified: Yes

## 2023-01-04 ENCOUNTER — VIRTUAL VISIT (OUTPATIENT)
Dept: PHYSICAL THERAPY | Facility: CLINIC | Age: 63
End: 2023-01-04
Attending: PHYSICIAN ASSISTANT
Payer: COMMERCIAL

## 2023-01-04 DIAGNOSIS — S82.843A BIMALLEOLAR ANKLE FRACTURE: ICD-10-CM

## 2023-01-04 DIAGNOSIS — Z48.89 AFTERCARE FOLLOWING SURGERY: ICD-10-CM

## 2023-01-04 DIAGNOSIS — Z47.89 AFTERCARE FOLLOWING SURGERY OF THE MUSCULOSKELETAL SYSTEM, NEC: ICD-10-CM

## 2023-01-04 DIAGNOSIS — S82.891A FRACTURE OF RIGHT ANKLE: ICD-10-CM

## 2023-01-04 PROCEDURE — 97161 PT EVAL LOW COMPLEX 20 MIN: CPT | Mod: GP | Performed by: PHYSICAL THERAPIST

## 2023-01-04 PROCEDURE — 97110 THERAPEUTIC EXERCISES: CPT | Mod: GP | Performed by: PHYSICAL THERAPIST

## 2023-01-04 PROCEDURE — 97530 THERAPEUTIC ACTIVITIES: CPT | Mod: GP | Performed by: PHYSICAL THERAPIST

## 2023-01-04 ASSESSMENT — ACTIVITIES OF DAILY LIVING (ADL): FAAM_ACTIVITIES_OF_DAILY_LIVING_SCORE: 42.11

## 2023-01-04 NOTE — PROGRESS NOTES
Physical Therapy Initial Evaluation  Subjective:  The history is provided by the patient.   Therapist Generated HPI Evaluation  Problem details: 11/18/22 - s/p ORIF for right Bimalleolar ankle fracture after a fall. Fell down the stairs at home. Fall was on 11/12/22.   Pt was to be NWB for 6 weeks.   Met with PA 1 week ago, did xrays and stated good healing. Now ok for PWB with progression toward FWB with PT. Suggested weight shifting on kitchen counter. Gets soreness at the knee and below with the WBing. Foot is not having an issue with the weight being. Feels just like a minor sprain. Has decreased pain meds a lot. Still wearing boot but is able to wean off with PT. But was given triloc ankle brace. .         Type of problem:  Right ankle.    This is a new condition.  Condition occurred with:  A fall/slip.  Where condition occurred: at home.  Site of Pain: general ankle, and some up into hip and knee and below.  Pain is described as aching (mild occasional electric shock) and is constant.    Since onset symptoms are gradually improving.  Associated symptoms:  Edema, loss of motion/stiffness and loss of strength. Symptoms are exacerbated by weight bearing, walking and standing  and relieved by ice, bracing/immobilizing and analgesics.  Special tests included:  X-ray.  Previous treatment includes surgery. There was moderate improvement following previous treatment.  Restrictions due to condition include:  Working in normal job without restrictions.  Barriers include:  None as reported by patient.    Patient Health History  Hong Pool being seen for PT for broken ankle, s/p ORIF for right Bimalleolar ankle fracture.     Problem began: 11/12/2022 (surgery 11/18/22).   Problem occurred: Fell on stairs   Pain score: 2-4/10.  General health as reported by patient is good.  Pertinent medical history includes: kidney disease and osteoarthritis.        Surgeries include:  Orthopedic surgery and heart surgery.     Current medications:  Pain medication.       Primary job tasks include:  Computer work.                                    Objective:    Gait:  Has been using scooter, in CAM boot. Allowed to progress to PWB and FWB and wean from CAM to triloc brace with PT as tolerated               Ankle/Foot Evaluation  ROM:    AROM:    Dorsiflexion: Left:    Right:   To about neurtal - mod limit  Plantarflexion: Left:     Right:  Min limit  Inversion: Left:      Right:  Mod limit  Eversion:     Right:  Min limit              PALPATION: Palpation of ankle: visible bruising remaining medial ankle near incision.                                                          General     ROS    Assessment/Plan:    Patient is a 62 year old male with right side ankle complaints.    Patient has the following significant findings with corresponding treatment plan.                Diagnosis 1:  s/p ORIF for right Bimalleolar ankle fracture    Pain -  manual therapy, splint/taping/bracing/orthotics, self management, education and home program  Decreased ROM/flexibility - manual therapy, therapeutic exercise and home program  Decreased strength - therapeutic exercise, therapeutic activities and home program  Impaired balance - neuro re-education, therapeutic activities, adaptive equipment/assistive device and home program  Inflammation - self management/home program  Impaired gait - assistive devices and home program  Decreased function - therapeutic activities and home program    Therapy Evaluation Codes:   1) History comprised of:   Personal factors that impact the plan of care:      None.    Comorbidity factors that impact the plan of care are:      None.     Medications impacting care: Pain.  2) Examination of Body Systems comprised of:   Body structures and functions that impact the plan of care:      Ankle.   Activity limitations that impact the plan of care are:      Driving, Squatting/kneeling, Stairs, Standing and Walking.  3) Clinical  presentation characteristics are:   Stable/Uncomplicated.  4) Decision-Making    Low complexity using standardized patient assessment instrument and/or measureable assessment of functional outcome.  Cumulative Therapy Evaluation is: Low complexity.    Previous and current functional limitations:  (See Goal Flow Sheet for this information)    Short term and Long term goals: (See Goal Flow Sheet for this information)     Communication ability:  Patient appears to be able to clearly communicate and understand verbal and written communication and follow directions correctly.  Treatment Explanation - The following has been discussed with the patient:   RX ordered/plan of care  Anticipated outcomes  Possible risks and side effects  This patient would benefit from PT intervention to resume normal activities.   Rehab potential is good.    Frequency:  1 X week, once daily  Duration:  for 8 weeks tapering to 2 X a month over 4 weeks  Discharge Plan:  Achieve all LTG.  Independent in home treatment program.  Reach maximal therapeutic benefit.    Please refer to the daily flowsheet for treatment today, total treatment time and time spent performing 1:1 timed codes.

## 2023-01-04 NOTE — LETTER
[facility] [address] [address]  [phone]    2023    Re: Hong Pool   :   1960  MRN:  [id number]   REFERRING PHYSICIAN:   Dawna SALAS [facility]    Date of Initial Evaluation:  2023  Visits:  Rxs Used: 1  Reason for Referral:     Aftercare following surgery  Bimalleolar ankle fracture  Fracture of right ankle  Aftercare following surgery of the musculoskeletal system, NEC    EVALUATION SUMMARY    Physical Therapy Initial Evaluation  Subjective:  The history is provided by the patient.   Therapist Generated HPI Evaluation  Problem details: 22 - s/p ORIF for right Bimalleolar ankle fracture after a fall. Fell down the stairs at home. Fall was on 22.   Pt was to be NWB for 6 weeks.   Met with PA 1 week ago, did xrays and stated good healing. Now ok for PWB with progression toward FWB with PT. Suggested weight shifting on kitchen counter. Gets soreness at the knee and below with the WBing. Foot is not having an issue with the weight being. Feels just like a minor sprain. Has decreased pain meds a lot. Still wearing boot but is able to wean off with PT. But was given triloc ankle brace. .         Type of problem:  Right ankle.    This is a new condition.  Condition occurred with:  A fall/slip.  Where condition occurred: at home.  Site of Pain: general ankle, and some up into hip and knee and below.  Pain is described as aching (mild occasional electric shock) and is constant.    Since onset symptoms are gradually improving.  Associated symptoms:  Edema, loss of motion/stiffness and loss of strength. Symptoms are exacerbated by weight bearing, walking and standing  and relieved by ice, bracing/immobilizing and analgesics.  Special tests included:  X-ray.  Previous treatment includes surgery. There was moderate  improvement following previous treatment.  Restrictions due to condition include:  Working in normal job without restrictions.  Barriers include:  None as reported by patient.    Re: Hong Pool   :   1960    Patient Health History  Hong Pool being seen for PT for broken ankle, s/p ORIF for right Bimalleolar ankle fracture.   Problem began: 2022 (surgery 22).   Problem occurred: Fell on stairs   Pain score: 2-4/10.  General health as reported by patient is good.  Pertinent medical history includes: kidney disease and osteoarthritis.   Surgeries include:  Orthopedic surgery and heart surgery.    Current medications:  Pain medication.     Primary job tasks include:  Computer work.                Objective:    Gait:  Has been using scooter, in CAM boot. Allowed to progress to PWB and FWB and wean from CAM to triloc brace with PT as tolerated     Ankle/Foot Evaluation  ROM:    AROM:    Dorsiflexion: Left:    Right:   To about neurtal - mod limit  Plantarflexion: Left:     Right:  Min limit  Inversion: Left:      Right:  Mod limit  Eversion:     Right:  Min limit    PALPATION: Palpation of ankle: visible bruising remaining medial ankle near incision.    Assessment/Plan:    Patient is a 62 year old male with right side ankle complaints.    Patient has the following significant findings with corresponding treatment plan.                Diagnosis 1:  s/p ORIF for right Bimalleolar ankle fracture  Pain -  manual therapy, splint/taping/bracing/orthotics, self management, education and home program  Decreased ROM/flexibility - manual therapy, therapeutic exercise and home program  Decreased strength - therapeutic exercise, therapeutic activities and home program  Impaired balance - neuro re-education, therapeutic activities, adaptive equipment/assistive device and home program  Inflammation - self management/home program  Impaired gait - assistive devices and home program  Decreased function -  therapeutic activities and home program    Therapy Evaluation Codes:   1) History comprised of:   Personal factors that impact the plan of care:     None.    Comorbidity factors that impact the plan of care are:     None.     Medications impacting care: Pain.  2) Examination of Body Systems comprised of:   Body structures and functions that impact the plan of care:     Ankle.  Re: Hong Pool   :   1960     Activity limitations that impact the plan of care are:     Driving, Squatting/kneeling, Stairs, Standing and Walking.  3) Clinical presentation characteristics are:  Stable/Uncomplicated.  4) Decision-Making   Low complexity using standardized patient assessment instrument and/or  measureable assessment of functional outcome.    Cumulative Therapy Evaluation is: Low complexity.  Previous and current functional limitations:  (See Goal Flow Sheet for this information)    Short term and Long term goals: (See Goal Flow Sheet for this information)   Communication ability:  Patient appears to be able to clearly communicate and understand verbal and written communication and follow directions correctly.  Treatment Explanation - The following has been discussed with the patient:   RX ordered/plan of care  Anticipated outcomes  Possible risks and side effects  This patient would benefit from PT intervention to resume normal activities.   Rehab potential is good.  Frequency:  1 X week, once daily  Duration:  for 8 weeks tapering to 2 X a month over 4 weeks  Discharge Plan:  Achieve all LTG.  Independent in home treatment program.  Reach maximal therapeutic benefit.    Thank you for your referral.    INQUIRIES  Therapist: Alisa Horvath PT  [address] [address]  Phone: [phone]  Fax: [phone]

## 2023-01-09 ENCOUNTER — LAB (OUTPATIENT)
Dept: LAB | Facility: CLINIC | Age: 63
End: 2023-01-09
Payer: COMMERCIAL

## 2023-01-09 DIAGNOSIS — I25.810 CORONARY ARTERY DISEASE INVOLVING CORONARY BYPASS GRAFT OF NATIVE HEART WITHOUT ANGINA PECTORIS: ICD-10-CM

## 2023-01-09 DIAGNOSIS — E78.5 HYPERLIPIDEMIA LDL GOAL <70: ICD-10-CM

## 2023-01-09 LAB
ALBUMIN SERPL BCG-MCNC: 4.4 G/DL (ref 3.5–5.2)
ALP SERPL-CCNC: 61 U/L (ref 40–129)
ALT SERPL W P-5'-P-CCNC: 37 U/L (ref 10–50)
ANION GAP SERPL CALCULATED.3IONS-SCNC: 10 MMOL/L (ref 7–15)
AST SERPL W P-5'-P-CCNC: 27 U/L (ref 10–50)
BILIRUB SERPL-MCNC: 1.1 MG/DL
BUN SERPL-MCNC: 16.1 MG/DL (ref 8–23)
CALCIUM SERPL-MCNC: 9.8 MG/DL (ref 8.8–10.2)
CHLORIDE SERPL-SCNC: 103 MMOL/L (ref 98–107)
CHOLEST SERPL-MCNC: 143 MG/DL
CREAT SERPL-MCNC: 0.99 MG/DL (ref 0.67–1.17)
DEPRECATED HCO3 PLAS-SCNC: 28 MMOL/L (ref 22–29)
GFR SERPL CREATININE-BSD FRML MDRD: 86 ML/MIN/1.73M2
GLUCOSE SERPL-MCNC: 119 MG/DL (ref 70–99)
HDLC SERPL-MCNC: 54 MG/DL
LDLC SERPL CALC-MCNC: 47 MG/DL
NONHDLC SERPL-MCNC: 89 MG/DL
POTASSIUM SERPL-SCNC: 4.3 MMOL/L (ref 3.4–5.3)
PROT SERPL-MCNC: 7.2 G/DL (ref 6.4–8.3)
SODIUM SERPL-SCNC: 141 MMOL/L (ref 136–145)
TRIGL SERPL-MCNC: 211 MG/DL

## 2023-01-09 PROCEDURE — 80053 COMPREHEN METABOLIC PANEL: CPT | Performed by: PATHOLOGY

## 2023-01-09 PROCEDURE — 36415 COLL VENOUS BLD VENIPUNCTURE: CPT | Performed by: PATHOLOGY

## 2023-01-09 PROCEDURE — 80061 LIPID PANEL: CPT | Performed by: PATHOLOGY

## 2023-01-11 ENCOUNTER — THERAPY VISIT (OUTPATIENT)
Dept: PHYSICAL THERAPY | Facility: CLINIC | Age: 63
End: 2023-01-11
Attending: PHYSICIAN ASSISTANT
Payer: COMMERCIAL

## 2023-01-11 DIAGNOSIS — S82.891A FRACTURE OF RIGHT ANKLE: Primary | ICD-10-CM

## 2023-01-11 DIAGNOSIS — Z47.89 AFTERCARE FOLLOWING SURGERY OF THE MUSCULOSKELETAL SYSTEM, NEC: ICD-10-CM

## 2023-01-11 PROCEDURE — 97110 THERAPEUTIC EXERCISES: CPT | Mod: GP | Performed by: PHYSICAL THERAPIST

## 2023-01-11 PROCEDURE — 97530 THERAPEUTIC ACTIVITIES: CPT | Mod: GP | Performed by: PHYSICAL THERAPIST

## 2023-01-18 ENCOUNTER — THERAPY VISIT (OUTPATIENT)
Dept: PHYSICAL THERAPY | Facility: CLINIC | Age: 63
End: 2023-01-18
Payer: COMMERCIAL

## 2023-01-18 DIAGNOSIS — S82.891A FRACTURE OF RIGHT ANKLE: Primary | ICD-10-CM

## 2023-01-18 DIAGNOSIS — Z47.89 AFTERCARE FOLLOWING SURGERY OF THE MUSCULOSKELETAL SYSTEM, NEC: ICD-10-CM

## 2023-01-18 PROCEDURE — 97112 NEUROMUSCULAR REEDUCATION: CPT | Mod: GP | Performed by: PHYSICAL THERAPIST

## 2023-01-18 PROCEDURE — 97110 THERAPEUTIC EXERCISES: CPT | Mod: GP | Performed by: PHYSICAL THERAPIST

## 2023-01-18 PROCEDURE — 97530 THERAPEUTIC ACTIVITIES: CPT | Mod: GP | Performed by: PHYSICAL THERAPIST

## 2023-01-19 ENCOUNTER — OFFICE VISIT (OUTPATIENT)
Dept: CARDIOLOGY | Facility: CLINIC | Age: 63
End: 2023-01-19
Attending: INTERNAL MEDICINE
Payer: COMMERCIAL

## 2023-01-19 VITALS
BODY MASS INDEX: 27.33 KG/M2 | DIASTOLIC BLOOD PRESSURE: 79 MMHG | HEIGHT: 73 IN | HEART RATE: 63 BPM | SYSTOLIC BLOOD PRESSURE: 120 MMHG | WEIGHT: 206.2 LBS | OXYGEN SATURATION: 97 %

## 2023-01-19 DIAGNOSIS — I25.10 ASCVD (ARTERIOSCLEROTIC CARDIOVASCULAR DISEASE): ICD-10-CM

## 2023-01-19 DIAGNOSIS — Z78.9 STATIN INTOLERANCE: ICD-10-CM

## 2023-01-19 DIAGNOSIS — E78.5 HYPERLIPIDEMIA LDL GOAL <70: Primary | ICD-10-CM

## 2023-01-19 DIAGNOSIS — I25.810 CORONARY ARTERY DISEASE INVOLVING CORONARY BYPASS GRAFT OF NATIVE HEART WITHOUT ANGINA PECTORIS: ICD-10-CM

## 2023-01-19 PROCEDURE — 99214 OFFICE O/P EST MOD 30 MIN: CPT | Mod: GC | Performed by: INTERNAL MEDICINE

## 2023-01-19 PROCEDURE — G0463 HOSPITAL OUTPT CLINIC VISIT: HCPCS | Performed by: INTERNAL MEDICINE

## 2023-01-19 PROCEDURE — G0463 HOSPITAL OUTPT CLINIC VISIT: HCPCS

## 2023-01-19 RX ORDER — IBUPROFEN 400 MG/1
400 TABLET, FILM COATED ORAL EVERY 6 HOURS PRN
COMMUNITY
End: 2024-07-03

## 2023-01-19 ASSESSMENT — PAIN SCALES - GENERAL: PAINLEVEL: NO PAIN (0)

## 2023-01-19 NOTE — LETTER
1/19/2023      RE: Hong Pool  [address] [facility] [address]       Dear Colleague,    Thank you for the opportunity to participate in the care of your patient, Hong Pool, at the [facility] [place] [facility]. Please see a copy of my visit note below.    HPI:     I had the privilege to evaluate and examine Mr Anant Pool, who is a 62 year old male with a history of CAD with CABG in 2012.   He has had statin intolerance (atorvastatin 80 mg , simvastatin 80 mg caused both muscle pain and consequently was unable to walk ) and lower dosage pravastatin was not tolerated.      Last visit 1/13/2022.     Patient is now on Repatha 140  mg subcut very 2 weeks.    He broke his right ankle last November, requiring surgical repair. He is now doing PT.     Patient denies chest pain, shortness of breath, palpitations, intermittent claudication.       PAST MEDICAL HISTORY:  Past Medical History:   Diagnosis Date     Borderline diabetes mellitus 11/26/2015     Coronary artery disease 2012    CABG x4     Coronary artery disease involving coronary bypass graft of native heart without angina pectoris 11/18/2015     Depression      Goiter     With normal TSH     Hyperlipidemia     Started on statin 7/2007.  Discontinued 10/07 secondary to muscle aches and fatigue     Nephrolithiasis      Shoulder pain      Statin intolerance 6/23/2016       CURRENT MEDICATIONS:  Current Outpatient Medications   Medication Sig Dispense Refill     acetaminophen (TYLENOL) 325 MG tablet Take 3 tablets (975 mg) by mouth every 8 hours 50 tablet 0     alpha-d-galactosidase (BEANO) tablet Take 2 tablets by mouth 3 times daily (before meals) 540 tablet 4     aspirin (ASA) 81 MG EC tablet Take 1 tablet (81 mg) by mouth 2 times daily (Patient taking differently: Take 81 mg by mouth daily) 90 tablet 4     bismuth subsalicylate (PEPTO BISMOL) 262 MG chewable tablet  Take 1 tablet by mouth 3 times daily as needed       Blood Pressure Monitor KIT 1 each daily. 1 kit 0     Cholecalciferol (VITAMIN D3) 2000 UNITS CAPS Take 1 capsule by mouth daily 90 capsule 4     evolocumab (REPATHA) 140 MG/ML prefilled autoinjector Inject 1 mL (140 mg) Subcutaneous every 14 days 6 mL 3     hydrOXYzine (ATARAX) 25 MG tablet Take 1 tablet (25 mg) by mouth 3 times daily as needed for itching 30 tablet 0     ibuprofen (ADVIL/MOTRIN) 400 MG tablet Take 400 mg by mouth every 6 hours as needed for moderate pain (4-6)       LACTAID FAST ACT 9000 units TABS tablet 3,000-9,000 Units as needed  4     Multiple Vitamins-Minerals (MENS MULTIVITAMIN PLUS) TABS Take 1 tablet by mouth daily 90 tablet 4     oxyCODONE (ROXICODONE) 5 MG tablet Take 1-2 tablets (5-10 mg) by mouth every 4 hours as needed for moderate to severe pain 20 tablet 0     potassium citrate (UROCIT-K) 10 MEQ (1080 MG) CR tablet Take 1 tablet (10 mEq) by mouth 3 times daily (with meals) 180 tablet 3     pyridOXINE (VITAMIN B6) 100 MG TABS Take 1 tablet (100 mg) by mouth daily 90 tablet 4     STATIN NOT PRESCRIBED, INTENTIONAL, 1 each continuous Statin not prescribed intentionally due to Intolerance (with supporting documentation of trying a statin at least once within the last 5 years) (Patient not taking: Reported on 11/16/2022) 0 each 0       PAST SURGICAL HISTORY:  Past Surgical History:   Procedure Laterality Date     BYPASS GRAFT ARTERY CORONARY  6/24/2012    Procedure: BYPASS GRAFT ARTERY CORONARY;  Median Sternotomy, Coronary Artery Bypass Grafting x 4 using Left Internal Mammary and Left Saphenous Vein  with Endovein Harvesting. On Pump Oxygenation;  Surgeon: Genesis Larsen MD;  Location:  OR     ESOPHAGOSCOPY, GASTROSCOPY, DUODENOSCOPY (EGD), COMBINED N/A 4/11/2017    Procedure: COMBINED ESOPHAGOSCOPY, GASTROSCOPY, DUODENOSCOPY (EGD), BIOPSY SINGLE OR MULTIPLE;  Surgeon: Corine Ly MD;  Location:  GI      OPEN REDUCTION INTERNAL FIXATION ANKLE Right 11/18/2022    Procedure: OPEN REDUCTION INTERNAL FIXATION, FRACTURE, ANKLE RIGHT;  Surgeon: Claudio Macias, DO;  Location: [facility]       ALLERGIES     Allergies   Allergen Reactions     Lidocaine Rash     Break out     Adhesive Tape      Adhesive     Band-Aid Anti-Itch      Pad on the bandaid, antibacterial gauze that causes reaction     Cholestoff Complete [Plant Sterol Stanol-Pantethine] Other (See Comments)     Severe stomach pain     Lactose Diarrhea     Rosuvastatin Muscle Pain (Myalgia)     Simvastatin      Other reaction(s): Muscle Aches     Zocor [Simvastatin - High Dose] Other (See Comments)     Muscle aches/soreness, confusion, disorganized thinking       FAMILY HISTORY:  Family History   Problem Relation Age of Onset     C.A.D. Other         Uncle     Diabetes Brother      Cerebrovascular Disease Brother      Cardiovascular Sister         tachyarrhythmia       SOCIAL HISTORY:  Social History     Socioeconomic History     Marital status:      Spouse name: None     Number of children: None     Years of education: None     Highest education level: None   Occupational History     None   Tobacco Use     Smoking status: Never Smoker     Smokeless tobacco: Never Used   Vaping Use     Vaping Use: Never used   Substance and Sexual Activity     Alcohol use: No     Drug use: No     Sexual activity: Yes   Other Topics Concern     Parent/sibling w/ CABG, MI or angioplasty before 65F 55M? Not Asked   Social History Narrative    .      Social Determinants of Health     Financial Resource Strain: Low Risk      Difficulty of Paying Living Expenses: Not very hard   Food Insecurity: No Food Insecurity     Worried About Running Out of Food in the Last Year: Never true     Ran Out of Food in the Last Year: Never true   Transportation Needs: No Transportation Needs     Lack of Transportation (Medical): No     Lack of Transportation (Non-Medical):  "No   Physical Activity: Insufficiently Active     Days of Exercise per Week: 2 days     Minutes of Exercise per Session: 20 min   Stress: Stress Concern Present     Feeling of Stress : To some extent   Social Connections: Not on file   Intimate Partner Violence: Not At Risk     Fear of Current or Ex-Partner: No     Emotionally Abused: No     Physically Abused: No     Sexually Abused: No   Housing Stability: Unknown     Unable to Pay for Housing in the Last Year: No     Number of Places Lived in the Last Year: Not on file     Unstable Housing in the Last Year: No       ROS:   Constitutional: No fever, chills, or sweats. No weight gain/loss   ENT: No visual disturbance, ear ache, epistaxis, sore throat  Allergies/Immunologic: Negative.   Respiratory: No cough, hemoptysia  Cardiovascular: As per HPI  GI: No nausea, vomiting, hematemesis, melena, or hematochezia  : No urinary frequency, dysuria, or hematuria  Integument: Negative  Psychiatric: Negative  Neuro: Negative  Endocrinology: Negative   Musculoskeletal: Negative    EXAM:  /79 (BP Location: Right arm, Patient Position: Chair, Cuff Size: Adult Large)   Pulse 63   Ht 1.85 m (6' 0.84\")   Wt 93.5 kg (206 lb 3.2 oz)   SpO2 97%   BMI 27.33 kg/m    In general, the patient is a pleasant male in no apparent distress.    HEENT: NC/AT.  PERRLA.  EOMI.  Sclerae white, not injected.  Nares clear.  Pharynx without erythema or exudate.  Dentition intact.    Neck: No adenopathy.  No thyromegaly. Carotids +4/4 bilaterally without bruits.  No jugular venous distension.   Heart: RRR. Normal S1, S2 splits physiologically. No murmur, rub, click, or gallop. The PMI is in the 5th ICS in the midclavicular line. There is no heave.    Lungs: CTA.  No ronchi, wheezes, rales.  No dullness to percussion.   Abdomen: Soft, nontender, nondistended. No organomegaly.  No bruits.   Extremities: No clubbing, cyanosis, or edema.  The pulses are +4/4 at the radial, brachial, femoral, " popliteal, DP, and PT sites bilaterally.  No bruits are noted.  Neurologic: Alert and oriented to person/place/time, normal speech, gait and affect  Skin: No petechiae, purpura or rash.    Labs:  LIPID RESULTS:  Lab Results   Component Value Date    CHOL 143 01/09/2023    CHOL 137 01/18/2021    HDL 54 01/09/2023    HDL 49 01/18/2021    LDL 47 01/09/2023    LDL 34 01/18/2021    TRIG 211 (H) 01/09/2023    TRIG 272 (H) 01/18/2021    CHOLHDLRATIO 5.1 (H) 11/24/2015    NHDL 89 01/09/2023    NHDL 88 01/18/2021       LIVER ENZYME RESULTS:  Lab Results   Component Value Date    AST 27 01/09/2023    AST 17 01/18/2021    ALT 37 01/09/2023    ALT 30 01/18/2021       CBC RESULTS:  Lab Results   Component Value Date    WBC 6.3 11/16/2022    WBC 4.9 06/24/2016    RBC 4.50 11/16/2022    RBC 4.33 (L) 06/24/2016    HGB 14.5 11/16/2022    HGB 15.7 05/25/2018    HCT 43.4 11/16/2022    HCT 51.2 05/25/2018    MCV 96 11/16/2022    .7 (H) 05/25/2018    MCH 32.2 11/16/2022    MCH 31.5 05/25/2018    MCHC 33.4 11/16/2022    MCHC 30.7 (L) 05/25/2018    RDW 12.4 11/16/2022    RDW 11.9 06/24/2016     11/16/2022     (L) 06/24/2016       BMP RESULTS:  Lab Results   Component Value Date     01/09/2023     01/18/2021    POTASSIUM 4.3 01/09/2023    POTASSIUM 4.0 01/06/2022    POTASSIUM 4.1 01/18/2021    CHLORIDE 103 01/09/2023    CHLORIDE 106 01/06/2022    CHLORIDE 108 01/18/2021    CO2 28 01/09/2023    CO2 29 01/06/2022    CO2 30 01/18/2021    ANIONGAP 10 01/09/2023    ANIONGAP 8 01/06/2022    ANIONGAP 3 01/18/2021     (H) 01/09/2023     (H) 01/06/2022     (H) 01/18/2021    BUN 16.1 01/09/2023    BUN 19 01/06/2022    BUN 24 01/18/2021    CR 0.99 01/09/2023    CR 1.06 01/18/2021    GFRESTIMATED 86 01/09/2023    GFRESTIMATED 76 01/18/2021    GFRESTBLACK 88 01/18/2021    TYSON 9.8 01/09/2023    TYSON 9.0 01/18/2021        A1C RESULTS:  Lab Results   Component Value Date    A1C 6.4 (H) 11/16/2022    A1C  5.9 (H) 01/18/2021       INR RESULTS:  Lab Results   Component Value Date    INR 1.53 (H) 06/24/2012    INR 1.73 (H) 06/24/2012         Assessment and Plan:     I discussed the results with the patient.    I have interviewed patient with CV fellow. I have reviewed the laboratory tests  with patient and CV fellow. I have reviewed the management plan with the patient and the CV fellow. I discussed with the CV fellow and agree with the findings and plan in this CV fellow s note.  In addition to the assessment and plan changes  have been incorporated into the note by myself, as to make it a single cohesive document.    Medication Changes: None        Follow Up:   -Echocardiogram when able  -Follow up with Dr. Evangelista in 1 year with fasting labs prior to visit     Follow the American Heart Association Diet and Lifestyle Recommendations:  -Limit saturated fat, trans fat, sodium, red meat, sweets and sugar-sweetened beverages. If you choose to eat red meat, compare labels and select the leanest cuts available.  -Aim for at least 150 minutes of moderate physical activity or 75 minutes of vigorous physical activity - or an equal combination of both - each week.          Discussed with Dr. Jean Carlos Benito MD  Cardiology Fellow       I have interviewed patient with CV fellow. I have reviewed the laboratory tests  with patient and CV fellow. I have reviewed the management plan with the patient and the CV fellow. I discussed with the CV fellow and agree with the findings and plan in this CV fellow s note.  In addition to the assessment and plan changes  have been incorporated into the note by myself, as to make it a single cohesive document.      Jaylan Evangelista MD, PhD  Professor of Medicine  Division of Cardiology    CC  Patient Care Team:  Jorge Singleton MD as PCP - General (General Surgery)  Rebeca Dunn MD as MD (Ophthalmology)  Corine Ly MD as MD (Internal Medicine)  Claudio Chavez  MD as MD (Urology)  Carolyn Christiansen, RN as Registered Nurse (Urology)  [place], [facility] as Pharmacist (Pharmacist)  Jorge Singleton MD as Assigned PCP  Catina Crump, RN as Specialty Care Coordinator (Cardiology)

## 2023-01-19 NOTE — PROGRESS NOTES
HPI:     I had the privilege to evaluate and examine Mr Anant Pool, who is a 62 year old male with a history of CAD with CABG in 2012.   He has had statin intolerance (atorvastatin 80 mg , simvastatin 80 mg caused both muscle pain and consequently was unable to walk ) and lower dosage pravastatin was not tolerated.      Last visit 1/13/2022.     Patient is now on Repatha 140  mg subcut very 2 weeks.    He broke his right ankle last November, requiring surgical repair. He is now doing PT.     Patient denies chest pain, shortness of breath, palpitations, intermittent claudication.       PAST MEDICAL HISTORY:  Past Medical History:   Diagnosis Date     Borderline diabetes mellitus 11/26/2015     Coronary artery disease 2012    CABG x4     Coronary artery disease involving coronary bypass graft of native heart without angina pectoris 11/18/2015     Depression      Goiter     With normal TSH     Hyperlipidemia     Started on statin 7/2007.  Discontinued 10/07 secondary to muscle aches and fatigue     Nephrolithiasis      Shoulder pain      Statin intolerance 6/23/2016       CURRENT MEDICATIONS:  Current Outpatient Medications   Medication Sig Dispense Refill     acetaminophen (TYLENOL) 325 MG tablet Take 3 tablets (975 mg) by mouth every 8 hours 50 tablet 0     alpha-d-galactosidase (BEANO) tablet Take 2 tablets by mouth 3 times daily (before meals) 540 tablet 4     aspirin (ASA) 81 MG EC tablet Take 1 tablet (81 mg) by mouth 2 times daily (Patient taking differently: Take 81 mg by mouth daily) 90 tablet 4     bismuth subsalicylate (PEPTO BISMOL) 262 MG chewable tablet Take 1 tablet by mouth 3 times daily as needed       Blood Pressure Monitor KIT 1 each daily. 1 kit 0     Cholecalciferol (VITAMIN D3) 2000 UNITS CAPS Take 1 capsule by mouth daily 90 capsule 4     evolocumab (REPATHA) 140 MG/ML prefilled autoinjector Inject 1 mL (140 mg) Subcutaneous every 14 days 6 mL 3     hydrOXYzine (ATARAX) 25 MG tablet Take 1  tablet (25 mg) by mouth 3 times daily as needed for itching 30 tablet 0     ibuprofen (ADVIL/MOTRIN) 400 MG tablet Take 400 mg by mouth every 6 hours as needed for moderate pain (4-6)       LACTAID FAST ACT 9000 units TABS tablet 3,000-9,000 Units as needed  4     Multiple Vitamins-Minerals (MENS MULTIVITAMIN PLUS) TABS Take 1 tablet by mouth daily 90 tablet 4     oxyCODONE (ROXICODONE) 5 MG tablet Take 1-2 tablets (5-10 mg) by mouth every 4 hours as needed for moderate to severe pain 20 tablet 0     potassium citrate (UROCIT-K) 10 MEQ (1080 MG) CR tablet Take 1 tablet (10 mEq) by mouth 3 times daily (with meals) 180 tablet 3     pyridOXINE (VITAMIN B6) 100 MG TABS Take 1 tablet (100 mg) by mouth daily 90 tablet 4     STATIN NOT PRESCRIBED, INTENTIONAL, 1 each continuous Statin not prescribed intentionally due to Intolerance (with supporting documentation of trying a statin at least once within the last 5 years) (Patient not taking: Reported on 11/16/2022) 0 each 0       PAST SURGICAL HISTORY:  Past Surgical History:   Procedure Laterality Date     BYPASS GRAFT ARTERY CORONARY  6/24/2012    Procedure: BYPASS GRAFT ARTERY CORONARY;  Median Sternotomy, Coronary Artery Bypass Grafting x 4 using Left Internal Mammary and Left Saphenous Vein  with Endovein Harvesting. On Pump Oxygenation;  Surgeon: Genesis Larsen MD;  Location:  OR     ESOPHAGOSCOPY, GASTROSCOPY, DUODENOSCOPY (EGD), COMBINED N/A 4/11/2017    Procedure: COMBINED ESOPHAGOSCOPY, GASTROSCOPY, DUODENOSCOPY (EGD), BIOPSY SINGLE OR MULTIPLE;  Surgeon: Corine Ly MD;  Location:  GI     OPEN REDUCTION INTERNAL FIXATION ANKLE Right 11/18/2022    Procedure: OPEN REDUCTION INTERNAL FIXATION, FRACTURE, ANKLE RIGHT;  Surgeon: Claudio Macias DO;  Location: [facility] Main OR       ALLERGIES     Allergies   Allergen Reactions     Lidocaine Rash     Break out     Adhesive Tape      Adhesive     Band-Aid Anti-Itch      Pad on the  bandaid, antibacterial gauze that causes reaction     Cholestoff Complete [Plant Sterol Stanol-Pantethine] Other (See Comments)     Severe stomach pain     Lactose Diarrhea     Rosuvastatin Muscle Pain (Myalgia)     Simvastatin      Other reaction(s): Muscle Aches     Zocor [Simvastatin - High Dose] Other (See Comments)     Muscle aches/soreness, confusion, disorganized thinking       FAMILY HISTORY:  Family History   Problem Relation Age of Onset     C.A.D. Other         Uncle     Diabetes Brother      Cerebrovascular Disease Brother      Cardiovascular Sister         tachyarrhythmia       SOCIAL HISTORY:  Social History     Socioeconomic History     Marital status:      Spouse name: None     Number of children: None     Years of education: None     Highest education level: None   Occupational History     None   Tobacco Use     Smoking status: Never Smoker     Smokeless tobacco: Never Used   Vaping Use     Vaping Use: Never used   Substance and Sexual Activity     Alcohol use: No     Drug use: No     Sexual activity: Yes   Other Topics Concern     Parent/sibling w/ CABG, MI or angioplasty before 65F 55M? Not Asked   Social History Narrative    .      Social Determinants of Health     Financial Resource Strain: Low Risk      Difficulty of Paying Living Expenses: Not very hard   Food Insecurity: No Food Insecurity     Worried About Running Out of Food in the Last Year: Never true     Ran Out of Food in the Last Year: Never true   Transportation Needs: No Transportation Needs     Lack of Transportation (Medical): No     Lack of Transportation (Non-Medical): No   Physical Activity: Insufficiently Active     Days of Exercise per Week: 2 days     Minutes of Exercise per Session: 20 min   Stress: Stress Concern Present     Feeling of Stress : To some extent   Social Connections: Not on file   Intimate Partner Violence: Not At Risk     Fear of Current or Ex-Partner: No     Emotionally Abused: No      "Physically Abused: No     Sexually Abused: No   Housing Stability: Unknown     Unable to Pay for Housing in the Last Year: No     Number of Places Lived in the Last Year: Not on file     Unstable Housing in the Last Year: No       ROS:   Constitutional: No fever, chills, or sweats. No weight gain/loss   ENT: No visual disturbance, ear ache, epistaxis, sore throat  Allergies/Immunologic: Negative.   Respiratory: No cough, hemoptysia  Cardiovascular: As per HPI  GI: No nausea, vomiting, hematemesis, melena, or hematochezia  : No urinary frequency, dysuria, or hematuria  Integument: Negative  Psychiatric: Negative  Neuro: Negative  Endocrinology: Negative   Musculoskeletal: Negative    EXAM:  /79 (BP Location: Right arm, Patient Position: Chair, Cuff Size: Adult Large)   Pulse 63   Ht 1.85 m (6' 0.84\")   Wt 93.5 kg (206 lb 3.2 oz)   SpO2 97%   BMI 27.33 kg/m    In general, the patient is a pleasant male in no apparent distress.    HEENT: NC/AT.  PERRLA.  EOMI.  Sclerae white, not injected.  Nares clear.  Pharynx without erythema or exudate.  Dentition intact.    Neck: No adenopathy.  No thyromegaly. Carotids +4/4 bilaterally without bruits.  No jugular venous distension.   Heart: RRR. Normal S1, S2 splits physiologically. No murmur, rub, click, or gallop. The PMI is in the 5th ICS in the midclavicular line. There is no heave.    Lungs: CTA.  No ronchi, wheezes, rales.  No dullness to percussion.   Abdomen: Soft, nontender, nondistended. No organomegaly.  No bruits.   Extremities: No clubbing, cyanosis, or edema.  The pulses are +4/4 at the radial, brachial, femoral, popliteal, DP, and PT sites bilaterally.  No bruits are noted.  Neurologic: Alert and oriented to person/place/time, normal speech, gait and affect  Skin: No petechiae, purpura or rash.    Labs:  LIPID RESULTS:  Lab Results   Component Value Date    CHOL 143 01/09/2023    CHOL 137 01/18/2021    HDL 54 01/09/2023    HDL 49 01/18/2021    LDL 47 " 01/09/2023    LDL 34 01/18/2021    TRIG 211 (H) 01/09/2023    TRIG 272 (H) 01/18/2021    CHOLHDLRATIO 5.1 (H) 11/24/2015    NHDL 89 01/09/2023    NHDL 88 01/18/2021       LIVER ENZYME RESULTS:  Lab Results   Component Value Date    AST 27 01/09/2023    AST 17 01/18/2021    ALT 37 01/09/2023    ALT 30 01/18/2021       CBC RESULTS:  Lab Results   Component Value Date    WBC 6.3 11/16/2022    WBC 4.9 06/24/2016    RBC 4.50 11/16/2022    RBC 4.33 (L) 06/24/2016    HGB 14.5 11/16/2022    HGB 15.7 05/25/2018    HCT 43.4 11/16/2022    HCT 51.2 05/25/2018    MCV 96 11/16/2022    .7 (H) 05/25/2018    MCH 32.2 11/16/2022    MCH 31.5 05/25/2018    MCHC 33.4 11/16/2022    MCHC 30.7 (L) 05/25/2018    RDW 12.4 11/16/2022    RDW 11.9 06/24/2016     11/16/2022     (L) 06/24/2016       BMP RESULTS:  Lab Results   Component Value Date     01/09/2023     01/18/2021    POTASSIUM 4.3 01/09/2023    POTASSIUM 4.0 01/06/2022    POTASSIUM 4.1 01/18/2021    CHLORIDE 103 01/09/2023    CHLORIDE 106 01/06/2022    CHLORIDE 108 01/18/2021    CO2 28 01/09/2023    CO2 29 01/06/2022    CO2 30 01/18/2021    ANIONGAP 10 01/09/2023    ANIONGAP 8 01/06/2022    ANIONGAP 3 01/18/2021     (H) 01/09/2023     (H) 01/06/2022     (H) 01/18/2021    BUN 16.1 01/09/2023    BUN 19 01/06/2022    BUN 24 01/18/2021    CR 0.99 01/09/2023    CR 1.06 01/18/2021    GFRESTIMATED 86 01/09/2023    GFRESTIMATED 76 01/18/2021    GFRESTBLACK 88 01/18/2021    TYSON 9.8 01/09/2023    TYSON 9.0 01/18/2021        A1C RESULTS:  Lab Results   Component Value Date    A1C 6.4 (H) 11/16/2022    A1C 5.9 (H) 01/18/2021       INR RESULTS:  Lab Results   Component Value Date    INR 1.53 (H) 06/24/2012    INR 1.73 (H) 06/24/2012         Assessment and Plan:     I discussed the results with the patient.    I have interviewed patient with CV fellow. I have reviewed the laboratory tests  with patient and CV fellow. I have reviewed the  management plan with the patient and the CV fellow. I discussed with the CV fellow and agree with the findings and plan in this CV fellow s note.  In addition to the assessment and plan changes  have been incorporated into the note by myself, as to make it a single cohesive document.    Medication Changes: None        Follow Up:   -Echocardiogram when able  -Follow up with Dr. Evangelista in 1 year with fasting labs prior to visit     Follow the American Heart Association Diet and Lifestyle Recommendations:  -Limit saturated fat, trans fat, sodium, red meat, sweets and sugar-sweetened beverages. If you choose to eat red meat, compare labels and select the leanest cuts available.  -Aim for at least 150 minutes of moderate physical activity or 75 minutes of vigorous physical activity - or an equal combination of both - each week.          Discussed with Dr. Jean Carlos Benito MD  Cardiology Fellow       I have interviewed patient with CV fellow. I have reviewed the laboratory tests  with patient and CV fellow. I have reviewed the management plan with the patient and the CV fellow. I discussed with the CV fellow and agree with the findings and plan in this CV fellow s note.  In addition to the assessment and plan changes  have been incorporated into the note by myself, as to make it a single cohesive document.      Jaylan Evangelista MD, PhD  Professor of Medicine  Division of Cardiology    CC  Patient Care Team:  Jorge Singleton MD as PCP - General (General Surgery)  Jaylan Evangelista MD as MD (Cardiology)  Rebeca Dunn MD as MD (Ophthalmology)  Corine Ly MD as MD (Internal Medicine)  Claudio Chavez MD as MD (Urology)  Carolyn Christiansen RN as Registered Nurse (Urology)  Jaylan Evangelista MD as Assigned Heart and Vascular Provider  [place] as Pharmacist (Pharmacist)  Jorge Singleton MD as Assigned PCP  Catina Crump RN as Specialty Care Coordinator (Cardiology)

## 2023-01-19 NOTE — PATIENT INSTRUCTIONS
January 19, 2023    Cardiology Provider You Saw During Your Visit: Dr. Evangelista      Medication Changes: None      Follow Up:   -Echocardiogram when able  -Follow up with Dr. Evangelista in 1 year with fasting labs prior to visit    Follow the American Heart Association Diet and Lifestyle Recommendations:  -Limit saturated fat, trans fat, sodium, red meat, sweets and sugar-sweetened beverages. If you choose to eat red meat, compare labels and select the leanest cuts available.  -Aim for at least 150 minutes of moderate physical activity or 75 minutes of vigorous physical activity - or an equal combination of both - each week.      To Reach Us:  -During business hours: [phone], press option # 1 to schedule an appointment or to leave a message for your care team.     -After hours, weekends or holidays: [phone], press option #4 and ask to speak to the on-call cardiologist. Inform them you are a patient at the [facility].      Catina Crump RN  Cardiology Care Coordinator - General Cardiology  [facility]

## 2023-01-19 NOTE — NURSING NOTE
Chief Complaint   Patient presents with     Follow Up     Return Jerrod- Jean Carlos F/U      Vitals were taken and medications reconciled.    Uriel Vaughan, EMT  3:48 PM      Yes

## 2023-01-19 NOTE — NURSING NOTE
January 19, 2023      Medication Changes: None      Follow Up:   -Echocardiogram when able  -Follow up with Dr. Evangelista in 1 year with fasting labs prior to visit    Patient verbalized understanding of all health information given and agreed to call with further questions or concerns.      Catina Crump RN

## 2023-01-24 ENCOUNTER — THERAPY VISIT (OUTPATIENT)
Dept: PHYSICAL THERAPY | Facility: CLINIC | Age: 63
End: 2023-01-24
Payer: COMMERCIAL

## 2023-01-24 DIAGNOSIS — Z47.89 AFTERCARE FOLLOWING SURGERY OF THE MUSCULOSKELETAL SYSTEM, NEC: ICD-10-CM

## 2023-01-24 DIAGNOSIS — S82.891A FRACTURE OF RIGHT ANKLE: Primary | ICD-10-CM

## 2023-01-24 PROCEDURE — 97110 THERAPEUTIC EXERCISES: CPT | Mod: GP | Performed by: PHYSICAL THERAPIST

## 2023-01-24 PROCEDURE — 97530 THERAPEUTIC ACTIVITIES: CPT | Mod: GP | Performed by: PHYSICAL THERAPIST

## 2023-02-01 ENCOUNTER — THERAPY VISIT (OUTPATIENT)
Dept: PHYSICAL THERAPY | Facility: CLINIC | Age: 63
End: 2023-02-01
Payer: COMMERCIAL

## 2023-02-01 DIAGNOSIS — Z47.89 AFTERCARE FOLLOWING SURGERY OF THE MUSCULOSKELETAL SYSTEM, NEC: ICD-10-CM

## 2023-02-01 DIAGNOSIS — S82.891A FRACTURE OF RIGHT ANKLE: Primary | ICD-10-CM

## 2023-02-01 PROCEDURE — 97110 THERAPEUTIC EXERCISES: CPT | Mod: GP | Performed by: PHYSICAL THERAPIST

## 2023-02-01 PROCEDURE — 97140 MANUAL THERAPY 1/> REGIONS: CPT | Mod: GP | Performed by: PHYSICAL THERAPIST

## 2023-02-01 PROCEDURE — 97112 NEUROMUSCULAR REEDUCATION: CPT | Mod: GP | Performed by: PHYSICAL THERAPIST

## 2023-02-01 PROCEDURE — 97530 THERAPEUTIC ACTIVITIES: CPT | Mod: GP | Performed by: PHYSICAL THERAPIST

## 2023-02-01 NOTE — PROGRESS NOTES
PROGRESS  REPORT    Progress reporting period is from 1/4/22 to 2/1/23.       SUBJECTIVE  Subjective: pt went to the grocery store for the first time. took crutches just in case since it was the most he was going to walk and stand so far. it went well during, sore after. also tried driving, again during it was fine, but later the legs were just sore and tired from extra work. the ankle itself was fine. can get some eda tingling in sitting at times, comes and goes, almost a twitch/twinge feeling. able to ascend and descend stairs reciprocally, only slightly slower than normal, loosens up as he goes. marching balance is getting easy. can feel a little ffedback at ankle if he is turning/twisting/reaching. only uses the brace when he leaves the house, does not use brace in the house and has not issue with that. feels he is 60-70% of normal.     Current Pain level: 1/10 (lingering tightness).      Initial Pain level: 4/10.   Changes in function:  Yes (See Goal flowsheet attached for changes in current functional level)  Adverse reaction to treatment or activity: None    OBJECTIVE  Changes noted in objective findings:  Yes  Objective: ankle WBing DF (distance toes from wall). L 8 cm, R 2 cm. AROM: DF 1 (L 3). PF 46 (L 50). hypomobility TCJ AP. MMT: ankle EV 5-/5, INV 5-/5. SL balance: L 26 sec (1st trial), R 30 sec (multiple trials required). increased balance challenge in tandem stance on airex pad. walking without AD, just triloc brace.     ASSESSMENT/PLAN  Updated problem list and treatment plan: Diagnosis 1:  s/p ORIF for right Bimalleolar ankle fracture    Pain -  manual therapy, self management, education and home program  Decreased ROM/flexibility - manual therapy, therapeutic exercise and home program  Decreased joint mobility - manual therapy, therapeutic exercise and home program  Decreased strength - therapeutic exercise, therapeutic activities and home program  Impaired balance - neuro re-education,  therapeutic activities, adaptive equipment/assistive device and home program  Decreased function - therapeutic activities and home program  STG/LTGs have been met or progress has been made towards goals:  Yes (See Goal flow sheet completed today.)  Assessment of Progress: The patient's condition is improving.  Patient is meeting short term goals and is progressing towards long term goals.  Self Management Plans:  Patient has been instructed in a home treatment program.  Patient  has been instructed in self management of symptoms.  I have re-evaluated this patient and find that the nature, scope, duration and intensity of the therapy is appropriate for the medical condition of the patient.  Hong continues to require the following intervention to meet STG and LTG's:  PT    Recommendations:  This patient would benefit from continued therapy.     Frequency:  1 X week, once daily  Duration:  for 3 weeks tapering to 2 X a month over 4 weeks        Please refer to the daily flowsheet for treatment today, total treatment time and time spent performing 1:1 timed codes.

## 2023-02-01 NOTE — LETTER
MARITZA [address] [address] [phone]    2023    Re: Hong Pool   :   1960  MRN:  [id number]   REFERRING PHYSICIAN:   Dawna SALAS [facility]    Date of Initial Evaluation:  2023  Visits:  Rxs Used: 5  Reason for Referral:     Fracture of right ankle  Aftercare following surgery of the musculoskeletal system, NEC    EVALUATION SUMMARY    PROGRESS  REPORT    Progress reporting period is from 22 to 23.       SUBJECTIVE  Subjective: pt went to the grocery store for the first time. took crutches just in case since it was the most he was going to walk and stand so far. it went well during, sore after. also tried driving, again during it was fine, but later the legs were just sore and tired from extra work. the ankle itself was fine. can get some eda tingling in sitting at times, comes and goes, almost a twitch/twinge feeling. able to ascend and descend stairs reciprocally, only slightly slower than normal, loosens up as he goes. marching balance is getting easy. can feel a little ffedback at ankle if he is turning/twisting/reaching. only uses the brace when he leaves the house, does not use brace in the house and has not issue with that. feels he is 60-70% of normal.     Current Pain level: 1/10 (lingering tightness).      Initial Pain level: 4/10.   Changes in function:  Yes (See Goal flowsheet attached for changes in current functional level)  Adverse reaction to treatment or activity: None    OBJECTIVE  Changes noted in objective findings:  Yes  Objective: ankle WBing DF (distance toes from wall). L 8 cm, R 2 cm. AROM: DF 1 (L 3). PF 46 (L 50). hypomobility TCJ AP. MMT: ankle EV 5-/5, INV 5-/5. SL balance: L 26 sec (1st trial), R 30 sec (multiple trials required). increased balance challenge in tandem  stance on airex pad. walking without AD, just triloc brace.               Re: Hong Pool   :   1960    ASSESSMENT/PLAN  Updated problem list and treatment plan: Diagnosis 1:  s/p ORIF for right Bimalleolar ankle fracture    Pain -  manual therapy, self management, education and home program  Decreased ROM/flexibility - manual therapy, therapeutic exercise and home program  Decreased joint mobility - manual therapy, therapeutic exercise and home program  Decreased strength - therapeutic exercise, therapeutic activities and home program  Impaired balance - neuro re-education, therapeutic activities, adaptive equipment/assistive device and home program  Decreased function - therapeutic activities and home program  STG/LTGs have been met or progress has been made towards goals:  Yes (See Goal flow sheet completed today.)  Assessment of Progress: The patient's condition is improving.  Patient is meeting short term goals and is progressing towards long term goals.  Self Management Plans:  Patient has been instructed in a home treatment program.  Patient  has been instructed in self management of symptoms.  I have re-evaluated this patient and find that the nature, scope, duration and intensity of the therapy is appropriate for the medical condition of the patient.  Hong continues to require the following intervention to meet STG and LTG's:  PT    Recommendations:  This patient would benefit from continued therapy.     Frequency:  1 X week, once daily  Duration:  for 3 weeks tapering to 2 X a month over 4 weeks        Please refer to the daily flowsheet for treatment today, total treatment time and time spent performing 1:1 timed codes.            Thank you for your referral.    INQUIRIES  Therapist: Alisa SALAS [address] [address]  Phone: [phone]  Fax: [phone]

## 2023-02-08 ENCOUNTER — THERAPY VISIT (OUTPATIENT)
Dept: PHYSICAL THERAPY | Facility: CLINIC | Age: 63
End: 2023-02-08
Payer: COMMERCIAL

## 2023-02-08 DIAGNOSIS — S82.891A FRACTURE OF RIGHT ANKLE: Primary | ICD-10-CM

## 2023-02-08 DIAGNOSIS — Z47.89 AFTERCARE FOLLOWING SURGERY OF THE MUSCULOSKELETAL SYSTEM, NEC: ICD-10-CM

## 2023-02-08 PROCEDURE — 97110 THERAPEUTIC EXERCISES: CPT | Mod: 59 | Performed by: PHYSICAL THERAPIST

## 2023-02-08 PROCEDURE — 97530 THERAPEUTIC ACTIVITIES: CPT | Mod: GP | Performed by: PHYSICAL THERAPIST

## 2023-02-08 PROCEDURE — 97140 MANUAL THERAPY 1/> REGIONS: CPT | Mod: 59 | Performed by: PHYSICAL THERAPIST

## 2023-02-21 ENCOUNTER — THERAPY VISIT (OUTPATIENT)
Dept: PHYSICAL THERAPY | Facility: CLINIC | Age: 63
End: 2023-02-21
Payer: COMMERCIAL

## 2023-02-21 DIAGNOSIS — S82.891A FRACTURE OF RIGHT ANKLE: Primary | ICD-10-CM

## 2023-02-21 DIAGNOSIS — Z47.89 AFTERCARE FOLLOWING SURGERY OF THE MUSCULOSKELETAL SYSTEM, NEC: ICD-10-CM

## 2023-02-21 PROCEDURE — 97140 MANUAL THERAPY 1/> REGIONS: CPT | Mod: 59 | Performed by: PHYSICAL THERAPIST

## 2023-02-21 PROCEDURE — 97110 THERAPEUTIC EXERCISES: CPT | Mod: 59 | Performed by: PHYSICAL THERAPIST

## 2023-02-21 PROCEDURE — 97530 THERAPEUTIC ACTIVITIES: CPT | Mod: GP | Performed by: PHYSICAL THERAPIST

## 2023-03-01 ENCOUNTER — THERAPY VISIT (OUTPATIENT)
Dept: PHYSICAL THERAPY | Facility: CLINIC | Age: 63
End: 2023-03-01
Payer: COMMERCIAL

## 2023-03-01 DIAGNOSIS — Z47.89 AFTERCARE FOLLOWING SURGERY OF THE MUSCULOSKELETAL SYSTEM, NEC: ICD-10-CM

## 2023-03-01 DIAGNOSIS — S82.891A FRACTURE OF RIGHT ANKLE: Primary | ICD-10-CM

## 2023-03-01 PROCEDURE — 97140 MANUAL THERAPY 1/> REGIONS: CPT | Mod: GP | Performed by: PHYSICAL THERAPIST

## 2023-03-01 PROCEDURE — 97530 THERAPEUTIC ACTIVITIES: CPT | Mod: GP | Performed by: PHYSICAL THERAPIST

## 2023-03-01 PROCEDURE — 97110 THERAPEUTIC EXERCISES: CPT | Mod: GP | Performed by: PHYSICAL THERAPIST

## 2023-03-08 ENCOUNTER — THERAPY VISIT (OUTPATIENT)
Dept: PHYSICAL THERAPY | Facility: CLINIC | Age: 63
End: 2023-03-08
Payer: COMMERCIAL

## 2023-03-08 DIAGNOSIS — Z47.89 AFTERCARE FOLLOWING SURGERY OF THE MUSCULOSKELETAL SYSTEM, NEC: ICD-10-CM

## 2023-03-08 DIAGNOSIS — S82.891A FRACTURE OF RIGHT ANKLE: Primary | ICD-10-CM

## 2023-03-08 PROCEDURE — 97110 THERAPEUTIC EXERCISES: CPT | Mod: GP | Performed by: PHYSICAL THERAPIST

## 2023-03-08 PROCEDURE — 97530 THERAPEUTIC ACTIVITIES: CPT | Mod: GP | Performed by: PHYSICAL THERAPIST

## 2023-03-08 PROCEDURE — 97140 MANUAL THERAPY 1/> REGIONS: CPT | Mod: GP | Performed by: PHYSICAL THERAPIST

## 2023-03-15 ENCOUNTER — TRANSFERRED RECORDS (OUTPATIENT)
Dept: HEALTH INFORMATION MANAGEMENT | Facility: CLINIC | Age: 63
End: 2023-03-15
Payer: COMMERCIAL

## 2023-03-29 ENCOUNTER — THERAPY VISIT (OUTPATIENT)
Dept: PHYSICAL THERAPY | Facility: CLINIC | Age: 63
End: 2023-03-29
Payer: COMMERCIAL

## 2023-03-29 DIAGNOSIS — S82.891A FRACTURE OF RIGHT ANKLE: Primary | ICD-10-CM

## 2023-03-29 DIAGNOSIS — Z47.89 AFTERCARE FOLLOWING SURGERY OF THE MUSCULOSKELETAL SYSTEM, NEC: ICD-10-CM

## 2023-03-29 PROCEDURE — 97140 MANUAL THERAPY 1/> REGIONS: CPT | Mod: GP | Performed by: PHYSICAL THERAPIST

## 2023-03-29 PROCEDURE — 97110 THERAPEUTIC EXERCISES: CPT | Mod: GP | Performed by: PHYSICAL THERAPIST

## 2023-03-29 PROCEDURE — 97530 THERAPEUTIC ACTIVITIES: CPT | Mod: GP | Performed by: PHYSICAL THERAPIST

## 2023-03-29 NOTE — LETTER
MARITZA [address] [address] [phone]    2023    Re: Hong Pool   :   1960  MRN:  [id number]   REFERRING PHYSICIAN:   Dawna SALAS [facility]    Date of Initial Evaluation:  2023  Visits:  Rxs Used: 10  Reason for Referral:     Fracture of right ankle  Aftercare following surgery of the musculoskeletal system, NEC    EVALUATION SUMMARY    PROGRESS  REPORT    Progress reporting period is from 23 to 3/29/23.       SUBJECTIVE   Subjective: pt reports noticing some more pain in more conditions and more often. like sometimes getting out of the car can have a lot of pain at lateral foot for the first minute and then settles. still gets swelling fairly easily and seems a little worse. can do some gentle walking outside without a large issue. but right hip can bother him with walking. has had a handful of other appts lately so some days have been less with his HEP. feels slightly better on days he doesnt do as much HEP.    Current Pain level: No change.      Initial Pain level: 4/10.   Changes in function:  Yes (See Goal flowsheet attached for changes in current functional level)  Adverse reaction to treatment or activity: None    OBJECTIVE  Changes noted in objective findings:  Yes, minimal change lately though  Objective: ankle WBing DF: 2.5 cm (L 8 cm). figure 8 swelling: R 58 cm, L 53 cm. MMT: 5/5 all direction, painfree. no change in WBing DF range or reports of tightness after trial of distal fibular mobs.     ASSESSMENT/PLAN  Updated problem list and treatment plan: Diagnosis 1:  s/p ORIF for right Bimalleolar ankle fracture    Pain -  manual therapy, self management, education and home program  Decreased ROM/flexibility - manual therapy, therapeutic exercise and home program  Decreased joint mobility - manual  therapy, therapeutic exercise, therapeutic activity and home program  Decreased strength - therapeutic exercise, therapeutic activities and home program  Impaired balance - neuro re-education, therapeutic activities and home program  Re: Hong Pool   :   1960    Decreased function - therapeutic activities and home program  STG/LTGs have been met or progress has been made towards goals:  Yes (See Goal flow sheet completed today.)  Assessment of Progress: The patient's progress has plateaued.  Self Management Plans:  Patient has been instructed in a home treatment program.  Patient  has been instructed in self management of symptoms.  I have re-evaluated this patient and find that the nature, scope, duration and intensity of the therapy is appropriate for the medical condition of the patient.  Hong continues to require the following intervention to meet STG and LTG's:  PT    Recommendations:  This patient would benefit from continued therapy.     Frequency:  2 X a month, once daily  Duration:  for 3 months    Thank you for your referral.    INQUIRIES  Therapist: Alisa Horvath PT  [address] [address]  Phone: [phone]  Fax: [phone]

## 2023-03-30 NOTE — PROGRESS NOTES
PROGRESS  REPORT    Progress reporting period is from 2/1/23 to 3/29/23.       SUBJECTIVE   Subjective: pt reports noticing some more pain in more conditions and more often. like sometimes getting out of the car can have a lot of pain at lateral foot for the first minute and then settles. still gets swelling fairly easily and seems a little worse. can do some gentle walking outside without a large issue. but right hip can bother him with walking. has had a handful of other appts lately so some days have been less with his HEP. feels slightly better on days he doesnt do as much HEP.    Current Pain level: No change.      Initial Pain level: 4/10.   Changes in function:  Yes (See Goal flowsheet attached for changes in current functional level)  Adverse reaction to treatment or activity: None    OBJECTIVE  Changes noted in objective findings:  Yes, minimal change lately though  Objective: ankle WBing DF: 2.5 cm (L 8 cm). figure 8 swelling: R 58 cm, L 53 cm. MMT: 5/5 all direction, painfree. no change in WBing DF range or reports of tightness after trial of distal fibular mobs.     ASSESSMENT/PLAN  Updated problem list and treatment plan: Diagnosis 1:  s/p ORIF for right Bimalleolar ankle fracture    Pain -  manual therapy, self management, education and home program  Decreased ROM/flexibility - manual therapy, therapeutic exercise and home program  Decreased joint mobility - manual therapy, therapeutic exercise, therapeutic activity and home program  Decreased strength - therapeutic exercise, therapeutic activities and home program  Impaired balance - neuro re-education, therapeutic activities and home program  Decreased function - therapeutic activities and home program  STG/LTGs have been met or progress has been made towards goals:  Yes (See Goal flow sheet completed today.)  Assessment of Progress: The patient's progress has plateaued.  Self Management Plans:  Patient has been instructed in a home treatment  program.  Patient  has been instructed in self management of symptoms.  I have re-evaluated this patient and find that the nature, scope, duration and intensity of the therapy is appropriate for the medical condition of the patient.  Hong continues to require the following intervention to meet STG and LTG's:  PT    Recommendations:  This patient would benefit from continued therapy.     Frequency:  2 X a month, once daily  Duration:  for 3 months        Please refer to the daily flowsheet for treatment today, total treatment time and time spent performing 1:1 timed codes.

## 2023-04-05 ENCOUNTER — THERAPY VISIT (OUTPATIENT)
Dept: PHYSICAL THERAPY | Facility: CLINIC | Age: 63
End: 2023-04-05
Payer: COMMERCIAL

## 2023-04-05 DIAGNOSIS — Z47.89 AFTERCARE FOLLOWING SURGERY OF THE MUSCULOSKELETAL SYSTEM, NEC: ICD-10-CM

## 2023-04-05 DIAGNOSIS — S82.891A FRACTURE OF RIGHT ANKLE: Primary | ICD-10-CM

## 2023-04-05 PROCEDURE — 97110 THERAPEUTIC EXERCISES: CPT | Mod: GP | Performed by: PHYSICAL THERAPIST

## 2023-04-05 PROCEDURE — 97112 NEUROMUSCULAR REEDUCATION: CPT | Mod: GP | Performed by: PHYSICAL THERAPIST

## 2023-04-05 PROCEDURE — 97530 THERAPEUTIC ACTIVITIES: CPT | Mod: GP | Performed by: PHYSICAL THERAPIST

## 2023-04-19 ENCOUNTER — THERAPY VISIT (OUTPATIENT)
Dept: PHYSICAL THERAPY | Facility: CLINIC | Age: 63
End: 2023-04-19
Payer: COMMERCIAL

## 2023-04-19 DIAGNOSIS — Z47.89 AFTERCARE FOLLOWING SURGERY OF THE MUSCULOSKELETAL SYSTEM, NEC: ICD-10-CM

## 2023-04-19 DIAGNOSIS — S82.891A FRACTURE OF RIGHT ANKLE: Primary | ICD-10-CM

## 2023-04-19 PROCEDURE — 97110 THERAPEUTIC EXERCISES: CPT | Mod: GP | Performed by: PHYSICAL THERAPIST

## 2023-04-19 PROCEDURE — 97530 THERAPEUTIC ACTIVITIES: CPT | Mod: GP | Performed by: PHYSICAL THERAPIST

## 2023-04-19 NOTE — PROGRESS NOTES
DISCHARGE REPORT    Progress reporting period is from 3/29/23 to 4/19/23.       SUBJECTIVE  Subjective: pt reports has been able to try more activity such as more yardwork. feels fine during it. but some incr swelling and pain after. but then ice or ibuprofen easily settles it back down. stairs are fine. just normal walking is not an issue and does not cause more pain or swelling. does not feel any balance issues day to day.     Current Pain level: 0/10.     Initial Pain level: 4/10.   Changes in function:  Yes (See Goal flowsheet attached for changes in current functional level)  Adverse reaction to treatment or activity: None    OBJECTIVE  Changes noted in objective findings:  Yes  Objective: normal gait, painfree. pt demos good understanding of continuing on own, DC from formal PT     ASSESSMENT/PLAN  Updated problem list and treatment plan: Diagnosis 1:  s/p ORIF for right Bimalleolar ankle fracture    Pain -  home program  STG/LTGs have been met or progress has been made towards goals:  Yes (See Goal flow sheet completed today.)  Assessment of Progress: The patient has met all of their long term goals.  Self Management Plans:  Patient has been instructed in a home treatment program.  Patient is independent in a home treatment program.  Patient  has been instructed in self management of symptoms.  Patient is independent in self management of symptoms.  I have re-evaluated this patient and find that the nature, scope, duration and intensity of the therapy is appropriate for the medical condition of the patient.  Hong continues to require the following intervention to meet STG and LTG's:  PT intervention is no longer required to meet STG/LTG.    Recommendations:  This patient is ready to be discharged from therapy and continue their home treatment program.    Please refer to the daily flowsheet for treatment today, total treatment time and time spent performing 1:1 timed codes.

## 2023-05-15 DIAGNOSIS — N20.0 CALCULUS OF KIDNEY: ICD-10-CM

## 2023-05-15 NOTE — TELEPHONE ENCOUNTER
[facility] phone call message- patient requesting a refill:    Full Medication Name: potassium citrate (UROCIT-K) 10 MEQ (1080 MG) CR tablet    Dose: Take 1 tablet (10 mEq) by mouth 3 times daily (with meals) - Oral    Pharmacy confirmed as     [facility] - [place], [place] - [address]   [address] [facility] [address]  Phone: [phone] Fax: [phone]  : Yes    Additional Comments: Patient stated that he called on 05/06 to request a refill on this medication. I dont see record of that in his chart. He said the pharmacy has also called and that he's a little frustrated that its taking so long. He would to speak with nurse regarding this and to know how to make sure this doesn't happen again    OK to leave a message on voice mail? Yes    Primary language: English      needed? No    Call taken on May 15, 2023 at 12:40 PM by Leopoldo Mansfield

## 2023-05-16 RX ORDER — POTASSIUM CITRATE 10 MEQ/1
10 TABLET, EXTENDED RELEASE ORAL
Qty: 180 TABLET | Refills: 3 | Status: SHIPPED | OUTPATIENT
Start: 2023-05-16 | End: 2023-07-03

## 2023-05-16 NOTE — TELEPHONE ENCOUNTER
"Called and spoke with Mathew. He stated pharmacy sent a refill request on 5/6/23. Looking through chart and do not see a refill request. Advised will get submitted and in the future he can send a MyChart message or call when running low and needs refill. Pt verbalized understanding.     Last seen 12/22    Request for medication refill:  potassium citrate (UROCIT-K) 10 MEQ (1080 MG) CR tablet  Providers if patient needs an appointment and you are willing to give a one month supply please refill for one month and  send a letter/MyChart using \".SMILLIMITEDREFILL\" .smillimited and route chart to \"P SMI \" (Giving one month refill in non controlled medications is strongly recommended before denial)    If refill has been denied, meaning absolutely no refills without visit, please complete the smart phrase \".smirxrefuse\" and route it to the \"P SMI MED REFILLS\"  pool to inform the patient and the pharmacy.    Essie Reynoso RN      "

## 2023-07-03 DIAGNOSIS — N20.0 CALCULUS OF KIDNEY: ICD-10-CM

## 2023-07-03 RX ORDER — POTASSIUM CITRATE 10 MEQ/1
10 TABLET, EXTENDED RELEASE ORAL
Qty: 180 TABLET | Refills: 3 | Status: SHIPPED | OUTPATIENT
Start: 2023-07-03 | End: 2024-08-08

## 2023-07-03 NOTE — TELEPHONE ENCOUNTER
"Request for medication refill:potassium citrate (UROCIT-K) 10 MEQ (1080 MG) CR tablet    LV- /12/12/2022    Providers if patient needs an appointment and you are willing to give a one month supply please refill for one month and  send a letter/MyChart using \".SMILLIMITEDREFILL\" .smillimited and route chart to \"P SMI \" (Giving one month refill in non controlled medications is strongly recommended before denial)    If refill has been denied, meaning absolutely no refills without visit, please complete the smart phrase \".smirxrefuse\" and route it to the \"P SMI MED REFILLS\"  pool to inform the patient and the pharmacy.    Sina Braswell CMA        "

## 2023-10-30 ENCOUNTER — TELEPHONE (OUTPATIENT)
Dept: CARDIOLOGY | Facility: CLINIC | Age: 63
End: 2023-10-30
Payer: COMMERCIAL

## 2023-10-30 NOTE — TELEPHONE ENCOUNTER
[facility] Call Center    Phone Message    May a detailed message be left on voicemail: yes     Reason for Call: Other: Patient called requesting to speak with a member of his care team in regards to an upcoming echo. Patient has some clarifying questions. Please call patient back to further discuss.    Action Taken: Message routed to:  Other: Cardiology    Travel Screening: Not Applicable    Thank you!  [facility]

## 2023-11-07 ENCOUNTER — ANCILLARY PROCEDURE (OUTPATIENT)
Dept: CARDIOLOGY | Facility: CLINIC | Age: 63
End: 2023-11-07
Attending: INTERNAL MEDICINE
Payer: COMMERCIAL

## 2023-11-07 DIAGNOSIS — I25.810 CORONARY ARTERY DISEASE INVOLVING CORONARY BYPASS GRAFT OF NATIVE HEART WITHOUT ANGINA PECTORIS: ICD-10-CM

## 2023-11-07 LAB — LVEF ECHO: NORMAL

## 2023-11-07 PROCEDURE — 93306 TTE W/DOPPLER COMPLETE: CPT | Performed by: INTERNAL MEDICINE

## 2024-01-01 NOTE — TELEPHONE ENCOUNTER
1.  Date/reason for appt: 2/8/17- Epigastric abdominal pain     2.  Referring provider: Dr. Jorge Singleton - internal referral.    3.  Call to patient (Yes / No - short description): Yes, LM to call back. Are there any outside records related?     4.  Previous care at / records requested from: [facility] - records/imaging are in EPIC with Dr. Singleton.      Passed

## 2024-01-12 DIAGNOSIS — I25.10 ASCVD (ARTERIOSCLEROTIC CARDIOVASCULAR DISEASE): ICD-10-CM

## 2024-01-12 DIAGNOSIS — Z78.9 STATIN INTOLERANCE: ICD-10-CM

## 2024-01-12 DIAGNOSIS — E78.5 HYPERLIPIDEMIA LDL GOAL <70: ICD-10-CM

## 2024-01-12 DIAGNOSIS — I25.810 CORONARY ARTERY DISEASE INVOLVING CORONARY BYPASS GRAFT OF NATIVE HEART WITHOUT ANGINA PECTORIS: ICD-10-CM

## 2024-01-15 ENCOUNTER — TELEPHONE (OUTPATIENT)
Dept: CARDIOLOGY | Facility: CLINIC | Age: 64
End: 2024-01-15
Payer: COMMERCIAL

## 2024-01-18 NOTE — TELEPHONE ENCOUNTER
Central Prior Authorization Team   Phone: [phone]    PA Initiation    Medication: Repatha 140mg/ml  Insurance Company: [employer] - Phone [phone] Fax [phone]  Pharmacy Filling the Rx: [facility] - [place], [place] - [address]  Filling Pharmacy Phone: [phone]  Filling Pharmacy Fax:    Start Date: 1/18/2024

## 2024-01-19 NOTE — TELEPHONE ENCOUNTER
Prior Authorization Approval    Authorization Effective Date: 1/18/2024  Authorization Expiration Date: 1/17/2025  Medication: Repatha 140mg/ml  Approved Dose/Quantity:   Reference #:     Insurance Company: [employer] - Phone [phone] Fax [phone]  Expected CoPay:       CoPay Card Available:      Foundation Assistance Needed:    Which Pharmacy is filling the prescription (Not needed for infusion/clinic administered): [facility] - [place], [place] - [facility] [address]  Pharmacy Notified:  yes  Patient Notified:  yes- Pharmacy will contact patient when ready to /ship

## 2024-01-20 ENCOUNTER — HEALTH MAINTENANCE LETTER (OUTPATIENT)
Age: 64
End: 2024-01-20

## 2024-02-13 ENCOUNTER — OFFICE VISIT (OUTPATIENT)
Dept: FAMILY MEDICINE | Facility: CLINIC | Age: 64
End: 2024-02-13
Payer: COMMERCIAL

## 2024-02-13 VITALS
HEART RATE: 69 BPM | HEIGHT: 72 IN | RESPIRATION RATE: 16 BRPM | TEMPERATURE: 98 F | SYSTOLIC BLOOD PRESSURE: 136 MMHG | WEIGHT: 217.4 LBS | BODY MASS INDEX: 29.45 KG/M2 | OXYGEN SATURATION: 95 % | DIASTOLIC BLOOD PRESSURE: 72 MMHG

## 2024-02-13 DIAGNOSIS — K04.7 DENTAL ABSCESS: Primary | ICD-10-CM

## 2024-02-13 PROCEDURE — 99214 OFFICE O/P EST MOD 30 MIN: CPT | Mod: GC

## 2024-02-13 NOTE — PROGRESS NOTES
"  Assessment & Plan     Dental abscess  Day 5 of tooth pain with associated gingival swelling and fluctuance. Fever for 2 days over the weekend, now resolved and afebrile in clinic today. Exam concerning for dental abscess. Patient has had problems with this tooth before, with a similar presentation in 2021. See HPI for ENT and endodontic details. Recommend 1 week of treatment with Augmentin. Sent external dental referral with patient today. Recommended he call [facility] since he previously established care here, or other endodontic clinics to be seen before the end of the week for further management. Advised him to call the clinic if he develops high fever, worsening confusion, or if unable to connect with dental care.  - amoxicillin-clavulanate (AUGMENTIN) 875-125 MG tablet; Take 1 tablet by mouth 2 times daily  - Dental Referral; Future    Review of prior external note(s) from - Outside records from [facility] 3/24/21  Prescription drug management    No follow-ups on file.    Jose Carmona is a 63 year old, presenting for the following health issues:  Clinic Care Coordination - Initial (Pt having fever and chills, and puffiness on face, states he has an infection on one of his teeth)    HPI     Dental pain/infection - left upper tooth    Noticing pain in tooth since Friday  Pain and pressure has gone down a little bit  Drainage from left nostril that is very yellow, noticed this morning    On Friday, tooth started hurting. Had fever to 101.5 on Sunday, then down to 100.4 on Monday. Also having chills, resolved today. Normal temp in clinic today. Endorsing muscle aches and \"trouble thinking clearly\"      2021 - dentist prescribed antibiotic which worked. Did not do root canals at that facility. Referred to oral surgeon, but by the time he got in, the infection was resolved and was told he didn't need anything done. Also went to [facility] and was told same " thing that he didn't need anything done.    Every once in a while will hurt for half a day and then go away    That tooth has crown on it - dental surgeon has been recommended to him in past. Would do root canal through the crown rather than remove it because otherwise the underlying tooth would crumble      ENT visit 04/2021  The patient presents with a history of facial pain that began with an initial event of facial swelling, fever and dizziness. He will be referred for a CT scan of the temporal bones and a CT scan of the sinuses as well as a temporomandibular joint disorder evaluation with our temporomandibular joint disorder specialists in the Dental Department     ENT visit 06/2021  Hong Pool is seen in consultation from Dr. Plascencia.  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 He was initially seen for left facial swelling and redness from what he thought was an infected upper tooth. He saw one dentist who recommended a root canal and then another dentist who recommended antibiotics. He has taken the antibiotics and the facial swelling has decreased but he still has tooth pain. The dentist recommended he see ENT. Dr. 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 We discussed this and, since he is essentially asymptomatic beyond non bothersome autophony, recommendation was made for observation. He was pleased nothing needed to be done         Objective    /72 (BP Location: Left arm, Patient Position: Sitting, Cuff Size: Adult Large)   Pulse 69   Temp 98  F (36.7  C) (Oral)   Resp 16   Ht 1.829 m (6')   Wt 98.6 kg (217 lb 6.4 oz)   SpO2 95%   BMI 29.48 kg/m    Body mass index is 29.48 kg/m .  Physical Exam  Vitals reviewed.   Constitutional:       General: He is  not in acute distress.     Appearance: Normal appearance. He is not ill-appearing, toxic-appearing or diaphoretic.      Comments: Appears mildly uncomfortable but overall well appearing   HENT:      Head: Normocephalic and atraumatic.      Comments: Mild left facial swelling     Nose: No congestion or rhinorrhea.      Left Turbinates: Enlarged.      Comments: Left turbinates more swollen and enlarged compared to right. Non-erythematous, non-boggy     Mouth/Throat:      Lips: No lesions.      Mouth: Mucous membranes are moist.      Dentition: Abnormal dentition. Dental tenderness, gingival swelling and dental abscesses present.      Pharynx: No oropharyngeal exudate.        Comments: Moderately poor dentition with several crowns. Gingival swelling and significant fluctuance around encircled tooth above. Unable to appreciate if there is any drainage around area, no bleeding. Very tender to palpation.    Eyes:      General: No scleral icterus.     Conjunctiva/sclera: Conjunctivae normal.   Cardiovascular:      Rate and Rhythm: Normal rate.   Pulmonary:      Effort: Pulmonary effort is normal.   Lymphadenopathy:      Cervical: No cervical adenopathy.   Skin:     General: Skin is warm and dry.   Neurological:      General: No focal deficit present.      Mental Status: He is alert.        Signed Electronically by: Mando Perdue MD

## 2024-02-21 ENCOUNTER — TELEPHONE (OUTPATIENT)
Dept: FAMILY MEDICINE | Facility: CLINIC | Age: 64
End: 2024-02-21
Payer: COMMERCIAL

## 2024-02-21 NOTE — TELEPHONE ENCOUNTER
Care Coordinator attempted to reach out to patient to check in on the dental referral that  wrote on 2/13. Wanted to confirm that patient did make an appointment and was seen for the abscess. CC left message along with telephone number. (Covering for [employer] CC).    Eva Easton  Lead Care Coordinator  [facility]  [phone]

## 2024-02-27 DIAGNOSIS — E78.5 HYPERLIPIDEMIA LDL GOAL <70: Primary | ICD-10-CM

## 2024-02-28 ENCOUNTER — LAB (OUTPATIENT)
Dept: LAB | Facility: CLINIC | Age: 64
End: 2024-02-28
Payer: COMMERCIAL

## 2024-02-28 DIAGNOSIS — E78.5 HYPERLIPIDEMIA LDL GOAL <70: ICD-10-CM

## 2024-02-28 LAB
ALBUMIN SERPL BCG-MCNC: 4.2 G/DL (ref 3.5–5.2)
ALP SERPL-CCNC: 60 U/L (ref 40–150)
ALT SERPL W P-5'-P-CCNC: 23 U/L (ref 0–70)
ANION GAP SERPL CALCULATED.3IONS-SCNC: 8 MMOL/L (ref 7–15)
AST SERPL W P-5'-P-CCNC: 22 U/L (ref 0–45)
BILIRUB SERPL-MCNC: 1.3 MG/DL
BUN SERPL-MCNC: 15.4 MG/DL (ref 8–23)
CALCIUM SERPL-MCNC: 9.2 MG/DL (ref 8.8–10.2)
CHLORIDE SERPL-SCNC: 104 MMOL/L (ref 98–107)
CHOLEST SERPL-MCNC: 161 MG/DL
CREAT SERPL-MCNC: 1.14 MG/DL (ref 0.67–1.17)
DEPRECATED HCO3 PLAS-SCNC: 28 MMOL/L (ref 22–29)
EGFRCR SERPLBLD CKD-EPI 2021: 72 ML/MIN/1.73M2
FASTING STATUS PATIENT QL REPORTED: YES
GLUCOSE SERPL-MCNC: 155 MG/DL (ref 70–99)
HDLC SERPL-MCNC: 58 MG/DL
LDLC SERPL CALC-MCNC: 70 MG/DL
LDLC SERPL DIRECT ASSAY-MCNC: 85 MG/DL
NONHDLC SERPL-MCNC: 103 MG/DL
POTASSIUM SERPL-SCNC: 4.3 MMOL/L (ref 3.4–5.3)
PROT SERPL-MCNC: 6.7 G/DL (ref 6.4–8.3)
SODIUM SERPL-SCNC: 140 MMOL/L (ref 135–145)
TRIGL SERPL-MCNC: 164 MG/DL

## 2024-02-28 PROCEDURE — 99000 SPECIMEN HANDLING OFFICE-LAB: CPT | Performed by: PATHOLOGY

## 2024-02-28 PROCEDURE — 80053 COMPREHEN METABOLIC PANEL: CPT | Performed by: PATHOLOGY

## 2024-02-28 PROCEDURE — 83721 ASSAY OF BLOOD LIPOPROTEIN: CPT | Performed by: INTERNAL MEDICINE

## 2024-02-28 PROCEDURE — 36415 COLL VENOUS BLD VENIPUNCTURE: CPT | Performed by: PATHOLOGY

## 2024-02-28 PROCEDURE — 83695 ASSAY OF LIPOPROTEIN(A): CPT | Performed by: INTERNAL MEDICINE

## 2024-02-28 PROCEDURE — 80061 LIPID PANEL: CPT | Performed by: PATHOLOGY

## 2024-02-29 ENCOUNTER — OFFICE VISIT (OUTPATIENT)
Dept: CARDIOLOGY | Facility: CLINIC | Age: 64
End: 2024-02-29
Attending: INTERNAL MEDICINE
Payer: COMMERCIAL

## 2024-02-29 VITALS
HEART RATE: 69 BPM | BODY MASS INDEX: 30.1 KG/M2 | DIASTOLIC BLOOD PRESSURE: 76 MMHG | OXYGEN SATURATION: 98 % | SYSTOLIC BLOOD PRESSURE: 118 MMHG | WEIGHT: 221.9 LBS

## 2024-02-29 DIAGNOSIS — E78.5 HYPERLIPIDEMIA LDL GOAL <70: Primary | ICD-10-CM

## 2024-02-29 LAB — APO A-I SERPL-MCNC: 63 MG/DL

## 2024-02-29 PROCEDURE — 99213 OFFICE O/P EST LOW 20 MIN: CPT | Performed by: INTERNAL MEDICINE

## 2024-02-29 PROCEDURE — 99214 OFFICE O/P EST MOD 30 MIN: CPT | Performed by: INTERNAL MEDICINE

## 2024-02-29 RX ORDER — MULTIVIT-MIN/IRON/FOLIC ACID/K 18-600-40
500 CAPSULE ORAL DAILY
COMMUNITY

## 2024-02-29 ASSESSMENT — PAIN SCALES - GENERAL: PAINLEVEL: NO PAIN (0)

## 2024-02-29 NOTE — LETTER
2/29/2024      RE: Hong Andradeeroy  [address] [facility] [address]       Dear Colleague,    Thank you for the opportunity to participate in the care of your patient, Hong Pool, at the [facility] [place] [facility]. Please see a copy of my visit note below.    Optimal Vascular Metrics    Blood Pressure   <140/90    On Aspirin  Yes    On Statin  No: Contraindicated due to: On PCSK9 inhibitor    Tobacco use  No      Please do not hesitate to contact me if you have any questions/concerns.     Sincerely,     Jaylan Evangelista MD

## 2024-02-29 NOTE — NURSING NOTE
February 29, 2024      Medication Changes: None      Follow Up: With Dr. Evangelista in 1 year with fasting labs prior to visit.      Patient verbalized understanding of all health information given and agreed to call with further questions or concerns.      Catina Crump RN

## 2024-02-29 NOTE — PATIENT INSTRUCTIONS
February 29, 2024    Cardiology Provider You Saw During Your Visit: Dr. Evangelista      Medication Changes: None      Follow Up: With Dr. Evangelista in 1 year with fasting labs prior to visit.        Follow the American Heart Association Diet and Lifestyle Recommendations:  -Limit saturated fat, trans fat, sodium, red meat, sweets and sugar-sweetened beverages. If you choose to eat red meat, compare labels and select the leanest cuts available.  -Aim for at least 150 minutes of moderate physical activity or 75 minutes of vigorous physical activity - or an equal combination of both - each week.      To Reach Us:  -During business hours: [phone], press option # 1 to schedule an appointment or to leave a message for your care team.     -After hours, weekends or holidays: [phone], press option #4 and ask to speak to the on-call cardiologist. Inform them you are a patient at the [facility].        **If you have a cardiac device, please make sure you schedule an in-person device check just prior to your cardiology provider appointments**        Catina Crump RN  Cardiology Care Coordinator - General Cardiology  [facility]

## 2024-02-29 NOTE — CONFIDENTIAL NOTE
HPI:     I had the privilege to evaluate and examine Mr Anant Pool, who is a 63 year old male with a history of CAD with CABG in 2012.   He has had statin intolerance (atorvastatin 80 mg , simvastatin 80 mg caused both muscle pain and consequently was unable to walk ) and lower dosage pravastatin was not tolerated.      Patient is now on Repatha 140  mg subcut very 2 weeks.    Patient denies chest pain, shortness of breath, palpitations, intermittent claudication.       PAST MEDICAL HISTORY:  Past Medical History:   Diagnosis Date    Borderline diabetes mellitus 11/26/2015    Coronary artery disease 2012    CABG x4    Coronary artery disease involving coronary bypass graft of native heart without angina pectoris 11/18/2015    Depression     Goiter     With normal TSH    Hyperlipidemia     Started on statin 7/2007.  Discontinued 10/07 secondary to muscle aches and fatigue    Nephrolithiasis     Shoulder pain     Statin intolerance 6/23/2016       CURRENT MEDICATIONS:  Current Outpatient Medications   Medication Sig Dispense Refill    alpha-d-galactosidase (BEANO) tablet Take 2 tablets by mouth 3 times daily (before meals) 540 tablet 4    Ascorbic Acid (VITAMIN C) 500 MG CAPS Take 500 mg by mouth daily      aspirin (ASA) 81 MG EC tablet Take 1 tablet (81 mg) by mouth 2 times daily (Patient taking differently: Take 81 mg by mouth daily) 90 tablet 4    bismuth subsalicylate (PEPTO BISMOL) 262 MG chewable tablet Take 1 tablet by mouth 3 times daily as needed      Blood Pressure Monitor KIT 1 each daily. 1 kit 0    Cholecalciferol (VITAMIN D3) 2000 UNITS CAPS Take 1 capsule by mouth daily 90 capsule 4    evolocumab (REPATHA) 140 MG/ML prefilled autoinjector Inject 1 mL (140 mg) Subcutaneous every 14 days 6 mL 3    hydrOXYzine (ATARAX) 25 MG tablet Take 1 tablet (25 mg) by mouth 3 times daily as needed for itching 30 tablet 0    LACTAID FAST ACT 9000 units TABS tablet 3,000-9,000 Units as needed  4    Multiple  Vitamins-Minerals (MENS MULTIVITAMIN PLUS) TABS Take 1 tablet by mouth daily 90 tablet 4    potassium citrate (UROCIT-K) 10 MEQ (1080 MG) CR tablet Take 1 tablet (10 mEq) by mouth 3 times daily (with meals) 180 tablet 3    pyridOXINE (VITAMIN B6) 100 MG TABS Take 1 tablet (100 mg) by mouth daily 90 tablet 4    STATIN NOT PRESCRIBED, INTENTIONAL, 1 each continuous Statin not prescribed intentionally due to Intolerance (with supporting documentation of trying a statin at least once within the last 5 years) 0 each 0    acetaminophen (TYLENOL) 325 MG tablet Take 3 tablets (975 mg) by mouth every 8 hours (Patient not taking: Reported on 2/29/2024) 50 tablet 0    amoxicillin-clavulanate (AUGMENTIN) 875-125 MG tablet Take 1 tablet by mouth 2 times daily (Patient not taking: Reported on 2/29/2024) 14 tablet 0    ibuprofen (ADVIL/MOTRIN) 400 MG tablet Take 400 mg by mouth every 6 hours as needed for moderate pain (4-6) (Patient not taking: Reported on 2/29/2024)      oxyCODONE (ROXICODONE) 5 MG tablet Take 1-2 tablets (5-10 mg) by mouth every 4 hours as needed for moderate to severe pain (Patient not taking: Reported on 2/29/2024) 20 tablet 0       PAST SURGICAL HISTORY:  Past Surgical History:   Procedure Laterality Date    BYPASS GRAFT ARTERY CORONARY  6/24/2012    Procedure: BYPASS GRAFT ARTERY CORONARY;  Median Sternotomy, Coronary Artery Bypass Grafting x 4 using Left Internal Mammary and Left Saphenous Vein  with Endovein Harvesting. On Pump Oxygenation;  Surgeon: Genesis Larsen MD;  Location:  OR    ESOPHAGOSCOPY, GASTROSCOPY, DUODENOSCOPY (EGD), COMBINED N/A 4/11/2017    Procedure: COMBINED ESOPHAGOSCOPY, GASTROSCOPY, DUODENOSCOPY (EGD), BIOPSY SINGLE OR MULTIPLE;  Surgeon: Corine Ly MD;  Location:  GI    OPEN REDUCTION INTERNAL FIXATION ANKLE Right 11/18/2022    Procedure: OPEN REDUCTION INTERNAL FIXATION, FRACTURE, ANKLE RIGHT;  Surgeon: Claudio Macias DO;  Location:  [facility]       ALLERGIES     Allergies   Allergen Reactions    Lidocaine Rash     Break out    Adhesive Tape      Adhesive    Band-Aid Anti-Itch      Pad on the bandaid, antibacterial gauze that causes reaction    Cholestoff Complete [Plant Sterol Stanol-Pantethine] Other (See Comments)     Severe stomach pain    Lactose Diarrhea    Rosuvastatin Muscle Pain (Myalgia)    Simvastatin      Other reaction(s): Muscle Aches    Zocor [Simvastatin - High Dose] Other (See Comments)     Muscle aches/soreness, confusion, disorganized thinking       FAMILY HISTORY:  Family History   Problem Relation Age of Onset    C.A.D. Other         Uncle    Diabetes Brother     Cerebrovascular Disease Brother     Cardiovascular Sister         tachyarrhythmia       SOCIAL HISTORY:  Social History     Socioeconomic History    Marital status:      Spouse name: None    Number of children: None    Years of education: None    Highest education level: None   Tobacco Use    Smoking status: Never     Passive exposure: Past    Smokeless tobacco: Never   Vaping Use    Vaping Use: Never used   Substance and Sexual Activity    Alcohol use: No    Drug use: No    Sexual activity: Yes   Social History Narrative    .      Social Determinants of Health     Financial Resource Strain: Low Risk  (11/19/2021)    Overall Financial Resource Strain (CARDIA)     Difficulty of Paying Living Expenses: Not very hard   Food Insecurity: No Food Insecurity (11/19/2021)    Hunger Vital Sign     Worried About Running Out of Food in the Last Year: Never true     Ran Out of Food in the Last Year: Never true   Transportation Needs: No Transportation Needs (11/19/2021)    PRAPARE - Transportation     Lack of Transportation (Medical): No     Lack of Transportation (Non-Medical): No   Physical Activity: Insufficiently Active (11/19/2021)    Exercise Vital Sign     Days of Exercise per Week: 2 days     Minutes of Exercise per Session: 20 min   Stress: Stress  Concern Present (11/19/2021)    [facility] - Occupational Stress Questionnaire     Feeling of Stress : To some extent   Interpersonal Safety: Low Risk  (2/13/2024)    Interpersonal Safety     Do you feel physically and emotionally safe where you currently live?: Yes     Within the past 12 months, have you been hit, slapped, kicked or otherwise physically hurt by someone?: No     Within the past 12 months, have you been humiliated or emotionally abused in other ways by your partner or ex-partner?: No   Housing Stability: Unknown (11/19/2021)    Housing Stability Vital Sign     Unable to Pay for Housing in the Last Year: No     Unstable Housing in the Last Year: No       ROS:   Constitutional: No fever, chills, or sweats. No weight gain/loss   ENT: No visual disturbance, ear ache, epistaxis, sore throat  Allergies/Immunologic: Negative.   Respiratory: No cough, hemoptysia  Cardiovascular: As per HPI  GI: No nausea, vomiting, hematemesis, melena, or hematochezia  : No urinary frequency, dysuria, or hematuria  Integument: Negative  Psychiatric: Negative  Neuro: Negative  Endocrinology: Negative   Musculoskeletal: Negative    EXAM:  /76 (BP Location: Right arm, Patient Position: Sitting, Cuff Size: Adult Large)   Pulse 69   Wt 100.7 kg (221 lb 14.4 oz)   SpO2 98%   BMI 30.10 kg/m    In general, the patient is a pleasant male in no apparent distress.    HEENT: NC/AT.  PERRLA.  EOMI.  Sclerae white, not injected.  Nares clear.  Pharynx without erythema or exudate.  Dentition intact.    Neck: No adenopathy.  No thyromegaly. Carotids +4/4 bilaterally without bruits.  No jugular venous distension.   Heart: RRR. Normal S1, S2 splits physiologically. No murmur, rub, click, or gallop. The PMI is in the 5th ICS in the midclavicular line. There is no heave.    Lungs: CTA.  No ronchi, wheezes, rales.  No dullness to percussion.   Abdomen: Soft, nontender, nondistended. No organomegaly.  No  bruits.   Extremities: No clubbing, cyanosis, or edema.  The pulses are +4/4 at the radial, brachial, femoral, popliteal, DP, and PT sites bilaterally.  No bruits are noted.  Neurologic: Alert and oriented to person/place/time, normal speech, gait and affect  Skin: No petechiae, purpura or rash.    Labs:  LIPID RESULTS:  Lab Results   Component Value Date    CHOL 161 02/28/2024    CHOL 137 01/18/2021    HDL 58 02/28/2024    HDL 49 01/18/2021    LDL 85 02/28/2024    LDL 70 02/28/2024    LDL 34 01/18/2021    TRIG 164 (H) 02/28/2024    TRIG 272 (H) 01/18/2021    CHOLHDLRATIO 5.1 (H) 11/24/2015    NHDL 103 02/28/2024    NHDL 88 01/18/2021       LIVER ENZYME RESULTS:  Lab Results   Component Value Date    AST 22 02/28/2024    AST 17 01/18/2021    ALT 23 02/28/2024    ALT 30 01/18/2021       CBC RESULTS:  Lab Results   Component Value Date    WBC 6.3 11/16/2022    WBC 4.9 06/24/2016    RBC 4.50 11/16/2022    RBC 4.33 (L) 06/24/2016    HGB 14.5 11/16/2022    HGB 15.7 05/25/2018    HCT 43.4 11/16/2022    HCT 51.2 05/25/2018    MCV 96 11/16/2022    .7 (H) 05/25/2018    MCH 32.2 11/16/2022    MCH 31.5 05/25/2018    MCHC 33.4 11/16/2022    MCHC 30.7 (L) 05/25/2018    RDW 12.4 11/16/2022    RDW 11.9 06/24/2016     11/16/2022     (L) 06/24/2016       BMP RESULTS:  Lab Results   Component Value Date     02/28/2024     01/18/2021    POTASSIUM 4.3 02/28/2024    POTASSIUM 4.0 01/06/2022    POTASSIUM 4.1 01/18/2021    CHLORIDE 104 02/28/2024    CHLORIDE 106 01/06/2022    CHLORIDE 108 01/18/2021    CO2 28 02/28/2024    CO2 29 01/06/2022    CO2 30 01/18/2021    ANIONGAP 8 02/28/2024    ANIONGAP 8 01/06/2022    ANIONGAP 3 01/18/2021     (H) 02/28/2024     (H) 01/06/2022     (H) 01/18/2021    BUN 15.4 02/28/2024    BUN 19 01/06/2022    BUN 24 01/18/2021    CR 1.14 02/28/2024    CR 1.06 01/18/2021    GFRESTIMATED 72 02/28/2024    GFRESTIMATED 76 01/18/2021    GFRESTBLACK 88 01/18/2021     TYSON 9.2 02/28/2024    TYSON 9.0 01/18/2021        A1C RESULTS:  Lab Results   Component Value Date    A1C 6.4 (H) 11/16/2022    A1C 5.9 (H) 01/18/2021       INR RESULTS:  Lab Results   Component Value Date    INR 1.53 (H) 06/24/2012    INR 1.73 (H) 06/24/2012           Assessment and Plan:     We discussed the results with patient.  We discussed the importance of a heart healthy lifestyle and diet.  We continue with same medical regimen.  Follow-up within 1 year  with labs.    Jaylan Evangelista MD, PhD  Professor of Medicine  Division of Cardiology       CC  Patient Care Team:  Benton Avelar MD as PCP - General (Family Medicine)  Jaylan Evangelista MD as MD (Cardiology)  Rebeca Dunn MD as MD (Ophthalmology)  Claudio Chavez MD as MD (Urology)  Carolyn Christiansen, RN as Registered Nurse (Urology)  Jaylan Evangelista MD as Assigned Heart and Vascular Provider  [facility] as Pharmacist (Pharmacist)  Catina Crump RN as Specialty Care Coordinator (Cardiology)  Benton Avelar MD as Assigned PCP  SELF, REFERRED

## 2024-02-29 NOTE — NURSING NOTE
Chief Complaint   Patient presents with    Follow Up     Jean Carlos F/U     Vitals were taken and medications reconciled.    Adrianne Bryant CNA  1:59 PM

## 2024-05-15 ENCOUNTER — TELEPHONE (OUTPATIENT)
Dept: FAMILY MEDICINE | Facility: CLINIC | Age: 64
End: 2024-05-15

## 2024-05-15 ENCOUNTER — OFFICE VISIT (OUTPATIENT)
Dept: FAMILY MEDICINE | Facility: CLINIC | Age: 64
End: 2024-05-15
Payer: COMMERCIAL

## 2024-05-15 VITALS
HEIGHT: 72 IN | BODY MASS INDEX: 29.93 KG/M2 | TEMPERATURE: 98.6 F | WEIGHT: 221 LBS | DIASTOLIC BLOOD PRESSURE: 83 MMHG | RESPIRATION RATE: 12 BRPM | OXYGEN SATURATION: 96 % | SYSTOLIC BLOOD PRESSURE: 134 MMHG | HEART RATE: 73 BPM

## 2024-05-15 DIAGNOSIS — R21 RASH AND NONSPECIFIC SKIN ERUPTION: ICD-10-CM

## 2024-05-15 DIAGNOSIS — A69.20 LYME DISEASE: Primary | ICD-10-CM

## 2024-05-15 DIAGNOSIS — M13.80 MIGRATORY POLYARTHRITIS: ICD-10-CM

## 2024-05-15 DIAGNOSIS — R53.83 OTHER FATIGUE: ICD-10-CM

## 2024-05-15 DIAGNOSIS — R50.9 FEVER, UNSPECIFIED FEVER CAUSE: ICD-10-CM

## 2024-05-15 PROCEDURE — 93000 ELECTROCARDIOGRAM COMPLETE: CPT | Performed by: FAMILY MEDICINE

## 2024-05-15 PROCEDURE — 86618 LYME DISEASE ANTIBODY: CPT | Performed by: FAMILY MEDICINE

## 2024-05-15 PROCEDURE — 36415 COLL VENOUS BLD VENIPUNCTURE: CPT | Performed by: FAMILY MEDICINE

## 2024-05-15 PROCEDURE — 99215 OFFICE O/P EST HI 40 MIN: CPT | Performed by: FAMILY MEDICINE

## 2024-05-15 RX ORDER — DOXYCYCLINE 100 MG/1
100 CAPSULE ORAL 2 TIMES DAILY
Qty: 20 CAPSULE | Refills: 0 | Status: SHIPPED | OUTPATIENT
Start: 2024-05-15 | End: 2024-05-15

## 2024-05-15 RX ORDER — DOXYCYCLINE 100 MG/1
100 CAPSULE ORAL 2 TIMES DAILY
Qty: 20 CAPSULE | Refills: 0 | Status: SHIPPED | OUTPATIENT
Start: 2024-05-15 | End: 2024-07-03

## 2024-05-15 NOTE — TELEPHONE ENCOUNTER
[facility] phone call message-patient reporting a symptom:     Symptom: fever (3 days), malaise, puffy red face    Same Day Visit Offered: Scheduled w/Dr Seaman at 3:20pm    Additional comments: The patient would like a call back to discuss symptoms.    OK to leave message on voice mail? Yes    Primary language: English      needed? No    Call taken on May 15, 2024 at 1:06 PM by Yolis Zavaleta

## 2024-05-15 NOTE — PROGRESS NOTES
Assessment & Plan     Migratory polyarthritis  Fever, unspecified fever cause  Other fatigue  Rash and nonspecific skin eruption  Patient with ~3 days of extreme acute fatigue, intermittent fever up to 102, migrating joint pains, and now erythematous rash across face. No respiratory symptoms, no GI symptoms, no obvious sings of localized bacterial or viral infection.  Although rash is not totally consistent with erythema migrans, combination of positive and negative symptoms is most c/w lyme disease. Patient also notes seeing a small bug which they thought at the time was a tick.  Plan to treat empirically with doxy, get Ab titers for Lyme. Titers may still be negative as it has been less than one week, would continue treatment and repeat titers in 2-4 weeks.  Checked EKG given extreme fatigue and cardiac history, no heart block.  Provided reasons to present to ED.    Lyme disease  - Lyme Disease Total Abs Bld with Reflex to Confirm CLIA; Future  - EKG 12-lead complete w/read - Clinics  - doxycycline hyclate (VIBRAMYCIN) 100 MG capsule; Take 1 capsule (100 mg) by mouth 2 times daily  - Lyme Disease Total Abs Bld with Reflex to Con  firm CLIA    40 minutes spent by me on the date of the encounter doing chart review, interpretation of tests, documentation, and discussion with family       No follow-ups on file.    Subjective   Mathew is a 63 year old, presenting for the following health issues:  Generalized Body Aches, Derm Problem (face), Fever (Neg COVID test), and Dizziness (All symptoms started Sunday)      5/15/2024     3:40 PM   Additional Questions   Roomed by kaylah   Accompanied by wife Inez         5/15/2024    Information    services provided? No     HPI     Started feeling sick on Sunday - fatigue, dry eyes, feeling warm  Started having fevers on Monday, reached 102.5 Monday night  Started taking ibuprofen on 5/14 and 5/15 - took 1 tab twice a day, still reached 102.1  Aches and pains  that move around - joints and muscles - ball of R foot, knee, hip, low back. When the joint pain migrates, it usually resolves completely at the previous location of pain.  Chills and sweats intermittently  Rash started this morning around R eye and cheek - progressed to rash on forehead, scalp    Noticed a small bug in the house late last week, thought it might have been a tick.  Did hard work a few times last week.      Review of Systems  Constitutional, HEENT, cardiovascular, pulmonary, gi and gu systems are negative, except as otherwise noted.      Objective    /83 (BP Location: Left arm, Patient Position: Sitting, Cuff Size: Adult Large)   Pulse 73   Temp 98.6  F (37  C) (Oral)   Resp 12   Ht 1.829 m (6')   Wt 100.2 kg (221 lb)   SpO2 96%   BMI 29.97 kg/m    Body mass index is 29.97 kg/m .  Physical Exam   GENERAL: alert and no distress  NECK: no adenopathy, no asymmetry, masses, or scars  RESP: lungs clear to auscultation - no rales, rhonchi or wheezes  CV: regular rate and rhythm, normal S1 S2, no S3 or S4, no murmur, click or rub, no peripheral edema  MS: no gross musculoskeletal defects noted, no edema  SKIN: erythematous blanching rash, patchy and slightly raised scattered across face and scalp. Non tender, non pruritic.           EKG Interpretation:      Interpreted by Ilsa Seaman DO    Symptoms at time of EKG: fatigue, concern for lyme disease   Rhythm: Normal sinus   Rate: Normal  Axis: Normal  Ectopy: None  Conduction: Normal  ST Segments/ T Waves: No ST-T wave changes and No acute ischemic changes  Q Waves: None  Comparison to prior: Unchanged    Clinical Impression: normal EKG - first EKG w/ inversion of P waves in LII, this has been seen on prior EKG, repeat with upright P waves in LI and  LII, normal ND interval consistently - no heart block, no junctional rhythm          Signed Electronically by: Ilsa Seaman DO

## 2024-05-16 LAB — B BURGDOR IGG+IGM SER QL: 0.05

## 2024-05-22 DIAGNOSIS — M13.80 MIGRATORY POLYARTHRITIS: Primary | ICD-10-CM

## 2024-06-13 ENCOUNTER — LAB (OUTPATIENT)
Dept: LAB | Facility: CLINIC | Age: 64
End: 2024-06-13
Payer: COMMERCIAL

## 2024-06-13 DIAGNOSIS — M13.80 MIGRATORY POLYARTHRITIS: ICD-10-CM

## 2024-06-13 PROCEDURE — 86618 LYME DISEASE ANTIBODY: CPT

## 2024-06-13 PROCEDURE — 36415 COLL VENOUS BLD VENIPUNCTURE: CPT

## 2024-06-14 LAB — B BURGDOR IGG+IGM SER QL: 0.05

## 2024-06-17 ENCOUNTER — OFFICE VISIT (OUTPATIENT)
Dept: FAMILY MEDICINE | Facility: CLINIC | Age: 64
End: 2024-06-17
Payer: COMMERCIAL

## 2024-06-17 VITALS
WEIGHT: 217.2 LBS | DIASTOLIC BLOOD PRESSURE: 72 MMHG | BODY MASS INDEX: 29.42 KG/M2 | RESPIRATION RATE: 16 BRPM | HEIGHT: 72 IN | OXYGEN SATURATION: 98 % | HEART RATE: 68 BPM | TEMPERATURE: 98 F | SYSTOLIC BLOOD PRESSURE: 108 MMHG

## 2024-06-17 DIAGNOSIS — M13.80 MIGRATORY POLYARTHRITIS: ICD-10-CM

## 2024-06-17 DIAGNOSIS — R50.9 FEVER, UNSPECIFIED FEVER CAUSE: ICD-10-CM

## 2024-06-17 DIAGNOSIS — R21 RASH AND NONSPECIFIC SKIN ERUPTION: ICD-10-CM

## 2024-06-17 DIAGNOSIS — R53.83 OTHER FATIGUE: Primary | ICD-10-CM

## 2024-06-17 LAB
ALBUMIN SERPL BCG-MCNC: 4.4 G/DL (ref 3.5–5.2)
ALP SERPL-CCNC: 57 U/L (ref 40–150)
ALT SERPL W P-5'-P-CCNC: 29 U/L (ref 0–70)
ANION GAP SERPL CALCULATED.3IONS-SCNC: 11 MMOL/L (ref 7–15)
AST SERPL W P-5'-P-CCNC: 30 U/L (ref 0–45)
BILIRUB SERPL-MCNC: 1.3 MG/DL
BUN SERPL-MCNC: 16.2 MG/DL (ref 8–23)
CALCIUM SERPL-MCNC: 9.8 MG/DL (ref 8.8–10.2)
CHLORIDE SERPL-SCNC: 105 MMOL/L (ref 98–107)
CREAT SERPL-MCNC: 1.24 MG/DL (ref 0.67–1.17)
DEPRECATED HCO3 PLAS-SCNC: 26 MMOL/L (ref 22–29)
EGFRCR SERPLBLD CKD-EPI 2021: 65 ML/MIN/1.73M2
ERYTHROCYTE [DISTWIDTH] IN BLOOD BY AUTOMATED COUNT: 12.7 % (ref 10–15)
GLUCOSE SERPL-MCNC: 111 MG/DL (ref 70–99)
HCT VFR BLD AUTO: 47.1 % (ref 40–53)
HGB BLD-MCNC: 15.6 G/DL (ref 13.3–17.7)
MCH RBC QN AUTO: 31.3 PG (ref 26.5–33)
MCHC RBC AUTO-ENTMCNC: 33.1 G/DL (ref 31.5–36.5)
MCV RBC AUTO: 95 FL (ref 78–100)
PLATELET # BLD AUTO: 156 10E3/UL (ref 150–450)
POTASSIUM SERPL-SCNC: 4.7 MMOL/L (ref 3.4–5.3)
PROT SERPL-MCNC: 7.1 G/DL (ref 6.4–8.3)
RBC # BLD AUTO: 4.98 10E6/UL (ref 4.4–5.9)
RHEUMATOID FACT SERPL-ACNC: <10 IU/ML
SODIUM SERPL-SCNC: 142 MMOL/L (ref 135–145)
TSH SERPL DL<=0.005 MIU/L-ACNC: 0.68 UIU/ML (ref 0.3–4.2)
VIT D+METAB SERPL-MCNC: 64 NG/ML (ref 20–50)
WBC # BLD AUTO: 6.3 10E3/UL (ref 4–11)

## 2024-06-17 PROCEDURE — 86431 RHEUMATOID FACTOR QUANT: CPT | Performed by: FAMILY MEDICINE

## 2024-06-17 PROCEDURE — 86200 CCP ANTIBODY: CPT | Performed by: FAMILY MEDICINE

## 2024-06-17 PROCEDURE — 86039 ANTINUCLEAR ANTIBODIES (ANA): CPT | Performed by: FAMILY MEDICINE

## 2024-06-17 PROCEDURE — 82306 VITAMIN D 25 HYDROXY: CPT | Performed by: FAMILY MEDICINE

## 2024-06-17 PROCEDURE — 99214 OFFICE O/P EST MOD 30 MIN: CPT | Performed by: FAMILY MEDICINE

## 2024-06-17 PROCEDURE — 84443 ASSAY THYROID STIM HORMONE: CPT | Performed by: FAMILY MEDICINE

## 2024-06-17 PROCEDURE — 36415 COLL VENOUS BLD VENIPUNCTURE: CPT | Performed by: FAMILY MEDICINE

## 2024-06-17 PROCEDURE — 86038 ANTINUCLEAR ANTIBODIES: CPT | Performed by: FAMILY MEDICINE

## 2024-06-17 PROCEDURE — 85027 COMPLETE CBC AUTOMATED: CPT | Performed by: FAMILY MEDICINE

## 2024-06-17 PROCEDURE — 80053 COMPREHEN METABOLIC PANEL: CPT | Performed by: FAMILY MEDICINE

## 2024-06-17 ASSESSMENT — PATIENT HEALTH QUESTIONNAIRE - PHQ9
10. IF YOU CHECKED OFF ANY PROBLEMS, HOW DIFFICULT HAVE THESE PROBLEMS MADE IT FOR YOU TO DO YOUR WORK, TAKE CARE OF THINGS AT HOME, OR GET ALONG WITH OTHER PEOPLE: VERY DIFFICULT
SUM OF ALL RESPONSES TO PHQ QUESTIONS 1-9: 12
SUM OF ALL RESPONSES TO PHQ QUESTIONS 1-9: 12

## 2024-06-17 NOTE — PROGRESS NOTES
Assessment & Plan     Pt originally presented with migratory joint pain, rash, and fevers. Possible tick bite, bug unavailable. Treated for lyme, Abs negative x2. No heart block on EKG. Now with continued dizziness and joint pain, sever brain fog and fatigue.    Other fatigue  - Anti Nuclear Joanna IgG by IFA with Reflex; Future  - Cyclic Citrullinated Peptide Antibody IgG; Future  - Rheumatoid factor; Future  - Vitamin D Level; Future  - TSH with free T4 reflex; Future  - Comprehensive metabolic panel; Future  - CBC with platelets; Future  - Anti Nuclear Joanna IgG by IFA with Reflex  - Cyclic Citrullinated Peptide Antibody IgG  - Rheumatoid factor  - Vitamin D Level  - TSH with free T4 reflex  - Comprehensive metabolic panel  - CBC with platelets    Rash and nonspecific skin eruption  Migratory polyarthritis  Fever, unspecified fever cause    The longitudinal plan of care for the diagnosis(es)/condition(s) as documented were addressed during this visit. Due to the added complexity in care, I will continue to support Mathew in the subsequent management and with ongoing continuity of care.    Depression Screening Follow Up        6/17/2024     1:58 PM   PHQ   PHQ-9 Total Score 12   Q9: Thoughts of better off dead/self-harm past 2 weeks Not at all       Follow Up Actions Taken  Patient counseled, no additional follow up at this time.       No follow-ups on file.    Subjective   Mathew is a 63 year old, presenting for the following health issues:  RECHECK (Follow up on fever and rash)      6/17/2024     2:02 PM   Additional Questions   Roomed by Grant   Accompanied by Wife, Inez         6/17/2024     2:02 PM   Patient Reported Additional Medications   Patient reports taking the following new medications n/a         6/17/2024    Information    services provided? No     HPI     Treated ~1mo ago for presumed lyme disease  Initially had an erythematous rash across face, fevers up to 102, migrating joint  pain    Those resolved with doxy    Since then - severe fatigue, brain fog. Does still have some joint pain, not necessarily migratory like before.    Sometimes word finding difficulty, some forgetfulness       Review of Systems  Constitutional, HEENT, cardiovascular, pulmonary, gi and gu systems are negative, except as otherwise noted.      Objective    /72 (BP Location: Left arm, Patient Position: Sitting, Cuff Size: Adult Large)   Pulse 68   Temp 98  F (36.7  C) (Oral)   Resp 16   Ht 1.829 m (6')   Wt 98.5 kg (217 lb 3.2 oz)   SpO2 98%   BMI 29.46 kg/m    Body mass index is 29.46 kg/m .  Physical Exam   GENERAL: alert and no distress  RESP: lungs clear to auscultation - no rales, rhonchi or wheezes  CV: regular rate and rhythm, normal S1 S2, no S3 or S4, no murmur, click or rub, no peripheral edema  MS: no gross musculoskeletal defects noted, no edema        Signed Electronically by: Ilsa Seaman DO    Answers submitted by the patient for this visit:  Patient Health Questionnaire (Submitted on 6/17/2024)  If you checked off any problems, how difficult have these problems made it for you to do your work, take care of things at home, or get along with other people?: Very difficult  PHQ9 TOTAL SCORE: 12

## 2024-06-18 DIAGNOSIS — M13.80 MIGRATORY POLYARTHRITIS: ICD-10-CM

## 2024-06-18 DIAGNOSIS — R50.9 FEVER, UNSPECIFIED FEVER CAUSE: ICD-10-CM

## 2024-06-18 DIAGNOSIS — R53.83 OTHER FATIGUE: Primary | ICD-10-CM

## 2024-06-18 LAB
ANA PAT SER IF-IMP: ABNORMAL
ANA SER QL IF: POSITIVE
ANA TITR SER IF: ABNORMAL {TITER}
CCP AB SER IA-ACNC: 0.7 U/ML

## 2024-07-01 NOTE — PROGRESS NOTES
Rheumatology Clinic Visit  [facility]  Rupali Maryfouziaangelica, ERA     Hong Pool MRN# [id number]   YOB: 1960 Age: 63 year old   Date of Visit: 7/3/2024  Primary care provider: Ilsa Seaman          Assessment and Plan:     1.  Positive SOTO  2.  Fatigue  3.  Migratory polyarthritis    Patient presents today for an initial evaluation.  He states that he had an episode in May where he had a high fever and erythema and swelling over his face.  The swelling was more severe to where he was unable to see out of an eye.  He went to a local clinic and received an antibiotic and his symptoms resolved.  He has not had any recurrence since then.  He did have joint pain during his fevers.  Prior to this infectious episode he would have some migratory joint pain but nothing that was prohibitive.  He states that currently he continues to have a lot of fatigue.  He states his low energy is unusual for him.  Physical examination today did not show any active synovitis, dactylitis, tenosynovitis or enthesopathy.  He has full fist formation.  He does have Heberden nodes on his bilateral second DIPs and the start of a Heberden node on the third DIP on the left.  Previous laboratory evaluations and imaging studies were reviewed, results below.    Discussed with the patient that a positive SOTO is of unknown significance.  We discussed that the majority of positive ANAs are false positives.  10 to 30% of the healthy population can also have a positive SOTO that is not associated with any disorder.  Thyroiditis and hepatitis have also been known to be associated with a positive SOTO.  We also discussed that viral and bacterial infections could also have a positive SOTO.  Discussed that 99% of people who have systemic lupus erythematous have a positive SOTO but the majority of people that have a positive SOTO do not have lupus.  An SOTO can also be seen with other rheumatological autoimmune disorders such as  scleroderma.  My suspicion for an autoimmune rheumatological disorder is remote however we will do further workup given the persistent fatigue that he has been having.  Will also get x-rays of his hands.  I will contact patient with the results of the evaluation once it is complete.    Plan:     Schedule follow-up with Rupali Britt PA-C as needed   Imaging: xray hands  Labs: dsDNA, complement levels, SANDHYA panel, antiphospholipid panel, Urine (looking for protein), thyroid peroxidase antibody, Scl-70, Centromere antibody, CRP and Sed Rate, ANCA    Rupali Britt, PAC  Rheumatology         History of Present Illness:   Hong Pool presents for evaluation of fatigue, fevers, rash, migratory polyarthritis, Positive SOTO     He states that he had an incident in May 2024. He had a fever, 103, top of head was red. He had a lot of fluid under the skin. He was unable to see out of one eye. He went a local clinic. He got an antibiotic and symptoms resolved. No recurrence since then. He did have joint pain during the fevers. During the follow ups he reported that he has a lot of fatigue and no energy. This is unusual for him prior to the infection.     He has some joint pain. With his left finger, there is some turning at the 2nd DIP and the right is starting too as well. It does get irritated at times. The 3rd finger is starting at the DIP. Occasionally he will have pain in his wrists and elbows. Occasionally the toes. This comes and goes. He has not noticed swelling during these episodes. No diagnosis of psoriasis. No know family history of psoriasis, ulcerative colitis or crohn's. His grandmother had the same thing in her DIPs. No shortness of breath. He has not been as active due to his fatigue. On days that he feels good, he does go for long walks. No history of uveitis/iritis. No history of blood clots or seizures. No history of delirium or psychosis. No raynaud's.          Review of Systems:     Constitutional:  as above  Skin: as above  Eyes: negative  Ears/Nose/Throat: negative  Respiratory: No shortness of breath, dyspnea on exertion, cough, or hemoptysis  Cardiovascular: negative  Gastrointestinal: negative  Genitourinary: negative  Musculoskeletal: as above  Neurologic: negative  Psychiatric: negative  Hematologic/Lymphatic/Immunologic: negative  Endocrine: negative         Active Problem List:     Patient Active Problem List    Diagnosis Date Noted    ASCVD (arteriosclerotic cardiovascular disease) 06/24/2012     Priority: High    CAD (coronary artery disease) 06/22/2012     Priority: High    Osteoarthritis of finger, unspecified laterality 12/15/2022     Priority: Medium    Digestive symptoms 12/15/2022     Priority: Medium    Optic cupping of both eyes 02/15/2019     Priority: Medium    Myopia of both eyes with astigmatism and presbyopia 02/15/2019     Priority: Medium    Age-related nuclear cataract 02/15/2019     Priority: Medium    Functional dyspepsia 05/17/2017     Priority: Medium     GI eval in winter 2017      Gastroesophageal reflux disease without esophagitis 05/17/2017     Priority: Medium     EGD 4/11/2017  Impression:          - Z-line regular, 41 cm from the incisors.                        - Normal esophagus.                        - A few gastric polyps. Resected and retrieved.                        - A single duodenal polyp. Resected and retrieved.                        - Normal examined duodenum.                        - Biopsies were taken with a cold forceps for                        Helicobacter pylori testing.      Kidney stone 12/08/2016     Priority: Medium     Lithotripsy to remove 1 stone, cystoscopy to remove 2nd  ER May 2018 - CT   IMPRESSION:   1. 4 mm stone within the proximal right ureter with mild right  hydronephrosis. Nonobstructing nephrolithiasis in the left kidney    Urology 6/2018 - CaOxalate recurrent stone former      Statin intolerance 06/23/2016     Priority: Medium     Calculus of gallbladder without cholecystitis without obstruction 06/21/2016     Priority: Medium    Borderline diabetes mellitus 11/26/2015     Priority: Medium    Coronary artery disease involving coronary bypass graft of native heart without angina pectoris 11/18/2015     Priority: Medium    Fatigue 01/08/2013     Priority: Medium    Adjustment disorder with depressed mood 10/25/2012     Priority: Medium    Testicular hypofunction 10/25/2012     Priority: Medium     Problem list name updated by automated process. Provider to review      Hyperlipidemia LDL goal <70 09/07/2012     Priority: Medium     Statin intolerance              Past Medical History:     Past Medical History:   Diagnosis Date    Borderline diabetes mellitus 11/26/2015    Coronary artery disease 2012    CABG x4    Coronary artery disease involving coronary bypass graft of native heart without angina pectoris 11/18/2015    Depression     Goiter     With normal TSH    Hyperlipidemia     Started on statin 7/2007.  Discontinued 10/07 secondary to muscle aches and fatigue    Nephrolithiasis     Shoulder pain     Statin intolerance 6/23/2016     Past Surgical History:   Procedure Laterality Date    BYPASS GRAFT ARTERY CORONARY  6/24/2012    Procedure: BYPASS GRAFT ARTERY CORONARY;  Median Sternotomy, Coronary Artery Bypass Grafting x 4 using Left Internal Mammary and Left Saphenous Vein  with Endovein Harvesting. On Pump Oxygenation;  Surgeon: Genesis Larsen MD;  Location:  OR    ESOPHAGOSCOPY, GASTROSCOPY, DUODENOSCOPY (EGD), COMBINED N/A 4/11/2017    Procedure: COMBINED ESOPHAGOSCOPY, GASTROSCOPY, DUODENOSCOPY (EGD), BIOPSY SINGLE OR MULTIPLE;  Surgeon: Corine Ly MD;  Location:  GI    OPEN REDUCTION INTERNAL FIXATION ANKLE Right 11/18/2022    Procedure: OPEN REDUCTION INTERNAL FIXATION, FRACTURE, ANKLE RIGHT;  Surgeon: Claudio Macias DO;  Location: [facility] Main OR            Social History:     Social  History     Socioeconomic History    Marital status:      Spouse name: Not on file    Number of children: Not on file    Years of education: Not on file    Highest education level: Not on file   Occupational History    Not on file   Tobacco Use    Smoking status: Never     Passive exposure: Past    Smokeless tobacco: Never   Vaping Use    Vaping status: Never Used   Substance and Sexual Activity    Alcohol use: No    Drug use: No    Sexual activity: Yes   Other Topics Concern    Parent/sibling w/ CABG, MI or angioplasty before 65F 55M? Not Asked   Social History Narrative    .      Social Determinants of Health     Financial Resource Strain: Low Risk  (11/19/2021)    Overall Financial Resource Strain (CARDIA)     Difficulty of Paying Living Expenses: Not very hard   Food Insecurity: No Food Insecurity (11/19/2021)    Hunger Vital Sign     Worried About Running Out of Food in the Last Year: Never true     Ran Out of Food in the Last Year: Never true   Transportation Needs: No Transportation Needs (11/19/2021)    PRAPARE - Transportation     Lack of Transportation (Medical): No     Lack of Transportation (Non-Medical): No   Physical Activity: Insufficiently Active (11/19/2021)    Exercise Vital Sign     Days of Exercise per Week: 2 days     Minutes of Exercise per Session: 20 min   Stress: Stress Concern Present (11/19/2021)    [employer] - Occupational Stress Questionnaire     Feeling of Stress : To some extent   Social Connections: Not on file   Interpersonal Safety: Low Risk  (2/13/2024)    Interpersonal Safety     Do you feel physically and emotionally safe where you currently live?: Yes     Within the past 12 months, have you been hit, slapped, kicked or otherwise physically hurt by someone?: No     Within the past 12 months, have you been humiliated or emotionally abused in other ways by your partner or ex-partner?: No   Housing Stability: Unknown (11/19/2021)     Housing Stability Vital Sign     Unable to Pay for Housing in the Last Year: No     Number of Places Lived in the Last Year: Not on file     Unstable Housing in the Last Year: No          Family History:     Family History   Problem Relation Age of Onset    C.A.D. Other         Uncle    Diabetes Brother     Cerebrovascular Disease Brother     Cardiovascular Sister         tachyarrhythmia            Allergies:     Allergies   Allergen Reactions    Lidocaine Rash     Break out    Adhesive Tape      Adhesive    Band-Aid Anti-Itch      Pad on the bandaid, antibacterial gauze that causes reaction    Cholestoff Complete [Plant Sterol Stanol-Pantethine] Other (See Comments)     Severe stomach pain    Lactose Diarrhea    Rosuvastatin Muscle Pain (Myalgia)    Simvastatin      Other reaction(s): Muscle Aches    Zocor [Simvastatin - High Dose] Other (See Comments)     Muscle aches/soreness, confusion, disorganized thinking            Medications:     Current Outpatient Medications   Medication Sig Dispense Refill    alpha-d-galactosidase (BEANO) tablet Take 2 tablets by mouth 3 times daily (before meals) 540 tablet 4    Ascorbic Acid (VITAMIN C) 500 MG CAPS Take 500 mg by mouth daily      aspirin (ASA) 81 MG EC tablet Take 1 tablet (81 mg) by mouth 2 times daily (Patient taking differently: Take 81 mg by mouth daily) 90 tablet 4    bismuth subsalicylate (PEPTO BISMOL) 262 MG chewable tablet Take 1 tablet by mouth 3 times daily as needed      Blood Pressure Monitor KIT 1 each daily. 1 kit 0    Cholecalciferol (VITAMIN D3) 2000 UNITS CAPS Take 1 capsule by mouth daily 90 capsule 4    evolocumab (REPATHA) 140 MG/ML prefilled autoinjector Inject 1 mL (140 mg) Subcutaneous every 14 days 6 mL 3    LACTAID FAST ACT 9000 units TABS tablet 3,000-9,000 Units as needed  4    Multiple Vitamins-Minerals (MENS MULTIVITAMIN PLUS) TABS Take 1 tablet by mouth daily 90 tablet 4    potassium citrate (UROCIT-K) 10 MEQ (1080 MG) CR tablet Take 1  tablet (10 mEq) by mouth 3 times daily (with meals) 180 tablet 3    pyridOXINE (VITAMIN B6) 100 MG TABS Take 1 tablet (100 mg) by mouth daily 90 tablet 4    STATIN NOT PRESCRIBED, INTENTIONAL, 1 each continuous Statin not prescribed intentionally due to Intolerance (with supporting documentation of trying a statin at least once within the last 5 years) (Patient not taking: Reported on 5/15/2024) 0 each 0            Physical Exam:   Blood pressure 123/80, pulse 69, temperature 97.5  F (36.4  C), temperature source Tympanic, resp. rate 16, height 1.829 m (6'), weight 98.3 kg (216 lb 12.8 oz), SpO2 94%.  Wt Readings from Last 6 Encounters:   06/17/24 98.5 kg (217 lb 3.2 oz)   05/15/24 100.2 kg (221 lb)   02/29/24 100.7 kg (221 lb 14.4 oz)   02/13/24 98.6 kg (217 lb 6.4 oz)   01/19/23 93.5 kg (206 lb 3.2 oz)   11/18/22 97.5 kg (215 lb)     Constitutional: well-developed, appearing stated age; cooperative  Eyes: nl PERRLA, conjunctiva, sclera  ENT: nl external ears, nose, hearing, lips, teeth, gums, throat. No mucositis.   No mucous membrane lesions, normal saliva pool  Neck: no mass or thyroid enlargement  Resp: No shortness of breath with normal conversation  Lymph: no cervical, supraclavicular or epitrochlear nodes  MS:  No active synovitis or altered joint anatomy. Full joint ROM. No dactylitis,  tenosynovitis, enthesopathy.  Heberden nodes on his bilateral second DIPs and left third DIP  Skin: no nail pitting, alopecia, rash, nodules or lesions.   Psych: nl judgement, orientation, memory, affect.           Data:   Imaging:  3 views left hand radiographs 12/14/2021                                                             Impression:  1. No acute osseous abnormality.  2. Second and third distal interphalangeal joint degenerative change    Laboratory:  6/17/2024  Creatinine 1.24, GFR 65  Albumin 4.4  ALT 29, AST 30  CCP antibody 0.7  Rheumatoid factor less than 10  Vitamin D 64  TSH 0.68  White blood cell count 6.3,  hemoglobin 15.6, platelet count 156  SOTO positive with a speckled pattern and a titer of 1: 160

## 2024-07-03 ENCOUNTER — OFFICE VISIT (OUTPATIENT)
Dept: RHEUMATOLOGY | Facility: CLINIC | Age: 64
End: 2024-07-03
Payer: COMMERCIAL

## 2024-07-03 ENCOUNTER — ANCILLARY PROCEDURE (OUTPATIENT)
Dept: GENERAL RADIOLOGY | Facility: CLINIC | Age: 64
End: 2024-07-03
Attending: PHYSICIAN ASSISTANT
Payer: COMMERCIAL

## 2024-07-03 VITALS
RESPIRATION RATE: 16 BRPM | SYSTOLIC BLOOD PRESSURE: 123 MMHG | OXYGEN SATURATION: 94 % | TEMPERATURE: 97.5 F | BODY MASS INDEX: 29.36 KG/M2 | HEART RATE: 69 BPM | DIASTOLIC BLOOD PRESSURE: 80 MMHG | WEIGHT: 216.8 LBS | HEIGHT: 72 IN

## 2024-07-03 DIAGNOSIS — R76.8 POSITIVE ANA (ANTINUCLEAR ANTIBODY): ICD-10-CM

## 2024-07-03 DIAGNOSIS — M13.80 MIGRATORY POLYARTHRITIS: ICD-10-CM

## 2024-07-03 DIAGNOSIS — R76.8 POSITIVE ANA (ANTINUCLEAR ANTIBODY): Primary | ICD-10-CM

## 2024-07-03 DIAGNOSIS — R53.83 OTHER FATIGUE: ICD-10-CM

## 2024-07-03 LAB
ALBUMIN UR-MCNC: NEGATIVE MG/DL
APPEARANCE UR: CLEAR
BACTERIA #/AREA URNS HPF: ABNORMAL /HPF
BILIRUB UR QL STRIP: NEGATIVE
COLOR UR AUTO: YELLOW
CRP SERPL-MCNC: 3.49 MG/L
ERYTHROCYTE [SEDIMENTATION RATE] IN BLOOD BY WESTERGREN METHOD: 8 MM/HR (ref 0–20)
GLUCOSE UR STRIP-MCNC: NEGATIVE MG/DL
HGB UR QL STRIP: NEGATIVE
KETONES UR STRIP-MCNC: NEGATIVE MG/DL
LEUKOCYTE ESTERASE UR QL STRIP: ABNORMAL
MUCOUS THREADS #/AREA URNS LPF: PRESENT /LPF
NITRATE UR QL: NEGATIVE
PH UR STRIP: 6.5 [PH] (ref 5–7)
RBC #/AREA URNS AUTO: ABNORMAL /HPF
SP GR UR STRIP: 1.02 (ref 1–1.03)
UROBILINOGEN UR STRIP-ACNC: 0.2 E.U./DL
WBC #/AREA URNS AUTO: ABNORMAL /HPF

## 2024-07-03 PROCEDURE — 86376 MICROSOMAL ANTIBODY EACH: CPT | Performed by: PHYSICIAN ASSISTANT

## 2024-07-03 PROCEDURE — 99204 OFFICE O/P NEW MOD 45 MIN: CPT | Performed by: PHYSICIAN ASSISTANT

## 2024-07-03 PROCEDURE — 85730 THROMBOPLASTIN TIME PARTIAL: CPT | Performed by: PHYSICIAN ASSISTANT

## 2024-07-03 PROCEDURE — 86147 CARDIOLIPIN ANTIBODY EA IG: CPT | Performed by: PHYSICIAN ASSISTANT

## 2024-07-03 PROCEDURE — 86160 COMPLEMENT ANTIGEN: CPT | Performed by: PHYSICIAN ASSISTANT

## 2024-07-03 PROCEDURE — 86146 BETA-2 GLYCOPROTEIN ANTIBODY: CPT | Performed by: PHYSICIAN ASSISTANT

## 2024-07-03 PROCEDURE — 85652 RBC SED RATE AUTOMATED: CPT | Performed by: PHYSICIAN ASSISTANT

## 2024-07-03 PROCEDURE — 81001 URINALYSIS AUTO W/SCOPE: CPT | Performed by: PHYSICIAN ASSISTANT

## 2024-07-03 PROCEDURE — 85390 FIBRINOLYSINS SCREEN I&R: CPT | Performed by: PATHOLOGY

## 2024-07-03 PROCEDURE — 36415 COLL VENOUS BLD VENIPUNCTURE: CPT | Performed by: PHYSICIAN ASSISTANT

## 2024-07-03 PROCEDURE — 73130 X-RAY EXAM OF HAND: CPT | Mod: TC | Performed by: RADIOLOGY

## 2024-07-03 PROCEDURE — 86140 C-REACTIVE PROTEIN: CPT | Performed by: PHYSICIAN ASSISTANT

## 2024-07-03 PROCEDURE — 85613 RUSSELL VIPER VENOM DILUTED: CPT | Performed by: PHYSICIAN ASSISTANT

## 2024-07-03 PROCEDURE — 86225 DNA ANTIBODY NATIVE: CPT | Performed by: PHYSICIAN ASSISTANT

## 2024-07-03 PROCEDURE — 86036 ANCA SCREEN EACH ANTIBODY: CPT | Performed by: PHYSICIAN ASSISTANT

## 2024-07-03 PROCEDURE — 86235 NUCLEAR ANTIGEN ANTIBODY: CPT | Performed by: PHYSICIAN ASSISTANT

## 2024-07-03 ASSESSMENT — PAIN SCALES - GENERAL: PAINLEVEL: SEVERE PAIN (7)

## 2024-07-03 NOTE — PATIENT INSTRUCTIONS
After Visit Instructions:     Thank you for coming to [facility] for your care. It is my goal to partner with you to help you reach your optimal state of health.       Plan:     Schedule follow-up with Rupali Britt PA-C as needed   Imaging: xray hands  Labs: dsDNA, complement levels, SANDHYA panel, antiphospholipid panel, Urine (looking for protein), thyroid peroxidase antibody, Scl-70, Centromere antibody, CRP and Sed Rate, ANCA      Rupali Britt PA-C  [facility]  [facility]    Contact information: [facility]  Clinic Number:  [phone]  Please call or send a OLX message with any questions about your care

## 2024-07-05 LAB
ANCA AB PATTERN SER IF-IMP: NORMAL
C-ANCA TITR SER IF: NORMAL {TITER}
C3 SERPL-MCNC: 142 MG/DL (ref 81–157)
C4 SERPL-MCNC: 29 MG/DL (ref 13–39)
DRVVT SCREEN RATIO: 1.04
INR PPP: 1.11 (ref 0.85–1.15)
LA PPP-IMP: NEGATIVE
LUPUS INTERPRETATION: NORMAL
PATH REPORT.COMMENTS IMP SPEC: NORMAL
PTT RATIO: 1.11
THROMBIN TIME: 17.4 SECONDS (ref 13–19)
THYROPEROXIDASE AB SERPL-ACNC: <10 IU/ML

## 2024-07-08 LAB
B2 GLYCOPROT1 IGG SERPL IA-ACNC: 2.3 U/ML
B2 GLYCOPROT1 IGM SERPL IA-ACNC: <2.4 U/ML
CARDIOLIPIN IGG SER IA-ACNC: <2 GPL-U/ML
CARDIOLIPIN IGG SER IA-ACNC: NEGATIVE
CARDIOLIPIN IGM SER IA-ACNC: 6 MPL-U/ML
CARDIOLIPIN IGM SER IA-ACNC: NEGATIVE
CENTROMERE B AB SER-ACNC: <0.7 U/ML
CENTROMERE B AB SER-ACNC: NEGATIVE
DSDNA AB SER-ACNC: 0.8 IU/ML
ENA SCL70 IGG SER IA-ACNC: <0.6 U/ML
ENA SCL70 IGG SER IA-ACNC: NEGATIVE
ENA SM IGG SER IA-ACNC: <0.7 U/ML
ENA SM IGG SER IA-ACNC: NEGATIVE
ENA SS-A AB SER IA-ACNC: <0.5 U/ML
ENA SS-A AB SER IA-ACNC: NEGATIVE
ENA SS-B IGG SER IA-ACNC: <0.6 U/ML
ENA SS-B IGG SER IA-ACNC: NEGATIVE
U1 SNRNP IGG SER IA-ACNC: 1.4 U/ML
U1 SNRNP IGG SER IA-ACNC: NEGATIVE

## 2024-08-08 DIAGNOSIS — N20.0 CALCULUS OF KIDNEY: ICD-10-CM

## 2024-08-09 ENCOUNTER — TELEPHONE (OUTPATIENT)
Dept: FAMILY MEDICINE | Facility: CLINIC | Age: 64
End: 2024-08-09
Payer: COMMERCIAL

## 2024-08-09 DIAGNOSIS — U07.1 INFECTION DUE TO 2019 NOVEL CORONAVIRUS: Primary | ICD-10-CM

## 2024-08-09 RX ORDER — POTASSIUM CITRATE 10 MEQ/1
10 TABLET, EXTENDED RELEASE ORAL
Qty: 180 TABLET | Refills: 3 | Status: SHIPPED | OUTPATIENT
Start: 2024-08-09

## 2024-08-09 NOTE — TELEPHONE ENCOUNTER
"Request for medication refill:    potassium citrate (UROCIT-K) 10 MEQ (1080 MG) CR tablet     Providers if patient needs an appointment and you are willing to give a one month supply please refill for one month and  send a letter/MyChart using \".SMILLIMITEDREFILL\" .smillimited and route chart to \"[facility] \" (Giving one month refill in non controlled medications is strongly recommended before denial)    If refill has been denied, meaning absolutely no refills without visit, please complete the smart phrase \".smirxrefuse\" and route it to the \"P [facility] MED REFILLS\"  pool to inform the patient and the pharmacy.    Cheri Shah MA      "

## 2024-08-09 NOTE — TELEPHONE ENCOUNTER
COVID Positive/Requesting COVID treatment    Patient is positive for COVID and requesting treatment options.    Date of positive COVID test (PCR or at home)? 8/8/24; home  Current COVID symptoms: fever or chills, fatigue, muscle or body aches, and congestion or runny nose  Date COVID symptoms began: 8/6/24    Message should be routed to clinic RN pool. Best phone number to use for call back: [phone] - not listed; pt 's phone is broken.

## 2024-08-09 NOTE — TELEPHONE ENCOUNTER
RN COVID TREATMENT VISIT  08/09/24      The patient has been triaged and does not require a higher level of care.    Hong Pool  63 year old  Current weight? 216    Has the patient been seen by a primary care provider at an [facility] or [facility] within the past two years? Yes.   Have you been in close proximity to/do you have a known exposure to a person with a confirmed case of influenza? No.     General treatment eligibility:  Date of positive COVID test (PCR or at home)?  08/08/24    Are you or have you been hospitalized for this COVID-19 infection? No.   Have you received monoclonal antibodies or antiviral treatment for COVID-19 since this positive test? No.   Do you have any of the following conditions that place you at risk of being very sick from COVID-19?   - Age 50 years or older  Yes, patient has at least one high risk condition as noted above.     Current COVID symptoms:   - fever or chills  - fatigue  - muscle or body aches  - headache  - congestion or runny nose  Yes. Patient has at least one symptom as selected.     How many days since symptoms started? 5 days or less. Established patient, 12 years or older weighing at least 88.2 lbs, who has symptoms that started in the past 5 days, has not been hospitalized nor received treatment already, and is at risk for being very sick from COVID-19.     Treatment eligibility by RN:  Are you currently pregnant or nursing? No  Do you have a clinically significant hypersensitivity to nirmatrelvir or ritonavir, or toxic epidermal necrolysis (TEN) or Reilly-Kuldip Syndrome? No  Do you have a history of hepatitis, any hepatic impairment on the Problem List (such as Child-Burnette Class C, cirrhosis, fatty liver disease, alcoholic liver disease), or was the last liver lab (hepatic panel, ALT, AST, ALK Phos, bilirubin) elevated in the past 6 months? No  Do you have any history of severe renal impairment (eGFR < 30mL/min)? No    Is  patient eligible to continue? Yes, patient meets all eligibility requirements for the RN COVID treatment (as denoted by all no responses above).     Current Outpatient Medications   Medication Sig Dispense Refill    alpha-d-galactosidase (BEANO) tablet Take 2 tablets by mouth 3 times daily (before meals) 540 tablet 4    Ascorbic Acid (VITAMIN C) 500 MG CAPS Take 500 mg by mouth daily      aspirin (ASA) 81 MG EC tablet Take 1 tablet (81 mg) by mouth 2 times daily (Patient taking differently: Take 81 mg by mouth daily) 90 tablet 4    bismuth subsalicylate (PEPTO BISMOL) 262 MG chewable tablet Take 1 tablet by mouth 3 times daily as needed      Blood Pressure Monitor KIT 1 each daily. 1 kit 0    Cholecalciferol (VITAMIN D3) 2000 UNITS CAPS Take 1 capsule by mouth daily 90 capsule 4    evolocumab (REPATHA) 140 MG/ML prefilled autoinjector Inject 1 mL (140 mg) Subcutaneous every 14 days 6 mL 3    LACTAID FAST ACT 9000 units TABS tablet 3,000-9,000 Units as needed  4    Multiple Vitamins-Minerals (MENS MULTIVITAMIN PLUS) TABS Take 1 tablet by mouth daily 90 tablet 4    potassium citrate (UROCIT-K) 10 MEQ (1080 MG) CR tablet Take 1 tablet (10 mEq) by mouth 3 times daily (with meals) 180 tablet 3    pyridOXINE (VITAMIN B6) 100 MG TABS Take 1 tablet (100 mg) by mouth daily 90 tablet 4    STATIN NOT PRESCRIBED, INTENTIONAL, 1 each continuous Statin not prescribed intentionally due to Intolerance (with supporting documentation of trying a statin at least once within the last 5 years) (Patient not taking: Reported on 5/15/2024) 0 each 0       Medications from List 1 of the standing order (on medications that exclude the use of Paxlovid) that patient is taking: NONE. Is patient taking Gina's Wort? No  Is patient taking [place]'s Wort or any meds from List 1? No.   Medications from List 2 of the standing order (on meds that provider needs to adjust) that patient is taking: NONE. Is patient on any of the meds from List 2? No.    Medications from List 3 of standing order (on meds that a RN needs to adjust) that patient is taking: NONE. Is patient on any meds from List 3? No.     Paxlovid has an approximate 90% reduction in hospitalization. Paxlovid can possibly cause altered sense of taste, diarrhea (loose, watery stools), high blood pressure, muscle aches.     Would patient like a Paxlovid prescription?   Yes.   Lab Results   Component Value Date    GFRESTIMATED 65 06/17/2024       Was last eGFR reduced? No, eGFR 60 or greater/ No Result on record. Patient can receive the normal renal function dose. Paxlovid Rx sent to [place] [facility]       Temporary change to home medications: None    All medication adjustments (holds, etc) were discussed with the patient and patient was asked to repeat back (teachback) their med adjustment.  Did patient understand med adjustment? No medication adjustments needed.         Reviewed the following instructions with the patient:    Paxlovid (nimatrelvir and ritonavir)    How it works  Two medicines (nirmatrelvir and ritonavir) are taken together. They stop the virus from growing. Less amount of virus is easier for your body to fight.    How to take  Medicine comes in a daily container with both medicine tablets. Take by mouth twice daily (once in the morning, once at night) for 5 days.  The number of tablets to take varies by patient.  Don't chew or break capsules. Swallow whole.    When to take  Take as soon as possible after positive COVID-19 test result, and within 5 days of your first symptoms.    Possible side effects  Can cause altered sense of taste, diarrhea (loose, watery stools), high blood pressure, muscle aches.    Essie Reynoso RN

## 2024-10-23 ENCOUNTER — TELEPHONE (OUTPATIENT)
Dept: CARDIOLOGY | Facility: CLINIC | Age: 64
End: 2024-10-23

## 2024-10-23 NOTE — TELEPHONE ENCOUNTER
[facility] Call Center    Phone Message    May a detailed message be left on voicemail: yes     Reason for Call: Order(s): Other:   Reason for requested: fasting labs   Date needed: in 2025  Provider name: Jean Carlos    Patient will call back in April to schedule lab appt    Action Taken: Other: cardiology     Travel Screening: Not Applicable    Thank you!  [facility]       Date of Service:

## 2024-11-06 ASSESSMENT — PATIENT HEALTH QUESTIONNAIRE - PHQ9: SUM OF ALL RESPONSES TO PHQ QUESTIONS 1-9: 6

## 2024-12-30 NOTE — PROGRESS NOTES
Optimal Vascular Metrics    Blood Pressure   <140/90    On Aspirin  Yes    On Statin  No: Contraindicated due to: On PCSK9 inhibitor    Tobacco use  No    
Discharged

## 2025-01-07 ENCOUNTER — TELEPHONE (OUTPATIENT)
Dept: CARDIOLOGY | Facility: CLINIC | Age: 65
End: 2025-01-07
Payer: COMMERCIAL

## 2025-01-07 NOTE — TELEPHONE ENCOUNTER
[facility] Call Center    Phone Message    May a detailed message be left on voicemail: yes     Reason for Call: Other: pt's insurance company requires a 12 month authorization for services and his next appt with cardiology is outside of that window. Pt needs provider to get an extension for the authorization of services from his insurance company. Please call pt back with further questions.      Action Taken: Other: cardiology     Travel Screening: Not Applicable      Thank you!  [facility]        Date of Service:

## 2025-01-20 ENCOUNTER — TELEPHONE (OUTPATIENT)
Dept: CARDIOLOGY | Facility: CLINIC | Age: 65
End: 2025-01-20
Payer: COMMERCIAL

## 2025-01-20 DIAGNOSIS — I25.810 CORONARY ARTERY DISEASE INVOLVING CORONARY BYPASS GRAFT OF NATIVE HEART WITHOUT ANGINA PECTORIS: ICD-10-CM

## 2025-01-20 DIAGNOSIS — I25.10 ASCVD (ARTERIOSCLEROTIC CARDIOVASCULAR DISEASE): ICD-10-CM

## 2025-01-20 DIAGNOSIS — Z78.9 STATIN INTOLERANCE: ICD-10-CM

## 2025-01-20 DIAGNOSIS — E78.5 HYPERLIPIDEMIA LDL GOAL <70: ICD-10-CM

## 2025-01-20 NOTE — TELEPHONE ENCOUNTER
[facility] Call Center    Phone Message    May a detailed message be left on voicemail: yes     Reason for Call: Medication Refill Request    Has the patient contacted the pharmacy for the refill? Yes   Name of medication being requested:   evolocumab (REPATHA) 140 MG/ML prefilled autoinjector       Provider who prescribed the medication: katerina   Pharmacy:      [facility] - [place], [place] - [address]   Date medication is needed: asap       Action Taken: Other: cardiology     Travel Screening: Not Applicable     Date of Service:

## 2025-01-26 ENCOUNTER — HEALTH MAINTENANCE LETTER (OUTPATIENT)
Age: 65
End: 2025-01-26

## 2025-02-11 DIAGNOSIS — I25.810 CORONARY ARTERY DISEASE INVOLVING CORONARY BYPASS GRAFT OF NATIVE HEART WITHOUT ANGINA PECTORIS: ICD-10-CM

## 2025-02-11 DIAGNOSIS — E78.5 HYPERLIPIDEMIA LDL GOAL <70: Primary | ICD-10-CM

## 2025-02-21 ENCOUNTER — LAB (OUTPATIENT)
Dept: LAB | Facility: CLINIC | Age: 65
End: 2025-02-21
Payer: COMMERCIAL

## 2025-02-21 DIAGNOSIS — E78.5 HYPERLIPIDEMIA LDL GOAL <70: ICD-10-CM

## 2025-02-21 DIAGNOSIS — I25.810 CORONARY ARTERY DISEASE INVOLVING CORONARY BYPASS GRAFT OF NATIVE HEART WITHOUT ANGINA PECTORIS: ICD-10-CM

## 2025-02-21 LAB
ALBUMIN SERPL BCG-MCNC: 4.2 G/DL (ref 3.5–5.2)
ALP SERPL-CCNC: 52 U/L (ref 40–150)
ALT SERPL W P-5'-P-CCNC: 27 U/L (ref 0–70)
ANION GAP SERPL CALCULATED.3IONS-SCNC: 9 MMOL/L (ref 7–15)
AST SERPL W P-5'-P-CCNC: 27 U/L (ref 0–45)
BILIRUB SERPL-MCNC: 2.3 MG/DL
BUN SERPL-MCNC: 16.5 MG/DL (ref 8–23)
CALCIUM SERPL-MCNC: 9.2 MG/DL (ref 8.8–10.4)
CHLORIDE SERPL-SCNC: 102 MMOL/L (ref 98–107)
CHOLEST SERPL-MCNC: 143 MG/DL
CREAT SERPL-MCNC: 1.36 MG/DL (ref 0.67–1.17)
EGFRCR SERPLBLD CKD-EPI 2021: 58 ML/MIN/1.73M2
EST. AVERAGE GLUCOSE BLD GHB EST-MCNC: 151 MG/DL
FASTING STATUS PATIENT QL REPORTED: YES
FASTING STATUS PATIENT QL REPORTED: YES
GLUCOSE SERPL-MCNC: 150 MG/DL (ref 70–99)
HBA1C MFR BLD: 6.9 %
HCO3 SERPL-SCNC: 28 MMOL/L (ref 22–29)
HDLC SERPL-MCNC: 61 MG/DL
LDLC SERPL CALC-MCNC: 44 MG/DL
LDLC SERPL DIRECT ASSAY-MCNC: 56 MG/DL
NONHDLC SERPL-MCNC: 82 MG/DL
POTASSIUM SERPL-SCNC: 4.2 MMOL/L (ref 3.4–5.3)
PROT SERPL-MCNC: 7 G/DL (ref 6.4–8.3)
SODIUM SERPL-SCNC: 139 MMOL/L (ref 135–145)
TRIGL SERPL-MCNC: 190 MG/DL

## 2025-02-21 PROCEDURE — 80061 LIPID PANEL: CPT | Performed by: PATHOLOGY

## 2025-02-21 PROCEDURE — 36415 COLL VENOUS BLD VENIPUNCTURE: CPT | Performed by: PATHOLOGY

## 2025-02-21 PROCEDURE — 99000 SPECIMEN HANDLING OFFICE-LAB: CPT | Performed by: PATHOLOGY

## 2025-02-21 PROCEDURE — 80053 COMPREHEN METABOLIC PANEL: CPT | Performed by: PATHOLOGY

## 2025-02-21 PROCEDURE — 83721 ASSAY OF BLOOD LIPOPROTEIN: CPT | Performed by: INTERNAL MEDICINE

## 2025-02-21 PROCEDURE — 83036 HEMOGLOBIN GLYCOSYLATED A1C: CPT | Performed by: INTERNAL MEDICINE

## 2025-02-24 ENCOUNTER — OFFICE VISIT (OUTPATIENT)
Dept: FAMILY MEDICINE | Facility: CLINIC | Age: 65
End: 2025-02-24
Payer: COMMERCIAL

## 2025-02-24 ENCOUNTER — OFFICE VISIT (OUTPATIENT)
Dept: CARDIOLOGY | Facility: CLINIC | Age: 65
End: 2025-02-24
Attending: INTERNAL MEDICINE
Payer: COMMERCIAL

## 2025-02-24 ENCOUNTER — TELEPHONE (OUTPATIENT)
Dept: FAMILY MEDICINE | Facility: CLINIC | Age: 65
End: 2025-02-24

## 2025-02-24 VITALS
BODY MASS INDEX: 30.65 KG/M2 | SYSTOLIC BLOOD PRESSURE: 129 MMHG | DIASTOLIC BLOOD PRESSURE: 83 MMHG | WEIGHT: 226 LBS | HEART RATE: 60 BPM | OXYGEN SATURATION: 99 %

## 2025-02-24 VITALS
RESPIRATION RATE: 16 BRPM | OXYGEN SATURATION: 98 % | TEMPERATURE: 98.3 F | HEART RATE: 67 BPM | BODY MASS INDEX: 29.4 KG/M2 | HEIGHT: 72 IN | SYSTOLIC BLOOD PRESSURE: 150 MMHG | DIASTOLIC BLOOD PRESSURE: 83 MMHG

## 2025-02-24 DIAGNOSIS — H93.8X1 EAR SWELLING, RIGHT: ICD-10-CM

## 2025-02-24 DIAGNOSIS — H60.391 INFECTIVE OTITIS EXTERNA, RIGHT: Primary | ICD-10-CM

## 2025-02-24 DIAGNOSIS — E78.5 HYPERLIPIDEMIA LDL GOAL <70: ICD-10-CM

## 2025-02-24 DIAGNOSIS — I25.810 CORONARY ARTERY DISEASE INVOLVING CORONARY BYPASS GRAFT OF NATIVE HEART WITHOUT ANGINA PECTORIS: Primary | ICD-10-CM

## 2025-02-24 DIAGNOSIS — I25.10 ASCVD (ARTERIOSCLEROTIC CARDIOVASCULAR DISEASE): ICD-10-CM

## 2025-02-24 LAB
ATRIAL RATE - MUSE: 57 BPM
BASOPHILS # BLD AUTO: 0 10E3/UL (ref 0–0.2)
BASOPHILS NFR BLD AUTO: 0 %
DIASTOLIC BLOOD PRESSURE - MUSE: NORMAL MMHG
EOSINOPHIL # BLD AUTO: 0.2 10E3/UL (ref 0–0.7)
EOSINOPHIL NFR BLD AUTO: 4 %
ERYTHROCYTE [DISTWIDTH] IN BLOOD BY AUTOMATED COUNT: 12.2 % (ref 10–15)
ERYTHROCYTE [SEDIMENTATION RATE] IN BLOOD BY WESTERGREN METHOD: 12 MM/HR (ref 0–20)
HCT VFR BLD AUTO: 44.9 % (ref 40–53)
HGB BLD-MCNC: 14.8 G/DL (ref 13.3–17.7)
IMM GRANULOCYTES # BLD: 0 10E3/UL
IMM GRANULOCYTES NFR BLD: 0 %
INTERPRETATION ECG - MUSE: NORMAL
LYMPHOCYTES # BLD AUTO: 0.9 10E3/UL (ref 0.8–5.3)
LYMPHOCYTES NFR BLD AUTO: 23 %
MCH RBC QN AUTO: 31.2 PG (ref 26.5–33)
MCHC RBC AUTO-ENTMCNC: 33 G/DL (ref 31.5–36.5)
MCV RBC AUTO: 95 FL (ref 78–100)
MONOCYTES # BLD AUTO: 0.3 10E3/UL (ref 0–1.3)
MONOCYTES NFR BLD AUTO: 8 %
NEUTROPHILS # BLD AUTO: 2.5 10E3/UL (ref 1.6–8.3)
NEUTROPHILS NFR BLD AUTO: 65 %
P AXIS - MUSE: 7 DEGREES
PLATELET # BLD AUTO: 144 10E3/UL (ref 150–450)
PR INTERVAL - MUSE: 166 MS
QRS DURATION - MUSE: 96 MS
QT - MUSE: 418 MS
QTC - MUSE: 406 MS
R AXIS - MUSE: 15 DEGREES
RBC # BLD AUTO: 4.75 10E6/UL (ref 4.4–5.9)
SYSTOLIC BLOOD PRESSURE - MUSE: NORMAL MMHG
T AXIS - MUSE: 77 DEGREES
VENTRICULAR RATE- MUSE: 57 BPM
WBC # BLD AUTO: 3.8 10E3/UL (ref 4–11)

## 2025-02-24 PROCEDURE — 93005 ELECTROCARDIOGRAM TRACING: CPT

## 2025-02-24 PROCEDURE — 99211 OFF/OP EST MAY X REQ PHY/QHP: CPT | Performed by: INTERNAL MEDICINE

## 2025-02-24 PROCEDURE — 3078F DIAST BP <80 MM HG: CPT | Performed by: INTERNAL MEDICINE

## 2025-02-24 PROCEDURE — 99214 OFFICE O/P EST MOD 30 MIN: CPT | Mod: GC | Performed by: INTERNAL MEDICINE

## 2025-02-24 PROCEDURE — 1126F AMNT PAIN NOTED NONE PRSNT: CPT | Performed by: INTERNAL MEDICINE

## 2025-02-24 PROCEDURE — 3074F SYST BP LT 130 MM HG: CPT | Performed by: INTERNAL MEDICINE

## 2025-02-24 RX ORDER — CIPROFLOXACIN AND DEXAMETHASONE 3; 1 MG/ML; MG/ML
4 SUSPENSION/ DROPS AURICULAR (OTIC) 2 TIMES DAILY
Qty: 7.5 ML | Refills: 0 | Status: SHIPPED | OUTPATIENT
Start: 2025-02-24 | End: 2025-03-03

## 2025-02-24 RX ORDER — LEVOFLOXACIN 750 MG/1
750 TABLET, FILM COATED ORAL DAILY
Qty: 7 TABLET | Refills: 0 | Status: SHIPPED | OUTPATIENT
Start: 2025-02-24 | End: 2025-03-03

## 2025-02-24 RX ORDER — ASPIRIN 81 MG/1
81 TABLET ORAL DAILY
Status: SHIPPED
Start: 2025-02-24

## 2025-02-24 ASSESSMENT — PAIN SCALES - GENERAL
PAINLEVEL_OUTOF10: NO PAIN (0)
PAINLEVEL_OUTOF10: NO PAIN (0)

## 2025-02-24 ASSESSMENT — PATIENT HEALTH QUESTIONNAIRE - PHQ9
10. IF YOU CHECKED OFF ANY PROBLEMS, HOW DIFFICULT HAVE THESE PROBLEMS MADE IT FOR YOU TO DO YOUR WORK, TAKE CARE OF THINGS AT HOME, OR GET ALONG WITH OTHER PEOPLE: VERY DIFFICULT
SUM OF ALL RESPONSES TO PHQ QUESTIONS 1-9: 11
SUM OF ALL RESPONSES TO PHQ QUESTIONS 1-9: 11

## 2025-02-24 NOTE — PROGRESS NOTES
HPI:    Mr Anant Pool, who is a 63 year old male with a history of CAD with CABG (LIMA to LAD, SVGs to OM1, D1, RPDA ) in 2012. He has had statin intolerance (atorvastatin 80 mg , simvastatin 80 mg caused both muscle pain and consequently was unable to walk ) and lower dosage pravastatin was not tolerated.  Patient is now on Repatha 140  mg subcut very 2 weeks. He presents for routine visit.     Denies exertional chest pain, shortness of breath , orthopnea, paroxysmal nocturnal dyspnea, palpitations, syncope or presyncope. Functional status is Good.  While he is doing very well from a cardiac perspective and is tolerating PCSK9 well.  His EKG today was notable for sinus bradycardia without any new ST-T wave changes.    PAST MEDICAL HISTORY:  Past Medical History:   Diagnosis Date    Borderline diabetes mellitus 11/26/2015    Coronary artery disease 2012    CABG x4    Coronary artery disease involving coronary bypass graft of native heart without angina pectoris 11/18/2015    Depression     Goiter     With normal TSH    Hyperlipidemia     Started on statin 7/2007.  Discontinued 10/07 secondary to muscle aches and fatigue    Nephrolithiasis     Shoulder pain     Statin intolerance 6/23/2016       CURRENT MEDICATIONS:  Current Outpatient Medications   Medication Sig Dispense Refill    alpha-d-galactosidase (BEANO) tablet Take 2 tablets by mouth 3 times daily (before meals) 540 tablet 4    Ascorbic Acid (VITAMIN C) 500 MG CAPS Take 500 mg by mouth daily      aspirin 81 MG EC tablet Take 1 tablet (81 mg) by mouth daily.      Blood Pressure Monitor KIT 1 each daily. 1 kit 0    Cholecalciferol (VITAMIN D3) 2000 UNITS CAPS Take 1 capsule by mouth daily 90 capsule 4    evolocumab (REPATHA) 140 MG/ML prefilled autoinjector Inject 1 mL (140 mg) subcutaneously every 14 days. 6 mL 3    LACTAID FAST ACT 9000 units TABS tablet 3,000-9,000 Units as needed  4    Multiple Vitamins-Minerals (MENS MULTIVITAMIN PLUS) TABS Take 1  tablet by mouth daily 90 tablet 4    potassium citrate (UROCIT-K) 10 MEQ (1080 MG) CR tablet Take 1 tablet (10 mEq) by mouth 3 times daily (with meals) 180 tablet 3    pyridOXINE (VITAMIN B6) 100 MG TABS Take 1 tablet (100 mg) by mouth daily 90 tablet 4    ciprofloxacin-dexAMETHasone (CIPRODEX) 0.3-0.1 % otic suspension Place 4 drops into the right ear 2 times daily for 7 days. (Patient not taking: Reported on 2/24/2025) 7.5 mL 0    ciprofloxacin-hydrocortisone (CIPRO HC OTIC) 0.2-1 % otic suspension Place 3 drops into the right ear 2 times daily for 7 days. (Patient not taking: Reported on 2/24/2025) 10 mL 0    levofloxacin (LEVAQUIN) 750 MG tablet Take 1 tablet (750 mg) by mouth daily for 7 days. (Patient not taking: Reported on 2/24/2025) 7 tablet 0    STATIN NOT PRESCRIBED, INTENTIONAL, 1 each continuous Statin not prescribed intentionally due to Intolerance (with supporting documentation of trying a statin at least once within the last 5 years) (Patient not taking: Reported on 2/24/2025) 0 each 0       PAST SURGICAL HISTORY:  Past Surgical History:   Procedure Laterality Date    BYPASS GRAFT ARTERY CORONARY  6/24/2012    Procedure: BYPASS GRAFT ARTERY CORONARY;  Median Sternotomy, Coronary Artery Bypass Grafting x 4 using Left Internal Mammary and Left Saphenous Vein  with Endovein Harvesting. On Pump Oxygenation;  Surgeon: Genesis Larsen MD;  Location:  OR    ESOPHAGOSCOPY, GASTROSCOPY, DUODENOSCOPY (EGD), COMBINED N/A 4/11/2017    Procedure: COMBINED ESOPHAGOSCOPY, GASTROSCOPY, DUODENOSCOPY (EGD), BIOPSY SINGLE OR MULTIPLE;  Surgeon: Corine Ly MD;  Location:  GI    OPEN REDUCTION INTERNAL FIXATION ANKLE Right 11/18/2022    Procedure: OPEN REDUCTION INTERNAL FIXATION, FRACTURE, ANKLE RIGHT;  Surgeon: Claudio Macias DO;  Location: [facility] Main OR       ALLERGIES     Allergies   Allergen Reactions    Lidocaine Rash     Break out    Adhesive Tape      Adhesive     Band-Aid Anti-Itch      Pad on the bandaid, antibacterial gauze that causes reaction    Cholestoff Complete [Plant Sterol Stanol-Pantethine] Other (See Comments)     Severe stomach pain    Lactose Diarrhea    Rosuvastatin Muscle Pain (Myalgia)    Simvastatin      Other reaction(s): Muscle Aches    Zocor [Simvastatin - High Dose] Other (See Comments)     Muscle aches/soreness, confusion, disorganized thinking       FAMILY HISTORY:  Family History   Problem Relation Age of Onset    C.A.D. Other         Uncle    Diabetes Brother     Cerebrovascular Disease Brother     Cardiovascular Sister         tachyarrhythmia       SOCIAL HISTORY:  Social History     Socioeconomic History    Marital status:    Tobacco Use    Smoking status: Never     Passive exposure: Past    Smokeless tobacco: Never   Vaping Use    Vaping status: Never Used   Substance and Sexual Activity    Alcohol use: No    Drug use: No    Sexual activity: Yes   Social History Narrative    .      Social Drivers of Health     Financial Resource Strain: Low Risk  (11/19/2021)    Overall Financial Resource Strain (CARDIA)     Difficulty of Paying Living Expenses: Not very hard   Food Insecurity: No Food Insecurity (11/19/2021)    Hunger Vital Sign     Worried About Running Out of Food in the Last Year: Never true     Ran Out of Food in the Last Year: Never true   Transportation Needs: No Transportation Needs (11/19/2021)    PRAPARE - Transportation     Lack of Transportation (Medical): No     Lack of Transportation (Non-Medical): No   Physical Activity: Insufficiently Active (11/19/2021)    Exercise Vital Sign     Days of Exercise per Week: 2 days     Minutes of Exercise per Session: 20 min   Stress: Stress Concern Present (11/19/2021)    [place] [employer] - Occupational Stress Questionnaire     Feeling of Stress : To some extent   Interpersonal Safety: Low Risk  (2/24/2025)    Interpersonal Safety     Do you feel physically  and emotionally safe where you currently live?: Yes     Within the past 12 months, have you been hit, slapped, kicked or otherwise physically hurt by someone?: No     Within the past 12 months, have you been humiliated or emotionally abused in other ways by your partner or ex-partner?: No   Housing Stability: Unknown (11/19/2021)    Housing Stability Vital Sign     Unable to Pay for Housing in the Last Year: No     Unstable Housing in the Last Year: No       ROS:   Constitutional: No fever, chills, or sweats. No weight gain/loss   ENT: No visual disturbance, ear ache, epistaxis, sore throat  Allergies/Immunologic: Negative.   Respiratory: No cough, hemoptysia  Cardiovascular: As per HPI  GI: No nausea, vomiting, hematemesis, melena, or hematochezia  : No urinary frequency, dysuria, or hematuria  Integument: Negative  Psychiatric: Negative  Neuro: Negative  Endocrinology: Negative   Musculoskeletal: Negative    EXAM:  /83 (BP Location: Left arm, Patient Position: Chair, Cuff Size: Adult Large)   Pulse 60   Wt 102.5 kg (226 lb)   SpO2 99%   BMI 30.65 kg/m    In general, the patient is a pleasant male in no apparent distress.    HEENT: NC/AT.  PERRLA.  EOMI.  Sclerae white, not injected.  Nares clear.  Pharynx without erythema or exudate.  Dentition intact.    Neck: No adenopathy.  No thyromegaly. Carotids +4/4 bilaterally without bruits.  No jugular venous distension.   Heart: RRR. Normal S1, S2 splits physiologically. No murmur, rub, click, or gallop. The PMI is in the 5th ICS in the midclavicular line. There is no heave.    Lungs: CTA.  No ronchi, wheezes, rales.  No dullness to percussion.   Abdomen: Soft, nontender, nondistended. No organomegaly.  No bruits.   Extremities: No clubbing, cyanosis, or edema.  The pulses are +4/4 at the radial, brachial, femoral, popliteal, DP, and PT sites bilaterally.  No bruits are noted.  Neurologic: Alert and oriented to person/place/time, normal speech, gait and  affect  Skin: No petechiae, purpura or rash.    Labs:  LIPID RESULTS:  Lab Results   Component Value Date    CHOL 143 02/21/2025    CHOL 137 01/18/2021    HDL 61 02/21/2025    HDL 49 01/18/2021    LDL 56 02/21/2025    LDL 44 02/21/2025    LDL 34 01/18/2021    TRIG 190 (H) 02/21/2025    TRIG 272 (H) 01/18/2021    CHOLHDLRATIO 5.1 (H) 11/24/2015    NHDL 82 02/21/2025    NHDL 88 01/18/2021       LIVER ENZYME RESULTS:  Lab Results   Component Value Date    AST 27 02/21/2025    AST 17 01/18/2021    ALT 27 02/21/2025    ALT 30 01/18/2021       CBC RESULTS:  Lab Results   Component Value Date    WBC 3.8 (L) 02/24/2025    WBC 4.9 06/24/2016    RBC 4.75 02/24/2025    RBC 4.33 (L) 06/24/2016    HGB 14.8 02/24/2025    HGB 15.7 05/25/2018    HCT 44.9 02/24/2025    HCT 51.2 05/25/2018    MCV 95 02/24/2025    .7 (H) 05/25/2018    MCH 31.2 02/24/2025    MCH 31.5 05/25/2018    MCHC 33.0 02/24/2025    MCHC 30.7 (L) 05/25/2018    RDW 12.2 02/24/2025    RDW 11.9 06/24/2016     (L) 02/24/2025     (L) 06/24/2016       BMP RESULTS:  Lab Results   Component Value Date     02/21/2025     01/18/2021    POTASSIUM 4.2 02/21/2025    POTASSIUM 4.0 01/06/2022    POTASSIUM 4.1 01/18/2021    CHLORIDE 102 02/21/2025    CHLORIDE 106 01/06/2022    CHLORIDE 108 01/18/2021    CO2 28 02/21/2025    CO2 29 01/06/2022    CO2 30 01/18/2021    ANIONGAP 9 02/21/2025    ANIONGAP 8 01/06/2022    ANIONGAP 3 01/18/2021     (H) 02/21/2025     (H) 01/06/2022     (H) 01/18/2021    BUN 16.5 02/21/2025    BUN 19 01/06/2022    BUN 24 01/18/2021    CR 1.36 (H) 02/21/2025    CR 1.06 01/18/2021    GFRESTIMATED 58 (L) 02/21/2025    GFRESTIMATED 76 01/18/2021    GFRESTBLACK 88 01/18/2021    TYSON 9.2 02/21/2025    TYSON 9.0 01/18/2021        A1C RESULTS:  Lab Results   Component Value Date    A1C 6.9 (H) 02/21/2025    A1C 5.9 (H) 01/18/2021       INR RESULTS:  Lab Results   Component Value Date    INR 1.11 07/03/2024    INR  1.53 (H) 06/24/2012    INR 1.73 (H) 06/24/2012       Procedures:  Echo 11/23   Interpretation Summary  Global and regional left ventricular function is normal with an EF of 55-60%.  Global right ventricular function is normal.  No significant valvular abnormalities present.  No pericardial effusion is present.    Assessment and Plan:   Dyslipidemia, LDL goal < 70. On PCSK9. Good control  Statin intolerance  Coronary artery disease s/p CABG x4  2012 ( LIMA to LAD, SVGs to OM1, D1, RPDA), Continue ASA. No symptoms       Alexis Amado MD  Cardiology fellow (PGY5)  [address]    Seen and Discussed with Dr. Evangelista    I discussed the results with patient.  I discussed he importance of a heart healthy diet and lifestyle.  I have personally reviewed imaging, labs and EKG     Jaylan Evangelista MD, PhD  Professor of Medicine  Division of Cardiology      CC  Patient Care Team:  Ilsa Seaman DO as PCP - General (Family Medicine)  Jaylan Evangelista MD as MD (Cardiology)  Rebeca Dunn MD as MD (Ophthalmology)  Claudio Chavez MD as MD (Urology)  Carolyn Christiansen RN as Registered Nurse (Urology)  Jaylan Evangelista MD as Assigned Heart and Vascular Provider  [place] [facility] as Pharmacist (Pharmacist)  Catina Crump, RN as Specialty Care Coordinator (Cardiology)  Rupali Britt PA-C as Physician Assistant (Rheumatology)  Ilsa Seaman DO as Assigned PCP  Rupali Britt PA-C as Assigned Rheumatology Provider  JAYLAN EVANGELISTA

## 2025-02-24 NOTE — LETTER
Assigned Heart and Vascular Provider  [place] [facility] as Pharmacist (Pharmacist)  Catina Crump, RN as Specialty Care Coordinator (Cardiology)  Rupali Britt PA-C as Physician Assistant (Rheumatology)  Ilsa Seaman DO as Assigned PCP  uRpali Britt PA-C as Assigned Rheumatology Provider  JAYLAN EVANGELISTA      Please do not hesitate to contact me if you have any questions/concerns.     Sincerely,     Jaylan Evangelista MD

## 2025-02-24 NOTE — NURSING NOTE
Chief Complaint   Patient presents with    Follow Up     Jean Carlos F/U         Vitals were taken, medications reconciled and EKG performed.    Edmond Christiansen, Clinic Assistant    4:12 PM

## 2025-02-24 NOTE — PROGRESS NOTES
Assessment & Plan     Infective otitis externa, right  Patient has moderate otitis externa based on exam but given history of fever and erythema of right ear may benefit from systemic antibiotics if not improving within 24 hours of ciprodex drops.   - ciprofloxacin-hydrocortisone (CIPRO HC OTIC) 0.2-1 % otic suspension; Place 3 drops into the right ear 2 times daily for 7 days.  - recommend levofloxacin (LEVAQUIN) 750 MG tablet; Take 1 tablet (750 mg) by mouth daily for 7 days if has recurrent fevers    Ear swelling, right  Unclear if ear swelling is related to previous episode of left sided pain and swelling that was empirically treated as seronegative lyme. However, prior rheumatologic workup and consultation (see 7/3/24) were reassuring against autoimmune cause. Nonetheless, would be reasonable to repeat some broad basic workup for this (as below). Will plan to refer back to rheum or complete additional workup as indicated. If symptoms resolved with treatment of otitis externa no further workup warranted.  - CRP inflammation; Future  - Erythrocyte sedimentation rate auto; Future  - CBC with platelets differential; Future  - Basic metabolic panel; Future  - CRP inflammation  - Erythrocyte sedimentation rate auto  - CBC with platelets differential  - Basic metabolic panel    ASCVD (arteriosclerotic cardiovascular disease)  Update medications to be accurate (on ASA daily not BID)  - aspirin 81 MG EC tablet; Take 1 tablet (81 mg) by mouth daily.      BMI  Estimated body mass index is 29.4 kg/m  as calculated from the following:    Height as of this encounter: 1.829 m (6').    Weight as of 7/3/24: 98.3 kg (216 lb 12.8 oz).       Depression Screening Follow Up        2/24/2025     9:02 AM   PHQ   PHQ-9 Total Score 11    Q9: Thoughts of better off dead/self-harm past 2 weeks Not at all        Proxy-reported         2/24/2025     9:02 AM   Last PHQ-9   1.  Little interest or pleasure in doing things 1    2.  Feeling  "down, depressed, or hopeless 2    3.  Trouble falling or staying asleep, or sleeping too much 2    4.  Feeling tired or having little energy 3    5.  Poor appetite or overeating 1    6.  Feeling bad about yourself 0    7.  Trouble concentrating 2    8.  Moving slowly or restless 0    Q9: Thoughts of better off dead/self-harm past 2 weeks 0    PHQ-9 Total Score 11        Proxy-reported       Follow Up Actions Taken  Crisis resource information provided in After Visit Summary       See Patient Instructions    Return if symptoms worsen or fail to improve, for Follow up.    Jose Carmona is a 64 year old, presenting for the following health issues:  Fever and Ear Problem      2/24/2025     9:02 AM   Additional Questions   Roomed by Ochoa   Accompanied by Self         2/24/2025    Information    services provided? No     HPI   Right ear pain, swelling and fevers  - developed sudden fever on Thursday 2/20 , felt pressure building up and chills, highest temp was 101.2  - pressure felt to be in the ear (above behind, wonders about eustachian tube)  - Saturday developed a rash (noted left versus right side of head had swollen righ ear that was red)  - felt ear spread from ear to back of ear and top of head felt spread along  - Temp then started to go down, started to take ibuprofen and fevers subsided  - Today no fever  - Right ear is still very sensitive  - does use qtips, no known insect bites, no hot tubs, no swimming  - no family history of autoimmune  - has travelled to [place], and decades ago [place], [place], [place], [place], and [place] (1980s)  -no pets right now, had cats  - 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 The swelling was more severe to where he was unable to see out of an eye. He went to a local clinic and received an antibiotic and his symptoms resolved. \"        Objective    BP (!) 150/83 (BP Location: Left arm, Patient Position: Sitting, Cuff Size: Adult Regular)   Pulse 67   Temp 98.3  F (36.8  C) (Oral)   Resp 16   Ht 1.829 m (6')   SpO2 98%   BMI 29.40 kg/m    Body mass index is 29.4 kg/m .  Physical Exam   General: No acute distress  Head: No obvious trauma to head.  Ears, Nose, Throat:   Nose normal.  No pharyngeal erythema, swelling or exudate.  Midline uvula.  No facial rash  Left Ear: Left ear canal wnl, Left TM wnl. External ear normal, no pain with palpation over mastoid process  Right Ear:  Right ear canal swollen slight erythema, Right TM wnl. Sligh eyrhtema and warmth of right ear, no pain with palpation over mastoid process  Eyes:  Conjunctivae clear.  Pupils are equal, round, and reactive.   Neck: Normal range of motion.  Neck supple.   CV: Regular rate and rhythm.  No murmurs.      Respiratory: Effort normal and breath sounds normal.  No wheezing or crackles.   Gastrointestinal: Soft.  No distension. There is no tenderness.  There is no rigidity, no rebound and no guarding.   Musculoskeletal: Normal range of motion.  Non tender extremities to palpations.    Neuro: Alert. Moving all extremities appropriately.  Normal speech.    Skin: Skin is warm and dry.  No rash noted.   Psych: Normal mood and affect. Behavior is normal.              Signed Electronically by: Deandra Vale MD    Answers submitted by the patient for this visit:  Patient Health Questionnaire (Submitted on 2/24/2025)  If you checked off any problems, how difficult have these problems made it for you to do your work, take care of things at home, or get along " with other people?: Very difficult  PHQ9 TOTAL SCORE: 11

## 2025-02-24 NOTE — PATIENT INSTRUCTIONS
Your emotional health is important to us!  If you feel that you may hurt yourself or others, please seek help through the hospital ER, or 9-1-1.  You may also call [place] [facility], toll-free, at 1.502.589.5941 for immediate support.     The National Suicide Prevention Lifeline at 988 can be reached 24/7.      Swimmer's Ear: Care Instructions  Overview     Swimmer's ear (otitis externa) is inflammation or infection of the ear canal. This is the passage that leads from the outer ear to the eardrum. Any water, sand, or other debris that gets into the ear canal and stays there can cause swimmer's ear. Putting cotton swabs or other items in the ear to clean it can also cause this problem.  Swimmer's ear can be very painful. But you can treat the pain and infection with medicines. You should feel better in a few days.  Follow-up care is a key part of your treatment and safety. Be sure to make and go to all appointments, and call your doctor if you are having problems. It's also a good idea to know your test results and keep a list of the medicines you take.  How can you care for yourself at home?  Cleaning and care  Use antibiotic drops as your doctor directs.  Do not insert eardrops (other than the antibiotic eardrops) or anything else into the ear unless your doctor has told you to.  Avoid getting water in the ear until the problem clears up. Use cotton lightly coated with petroleum jelly as an earplug. Do not use plastic earplugs.  Use a hair dryer set on low to carefully dry the ear after you shower.  To ease ear pain, hold a warm washcloth against your ear.  Take pain medicines exactly as directed.  If the doctor gave you a prescription medicine for pain, take it as prescribed.  If you are not taking a prescription pain medicine, ask your doctor if you can take an over-the-counter medicine.  Inserting eardrops  Warm the drops to body temperature by rolling the container in your hands. Or you can place it  "in a cup of warm water for a few minutes.  Lie down, with your ear facing up.  Place drops inside the ear. Follow your doctor's instructions (or the directions on the label) for how many drops to use. Gently wiggle the outer ear or pull the ear up and back to help the drops get into the ear.  It's important to keep the liquid in the ear canal for 3 to 5 minutes.  When should you call for help?   Call your doctor now or seek immediate medical care if:    You have a new or higher fever.     You have new or worse pain, swelling, warmth, or redness around or behind your ear.     You have new or increasing pus or blood draining from your ear.   Watch closely for changes in your health, and be sure to contact your doctor if:    You are not getting better after 2 days (48 hours).   Where can you learn more?  Go to https://www.[employer].Plum.io/patiented  Enter C706 in the search box to learn more about \"Swimmer's Ear: Care Instructions.\"  Current as of: September 27, 2023  Content Version: 14.3    2024 [employer].   Care instructions adapted under license by your healthcare professional. If you have questions about a medical condition or this instruction, always ask your healthcare professional. [employer] disclaims any warranty or liability for your use of this information.    Patient Education   Here is the plan from today's visit    1. Infective otitis externa, right (Primary)    - ciprofloxacin-hydrocortisone (CIPRO HC OTIC) 0.2-1 % otic suspension; Place 3 drops into the right ear 2 times daily for 7 days.  Dispense: 10 mL; Refill: 0    2. Ear swelling, right    - CRP inflammation; Future  - Erythrocyte sedimentation rate auto; Future  - CBC with platelets differential; Future  - Basic metabolic panel; Future          Please call or return to clinic if your symptoms don't go away.    Follow up plan  Return if symptoms worsen or fail to improve, for Follow up.    Thank you for coming to [facility] today.  Lab Testing:  **If you had lab testing today and your results are reassuring or normal they will be mailed to you or sent through [employer] within 7 days.   **If the lab tests need quick action we will call you with the results.  **If you are having labs done on a different day, please call [phone] to schedule at [facility] or [phone] for other [facility] Outpatient Lab locations. Labs do not offer walk-in appointments.  The phone number we will call with results is # [phone] (home) . If this is not the best number please call our clinic and change the number.  Medication Refills:  If you need any refills please call your pharmacy and they will contact us.   If you need to  your refill at a new pharmacy, please contact the new pharmacy directly. The new pharmacy will help you get your medications transferred faster.   Scheduling:  If you have any concerns about today's visit or wish to schedule another appointment please call our office during normal business hours [phone] (8-5:00 M-F). If you can no longer make a scheduled visit, please cancel via [employer] or call us to cancel.   If a referral was made to an [facility] specialty provider and you do not get a call from central scheduling, please refer to directions on your visit summary or call our office during normal business hours for assistance.   If a Mammogram was ordered for you at the Breast Center call [phone] to schedule or change your appointment.  If you had an XRay/CT/Ultrasound/MRI ordered the number is [phone] to schedule or change your radiology appointment.   [facility] has limited ultrasound appointments available on Wednesdays, if you would like your ultrasound at [facility], please call [phone] to schedule.   Medical Concerns:  If you have urgent medical concerns please call [phone] at any time of the day.    Deandra Vale MD

## 2025-02-24 NOTE — TELEPHONE ENCOUNTER
[facility] phone call message- medication clarification/question:    Full Medication Name: ciprofloxacin-hydrocortisone (CIPRO HC OTIC) 0.2-1 % otic suspension     Question/Clarification needed: crorie form pharmacy want speak nurse regards proscription was send today was very expensive needs to know if they change different  like to discuss a triage nurse would you please call corrie [phone]      [facility] [address], [address] [address]  Phone: [phone] Fax: [phone]  : Yes    Please leave ONLY preferred pharmacy    OK to leave a message on voice mail? Yes    Advised patient that RN would call back within 3 hours, unless emergent.    Primary language: English      needed? No    Call taken on February 24, 2025 at 12:11 PM by YULISSA Garces    Route to P [facility] TRIAGE

## 2025-02-24 NOTE — Clinical Note
2/24/2025      RE: Hong Pool  [address] [facility] [address]       Dear Colleague,    Thank you for the opportunity to participate in the care of your patient, Hong Pool, at the [facility] [place] [facility]. Please see a copy of my visit note below.    HPI:    Mr Anant Pool, who is a 63 year old male with a history of CAD with CABG (LIMA to LAD, SVGs to OM1, D1, RPDA ) in 2012. He has had statin intolerance (atorvastatin 80 mg , simvastatin 80 mg caused both muscle pain and consequently was unable to walk ) and lower dosage pravastatin was not tolerated.  Patient is now on Repatha 140  mg subcut very 2 weeks. He presents for routine visit.       PAST MEDICAL HISTORY:  Past Medical History:   Diagnosis Date    Borderline diabetes mellitus 11/26/2015    Coronary artery disease 2012    CABG x4    Coronary artery disease involving coronary bypass graft of native heart without angina pectoris 11/18/2015    Depression     Goiter     With normal TSH    Hyperlipidemia     Started on statin 7/2007.  Discontinued 10/07 secondary to muscle aches and fatigue    Nephrolithiasis     Shoulder pain     Statin intolerance 6/23/2016       CURRENT MEDICATIONS:  Current Outpatient Medications   Medication Sig Dispense Refill    alpha-d-galactosidase (BEANO) tablet Take 2 tablets by mouth 3 times daily (before meals) 540 tablet 4    Ascorbic Acid (VITAMIN C) 500 MG CAPS Take 500 mg by mouth daily      aspirin 81 MG EC tablet Take 1 tablet (81 mg) by mouth daily.      Blood Pressure Monitor KIT 1 each daily. 1 kit 0    Cholecalciferol (VITAMIN D3) 2000 UNITS CAPS Take 1 capsule by mouth daily 90 capsule 4    ciprofloxacin-hydrocortisone (CIPRO HC OTIC) 0.2-1 % otic suspension Place 3 drops into the right ear 2 times daily for 7 days. 10 mL 0    evolocumab (REPATHA) 140 MG/ML prefilled autoinjector Inject 1 mL (140 mg) subcutaneously  every 14 days. 6 mL 3    LACTAID FAST ACT 9000 units TABS tablet 3,000-9,000 Units as needed  4    levofloxacin (LEVAQUIN) 750 MG tablet Take 1 tablet (750 mg) by mouth daily for 7 days. 7 tablet 0    Multiple Vitamins-Minerals (MENS MULTIVITAMIN PLUS) TABS Take 1 tablet by mouth daily 90 tablet 4    potassium citrate (UROCIT-K) 10 MEQ (1080 MG) CR tablet Take 1 tablet (10 mEq) by mouth 3 times daily (with meals) 180 tablet 3    pyridOXINE (VITAMIN B6) 100 MG TABS Take 1 tablet (100 mg) by mouth daily 90 tablet 4    STATIN NOT PRESCRIBED, INTENTIONAL, 1 each continuous Statin not prescribed intentionally due to Intolerance (with supporting documentation of trying a statin at least once within the last 5 years) 0 each 0       PAST SURGICAL HISTORY:  Past Surgical History:   Procedure Laterality Date    BYPASS GRAFT ARTERY CORONARY  6/24/2012    Procedure: BYPASS GRAFT ARTERY CORONARY;  Median Sternotomy, Coronary Artery Bypass Grafting x 4 using Left Internal Mammary and Left Saphenous Vein  with Endovein Harvesting. On Pump Oxygenation;  Surgeon: Genesis Larsen MD;  Location:  OR    ESOPHAGOSCOPY, GASTROSCOPY, DUODENOSCOPY (EGD), COMBINED N/A 4/11/2017    Procedure: COMBINED ESOPHAGOSCOPY, GASTROSCOPY, DUODENOSCOPY (EGD), BIOPSY SINGLE OR MULTIPLE;  Surgeon: Corine Ly MD;  Location:  GI    OPEN REDUCTION INTERNAL FIXATION ANKLE Right 11/18/2022    Procedure: OPEN REDUCTION INTERNAL FIXATION, FRACTURE, ANKLE RIGHT;  Surgeon: Claudio Macias DO;  Location: [facility] OR       ALLERGIES     Allergies   Allergen Reactions    Lidocaine Rash     Break out    Adhesive Tape      Adhesive    Band-Aid Anti-Itch      Pad on the bandaid, antibacterial gauze that causes reaction    Cholestoff Complete [Plant Sterol Stanol-Pantethine] Other (See Comments)     Severe stomach pain    Lactose Diarrhea    Rosuvastatin Muscle Pain (Myalgia)    Simvastatin      Other reaction(s): Muscle Aches     Zocor [Simvastatin - High Dose] Other (See Comments)     Muscle aches/soreness, confusion, disorganized thinking       FAMILY HISTORY:  Family History   Problem Relation Age of Onset    C.A.D. Other         Uncle    Diabetes Brother     Cerebrovascular Disease Brother     Cardiovascular Sister         tachyarrhythmia       SOCIAL HISTORY:  Social History     Socioeconomic History    Marital status:    Tobacco Use    Smoking status: Never     Passive exposure: Past    Smokeless tobacco: Never   Vaping Use    Vaping status: Never Used   Substance and Sexual Activity    Alcohol use: No    Drug use: No    Sexual activity: Yes   Social History Narrative    .      Social Drivers of Health     Financial Resource Strain: Low Risk  (11/19/2021)    Overall Financial Resource Strain (CARDIA)     Difficulty of Paying Living Expenses: Not very hard   Food Insecurity: No Food Insecurity (11/19/2021)    Hunger Vital Sign     Worried About Running Out of Food in the Last Year: Never true     Ran Out of Food in the Last Year: Never true   Transportation Needs: No Transportation Needs (11/19/2021)    PRAPARE - Transportation     Lack of Transportation (Medical): No     Lack of Transportation (Non-Medical): No   Physical Activity: Insufficiently Active (11/19/2021)    Exercise Vital Sign     Days of Exercise per Week: 2 days     Minutes of Exercise per Session: 20 min   Stress: Stress Concern Present (11/19/2021)    [employer] [place] [employer] - Occupational Stress Questionnaire     Feeling of Stress : To some extent   Interpersonal Safety: Low Risk  (2/24/2025)    Interpersonal Safety     Do you feel physically and emotionally safe where you currently live?: Yes     Within the past 12 months, have you been hit, slapped, kicked or otherwise physically hurt by someone?: No     Within the past 12 months, have you been humiliated or emotionally abused in other ways by your partner or ex-partner?: No    Housing Stability: Unknown (11/19/2021)    Housing Stability Vital Sign     Unable to Pay for Housing in the Last Year: No     Unstable Housing in the Last Year: No       ROS:   Constitutional: No fever, chills, or sweats. No weight gain/loss   ENT: No visual disturbance, ear ache, epistaxis, sore throat  Allergies/Immunologic: Negative.   Respiratory: No cough, hemoptysia  Cardiovascular: As per HPI  GI: No nausea, vomiting, hematemesis, melena, or hematochezia  : No urinary frequency, dysuria, or hematuria  Integument: Negative  Psychiatric: Negative  Neuro: Negative  Endocrinology: Negative   Musculoskeletal: Negative    EXAM:  There were no vitals taken for this visit.  In general, the patient is a pleasant male in no apparent distress.    HEENT: NC/AT.  PERRLA.  EOMI.  Sclerae white, not injected.  Nares clear.  Pharynx without erythema or exudate.  Dentition intact.    Neck: No adenopathy.  No thyromegaly. Carotids +4/4 bilaterally without bruits.  No jugular venous distension.   Heart: RRR. Normal S1, S2 splits physiologically. No murmur, rub, click, or gallop. The PMI is in the 5th ICS in the midclavicular line. There is no heave.    Lungs: CTA.  No ronchi, wheezes, rales.  No dullness to percussion.   Abdomen: Soft, nontender, nondistended. No organomegaly.  No bruits.   Extremities: No clubbing, cyanosis, or edema.  The pulses are +4/4 at the radial, brachial, femoral, popliteal, DP, and PT sites bilaterally.  No bruits are noted.  Neurologic: Alert and oriented to person/place/time, normal speech, gait and affect  Skin: No petechiae, purpura or rash.    Labs:  LIPID RESULTS:  Lab Results   Component Value Date    CHOL 143 02/21/2025    CHOL 137 01/18/2021    HDL 61 02/21/2025    HDL 49 01/18/2021    LDL 56 02/21/2025    LDL 44 02/21/2025    LDL 34 01/18/2021    TRIG 190 (H) 02/21/2025    TRIG 272 (H) 01/18/2021    CHOLHDLRATIO 5.1 (H) 11/24/2015    NHDL 82 02/21/2025    NHDL 88 01/18/2021       LIVER  ENZYME RESULTS:  Lab Results   Component Value Date    AST 27 02/21/2025    AST 17 01/18/2021    ALT 27 02/21/2025    ALT 30 01/18/2021       CBC RESULTS:  Lab Results   Component Value Date    WBC 3.8 (L) 02/24/2025    WBC 4.9 06/24/2016    RBC 4.75 02/24/2025    RBC 4.33 (L) 06/24/2016    HGB 14.8 02/24/2025    HGB 15.7 05/25/2018    HCT 44.9 02/24/2025    HCT 51.2 05/25/2018    MCV 95 02/24/2025    .7 (H) 05/25/2018    MCH 31.2 02/24/2025    MCH 31.5 05/25/2018    MCHC 33.0 02/24/2025    MCHC 30.7 (L) 05/25/2018    RDW 12.2 02/24/2025    RDW 11.9 06/24/2016     (L) 02/24/2025     (L) 06/24/2016       BMP RESULTS:  Lab Results   Component Value Date     02/21/2025     01/18/2021    POTASSIUM 4.2 02/21/2025    POTASSIUM 4.0 01/06/2022    POTASSIUM 4.1 01/18/2021    CHLORIDE 102 02/21/2025    CHLORIDE 106 01/06/2022    CHLORIDE 108 01/18/2021    CO2 28 02/21/2025    CO2 29 01/06/2022    CO2 30 01/18/2021    ANIONGAP 9 02/21/2025    ANIONGAP 8 01/06/2022    ANIONGAP 3 01/18/2021     (H) 02/21/2025     (H) 01/06/2022     (H) 01/18/2021    BUN 16.5 02/21/2025    BUN 19 01/06/2022    BUN 24 01/18/2021    CR 1.36 (H) 02/21/2025    CR 1.06 01/18/2021    GFRESTIMATED 58 (L) 02/21/2025    GFRESTIMATED 76 01/18/2021    GFRESTBLACK 88 01/18/2021    TYSON 9.2 02/21/2025    TYSON 9.0 01/18/2021        A1C RESULTS:  Lab Results   Component Value Date    A1C 6.9 (H) 02/21/2025    A1C 5.9 (H) 01/18/2021       INR RESULTS:  Lab Results   Component Value Date    INR 1.11 07/03/2024    INR 1.53 (H) 06/24/2012    INR 1.73 (H) 06/24/2012       Procedures:  Echo 11/23   Interpretation Summary  Global and regional left ventricular function is normal with an EF of 55-60%.  Global right ventricular function is normal.  No significant valvular abnormalities present.  No pericardial effusion is present.    Assessment and Plan: ***      CC  Patient Care Team:  Ilsa Seaman DO as PCP -  General (Family Medicine)  Jaylan Evangelista MD as MD (Cardiology)  Rebeca Dunn MD as MD (Ophthalmology)  Claudio Chavez MD as MD (Urology)  Carolyn Christiansen, MIYA as Registered Nurse (Urology)  Jaylan Evangelista MD as Assigned Heart and Vascular Provider  [place] X[facility], [facility] as Pharmacist (Pharmacist)  Catina Crump RN as Specialty Care Coordinator (Cardiology)  Rupali Britt PA-C as Physician Assistant (Rheumatology)  Ilsa Seaman DO as Assigned PCP  Rupali Britt PA-C as Assigned Rheumatology Provider  JAYLAN EVANGELISTA        Please do not hesitate to contact me if you have any questions/concerns.     Sincerely,     Jaylan Evangelista MD

## 2025-02-25 LAB
ANION GAP SERPL CALCULATED.3IONS-SCNC: 12 MMOL/L (ref 7–15)
BUN SERPL-MCNC: 18.9 MG/DL (ref 8–23)
CALCIUM SERPL-MCNC: 9.1 MG/DL (ref 8.8–10.4)
CHLORIDE SERPL-SCNC: 103 MMOL/L (ref 98–107)
CREAT SERPL-MCNC: 1.08 MG/DL (ref 0.67–1.17)
CRP SERPL-MCNC: 27.8 MG/L
EGFRCR SERPLBLD CKD-EPI 2021: 77 ML/MIN/1.73M2
GLUCOSE SERPL-MCNC: 146 MG/DL (ref 70–99)
HCO3 SERPL-SCNC: 25 MMOL/L (ref 22–29)
POTASSIUM SERPL-SCNC: 4.6 MMOL/L (ref 3.4–5.3)
SODIUM SERPL-SCNC: 140 MMOL/L (ref 135–145)

## 2025-02-27 ENCOUNTER — TELEPHONE (OUTPATIENT)
Dept: FAMILY MEDICINE | Facility: CLINIC | Age: 65
End: 2025-02-27
Payer: COMMERCIAL

## 2025-02-27 ENCOUNTER — MYC MEDICAL ADVICE (OUTPATIENT)
Dept: FAMILY MEDICINE | Facility: CLINIC | Age: 65
End: 2025-02-27
Payer: COMMERCIAL

## 2025-02-27 NOTE — TELEPHONE ENCOUNTER
Called and spoke with patient, he states he was only given the Levaquin from the pharmacy because of confusion with the two different drops.    Please clarify what patient is supposed to be taking and adjust meds ordered.    Thank you  Nevin Perera RN

## 2025-02-27 NOTE — TELEPHONE ENCOUNTER
Called and spoke with the pharmacist, the Ciprofloxacin-hydrocortisone was too expensive and unavailable,  The Ciprofloxacin-dexamethasone was $130 but patient had already taken a days dose of Levaquin because he was not clear on when to take.    I called patient and told him to just finish the Levaquin and he is scheduled for 3/6/25 with Dr. Wooten for a recheck.    Nevin Perera RN

## 2025-02-27 NOTE — TELEPHONE ENCOUNTER
[facility] phone call message- medication clarification/question:    Full Medication Name:   -ciprofloxacin-hydrocortisone (CIPRO HC OTIC) 0.2-1 % otic suspension     -levofloxacin (LEVAQUIN) 750 MG tablet   -ciprofloxacin-dexAMETHasone (CIPRODEX) 0.3-0.1 % otic suspension       Question: The patient is confused as to which medications he should be taking and the dosing as well.     Pharmacy confirmed as      [facility] [address]    : Yes    OK to leave a message on voice mail? Yes    Primary language: English      needed? No    Call taken on February 27, 2025 at 9:20 AM by Yolis Zavaleta

## 2025-03-01 ENCOUNTER — TELEPHONE (OUTPATIENT)
Dept: FAMILY MEDICINE | Facility: CLINIC | Age: 65
End: 2025-03-01
Payer: COMMERCIAL

## 2025-03-01 NOTE — TELEPHONE ENCOUNTER
Telephone Message   3/1/2025  10:25 AM    Call returned by Juvenal Wooten DO    Patient: Mathew Pool   Phone number-  [phone] (home)       return their call  Phone conversation with: Patient    Situation: Mathew Pool  Is a 64 year old  male This call is regarding possible reaction to new medication.    Background : Started levofloxacin for otitis externa on Wednesday, has taken 3 doses so far. Not taking ear drops, issue with prescription/pharmacy. Since he had already started oral abx, decided these were not needed. Since then, has noticed tightness in right leg starting yesterday evening, now in left leg as well. Pain primarily along posterior lower leg from ankle to knee. No new level of activity or trauma. No weakness, numbness or tingling. Has incidentally noticed right leg seems slightly more swollen than left, unsure if this is new. No erythema or other associated skin findings. Takes aspirin. No history of blood clots. No recent travel or extended time sedentary. No fevers, sick symptoms. Ear symptoms have since resolved. Has not tried any interventions for pain/discomfort.     Assessment: Suspect achilles tendinopathy 2/2 to levofloxacin initiation vs muscle strain. Low risk for DVT with no provoking factors. No trauma or significant weakness to suggest tendon rupture.    Recommendation/Plan:  - Stop Levofloxacin  - Start tylenol prn for pain/discomfort  - Avoid strenuous activity  - Monitor for recurrence of ear symptoms  - Monitor leg symptoms. If worsening pain, swelling, new numbness or tingling, encouraged to present to ER for further evaluation over the weekend or a same day visit in clinic during the week.  - Has follow up visit already scheduled for 3/6 with me for symptoms reassessment    Patient understands the suggested plan and agrees to follow though.

## 2025-03-04 DIAGNOSIS — N20.0 CALCULUS OF KIDNEY: ICD-10-CM

## 2025-03-05 NOTE — TELEPHONE ENCOUNTER
"Request for medication refill:    Medication Name:     potassium citrate (UROCIT-K) 10 MEQ (1080 MG) CR tablet       Providers if patient needs an appointment and you are willing to give a one month supply please refill for one month and  send a MyChart using \".SMILLIMITEDREFILL\" .Or route chart to \"P [facility] \" . To call patient and inform of limited refill and providers request to make an appointment. (Giving one month refill in non controlled medications is strongly recommended before denial)    If refill has been denied, meaning absolutely no refills without visit, please complete the smart phrase \".SMIRXREFUSE\" and route it to the \"P [facility] MED REFILLS\"  pool to inform the patient and the pharmacy.    Adamaris Munguia MA     "

## 2025-03-06 ENCOUNTER — OFFICE VISIT (OUTPATIENT)
Dept: FAMILY MEDICINE | Facility: CLINIC | Age: 65
End: 2025-03-06
Payer: COMMERCIAL

## 2025-03-06 VITALS
TEMPERATURE: 98 F | DIASTOLIC BLOOD PRESSURE: 81 MMHG | WEIGHT: 222.2 LBS | SYSTOLIC BLOOD PRESSURE: 128 MMHG | OXYGEN SATURATION: 97 % | RESPIRATION RATE: 15 BRPM | HEART RATE: 62 BPM | HEIGHT: 72 IN | BODY MASS INDEX: 30.1 KG/M2

## 2025-03-06 DIAGNOSIS — F33.1 MODERATE RECURRENT MAJOR DEPRESSION (H): Primary | ICD-10-CM

## 2025-03-06 DIAGNOSIS — H60.391 INFECTIVE OTITIS EXTERNA, RIGHT: ICD-10-CM

## 2025-03-06 LAB — CRP SERPL-MCNC: 4.71 MG/L

## 2025-03-06 RX ORDER — POTASSIUM CITRATE 1080 MG/1
10 TABLET, EXTENDED RELEASE ORAL
Qty: 180 TABLET | Refills: 3 | Status: SHIPPED | OUTPATIENT
Start: 2025-03-06

## 2025-03-06 NOTE — PROGRESS NOTES
Assessment & Plan     Moderate recurrent major depression (H)  Chronic, stable condition.  Not currently in therapy or on any psychotropic medications.  Declines mental health referrals.  Expresses preference to follow-up with PCP for ongoing care needs.    Infective otitis externa, right  Patient presents for follow-up of acute right otitis externa.  He was treated with Levaquin for 3 days before developing Achilles tightness, concerning for medication induced tendinopathy.  Advised to stop Levaquin, added to patient's allergy list.  Since then, ear and leg pain have all resolved.  Will obtain CRP today for confirmation of resolution.  Of note, patient does have documented history of facial rashes associated with fevers over the last 3 years, always in February.  He has reportedly seen a rheumatologist in July 2024 for this.  He expresses desire for continuity of care with PCP to look into this further.  He wishes to do this at his annual physical appointment scheduled for 3/17 with Dr. Camargo.  - CRP inflammation        Return in about 11 days (around 3/17/2025) for as scheduled for yearly preventive visit.    Jose Carmona is a 64 year old, presenting for the following health issues:  Medication Problem (Reaction to med)        3/6/2025     8:26 AM   Additional Questions   Roomed by Doua   Accompanied by Self         3/6/2025     8:26 AM   Patient Reported Additional Medications   Patient reports taking the following new medications n/a         3/6/2025    Information    services provided? No     HPI    Patient here to discuss the following:    Ear recheck:  - Diagnosed with otitis externa on 2/24  - Prescribed Levaquin  - Noted leg tightness on 3/1, discontinued Levaquin at that time, took total of 3 doses  - Since then...   - Leg soreness has resolved. Had gradually improved day by day.  - No recurrence of fevers  - Ear pain resolved    Depression:  - Laid off, MIL passed away, family  issues  - Chronic fatigue  - Poor sleep  - Not on medications or doing therapy  - Stable for right now  - Wants PCP to follow with consistently          12/12/2022    10:29 AM 6/17/2024     1:58 PM 2/24/2025     9:02 AM   PHQ   PHQ-9 Total Score 7 12 11    Q9: Thoughts of better off dead/self-harm past 2 weeks Not at all Not at all Not at all        Proxy-reported           Objective    /81   Pulse 62   Temp 98  F (36.7  C) (Oral)   Resp 15   Ht 1.829 m (6')   Wt 100.8 kg (222 lb 3.2 oz)   SpO2 97%   BMI 30.14 kg/m    Body mass index is 30.14 kg/m .  Physical Exam  Vitals reviewed.   Constitutional:       General: He is not in acute distress.     Appearance: Normal appearance.   HENT:      Head: Normocephalic and atraumatic.      Right Ear: Tympanic membrane, ear canal and external ear normal. There is no impacted cerumen.      Left Ear: Tympanic membrane, ear canal and external ear normal. There is no impacted cerumen.   Pulmonary:      Effort: Pulmonary effort is normal. No respiratory distress.   Skin:     General: Skin is warm and dry.   Neurological:      Mental Status: He is alert. Mental status is at baseline.   Psychiatric:         Mood and Affect: Mood normal.         Behavior: Behavior normal.         Thought Content: Thought content normal.         Judgment: Judgment normal.                Signed Electronically by: Juvenal Wooten DO

## 2025-03-06 NOTE — Clinical Note
This patient has upcoming physical with you on 3/17, hoping to have you as his PCP.   Sending as FYI: He has this history of facial rashes associated with fevers, always in February over the last 3 years. Always treated with antibiotics and symptoms resolved. Unclear if related or isolated events. He has seen rheumatology on 7/3/24, workup notable for SOTO+, negative lupus workup (anitcoag panel, SANDHYA, SOTO, dsDNA, complement, cardiolipin, etc. All wnl), negative lyme. He has pictures from previous rashes, but I am unable to see documentation in the chart for these prior episodes. He would like to look into this further with you as his PCP for continuity.

## 2025-03-16 SDOH — HEALTH STABILITY: PHYSICAL HEALTH: ON AVERAGE, HOW MANY DAYS PER WEEK DO YOU ENGAGE IN MODERATE TO STRENUOUS EXERCISE (LIKE A BRISK WALK)?: 2 DAYS

## 2025-03-16 SDOH — HEALTH STABILITY: PHYSICAL HEALTH: ON AVERAGE, HOW MANY MINUTES DO YOU ENGAGE IN EXERCISE AT THIS LEVEL?: 30 MIN

## 2025-03-16 ASSESSMENT — SOCIAL DETERMINANTS OF HEALTH (SDOH): HOW OFTEN DO YOU GET TOGETHER WITH FRIENDS OR RELATIVES?: NEVER

## 2025-03-17 ENCOUNTER — OFFICE VISIT (OUTPATIENT)
Dept: FAMILY MEDICINE | Facility: CLINIC | Age: 65
End: 2025-03-17
Payer: COMMERCIAL

## 2025-03-17 VITALS
DIASTOLIC BLOOD PRESSURE: 82 MMHG | SYSTOLIC BLOOD PRESSURE: 129 MMHG | RESPIRATION RATE: 14 BRPM | HEIGHT: 72 IN | OXYGEN SATURATION: 98 % | BODY MASS INDEX: 30.27 KG/M2 | HEART RATE: 66 BPM | WEIGHT: 223.5 LBS | TEMPERATURE: 98 F

## 2025-03-17 DIAGNOSIS — E11.59 TYPE 2 DIABETES MELLITUS WITH OTHER CIRCULATORY COMPLICATION, WITHOUT LONG-TERM CURRENT USE OF INSULIN (H): ICD-10-CM

## 2025-03-17 DIAGNOSIS — I25.810 CORONARY ARTERY DISEASE INVOLVING CORONARY BYPASS GRAFT OF NATIVE HEART WITHOUT ANGINA PECTORIS: ICD-10-CM

## 2025-03-17 DIAGNOSIS — E11.9 TYPE 2 DIABETES MELLITUS WITHOUT COMPLICATION, WITHOUT LONG-TERM CURRENT USE OF INSULIN (H): ICD-10-CM

## 2025-03-17 DIAGNOSIS — Z23 NEED FOR MMR VACCINE: ICD-10-CM

## 2025-03-17 DIAGNOSIS — Z00.00 ROUTINE GENERAL MEDICAL EXAMINATION AT A HEALTH CARE FACILITY: Primary | ICD-10-CM

## 2025-03-17 DIAGNOSIS — Z12.5 SCREENING FOR PROSTATE CANCER: ICD-10-CM

## 2025-03-17 PROBLEM — F33.1 MODERATE RECURRENT MAJOR DEPRESSION (H): Status: RESOLVED | Noted: 2025-03-06 | Resolved: 2025-03-17

## 2025-03-17 LAB
CREAT UR-MCNC: 171 MG/DL
MICROALBUMIN UR-MCNC: 22.5 MG/L
MICROALBUMIN/CREAT UR: 13.16 MG/G CR (ref 0–17)
PSA SERPL DL<=0.01 NG/ML-MCNC: 2.56 NG/ML (ref 0–4.5)

## 2025-03-17 PROCEDURE — G0103 PSA SCREENING: HCPCS | Performed by: FAMILY MEDICINE

## 2025-03-17 PROCEDURE — 3074F SYST BP LT 130 MM HG: CPT | Performed by: FAMILY MEDICINE

## 2025-03-17 PROCEDURE — 99396 PREV VISIT EST AGE 40-64: CPT | Mod: 25 | Performed by: FAMILY MEDICINE

## 2025-03-17 PROCEDURE — 82043 UR ALBUMIN QUANTITATIVE: CPT | Performed by: FAMILY MEDICINE

## 2025-03-17 PROCEDURE — 3079F DIAST BP 80-89 MM HG: CPT | Performed by: FAMILY MEDICINE

## 2025-03-17 PROCEDURE — 36415 COLL VENOUS BLD VENIPUNCTURE: CPT | Performed by: FAMILY MEDICINE

## 2025-03-17 PROCEDURE — 82570 ASSAY OF URINE CREATININE: CPT | Performed by: FAMILY MEDICINE

## 2025-03-17 NOTE — PROGRESS NOTES
Preventive Care Visit  [facility] ZHANNA Camargo MD, Family Medicine  Mar 17, 2025      Assessment & Plan     Screening for prostate cancer:  - Discussed the PSA test as a screening tool for prostate cancer. Noted that prostate enlargement and cancer are common with age. The PSA test is not strongly recommended by the USPTF but can be considered, especially with family history.  - Perform PSA test to screen for prostate cancer.  - Risks and side effects: Discussed potential for false positives and negatives with PSA testing.    Type 2 diabetes mellitus without complication, without long-term current use of insulin (H):  - Newly diagnosed with diabetes, A1c of 6.9 indicates controlled diabetes. Discussed the importance of managing blood sugar to prevent complications.  - No medication required at this time. Recommendations include exercise and dietary modifications to manage diabetes. Option to consider diabetic education or consult a dietician.    Routine general medical examination at a health care facility:  - Routine examination conducted, including discussion of preventive screenings and vaccinations.  - Conduct urinalysis to check for early signs of kidney disease. Monitor blood pressure, which was 129/82 on the second reading.    Need for MMR vaccine:  - Discussed the need for an MMR vaccine due to being in a less protected age group.  - Option to receive the MMR vaccine today or at a pharmacy/nurse visit at a later date.  Mathew was seen today for physical.    Diagnoses and all orders for this visit:    Routine general medical examination at a health care facility    Screening for prostate cancer  -     Prostate spec antigen screen; Future    Need for MMR vaccine    Type 2 diabetes mellitus without complication, without long-term current use of insulin (H)  -     Albumin Random Urine Quantitative with Creat Ratio; Future    Type 2 diabetes mellitus with other circulatory complication,  without long-term current use of insulin (H)  Currently well-controlled we will continue to monitor and provide assistance as needed.  Coronary artery disease involving coronary bypass graft of native heart without angina pectoris  Continue to follow-up with cardiology regarding CAD and complex cholesterol management     The longitudinal plan of care for the diagnosis(es)/condition(s) as documented were addressed during this visit. Due to the added complexity in care, I will continue to support Mathew in the subsequent management and with ongoing continuity of care.  I spent a total of 25 minutes on the day of the visit. This was in addition to the usual preventive visit   Time spent by me today doing chart review, history and exam, documentation and further activities per the note     Consent was obtained from the patient to use an AI documentation tool in the creation of this note.    Patient has been advised of split billing requirements and indicates understanding: Yes        BMI  Estimated body mass index is 30.31 kg/m  as calculated from the following:    Height as of this encounter: 1.829 m (6').    Weight as of this encounter: 101.4 kg (223 lb 8 oz).   Mathew was seen today for physical.    Diagnoses and all orders for this visit:    Routine general medical examination at a health care facility    Screening for prostate cancer  -     Prostate spec antigen screen; Future    Need for MMR vaccine    Type 2 diabetes mellitus without complication, without long-term current use of insulin (H)  -     Albumin Random Urine Quantitative with Creat Ratio; Future    Type 2 diabetes mellitus with other circulatory complication, without long-term current use of insulin (H)    Coronary artery disease involving coronary bypass graft of native heart without angina pectoris       The longitudinal plan of care for the diagnosis(es)/condition(s) as documented were addressed during this visit. Due to the added complexity in care, I  will continue to support Mathew in the subsequent management and with ongoing continuity of care.  I spent a total of 25 minutes on the day of the visit.   Time spent by me today doing chart review, history and exam, documentation and further activities per the note  During    Counseling  Appropriate preventive services were addressed with this patient via screening, questionnaire, or discussion as appropriate for fall prevention, nutrition, physical activity, Tobacco-use cessation, social engagement, weight loss and cognition.  Checklist reviewing preventive services available has been given to the patient.  Reviewed patient's diet, addressing concerns and/or questions.   He is at risk for lack of exercise and has been provided with information to increase physical activity for the benefit of his well-being.   Patient is at risk for social isolation and has been provided with information about the benefit of social connection.   The patient was instructed to see the dentist every 6 months.           Return in about 53 weeks (around 3/23/2026) for Annual Wellness Visit.    Jose Carmona is a 64 year old, presenting for the following:  Physical        3/17/2025     8:50 AM   Additional Questions   Roomed by David         3/17/2025    Information    services provided? No          HPI  Mathew Pool, 64 years    Coronary Artery Disease  - Underwent bypass graft surgery at age 51  - No history of heart attack or valve damage  - Severe artery blockages detected through testing, leading to surgery within 36 hours  - Not on alpha inhibitors or beta blockers currently    Fatigue  - Reports severe fatigue impacting daily activities  - Believes fatigue affects enjoyment of activities  - No current treatment for fatigue    Depression  - Does not agree with the diagnosis of major depression  - Previously saw a therapist for a few weeks, which did not resolve issues  - Stopped taking a medication that  improved mood  - Experienced a difficult year with multiple stressors, including family deaths and personal health issues    Diabetes  - Recent A1c of 6.9, indicating a new diagnosis of diabetes  - Previously had an A1c of 6.4 a couple of years ago  - Reports increased stress and decreased exercise over the past year  - Consumes more convenience foods due to family obligations    Testicular Hypofunction  - Diagnosed with low testosterone  - Tests indicated borderline low levels  - No treatment recommended at this time    Kidney Stones  - History of recurrent kidney stones    Cataracts  - No cataract surgery performed  - Older brother has had cataract surgery    Family History  - Older brothers experiencing prostate issues; one with a permanent catheter, another with suspected cancer that was not confirmed    Misc  - Laid off from work, previously in [employer] industry  - Reports a swollen, red head with fever of 102 F three times in the past 12 months, cause unknown  - Diagnosed with joint problems  - Previously had a job within walking distance, allowing for regular exercise        Advance Care Planning  Patient does not have a Health Care Directive: Discussed advance care planning with patient; information given to patient to review.      3/16/2025   General Health   How would you rate your overall physical health? (!) FAIR   Feel stress (tense, anxious, or unable to sleep) Only a little   (!) STRESS CONCERN      3/16/2025   Nutrition   Three or more servings of calcium each day? Yes   Diet: Low salt    Low fat/cholesterol    Carbohydrate counting   How many servings of fruit and vegetables per day? 4 or more   How many sweetened beverages each day? 0-1       Multiple values from one day are sorted in reverse-chronological order         3/16/2025   Exercise   Days per week of moderate/strenous exercise 2 days   Average minutes spent exercising at this level 30 min   (!) EXERCISE CONCERN      3/16/2025   Social  "Factors   Frequency of gathering with friends or relatives Never   Worry food won't last until get money to buy more No   Food not last or not have enough money for food? No   Do you have housing? (Housing is defined as stable permanent housing and does not include staying ouside in a car, in a tent, in an abandoned building, in an overnight shelter, or couch-surfing.) Yes   Are you worried about losing your housing? No   Lack of transportation? No   Unable to get utilities (heat,electricity)? No   (!) SOCIAL CONNECTIONS CONCERN      3/16/2025   Fall Risk   Fallen 2 or more times in the past year? No   Trouble with walking or balance? No          3/16/2025   Dental   Dentist two times every year? (!) NO           Today's PHQ-2 Score:       3/16/2025     7:58 PM   PHQ-2 ( 1999 [employer])   Q1: Little interest or pleasure in doing things 0   Q2: Feeling down, depressed or hopeless 0   PHQ-2 Score 0    Q1: Little interest or pleasure in doing things Not at all   Q2: Feeling down, depressed or hopeless Not at all   PHQ-2 Score 0       Patient-reported           3/16/2025   Substance Use   Alcohol more than 3/day or more than 7/wk Not Applicable   Do you use any other substances recreationally? No     Social History     Tobacco Use    Smoking status: Never     Passive exposure: Past    Smokeless tobacco: Never   Vaping Use    Vaping status: Never Used   Substance Use Topics    Alcohol use: No    Drug use: No           3/16/2025   STI Screening   New sexual partner(s) since last STI/HIV test? No   Last PSA: No results found for: \"PSA\"  ASCVD Risk   The 10-year ASCVD risk score (Orion BERKOWITZ, et al., 2019) is: 15.4%    Values used to calculate the score:      Age: 64 years      Sex: Male      Is Non- : No      Diabetic: Yes      Tobacco smoker: No      Systolic Blood Pressure: 129 mmHg      Is BP treated: No      HDL Cholesterol: 61 mg/dL      Total Cholesterol: 143 mg/dL           Reviewed and " updated as needed this visit by Provider   Tobacco  Allergies  Meds  Problems  Med Hx  Surg Hx  Fam Hx                     Objective    Exam  /82   Pulse 66   Temp 98  F (36.7  C) (Temporal)   Resp 14   Ht 1.829 m (6')   Wt 101.4 kg (223 lb 8 oz)   SpO2 98%   BMI 30.31 kg/m     Estimated body mass index is 30.31 kg/m  as calculated from the following:    Height as of this encounter: 1.829 m (6').    Weight as of this encounter: 101.4 kg (223 lb 8 oz).    Physical Exam  Vitals reviewed.   Constitutional:       General: He is not in acute distress.     Appearance: He is well-developed. He is not diaphoretic.   HENT:      Head: Normocephalic.   Eyes:      General: No scleral icterus.     Conjunctiva/sclera: Conjunctivae normal.   Neck:      Thyroid: No thyromegaly.   Cardiovascular:      Rate and Rhythm: Normal rate and regular rhythm.      Pulses:           Dorsalis pedis pulses are 1+ on the right side and 1+ on the left side.        Posterior tibial pulses are 1+ on the right side and 1+ on the left side.      Heart sounds: Normal heart sounds. No murmur heard.  Pulmonary:      Effort: Pulmonary effort is normal. No respiratory distress.      Breath sounds: Normal breath sounds. No wheezing.   Musculoskeletal:      Cervical back: Normal range of motion.      Left lower leg: No edema.   Feet:      Right foot:      Protective Sensation: 10 sites tested.  10 sites sensed.      Skin integrity: Skin integrity normal.      Toenail Condition: Right toenails are normal.      Left foot:      Protective Sensation: 10 sites tested.  10 sites sensed.      Skin integrity: Skin integrity normal.      Toenail Condition: Left toenails are normal.   Skin:     General: Skin is warm and dry.   Neurological:      Mental Status: He is alert and oriented to person, place, and time. Mental status is at baseline.   Psychiatric:         Behavior: Behavior normal.         Thought Content: Thought content normal.          Judgment: Judgment normal.               Signed Electronically by: Sina Camargo MD

## 2025-03-17 NOTE — PATIENT INSTRUCTIONS
Patient Education   Preventive Care Advice   This is general advice given by our system to help you stay healthy. However, your care team may have specific advice just for you. Please talk to your care team about your preventive care needs.  Nutrition  Eat 5 or more servings of fruits and vegetables each day.  Try wheat bread, brown rice and whole grain pasta (instead of white bread, rice, and pasta).  Get enough calcium and vitamin D. Check the label on foods and aim for 100% of the RDA (recommended daily allowance).  Lifestyle  Exercise at least 150 minutes each week  (30 minutes a day, 5 days a week).  Do muscle strengthening activities 2 days a week. These help control your weight and prevent disease.  No smoking.  Wear sunscreen to prevent skin cancer.  Have a dental exam and cleaning every 6 months.  Yearly exams  See your health care team every year to talk about:  Any changes in your health.  Any medicines your care team has prescribed.  Preventive care, family planning, and ways to prevent chronic diseases.  Shots (vaccines)   HPV shots (up to age 26), if you've never had them before.  Hepatitis B shots (up to age 59), if you've never had them before.  COVID-19 shot: Get this shot when it's due.  Flu shot: Get a flu shot every year.  Tetanus shot: Get a tetanus shot every 10 years.  Pneumococcal, hepatitis A, and RSV shots: Ask your care team if you need these based on your risk.  Shingles shot (for age 50 and up)  General health tests  Diabetes screening:  Starting at age 35, Get screened for diabetes at least every 3 years.  If you are younger than age 35, ask your care team if you should be screened for diabetes.  Cholesterol test: At age 39, start having a cholesterol test every 5 years, or more often if advised.  Bone density scan (DEXA): At age 50, ask your care team if you should have this scan for osteoporosis (brittle bones).  Hepatitis C: Get tested at least once in your life.  STIs (sexually  transmitted infections)  Before age 24: Ask your care team if you should be screened for STIs.  After age 24: Get screened for STIs if you're at risk. You are at risk for STIs (including HIV) if:  You are sexually active with more than one person.  You don't use condoms every time.  You or a partner was diagnosed with a sexually transmitted infection.  If you are at risk for HIV, ask about PrEP medicine to prevent HIV.  Get tested for HIV at least once in your life, whether you are at risk for HIV or not.  Cancer screening tests  Cervical cancer screening: If you have a cervix, begin getting regular cervical cancer screening tests starting at age 21.  Breast cancer scan (mammogram): If you've ever had breasts, begin having regular mammograms starting at age 40. This is a scan to check for breast cancer.  Colon cancer screening: It is important to start screening for colon cancer at age 45.  Have a colonoscopy test every 10 years (or more often if you're at risk) Or, ask your provider about stool tests like a FIT test every year or Cologuard test every 3 years.  To learn more about your testing options, visit:   .  For help making a decision, visit:   https://bit.ly/se58993.  Prostate cancer screening test: If you have a prostate, ask your care team if a prostate cancer screening test (PSA) at age 55 is right for you.  Lung cancer screening: If you are a current or former smoker ages 50 to 80, ask your care team if ongoing lung cancer screenings are right for you.  For informational purposes only. Not to replace the advice of your health care provider. Copyright   2023 [facility]. All rights reserved. Clinically reviewed by the [facility] Transitions Program. [employer] [id number] - REV 01/24.  Relationships for Good Health  Relationships are important for our health and happiness. Social isolation, loneliness and lack of support are bad for your health. Studies show that loneliness can harm health  and limit your life span as much as high blood pressure and smoking.   Take some time to reflect on your relationships. Then answer these questions:  Are there people in your life that cause you stress or drain your energy? What can you do to set limits?  ________________________________________________________________________________________________________________________________________________________________________________________________________________________________________________________________________________________________________________________________________________  Who do you enjoy spending time with? Who can you go to for support?  ________________________________________________________________________________________________________________________________________________________________________________________________________________________________________________________________________________________________________________________________________________  What can you do to improve your relationships with others?  __________________________________________________________________________________________________________________________________________________________________________________________________________________  ______________________________________________________________________________________________________________________________  What do you like most about your relationships with others?  ________________________________________________________________________________________________________________________________________________________________________________________________________________________________________________________________________________________________________________________________________________  My goal: ______________________________________________________________________  I will:  ______________________________________________________________________________________________________________________________________________________________________________________________    For informational purposes only. Not to replace the advice of your health care provider. Copyright   2018 [facility]. All rights reserved. Clinically reviewed by [facility]  Team. Carolee [id number] - Rev 06/24.

## 2025-04-07 ENCOUNTER — OFFICE VISIT (OUTPATIENT)
Dept: FAMILY MEDICINE | Facility: CLINIC | Age: 65
End: 2025-04-07
Payer: COMMERCIAL

## 2025-04-07 VITALS
SYSTOLIC BLOOD PRESSURE: 136 MMHG | OXYGEN SATURATION: 96 % | TEMPERATURE: 98.2 F | RESPIRATION RATE: 14 BRPM | DIASTOLIC BLOOD PRESSURE: 82 MMHG | HEART RATE: 57 BPM

## 2025-04-07 DIAGNOSIS — E11.59 TYPE 2 DIABETES MELLITUS WITH OTHER CIRCULATORY COMPLICATION, WITHOUT LONG-TERM CURRENT USE OF INSULIN (H): ICD-10-CM

## 2025-04-07 DIAGNOSIS — A68.9 RECURRENT FEVER: Primary | ICD-10-CM

## 2025-04-07 DIAGNOSIS — Z23 NEED FOR VACCINATION: ICD-10-CM

## 2025-04-07 PROCEDURE — 3078F DIAST BP <80 MM HG: CPT | Performed by: FAMILY MEDICINE

## 2025-04-07 PROCEDURE — 99215 OFFICE O/P EST HI 40 MIN: CPT | Mod: 25 | Performed by: FAMILY MEDICINE

## 2025-04-07 PROCEDURE — 3075F SYST BP GE 130 - 139MM HG: CPT | Performed by: FAMILY MEDICINE

## 2025-04-07 PROCEDURE — G2211 COMPLEX E/M VISIT ADD ON: HCPCS | Performed by: FAMILY MEDICINE

## 2025-04-07 PROCEDURE — 90707 MMR VACCINE SC: CPT | Performed by: FAMILY MEDICINE

## 2025-04-07 PROCEDURE — 90471 IMMUNIZATION ADMIN: CPT | Performed by: FAMILY MEDICINE

## 2025-04-07 NOTE — PROGRESS NOTES
Assessment & Plan     Recurrent fever:  - Possible recurrent sinusitis or cellulitis. Consideration of immune system response changes due to Repatha. Differential diagnosis includes sinusitis and cellulitis.  - Recommend seeing an infectious disease specialist. Consider CT scan of sinuses during an episode. Emergency room visit suggested for quick scan access during an episode.    Need for vaccination:  - MMR vaccine considered despite past mild reaction to sterols.  - Recommend proceeding with MMR vaccination.    Consent was obtained from the patient to use an AI documentation tool in the creation of this note.  Mathew was seen today for rash.    Diagnoses and all orders for this visit:    Recurrent fever  -     Adult Infectious Disease  Referral; Future    Type 2 diabetes mellitus with other circulatory complication, without long-term current use of insulin (H)  -     Adult Eye  Referral; Future    Need for vaccination    Other orders  -     MMR (M-M-R II)       The longitudinal plan of care for the diagnosis(es)/condition(s) as documented were addressed during this visit. Due to the added complexity in care, I will continue to support Mathew in the subsequent management and with ongoing continuity of care.  I spent a total of 45 minutes on the day of the visit.   Time spent by me today doing chart review, history and exam, documentation and further activities per the note           BMI  Estimated body mass index is 30.31 kg/m  as calculated from the following:    Height as of 3/17/25: 1.829 m (6').    Weight as of 3/17/25: 101.4 kg (223 lb 8 oz).             No follow-ups on file.    Subjective   Mathew is a 64 year old, presenting for the following health issues:  Rash (Fever 102 and rash on face )        4/7/2025    10:21 AM   Additional Questions   Roomed by David         4/7/2025    Information    services provided? No     Rash  Associated symptoms include a rash.    Mathew JAN  [place], 64 years    Recurrent Rash and Fever  Patient currently does not have a rash or fever though there are pictures in media section that show his rash.  He presents today to see if there is anything we need to do or a strategy to try and figure out what is going on.  - Reports recurrent episodes of rash, fever, and swelling, primarily affecting the face and ears.  - First episode occurred in February 2021, with symptoms including fever, headache, sinus issues, left earache, swelling on the left side of the face, and rash near the temple. Treated with amoxicillin.  - Similar episode in 2024 with rash and swelling, treated with amoxicillin.  - In May 2024, experienced fever, rash, fatigue, and joint pain; possible tick bite considered, treated with doxycycline.  - Most recent episode in February 2025, with fever, chills, rash, fatigue, swelling in the right ear and right side of the head. Rash was warm and painful to touch, extending under the hair. Treated with levofloxacin.  - Fever during episodes ranged from 102 to 103 degrees Fahrenheit, lasting about 3.5 days.  - No consistent triggers identified; no correlation with food, detergents, or environmental factors.  - No history of camping or farm visits; low likelihood of tick exposure.  - Wife has not experienced similar symptoms, suggesting non-contagious nature.  - Blood tests conducted during episodes showed no significant findings.  - Sinus pressure reported during some episodes, but no sneezing or runny nose.  - External ear redness noted, with no issues behind the eardrum.  - Fatigue persists even after resolution of fever and rash.  Patient and wife have kept a food diary and have no consistent pattern of certain foods they thought of possibly alpha gal as being a possible allergy though they had no history of eating red meat around the time of these episodes and also alpha-gal allergy does not commonly show fever  Misc  - Has been on Repatha for  several years, with no consistent pattern of reactions following injections.  - No history of international travel except for a visit to [place], with no correlation to episodes.  - Reports no significant changes in lifestyle or activities prior to episodes.                  Objective    /82   Pulse 57   Temp 98.2  F (36.8  C) (Temporal)   Resp 14   SpO2 96%   There is no height or weight on file to calculate BMI.  Physical Exam  Vitals reviewed.   Constitutional:       General: He is not in acute distress.     Appearance: He is well-developed. He is not diaphoretic.   HENT:      Head: Normocephalic and atraumatic.      Comments: No current rashes swelling tenderness or fever on the head or face.  Eyes:      General: No scleral icterus.     Conjunctiva/sclera: Conjunctivae normal.   Neck:      Thyroid: No thyromegaly.   Cardiovascular:      Rate and Rhythm: Normal rate and regular rhythm.      Heart sounds: Normal heart sounds. No murmur heard.  Pulmonary:      Effort: Pulmonary effort is normal. No respiratory distress.      Breath sounds: Normal breath sounds. No wheezing.   Musculoskeletal:      Cervical back: Normal range of motion.      Left lower leg: No edema.   Skin:     General: Skin is warm and dry.   Neurological:      Mental Status: He is alert and oriented to person, place, and time. Mental status is at baseline.   Psychiatric:         Behavior: Behavior normal.         Thought Content: Thought content normal.         Judgment: Judgment normal.                    Signed Electronically by: Sina Camargo MD

## 2025-04-21 ENCOUNTER — OFFICE VISIT (OUTPATIENT)
Dept: OPHTHALMOLOGY | Facility: CLINIC | Age: 65
End: 2025-04-21
Attending: OPTOMETRIST
Payer: COMMERCIAL

## 2025-04-21 DIAGNOSIS — H52.203 MYOPIA OF BOTH EYES WITH ASTIGMATISM AND PRESBYOPIA: Primary | ICD-10-CM

## 2025-04-21 DIAGNOSIS — E11.59 TYPE 2 DIABETES MELLITUS WITH OTHER CIRCULATORY COMPLICATION, WITHOUT LONG-TERM CURRENT USE OF INSULIN (H): ICD-10-CM

## 2025-04-21 DIAGNOSIS — H52.4 MYOPIA OF BOTH EYES WITH ASTIGMATISM AND PRESBYOPIA: Primary | ICD-10-CM

## 2025-04-21 DIAGNOSIS — H52.13 MYOPIA OF BOTH EYES WITH ASTIGMATISM AND PRESBYOPIA: Primary | ICD-10-CM

## 2025-04-21 DIAGNOSIS — H01.02A SQUAMOUS BLEPHARITIS OF UPPER AND LOWER EYELIDS OF BOTH EYES: ICD-10-CM

## 2025-04-21 DIAGNOSIS — H01.02B SQUAMOUS BLEPHARITIS OF UPPER AND LOWER EYELIDS OF BOTH EYES: ICD-10-CM

## 2025-04-21 PROCEDURE — 99213 OFFICE O/P EST LOW 20 MIN: CPT | Performed by: OPTOMETRIST

## 2025-04-21 ASSESSMENT — REFRACTION_WEARINGRX
OS_SPHERE: -5.00
OD_SPHERE: -4.50
OD_AXIS: 106
OS_ADD: +2.75
OD_CYLINDER: +2.25
OD_ADD: +2.75
OS_AXIS: 098
OD_CYLINDER: +2.25
OD_AXIS: 106
OD_SPHERE: -6.50
OS_CYLINDER: +0.75
OS_AXIS: 098
OS_CYLINDER: +0.75
OS_SPHERE: -7.00

## 2025-04-21 ASSESSMENT — VISUAL ACUITY
OD_CC: 20/40
OD_PH_CC: 20/25
OS_CC+: -3
METHOD: SNELLEN - LINEAR
OD_PH_CC+: -1
OD_CC+: -1
CORRECTION_TYPE: GLASSES
OS_CC: 20/25

## 2025-04-21 ASSESSMENT — REFRACTION_MANIFEST
OS_AXIS: 098
OD_ADD: +2.25
OD_SPHERE: -6.00
OS_ADD: +2.25
OD_AXIS: 106
OD_CYLINDER: +2.25
OS_CYLINDER: +0.75
OS_SPHERE: -6.50

## 2025-04-21 ASSESSMENT — CONF VISUAL FIELD
METHOD: COUNTING FINGERS
OS_INFERIOR_NASAL_RESTRICTION: 0
OS_NORMAL: 1
OD_SUPERIOR_TEMPORAL_RESTRICTION: 0
OD_INFERIOR_NASAL_RESTRICTION: 0
OS_SUPERIOR_TEMPORAL_RESTRICTION: 0
OS_SUPERIOR_NASAL_RESTRICTION: 0
OD_INFERIOR_TEMPORAL_RESTRICTION: 0
OD_NORMAL: 1
OD_SUPERIOR_NASAL_RESTRICTION: 0
OS_INFERIOR_TEMPORAL_RESTRICTION: 0

## 2025-04-21 ASSESSMENT — CUP TO DISC RATIO
OD_RATIO: 0.45
OS_RATIO: 0.15

## 2025-04-21 ASSESSMENT — TONOMETRY
OD_IOP_MMHG: 13
OS_IOP_MMHG: 12
IOP_METHOD: ICARE

## 2025-04-21 ASSESSMENT — SLIT LAMP EXAM - LIDS
COMMENTS: 1+ BLEPHARITIS
COMMENTS: 1+ BLEPHARITIS

## 2025-04-21 ASSESSMENT — EXTERNAL EXAM - LEFT EYE: OS_EXAM: NORMAL

## 2025-04-21 ASSESSMENT — EXTERNAL EXAM - RIGHT EYE: OD_EXAM: NORMAL

## 2025-04-21 NOTE — NURSING NOTE
Chief Complaints and History of Present Illnesses   Patient presents with    Eye Exam For Diabetes     Type 2 diabetes mellitus with other circulatory complication, without long-term current use of insulin     Chief Complaint(s) and History of Present Illness(es)       Eye Exam For Diabetes              Laterality: both eyes    Associated symptoms: tearing and floaters.  Negative for glare, haloes, dryness, eye pain and flashes    Pain scale: 0/10    Comments: Type 2 diabetes mellitus with other circulatory complication, without long-term current use of insulin              Comments    JAIME was about 6 years ago.  Pt states vision is about the same.  Pt broke most recent glasses.  No pain.  No flashes.  No change to floaters.  Some occasional tearing the past few years.  No ocular meds.  Per Pt he is Pre-DM.  Lab Results       Component                Value               Date                       A1C                      6.9                 02/21/2025                 A1C                      6.4                 11/16/2022                 A1C                      6.2                 01/06/2022                 A1C                      6.2                 11/19/2021                 A1C                      5.9                 01/18/2021                 A1C                      5.9                 02/15/2017                 A1C                      6.3                 06/24/2016                 A1C                      5.8                 11/24/2015                 A1C                      6.0                 02/02/2015              KWAME Roberto April 21, 2025 9:32 AM

## 2025-04-21 NOTE — PATIENT INSTRUCTIONS
Borderline diabetes  No pain or redness  No itching of lids      New prescription for glasses   Bleph warm compresses with cleaning at night  NO DBR each eye  Explaine diabetes and need for yearly exam  Reinforced BS control  Yearly exam

## 2025-04-21 NOTE — PROGRESS NOTES
Assessment & Plan       Hong Pool is a 64 year old male with the following diagnoses:   1. Myopia of both eyes with astigmatism and presbyopia - Both Eyes    2. Squamous blepharitis of upper and lower eyelids of both eyes - Both Eyes    3. Type 2 diabetes mellitus with other circulatory complication, without long-term current use of insulin (H) - Both Eyes        Borderline diabetes  No pain or redness  No itching of lids     New prescription for glasses   Bleph warm compresses with cleaning at night  NO DBR each eye  Explaine diabetes and need for yearly exam  Reinforced BS control  Yearly exam        Patient disposition:   No follow-ups on file.          Complete documentation of historical and exam elements from today's encounter can be found in the full encounter summary report (not reduplicated in this progress note). I personally obtained the chief complaint(s) and history of present illness.  I confirmed and edited as necessary the review of systems, past medical/surgical history, family history, social history, and examination findings as documented by others; and I examined the patient myself. I personally reviewed the relevant tests, images, and reports as documented above. I formulated and edited as necessary the assessment and plan and discussed the findings and management plan with the patient and family.  Dr. Cesario Subramanian

## 2025-04-24 ENCOUNTER — TELEPHONE (OUTPATIENT)
Dept: INFECTIOUS DISEASES | Facility: CLINIC | Age: 65
End: 2025-04-24
Payer: COMMERCIAL

## 2025-04-24 NOTE — TELEPHONE ENCOUNTER
EP called 4/24 to sched a New ID visit with any ID provider for next avail. Referral from Dr. Sina Camargo MD.

## 2025-05-07 ENCOUNTER — OFFICE VISIT (OUTPATIENT)
Dept: FAMILY MEDICINE | Facility: CLINIC | Age: 65
End: 2025-05-07
Payer: COMMERCIAL

## 2025-05-07 VITALS
DIASTOLIC BLOOD PRESSURE: 86 MMHG | HEART RATE: 72 BPM | TEMPERATURE: 97.7 F | WEIGHT: 219.4 LBS | OXYGEN SATURATION: 92 % | SYSTOLIC BLOOD PRESSURE: 131 MMHG | RESPIRATION RATE: 14 BRPM | BODY MASS INDEX: 29.72 KG/M2 | HEIGHT: 72 IN

## 2025-05-07 DIAGNOSIS — E11.59 TYPE 2 DIABETES MELLITUS WITH OTHER CIRCULATORY COMPLICATION, WITHOUT LONG-TERM CURRENT USE OF INSULIN (H): Primary | ICD-10-CM

## 2025-05-07 DIAGNOSIS — M19.042 PRIMARY OSTEOARTHRITIS OF BOTH HANDS: ICD-10-CM

## 2025-05-07 DIAGNOSIS — M19.041 PRIMARY OSTEOARTHRITIS OF BOTH HANDS: ICD-10-CM

## 2025-05-07 DIAGNOSIS — R53.83 FATIGUE, UNSPECIFIED TYPE: ICD-10-CM

## 2025-05-07 LAB
CRP SERPL-MCNC: <3 MG/L
EST. AVERAGE GLUCOSE BLD GHB EST-MCNC: 137 MG/DL
HBA1C MFR BLD: 6.4 % (ref 0–5.6)
TSH SERPL DL<=0.005 MIU/L-ACNC: 0.61 UIU/ML (ref 0.3–4.2)
VIT D+METAB SERPL-MCNC: 66 NG/ML (ref 20–50)

## 2025-05-07 ASSESSMENT — PATIENT HEALTH QUESTIONNAIRE - PHQ9
SUM OF ALL RESPONSES TO PHQ QUESTIONS 1-9: 10
SUM OF ALL RESPONSES TO PHQ QUESTIONS 1-9: 10
10. IF YOU CHECKED OFF ANY PROBLEMS, HOW DIFFICULT HAVE THESE PROBLEMS MADE IT FOR YOU TO DO YOUR WORK, TAKE CARE OF THINGS AT HOME, OR GET ALONG WITH OTHER PEOPLE: SOMEWHAT DIFFICULT

## 2025-05-07 NOTE — PROGRESS NOTES
Assessment & Plan     Type 2 diabetes mellitus with other circulatory complication, without long-term current use of insulin (H)  - Diabetes is present with an A1c of 6.9, indicating diabetes. No current use of diabetes medication.  - Start metformin for blood sugar control and its additional benefits. Patient prefers to attempt lifestyle changes before starting medication.    Fatigue, unspecified type  - Fatigue not likely due to depression or sleep apnea. Possible long COVID considered. Thyroid function test (TSH) recommended.  - Blood draw for further evaluation. Encourage a structured exercise plan to improve conditioning and manage fatigue. Referral to long COVID clinic if symptoms persist.    Primary osteoarthritis of both hands  - Osteoarthritis present with pain in fingers.  - Try Voltaren gel for localized pain relief. Consider ice baths for symptom management.    Consent was obtained from the patient to use an AI documentation tool in the creation of this note.    Mathew was seen today for follow up.    Diagnoses and all orders for this visit:    Type 2 diabetes mellitus with other circulatory complication, without long-term current use of insulin (H)  -     HEMOGLOBIN A1C; Future    Fatigue, unspecified type  -     TSH with free T4 reflex; Future  -     CRP inflammation; Future  -     Vitamin D Deficiency; Future    Primary osteoarthritis of both hands  -     diclofenac (VOLTAREN) 1 % topical gel; Apply 4 grams to knees or 2 grams to hands four times daily using enclosed dosing card.       The longitudinal plan of care for the diagnosis(es)/condition(s) as documented were addressed during this visit. Due to the added complexity in care, I will continue to support Mathew in the subsequent management and with ongoing continuity of care.  I spent a total of 64 minutes on the day of the visit.   Time spent by me today doing chart review, history and exam, documentation and further activities per the note    No  "follow-ups on file.          BMI  Estimated body mass index is 29.76 kg/m  as calculated from the following:    Height as of this encounter: 1.829 m (6').    Weight as of this encounter: 99.5 kg (219 lb 6.4 oz).             Jose Carmona is a 64 year old, presenting for the following health issues:  Follow Up      5/7/2025     9:26 AM   Additional Questions   Roomed by David         5/7/2025    Information    services provided? No     HPI Mathew Pool, 64 years    Fatigue and Cognitive Issues  - Reports significant fatigue, often falling asleep during the day, with naps ranging from 15 minutes to 2 hours  - Experiences variability in symptoms, with some good days and more frequent bad days  - Describes difficulty thinking through situations, experiencing a \"fog\"  - Noted worsening of symptoms over the past year  - History of low vitamin D and iron levels, currently on supplements  - Thyroid was checked in the past, but not recently  - Symptoms perceived to have worsened during COVID, with consideration of long COVID  - Reports feeling uneasy and shaky when pushing through fatigue without resting  - Experiences fatigue quickly, often feeling most productive in the morning    Sleep Concerns  - Has not been observed snoring by his wife, who often stays up reading after he falls asleep  - Previously received a referral for a sleep clinic but did not follow through  - Wife and older brother use CPAP machines    Mood and Motivation  - Struggles with starting projects due to lack of energy and concentration  - Feels hesitant to start tasks, fearing they will remain incomplete  - Reports feeling in a \"spiral\" but occasionally has good days  - Does not feel down, depressed, or hopeless    Pain and Arthritis  - Has arthritis, with nodules in fingers and some hip joint involvement  - Reports pain in fingers, worsened by accidental knocks  - History of a torn tendon around the elbow 10-15 years ago, " treated with ibuprofen, which caused sleep disturbances  - Avoids pain medication due to side effects  - Experiences hip pain when lifting or twisting, but it has not worsened significantly over 20-30 years    Diabetes and Weight  - Previously self-medicated with ice cream due to family issues, leading to weight gain  - Last A1c was 6.9, indicating diabetes  - Eye exam showed no damage to blood vessels in the eyes    Social and Lifestyle Factors  - Laid off from job, leading to forced [employer] and financial stress  - Reports stress as a factor contributing to fatigue and mental stress                  Objective    /86   Pulse 72   Temp 97.7  F (36.5  C) (Temporal)   Resp 14   Ht 1.829 m (6')   Wt 99.5 kg (219 lb 6.4 oz)   SpO2 92%   BMI 29.76 kg/m    Body mass index is 29.76 kg/m .  Physical Exam  Vitals reviewed.   Constitutional:       General: He is not in acute distress.     Appearance: He is well-developed. He is not diaphoretic.   HENT:      Head: Normocephalic.   Eyes:      General: No scleral icterus.     Conjunctiva/sclera: Conjunctivae normal.   Neck:      Thyroid: No thyromegaly.   Cardiovascular:      Rate and Rhythm: Normal rate and regular rhythm.      Heart sounds: Normal heart sounds. No murmur heard.  Pulmonary:      Effort: Pulmonary effort is normal. No respiratory distress.      Breath sounds: Normal breath sounds. No wheezing.   Musculoskeletal:      Cervical back: Normal range of motion.      Left lower leg: No edema.   Skin:     General: Skin is warm and dry.   Neurological:      Mental Status: He is alert and oriented to person, place, and time. Mental status is at baseline.   Psychiatric:         Behavior: Behavior normal.         Thought Content: Thought content normal.         Judgment: Judgment normal.                    Signed Electronically by: Sina Camargo MD    Answers submitted by the patient for this visit:  Patient Health Questionnaire (Submitted on 5/7/2025)  If  you checked off any problems, how difficult have these problems made it for you to do your work, take care of things at home, or get along with other people?: Somewhat difficult  PHQ9 TOTAL SCORE: 10

## 2025-05-08 ENCOUNTER — RESULTS FOLLOW-UP (OUTPATIENT)
Dept: FAMILY MEDICINE | Facility: CLINIC | Age: 65
End: 2025-05-08

## 2025-05-08 ASSESSMENT — PATIENT HEALTH QUESTIONNAIRE - PHQ9: SUM OF ALL RESPONSES TO PHQ QUESTIONS 1-9: 6

## 2025-05-19 ENCOUNTER — OFFICE VISIT (OUTPATIENT)
Dept: INFECTIOUS DISEASES | Facility: CLINIC | Age: 65
End: 2025-05-19
Attending: STUDENT IN AN ORGANIZED HEALTH CARE EDUCATION/TRAINING PROGRAM
Payer: COMMERCIAL

## 2025-05-19 VITALS
WEIGHT: 220 LBS | TEMPERATURE: 98.5 F | SYSTOLIC BLOOD PRESSURE: 153 MMHG | DIASTOLIC BLOOD PRESSURE: 78 MMHG | HEART RATE: 67 BPM | OXYGEN SATURATION: 97 % | BODY MASS INDEX: 29.84 KG/M2

## 2025-05-19 DIAGNOSIS — R53.83 OTHER FATIGUE: ICD-10-CM

## 2025-05-19 DIAGNOSIS — Z87.2: ICD-10-CM

## 2025-05-19 DIAGNOSIS — A68.9 RECURRENT FEVER: Primary | ICD-10-CM

## 2025-05-19 PROCEDURE — 1126F AMNT PAIN NOTED NONE PRSNT: CPT

## 2025-05-19 PROCEDURE — 99213 OFFICE O/P EST LOW 20 MIN: CPT

## 2025-05-19 PROCEDURE — 99417 PROLNG OP E/M EACH 15 MIN: CPT

## 2025-05-19 PROCEDURE — 99205 OFFICE O/P NEW HI 60 MIN: CPT

## 2025-05-19 PROCEDURE — 3077F SYST BP >= 140 MM HG: CPT

## 2025-05-19 PROCEDURE — 3078F DIAST BP <80 MM HG: CPT

## 2025-05-19 ASSESSMENT — PAIN SCALES - GENERAL: PAINLEVEL_OUTOF10: NO PAIN (0)

## 2025-05-19 NOTE — PROGRESS NOTES
[facility]  Infectious Disease Clinic Note:  New Patient     Patient:  Hong Pool, Date of birth 1960, Medical record number [id number]  Date of Visit:  05/19/2025  Consult requested by Dr.Timothy Camargo for evaluation of recurrent fever, rash, swelling of ear, bilateral         Assessment and Recommendations:       Assessment:  63 y/o M on Evolocumab with 4 separate episodes  since 2021  of fever, headache, rash, swelling of the ears, in various combinations as below, last in 02/2025, none current. Pictures from 3/2025 reviewed in chart(included pictures of past episodes). CRP 27 in 2/2025, since then normal.     Based on our discussion today and review of pictures, presentation was not definitively suggestive of a a classic infectious etiology .One possibility is a dermatitis, which may or may not be secondarily infected.  Otitis externa has been considered in past episodes. This is a possibility as well based on pictures. Perichondritis(including relapsing perichondritis) considered in differential but involvement not affecting ears alone/always. Shingles and sinusitis were considered but pictures not classic for them(crosses midline, no sinus symptoms) Did have a tooth issue before episodes started, which is one of the most common causes of sinusitis. Appearance felt more characteristic of dermatitis than cellulitis on pictures. Evolocumab lists among its reported side effects rash, dermatitis and nasopharyngeal infections(most common) so I wonder if Evolocumab is the underlying cause, whatever the actual diagnosis ends up being, infectious or non infectious. Reviewed  reports in literature of dermatitis(rare) and rash with evolocumab - References: 1.  Drugs [employer] Outcomes. 2024 Jul 2;11(3):465-475. doi: 10.1007/y71234-464-10458-2 2.  [email or web address]/10.1272/jnms.[email or web address].[email or web address],. Advised to discuss consideration with prescribing provider, but I don't think we have  enough evidenbce currently to stop altogether, but will be a risk benefit consideration especially if this recurs.     To help tease out actual diagnosis, when another episode happens, advised Mathew to reach out with a [email or web address] message with pictures attached - I can help direct additional tests, imaging, and antibiotics or visits as needed. Will consider CT sinuses/temporal,skin biopsy/cultures in that event.  I have also created a Dermatology referral so Mathew can get established with them - they can help tease out dermatitis possibility, and also help with a skin biopsy if another episode happens.    For fatigue and history of fevers - noted low WBc and platelets, would be prudent to rule out tickborne infections. I ordered Babesia, anaplasma, ehrlicha PCRs.Given fevers upto 102-103 also getting blood cultures to complete infectious workup. Noted Lyme screen neg x 2. He has a positive SOTO, with neg rheum workup otherwise. Reviewed Rheum visit 7/2024.     Recommendations:  - To help tease diagnosis, if/when another episode happens, advised Mathew to reach out with a Tueet message with pictures attached expediently  - If an episode happens when he is out of town([place] etc), advised to seek help at an urgent care  - Dermatology referral  - Labs - blood cultures, anaplasma, ehrlichia, babesia pcrs  - Follow up as needed  Addendum: Post visit reviewed 2021 CT sinuses w tiny mucus retention cyst along the lateral wall of the right maxillary sinus, otherwise the paranasal sinuses are clear. 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 ENT planned observation at the time. This may make  intracranial spread of infection a easier. Noted mention of tooth pain in first two episodes. Noted drainage from left nostril that is very yellow on second episode. This makes sinusitis and dental issues a possibility. Sent patient a mychart message to discuss consideration of dental visit and repeat scan after  [facility].     I have reviewed vitals, labs, images, cultures and antibiotics    Total time on day of visit including chart review, visit, counseling, documentation and coordination of care: 95 minutes    Quincy Eric  Staff Physician  Division of Infectious Diseases           History of the Infectious Disease lllness:     63 y/o M referred for  recurrent episodes of rash, fever, and swelling, primarily affecting the face and ears.     Reviewed very helpful notes patient brought along, and pictures in chart from 3/2025    He has been on Evolocumab for hypercholesterolemia.     Episodes started in 2/2021 per pt notes, corroborated by verbal history:    2/13/2021 -2/16/2021 - fever, headache, sinus issues, left ear ache, swelling of the left side of face, rash near temple. Treated with Amoxicillin - reviewed mychart messages 3/04/2021. 2/16 mychart message mentioned by pt, not seen on my review,     Reviewed 4/3/2021 ENT noted. He was evaluated by a dental specialist and initially recommended to have root canal on an upper left tooth. However, a second specialist could not identify a cause for his pain and did not recommend this surgery.   ENT plan was to get CT sinuses and Temporal bone and to evaluate for TMJ disorder and see Mathew back after. At 6/2021 follow up Dr. 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 Since he was asymptomatic other than  autophony, plan for observation as made. Mention of tooth pain.     2/10/2024 -2/13/2024 - Fever, swelling, jaw pain, fatigue, slept most days, was treated with Amoxicillin  Mention of tooth pain noted on family medicine visit.     05/12/2024-5/15/2024 - Office visit with [facility] on 15th (reviewed mychart 6/17/2024)- fever rash, fatigue, joint pain. Presumed bug or tick bite per provider, none per wife's exam. Treated w Doxycycline. Photo taken 5/15/25. Photos taken days after peak and do not show worst  Reviewed Family  "med notes, noted mention of migrating joint pain     2/20/2025 - 02/24/2025 - Fevers, chills, rash, fatigue, swelling of right ear and right side of head. Rash area warm and painful to touch, extended under hair and down neck. Photo taken on 2/20/2025. [facility]"" visit 2/24/2025. Was given Levofloxacin. Advised to stop due to tendinitis. Ear still red on 2/24/2025 - Drs appt both at \[facility]\"" and [place]. Noted diagnosis of otitis externa, and ciprodex prescribed.   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 Couldn't really tell if antibiotics helped in each episode or if they would have self resolved. By the time he is able to get in for visits they usually resolve. Usually a pressure from swelling more than pain. No runny nose/sinus congestion.headache or post nasal drip.     No camping/visiting a farm. Only international travel was to [place] Oct 1 to 8,2023. Mathew had a history of Covid August 2024 and March 2020. He visited the hospital between April 21st and May 8, 2024. They notice not pattern relation with food. No new soap or shampoo use, they try to use fragrance free since he tends to have sensitive skin    Fatigue persists, difficult to say if temporally related with rash episodes or covid.        Past Medical History:   Diagnosis Date    Adjustment disorder with depressed mood 10/25/2012    Borderline diabetes mellitus 11/26/2015    Coronary artery disease 01/01/2012    CABG x4    Coronary artery disease involving coronary bypass graft of native heart without angina pectoris 11/18/2015    Depression     Goiter     With normal TSH    Hyperlipidemia     Started on statin 7/2007.  Discontinued 10/07 secondary to muscle aches and fatigue    Nephrolithiasis "     Shoulder pain     Statin intolerance 06/23/2016       Past Surgical History:   Procedure Laterality Date    BYPASS GRAFT ARTERY CORONARY  6/24/2012    Procedure: BYPASS GRAFT ARTERY CORONARY;  Median Sternotomy, Coronary Artery Bypass Grafting x 4 using Left Internal Mammary and Left Saphenous Vein  with Endovein Harvesting. On Pump Oxygenation;  Surgeon: Genesis Larsen MD;  Location: [facility] OR    ESOPHAGOSCOPY, GASTROSCOPY, DUODENOSCOPY (EGD), COMBINED N/A 4/11/2017    Procedure: COMBINED ESOPHAGOSCOPY, GASTROSCOPY, DUODENOSCOPY (EGD), BIOPSY SINGLE OR MULTIPLE;  Surgeon: Corine Ly MD;  Location: [facility] GI    OPEN REDUCTION INTERNAL FIXATION ANKLE Right 11/18/2022    Procedure: OPEN REDUCTION INTERNAL FIXATION, FRACTURE, ANKLE RIGHT;  Surgeon: Claudio Macias DO;  Location: [facility] OR       Family History   Problem Relation Age of Onset    Diabetes Brother     Cerebrovascular Disease Brother     Cardiovascular Sister         tachyarrhythmia    C.A.D. Other         Uncle    Glaucoma No family hx of     Macular Degeneration No family hx of        Social History     Social History Narrative    .      Social History     Tobacco Use    Smoking status: Never     Passive exposure: Past    Smokeless tobacco: Never   Vaping Use    Vaping status: Never Used   Substance Use Topics    Alcohol use: No    Drug use: No       Immunization History   Administered Date(s) Administered    COVID-19 12+ (Pfizer) 11/14/2023, 07/19/2024, 12/09/2024    COVID-19 Bivalent 12+ (Pfizer) 11/01/2022    COVID-19 Bivalent 18+ (Moderna) 07/14/2023    COVID-19 MONOVALENT 12+ (Pfizer) 03/31/2021, 04/21/2021, 11/15/2021    COVID-19 Monovalent 18+ (Moderna) 05/06/2022    Flu, Unspecified 11/24/1989    HIB (PRP-T) 12/16/1998    HepB 11/04/1998, 12/16/1998, 05/18/1999    Influenza (IIV3) PF 11/03/2008, 08/31/2009, 10/12/2011, 09/07/2012, 11/01/2014    Influenza Vaccine 18-64 (Flublok) 11/07/2021    Influenza  Vaccine >6 months,quad, PF 01/17/2018, 11/06/2018, 10/05/2019, 01/03/2021, 12/17/2023    Influenza Vaccine Trivalent (FluBlok) 11/22/2024    Influenza,INJ,MDCK,PF,Quad >6mo(Flucelvax) 11/16/2022, 11/30/2022    MMR (MMRII) 04/07/2025    Pneumococcal 20 valent Conjugate (Prevnar 20) 11/22/2024    RSV (Abrysvo) 04/12/2024    TD,PF 7+ (Tenivac) 11/04/1998, 01/23/2006    TDAP (Adacel,Boostrix) 06/18/2012, 12/12/2022    Zoster recombinant adjuvanted (Shingrix) 06/27/2022, 03/24/2023       Patient Active Problem List   Diagnosis    CAD (coronary artery disease)    Hyperlipidemia LDL goal <70    Testicular hypofunction    Fatigue    Type 2 diabetes mellitus without complication, without long-term current use of insulin (H)    Calculus of gallbladder without cholecystitis without obstruction    Statin intolerance    Kidney stone    Functional dyspepsia    Gastroesophageal reflux disease without esophagitis    Optic cupping of both eyes    Myopia of both eyes with astigmatism and presbyopia    Age-related nuclear cataract    Osteoarthritis of finger, unspecified laterality    Digestive symptoms    Type 2 diabetes mellitus with other circulatory complication, without long-term current use of insulin (H)       Current Outpatient Medications   Medication Sig Dispense Refill    alpha-d-galactosidase (BEANO) tablet Take 2 tablets by mouth 3 times daily (before meals) 540 tablet 4    Ascorbic Acid (VITAMIN C) 500 MG CAPS Take 500 mg by mouth daily      aspirin 81 MG EC tablet Take 1 tablet (81 mg) by mouth daily.      Blood Pressure Monitor KIT 1 each daily. 1 kit 0    Cholecalciferol (VITAMIN D3) 2000 UNITS CAPS Take 1 capsule by mouth daily 90 capsule 4    diclofenac (VOLTAREN) 1 % topical gel Apply 4 grams to knees or 2 grams to hands four times daily using enclosed dosing card. 100 g 1    evolocumab (REPATHA) 140 MG/ML prefilled autoinjector Inject 1 mL (140 mg) subcutaneously every 14 days. 6 mL 3    LACTAID FAST ACT 9000  units TABS tablet 3,000-9,000 Units as needed  4    Multiple Vitamins-Minerals (MENS MULTIVITAMIN PLUS) TABS Take 1 tablet by mouth daily 90 tablet 4    potassium citrate (UROCIT-K) 10 MEQ (1080 MG) CR tablet Take 1 tablet (10 mEq) by mouth 3 times daily (with meals). 100 tablet 3    pyridOXINE (VITAMIN B6) 100 MG TABS Take 1 tablet (100 mg) by mouth daily 90 tablet 4    STATIN NOT PRESCRIBED, INTENTIONAL, 1 each continuous Statin not prescribed intentionally due to Intolerance (with supporting documentation of trying a statin at least once within the last 5 years) 0 each 0     No current facility-administered medications for this visit.       Allergies   Allergen Reactions    Lidocaine Rash     Break out    Adhesive Tape      Adhesive    Band-Aid Anti-Itch      Pad on the bandaid, antibacterial gauze that causes reaction    Cholestoff Complete [Plant Sterol Stanol-Pantethine] Other (See Comments)     Severe stomach pain    Lactose Diarrhea    Levofloxacin Muscle Pain (Myalgia)     Questionable achilles tendinopathy    Rosuvastatin Muscle Pain (Myalgia)    Simvastatin      Other reaction(s): Muscle Aches    Zocor [Simvastatin - High Dose] Other (See Comments)     Muscle aches/soreness, confusion, disorganized thinking              Physical Exam:   Vitals were reviewed.  All vitals stable  BP (!) 153/78   Pulse 67   Temp 98.5  F (36.9  C)   Wt 99.8 kg (220 lb)   SpO2 97%   BMI 29.84 kg/m      Exam:  GENERAL:  well-developed, well-nourished, alert, oriented, in no acute distress.  HEENT:  Head is normocephalic, atraumatic   EYES:  Eyes have anicteric sclerae.    LUNGS:  Breathing comfortably  SKIN:  No acute rashes.    NEUROLOGIC:  Grossly nonfocal.         Laboratory Data:       Inflammatory Markers    Recent Labs   Lab Test 02/24/25  0948 07/03/24  1032 12/08/21  1538 12/08/21  1440   SED 12 8 10  --    CRP  --   --   --  3.0       Metabolic Studies       Recent Labs   Lab Test 05/07/25  1049 02/24/25  0948  02/21/25  0826 06/17/24  1508 02/28/24  0844 01/09/23  1212 11/16/22  1510 11/16/22  1507 05/25/18  1107 10/09/17  1035   NA  --  140 139 142 140 141  --  140   < > 138   POTASSIUM  --  4.6 4.2 4.7 4.3 4.3  --  4.4   < > 4.4   CHLORIDE  --  103 102 105 104 103  --  104   < > 105   CO2  --  25 28 26 28 28  --  23   < > 29   ANIONGAP  --  12 9 11 8 10  --  13   < > 4   BUN  --  18.9 16.5 16.2 15.4 16.1  --  16.6   < > 18   CR  --  1.08 1.36* 1.24* 1.14 0.99  --  1.05   < > 1.07   GFRESTIMATED  --  77 58* 65 72 86  --  80   < > 71   GLC  --  146* 150* 111* 155* 119*  --  118*   < > 118*   A1C 6.4*  --  6.9*  --   --   --    < >  --    < >  --    TYSON  --  9.1 9.2 9.8 9.2 9.8  --  9.7   < > 9.1   CKT  --   --   --   --   --   --   --   --   --  125    < > = values in this interval not displayed.       Hematology Studies      Recent Labs   Lab Test 02/24/25  0948 06/17/24  1508 11/16/22  1507 05/25/18  1107   WBC 3.8* 6.3 6.3  --    ANEU 2.5  --  4.9  --    ALYM 0.9  --  0.8 0.6*   OTILIA 0.3  --  0.4  --    AEOS 0.2  --  0.1  --    HGB 14.8 15.6 14.5 15.7   HCT 44.9 47.1 43.4 51.2   * 156 168  --        Clotting Studies    Recent Labs   Lab Test 07/03/24  1032   INR 1.11       Urine Studies     Recent Labs   Lab Test 07/03/24  1032 11/11/21  1530 11/13/19  1153 08/13/18  0906 06/26/18  0000 06/05/18  1031   URINEPH 6.5 7.0 6.0 5.0 5.0 5.0   NITRITE Negative Negative Negative Negative Neg Negative   LEUKEST Trace* Negative  --  Negative Neg Negative   WBCU 0-5  --   --  1  --  2         Microbiology:  Last 6 Culture results with specimen source  Culture Micro   Date Value Ref Range Status   05/09/2005 No growth  Final   03/21/2005 No growth  Final    Specimen Description   Date Value Ref Range Status   11/13/2019 Urine  Final   01/24/2017 Feces  Final   06/22/2012 Nares  Final   07/16/2007 Serum  Final   05/09/2005 Midstream Urine  Final   03/21/2005 Midstream Urine  Final           Hepatitis C Antibody   Date Value  Ref Range Status   11/19/2021 Nonreactive Nonreactive Final       Imaging:  Results for orders placed or performed in visit on 07/03/24   XR Hand Bilateral G/E 3 Views    Narrative    XR HAND BILATERAL G/E 3 VIEWS 7/3/2024 10:46 AM     HISTORY: Other fatigue; Migratory polyarthritis; Positive SOTO  (antinuclear antibody)    COMPARISON: None.         Impression    IMPRESSION: Both hands negative for fracture or dislocation.  Degenerative change at multiple IP joints of both hands. No erosive  changes are identified. Degenerative change of the STT joint and first  CMC joint bilaterally.    VOLODYMYR COBB MD         SYSTEM ID:  [id number]

## 2025-05-19 NOTE — LETTER
5/19/2025       RE: Hong Pool  [address]  [facility] [address]     Dear Colleague,    Thank you for referring your patient, Hong Pool, to the [facility] INFECTIOUS DISEASE [facility] [place] at [facility]. Please see a copy of my visit note below.    [facility]  Infectious Disease Clinic Note:  New Patient     Patient:  Hong Pool, Date of birth 1960, Medical record number [id number]  Date of Visit:  05/19/2025  Consult requested by Dr.Timothy Camargo for evaluation of recurrent fever, rash, swelling of ear, bilateral         Assessment and Recommendations:       Assessment:  65 y/o M on Evolocumab with 4 separate episodes  since 2021  of fever, headache, rash, swelling of the ears, in various combinations as below, last in 02/2025, none current. Pictures from 3/2025 reviewed in chart(included pictures of past episodes). CRP 27 in 2/2025, since then normal.     Based on our discussion today and review of pictures, presentation was not definitively suggestive of a a classic infectious etiology .One possibility is a dermatitis, which may or may not be secondarily infected.  Otitis externa has been considered in past episodes. This is a possibility as well based on pictures. Perichondritis(including relapsing perichondritis) considered in differential but involvement not affecting ears alone/always. Shingles and sinusitis were considered but pictures not classic for them(crosses midline, no sinus symptoms) Did have a tooth issue before episodes started, which is one of the most common causes of sinusitis. Appearance felt more characteristic of dermatitis than cellulitis on pictures. Evolocumab lists among its reported side effects rash, dermatitis and nasopharyngeal infections(most common) so I wonder if Evolocumab is the underlying cause, whatever the actual diagnosis ends up being, infectious or non  infectious. Reviewed  reports in literature of dermatitis(rare) and rash with evolocumab - References: 1.  Drugs Real World Outcomes. 2024 Jul 2;11(3):465-475. doi: 10.1007/h00621-425-70510-6 2.  [email or web address],. Advised to discuss consideration with prescribing provider, but I don't think we have enough evidenbce currently to stop altogether, but will be a risk benefit consideration especially if this recurs.     To help tease out actual diagnosis, when another episode happens, advised Mathew to reach out with a [employer] message with pictures attached - I can help direct additional tests, imaging, and antibiotics or visits as needed. Will consider CT sinuses/temporal,skin biopsy/cultures in that event.  I have also created a Dermatology referral so Mathew can get established with them - they can help tease out dermatitis possibility, and also help with a skin biopsy if another episode happens.    For fatigue and history of fevers - noted low WBc and platelets, would be prudent to rule out tickborne infections. I ordered Babesia, anaplasma, ehrlicha PCRs.Given fevers upto 102-103 also getting blood cultures to complete infectious workup. Noted Lyme screen neg x 2. He has a positive SOTO, with neg rheum workup otherwise. Reviewed Rheum visit 7/2024.     Recommendations:  - To help tease diagnosis, if/when another episode happens, advised Mathew to reach out with a [employer] message with pictures attached expediently  - If an episode happens when he is out of town([place] etc), advised to seek help at an urgent care  - Dermatology referral  - Labs - blood cultures, anaplasma, ehrlichia, babesia pcrs  - Follow up as needed  Addendum: Post visit reviewed 2021 CT sinuses w tiny mucus retention cyst along the lateral wall of the right maxillary sinus, otherwise the paranasal sinuses are clear. 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 ENT planned observation at the  time. This may make  intracranial spread of infection a easier. Noted mention of tooth pain in first two episodes. Noted drainage from left nostril that is very yellow on second episode. This makes sinusitis and dental issues a possibility. Sent patient a mychart message to discuss consideration of dental visit and repeat scan after dw ENT.     I have reviewed vitals, labs, images, cultures and antibiotics    Total time on day of visit including chart review, visit, counseling, documentation and coordination of care: 95 minutes    Quincy Eric  Staff Physician  Division of Infectious Diseases           History of the Infectious Disease lllness:     65 y/o M referred for  recurrent episodes of rash, fever, and swelling, primarily affecting the face and ears.     Reviewed very helpful notes patient brought along, and pictures in chart from 3/2025    He has been on Evolocumab for hypercholesterolemia.     Episodes started in 2/2021 per pt notes, corroborated by verbal history:    2/13/2021 -2/16/2021 - fever, headache, sinus issues, left ear ache, swelling of the left side of face, rash near temple. Treated with Amoxicillin - reviewed mychart messages 3/04/2021. 2/16 mychart message mentioned by pt, not seen on my review,     Reviewed 4/3/2021 ENT noted. He was evaluated by a dental specialist and initially recommended to have root canal on an upper left tooth. However, a second specialist could not identify a cause for his pain and did not recommend this surgery.   ENT plan was to get CT sinuses and Temporal bone and to evaluate for TMJ disorder and see Mathew back after. At 6/2021 follow up Dr. 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 Since he was asymptomatic other than  autophony, plan for observation as made. Mention of tooth pain.     2/10/2024 -2/13/2024 - Fever, swelling, jaw pain, fatigue, slept most days, was treated with Amoxicillin  Mention of tooth  "pain noted on family medicine visit.     05/12/2024-5/15/2024 - Office visit with [facility] on 15th (reviewed mychart 6/17/2024)- fever rash, fatigue, joint pain. Presumed bug or tick bite per provider, none per wife's exam. Treated w Doxycycline. Photo taken 5/15/25. Photos taken days after peak and do not show worst  Reviewed Family med notes, noted mention of migrating joint pain     2/20/2025 - 02/24/2025 - Fevers, chills, rash, fatigue, swelling of right ear and right side of head. Rash area warm and painful to touch, extended under hair and down neck. Photo taken on 2/20/2025. [facility] visit 2/24/2025. Was given Levofloxacin. Advised to stop due to tendinitis. Ear still red on 2/24/2025 - Drs appt both at [facility] and [facility]. Noted diagnosis of otitis externa, and ciprodex prescribed.   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 Couldn't really tell if antibiotics helped in each episode or if they would have self resolved. By the time he is able to get in for visits they usually resolve. Usually a pressure from swelling more than pain. No runny nose/sinus congestion.headache or post nasal drip.     No camping/visiting a farm. Only international travel was to [place] Oct 1 to 8,2023. Mathew had a history of Covid August 2024 and March 2020. He visited the hospital between April 21st and May 8, 2024. They notice not pattern relation with food. No new soap or shampoo use, they try to use fragrance free since he tends to have sensitive skin    Fatigue persists, difficult to say if temporally related with rash episodes or covid.        Past Medical History:   Diagnosis Date     Adjustment disorder with depressed mood 10/25/2012     Borderline diabetes mellitus " 11/26/2015     Coronary artery disease 01/01/2012    CABG x4     Coronary artery disease involving coronary bypass graft of native heart without angina pectoris 11/18/2015     Depression      Goiter     With normal TSH     Hyperlipidemia     Started on statin 7/2007.  Discontinued 10/07 secondary to muscle aches and fatigue     Nephrolithiasis      Shoulder pain      Statin intolerance 06/23/2016       Past Surgical History:   Procedure Laterality Date     BYPASS GRAFT ARTERY CORONARY  6/24/2012    Procedure: BYPASS GRAFT ARTERY CORONARY;  Median Sternotomy, Coronary Artery Bypass Grafting x 4 using Left Internal Mammary and Left Saphenous Vein  with Endovein Harvesting. On Pump Oxygenation;  Surgeon: Genesis Larsen MD;  Location: [facility] OR     ESOPHAGOSCOPY, GASTROSCOPY, DUODENOSCOPY (EGD), COMBINED N/A 4/11/2017    Procedure: COMBINED ESOPHAGOSCOPY, GASTROSCOPY, DUODENOSCOPY (EGD), BIOPSY SINGLE OR MULTIPLE;  Surgeon: Corine Ly MD;  Location:  GI     OPEN REDUCTION INTERNAL FIXATION ANKLE Right 11/18/2022    Procedure: OPEN REDUCTION INTERNAL FIXATION, FRACTURE, ANKLE RIGHT;  Surgeon: Claudio Macias DO;  Location: [facility] Main OR       Family History   Problem Relation Age of Onset     Diabetes Brother      Cerebrovascular Disease Brother      Cardiovascular Sister         tachyarrhythmia     C.A.D. Other         Uncle     Glaucoma No family hx of      Macular Degeneration No family hx of        Social History     Social History Narrative    .      Social History     Tobacco Use     Smoking status: Never     Passive exposure: Past     Smokeless tobacco: Never   Vaping Use     Vaping status: Never Used   Substance Use Topics     Alcohol use: No     Drug use: No       Immunization History   Administered Date(s) Administered     COVID-19 12+ (Pfizer) 11/14/2023, 07/19/2024, 12/09/2024     COVID-19 Bivalent 12+ (Pfizer) 11/01/2022     COVID-19 Bivalent 18+ (Moderna)  07/14/2023     COVID-19 MONOVALENT 12+ (Pfizer) 03/31/2021, 04/21/2021, 11/15/2021     COVID-19 Monovalent 18+ (Moderna) 05/06/2022     Flu, Unspecified 11/24/1989     HIB (PRP-T) 12/16/1998     HepB 11/04/1998, 12/16/1998, 05/18/1999     Influenza (IIV3) PF 11/03/2008, 08/31/2009, 10/12/2011, 09/07/2012, 11/01/2014     Influenza Vaccine 18-64 (Flublok) 11/07/2021     Influenza Vaccine >6 months,quad, PF 01/17/2018, 11/06/2018, 10/05/2019, 01/03/2021, 12/17/2023     Influenza Vaccine Trivalent (FluBlok) 11/22/2024     Influenza,INJ,MDCK,PF,Quad >6mo(Flucelvax) 11/16/2022, 11/30/2022     MMR (MMRII) 04/07/2025     Pneumococcal 20 valent Conjugate (Prevnar 20) 11/22/2024     RSV (Abrysvo) 04/12/2024     TD,PF 7+ (Tenivac) 11/04/1998, 01/23/2006     TDAP (Adacel,Boostrix) 06/18/2012, 12/12/2022     Zoster recombinant adjuvanted (Shingrix) 06/27/2022, 03/24/2023       Patient Active Problem List   Diagnosis     CAD (coronary artery disease)     Hyperlipidemia LDL goal <70     Testicular hypofunction     Fatigue     Type 2 diabetes mellitus without complication, without long-term current use of insulin (H)     Calculus of gallbladder without cholecystitis without obstruction     Statin intolerance     Kidney stone     Functional dyspepsia     Gastroesophageal reflux disease without esophagitis     Optic cupping of both eyes     Myopia of both eyes with astigmatism and presbyopia     Age-related nuclear cataract     Osteoarthritis of finger, unspecified laterality     Digestive symptoms     Type 2 diabetes mellitus with other circulatory complication, without long-term current use of insulin (H)       Current Outpatient Medications   Medication Sig Dispense Refill     alpha-d-galactosidase (BEANO) tablet Take 2 tablets by mouth 3 times daily (before meals) 540 tablet 4     Ascorbic Acid (VITAMIN C) 500 MG CAPS Take 500 mg by mouth daily       aspirin 81 MG EC tablet Take 1 tablet (81 mg) by mouth daily.       Blood  Pressure Monitor KIT 1 each daily. 1 kit 0     Cholecalciferol (VITAMIN D3) 2000 UNITS CAPS Take 1 capsule by mouth daily 90 capsule 4     diclofenac (VOLTAREN) 1 % topical gel Apply 4 grams to knees or 2 grams to hands four times daily using enclosed dosing card. 100 g 1     evolocumab (REPATHA) 140 MG/ML prefilled autoinjector Inject 1 mL (140 mg) subcutaneously every 14 days. 6 mL 3     LACTAID FAST ACT 9000 units TABS tablet 3,000-9,000 Units as needed  4     Multiple Vitamins-Minerals (MENS MULTIVITAMIN PLUS) TABS Take 1 tablet by mouth daily 90 tablet 4     potassium citrate (UROCIT-K) 10 MEQ (1080 MG) CR tablet Take 1 tablet (10 mEq) by mouth 3 times daily (with meals). 100 tablet 3     pyridOXINE (VITAMIN B6) 100 MG TABS Take 1 tablet (100 mg) by mouth daily 90 tablet 4     STATIN NOT PRESCRIBED, INTENTIONAL, 1 each continuous Statin not prescribed intentionally due to Intolerance (with supporting documentation of trying a statin at least once within the last 5 years) 0 each 0     No current facility-administered medications for this visit.       Allergies   Allergen Reactions     Lidocaine Rash     Break out     Adhesive Tape      Adhesive     Band-Aid Anti-Itch      Pad on the bandaid, antibacterial gauze that causes reaction     Cholestoff Complete [Plant Sterol Stanol-Pantethine] Other (See Comments)     Severe stomach pain     Lactose Diarrhea     Levofloxacin Muscle Pain (Myalgia)     Questionable achilles tendinopathy     Rosuvastatin Muscle Pain (Myalgia)     Simvastatin      Other reaction(s): Muscle Aches     Zocor [Simvastatin - High Dose] Other (See Comments)     Muscle aches/soreness, confusion, disorganized thinking              Physical Exam:   Vitals were reviewed.  All vitals stable  BP (!) 153/78   Pulse 67   Temp 98.5  F (36.9  C)   Wt 99.8 kg (220 lb)   SpO2 97%   BMI 29.84 kg/m      Exam:  GENERAL:  well-developed, well-nourished, alert, oriented, in no acute distress.  HEENT:  Head  is normocephalic, atraumatic   EYES:  Eyes have anicteric sclerae.    LUNGS:  Breathing comfortably  SKIN:  No acute rashes.    NEUROLOGIC:  Grossly nonfocal.         Laboratory Data:       Inflammatory Markers    Recent Labs   Lab Test 02/24/25  0948 07/03/24  1032 12/08/21  1538 12/08/21  1440   SED 12 8 10  --    CRP  --   --   --  3.0       Metabolic Studies       Recent Labs   Lab Test 05/07/25  1049 02/24/25  0948 02/21/25  0826 06/17/24  1508 02/28/24  0844 01/09/23  1212 11/16/22  1510 11/16/22  1507 05/25/18  1107 10/09/17  1035   NA  --  140 139 142 140 141  --  140   < > 138   POTASSIUM  --  4.6 4.2 4.7 4.3 4.3  --  4.4   < > 4.4   CHLORIDE  --  103 102 105 104 103  --  104   < > 105   CO2  --  25 28 26 28 28  --  23   < > 29   ANIONGAP  --  12 9 11 8 10  --  13   < > 4   BUN  --  18.9 16.5 16.2 15.4 16.1  --  16.6   < > 18   CR  --  1.08 1.36* 1.24* 1.14 0.99  --  1.05   < > 1.07   GFRESTIMATED  --  77 58* 65 72 86  --  80   < > 71   GLC  --  146* 150* 111* 155* 119*  --  118*   < > 118*   A1C 6.4*  --  6.9*  --   --   --    < >  --    < >  --    TYSON  --  9.1 9.2 9.8 9.2 9.8  --  9.7   < > 9.1   CKT  --   --   --   --   --   --   --   --   --  125    < > = values in this interval not displayed.       Hematology Studies      Recent Labs   Lab Test 02/24/25  0948 06/17/24  1508 11/16/22  1507 05/25/18  1107   WBC 3.8* 6.3 6.3  --    ANEU 2.5  --  4.9  --    ALYM 0.9  --  0.8 0.6*   OTILIA 0.3  --  0.4  --    AEOS 0.2  --  0.1  --    HGB 14.8 15.6 14.5 15.7   HCT 44.9 47.1 43.4 51.2   * 156 168  --        Clotting Studies    Recent Labs   Lab Test 07/03/24  1032   INR 1.11       Urine Studies     Recent Labs   Lab Test 07/03/24  1032 11/11/21  1530 11/13/19  1153 08/13/18  0906 06/26/18  0000 06/05/18  1031   URINEPH 6.5 7.0 6.0 5.0 5.0 5.0   NITRITE Negative Negative Negative Negative Neg Negative   LEUKEST Trace* Negative  --  Negative Neg Negative   WBCU 0-5  --   --  1  --  2          Microbiology:  Last 6 Culture results with specimen source  Culture Micro   Date Value Ref Range Status   05/09/2005 No growth  Final   03/21/2005 No growth  Final    Specimen Description   Date Value Ref Range Status   11/13/2019 Urine  Final   01/24/2017 Feces  Final   06/22/2012 Nares  Final   07/16/2007 Serum  Final   05/09/2005 Midstream Urine  Final   03/21/2005 Midstream Urine  Final           Hepatitis C Antibody   Date Value Ref Range Status   11/19/2021 Nonreactive Nonreactive Final       Imaging:  Results for orders placed or performed in visit on 07/03/24   XR Hand Bilateral G/E 3 Views    Narrative    XR HAND BILATERAL G/E 3 VIEWS 7/3/2024 10:46 AM     HISTORY: Other fatigue; Migratory polyarthritis; Positive SOTO  (antinuclear antibody)    COMPARISON: None.         Impression    IMPRESSION: Both hands negative for fracture or dislocation.  Degenerative change at multiple IP joints of both hands. No erosive  changes are identified. Degenerative change of the STT joint and first  CMC joint bilaterally.    VOLODYMYR COBB MD         SYSTEM ID:  [id number]           Again, thank you for allowing me to participate in the care of your patient.      Sincerely,     Gen ID

## 2025-05-19 NOTE — NURSING NOTE
Chief Complaint   Patient presents with    Consult     Recurrent fever       Vital signs:  Temp: 98.5  F (36.9  C)   BP: (!) 153/78 Pulse: 67     SpO2: 97 %       Weight: 99.8 kg (220 lb)  Estimated body mass index is 29.84 kg/m  as calculated from the following:    Height as of 5/7/25: 1.829 m (6').    Weight as of this encounter: 99.8 kg (220 lb).      Carola Monroe [facility]   5/19/2025 1:15 PM

## 2025-05-19 NOTE — PATIENT INSTRUCTIONS
- Based on our discussion today, one possibility is a dermatitis, which may or may not be secondarily infected  - Otitis externa has been considered in your past episodes. This is a possibility as well based on pictures  - Shingles and sinusitis were considered but pictures not classic for them  - Evolocumab lists among its reported side effects rash, dermatitis and nasopharyngeal infections so I wonder if Evolocumab is the underlying cause, whatever the actual diagnosis ends up being  - To help tease out actual diagnosis, when another episode happens, please reach out with a [employer] message with pictures attached - I can help direct additional tests, imaging, and antibiotics or visits as needed  - If an episode happens when you are out of town([place] etc), please seek help at an urgent care   - I have also created a Dermatology referral so you can get established with them - they can help tease out dermatitis possibility, and also help with a skin biopsy if another episode happens  - For fatigue and history of fevers - I ordered some tickborne infection workup and blood cultures to complete infectious workup, can do as per your convenience  - References: 1.  Drugs Real World Outcomes. 2024 Jul 2;11(3):465-475. doi: 10.1007/[id number] 2.  [email or web address]  - Follow up as needed

## 2025-05-20 ENCOUNTER — PATIENT OUTREACH (OUTPATIENT)
Dept: CARE COORDINATION | Facility: CLINIC | Age: 65
End: 2025-05-20
Payer: COMMERCIAL

## 2025-05-22 ENCOUNTER — PATIENT OUTREACH (OUTPATIENT)
Dept: CARE COORDINATION | Facility: CLINIC | Age: 65
End: 2025-05-22
Payer: COMMERCIAL

## 2025-05-23 PROCEDURE — 99000 SPECIMEN HANDLING OFFICE-LAB: CPT | Performed by: PATHOLOGY

## 2025-05-23 PROCEDURE — 87798 DETECT AGENT NOS DNA AMP: CPT | Performed by: STUDENT IN AN ORGANIZED HEALTH CARE EDUCATION/TRAINING PROGRAM

## 2025-05-23 PROCEDURE — 87040 BLOOD CULTURE FOR BACTERIA: CPT | Performed by: STUDENT IN AN ORGANIZED HEALTH CARE EDUCATION/TRAINING PROGRAM

## 2025-06-03 ENCOUNTER — RESULTS FOLLOW-UP (OUTPATIENT)
Dept: INFECTIOUS DISEASES | Facility: CLINIC | Age: 65
End: 2025-06-03

## 2025-08-17 ENCOUNTER — HEALTH MAINTENANCE LETTER (OUTPATIENT)
Age: 65
End: 2025-08-17

## 2025-08-26 DIAGNOSIS — N20.0 CALCULUS OF KIDNEY: ICD-10-CM

## 2025-08-26 RX ORDER — POTASSIUM CITRATE 1080 MG/1
10 TABLET, EXTENDED RELEASE ORAL
Qty: 100 TABLET | Refills: 3 | Status: SHIPPED | OUTPATIENT
Start: 2025-08-26

## (undated) DEVICE — DRESSING XEROFORM PETROLATUM 5X9 33605

## (undated) DEVICE — Device

## (undated) DEVICE — SU ETHILON 3-0 PS-1 18" 1663G

## (undated) DEVICE — CUSTOM PACK LOWER EXTREMITY SOP5BLEHEA

## (undated) DEVICE — ALCOHOL ISOPROPYL 4 OZ 70% IA7004

## (undated) DEVICE — ESU PENCIL SMOKE EVAC W/ROCKER SWITCH 0703-047-000

## (undated) DEVICE — DRSG GAUZE 4X4" 3033

## (undated) DEVICE — DRAPE C-ARMOR 5 SIDED 5523

## (undated) DEVICE — SU VICRYL 3-0 CT-2 27" UND J232H

## (undated) DEVICE — GLOVE BIOGEL PI ULTRATOUCH G SZ 7.5 42175

## (undated) DEVICE — DRILL BIT ARTHREX LPS CAN 3.5MM AR-4160-35

## (undated) DEVICE — SUCTION MANIFOLD NEPTUNE 2 SYS 1 PORT 702-025-000

## (undated) DEVICE — GLOVE BIOGEL PI SZ 6.5 40865

## (undated) DEVICE — SOL NACL 0.9% IRRIG 1000ML BOTTLE 2F7124

## (undated) DEVICE — GOWN IMPERVIOUS BREATHABLE SMART XLG 89045

## (undated) DEVICE — PLATE GROUNDING ADULT W/CORD 9165L

## (undated) DEVICE — DRAPE C-ARM 60X42" 1013

## (undated) DEVICE — GLOVE BIOGEL PI INDICATOR 8.0 LF 41680

## (undated) DEVICE — DRILL BIT ARTHREX 2.5MM AR-8943-30

## (undated) DEVICE — SUTURE VICRYL+ 2-0 CT-2 27" UND VCP269H

## (undated) DEVICE — GLOVE UNDER INDICATOR PI SZ 6.5 LF 41665

## (undated) DEVICE — PREP CHLORAPREP 26ML TINTED HI-LITE ORANGE 930815

## (undated) DEVICE — SOL WATER IRRIG 1000ML BOTTLE 2F7114

## (undated) DEVICE — SOLIDIFIER FLD 3.2OZ  CL-FREE F/ BIOHZRD WASTE 3000ML CANIST

## (undated) RX ORDER — FENTANYL CITRATE-0.9 % NACL/PF 10 MCG/ML
PLASTIC BAG, INJECTION (ML) INTRAVENOUS
Status: DISPENSED
Start: 2022-11-18

## (undated) RX ORDER — LIDOCAINE HYDROCHLORIDE 10 MG/ML
INJECTION, SOLUTION EPIDURAL; INFILTRATION; INTRACAUDAL; PERINEURAL
Status: DISPENSED
Start: 2022-11-18

## (undated) RX ORDER — DEXAMETHASONE SODIUM PHOSPHATE 10 MG/ML
INJECTION, EMULSION INTRAMUSCULAR; INTRAVENOUS
Status: DISPENSED
Start: 2022-11-18

## (undated) RX ORDER — EPHEDRINE SULFATE 50 MG/ML
INJECTION, SOLUTION INTRAMUSCULAR; INTRAVENOUS; SUBCUTANEOUS
Status: DISPENSED
Start: 2022-11-18

## (undated) RX ORDER — FENTANYL CITRATE 50 UG/ML
INJECTION, SOLUTION INTRAMUSCULAR; INTRAVENOUS
Status: DISPENSED
Start: 2022-11-18

## (undated) RX ORDER — ONDANSETRON 2 MG/ML
INJECTION INTRAMUSCULAR; INTRAVENOUS
Status: DISPENSED
Start: 2022-11-18

## (undated) RX ORDER — DIPHENHYDRAMINE HYDROCHLORIDE 50 MG/ML
INJECTION INTRAMUSCULAR; INTRAVENOUS
Status: DISPENSED
Start: 2017-04-11

## (undated) RX ORDER — PROPOFOL 10 MG/ML
INJECTION, EMULSION INTRAVENOUS
Status: DISPENSED
Start: 2022-11-18

## (undated) RX ORDER — FENTANYL CITRATE 50 UG/ML
INJECTION, SOLUTION INTRAMUSCULAR; INTRAVENOUS
Status: DISPENSED
Start: 2017-04-11